# Patient Record
Sex: FEMALE | Race: WHITE | Employment: OTHER | RURAL
[De-identification: names, ages, dates, MRNs, and addresses within clinical notes are randomized per-mention and may not be internally consistent; named-entity substitution may affect disease eponyms.]

---

## 2021-03-11 RX ORDER — CARVEDILOL 3.12 MG/1
3.12 TABLET ORAL 2 TIMES DAILY WITH MEALS
COMMUNITY
End: 2021-12-06

## 2021-03-11 RX ORDER — DICLOFENAC POTASSIUM 50 MG/1
50 TABLET, FILM COATED ORAL DAILY
COMMUNITY
End: 2021-12-06

## 2021-03-11 RX ORDER — ASPIRIN 81 MG/1
81 TABLET ORAL DAILY
COMMUNITY

## 2021-03-11 RX ORDER — LOSARTAN POTASSIUM 50 MG/1
50 TABLET ORAL DAILY
COMMUNITY
End: 2021-12-06

## 2021-03-11 RX ORDER — OMEPRAZOLE 20 MG/1
20 CAPSULE, DELAYED RELEASE ORAL DAILY
COMMUNITY

## 2021-03-11 NOTE — PERIOP NOTES
[address] [place] [address]  SURGICAL PRE-ADMISSION INSTRUCTIONS    ARRIVAL  · You will be called the day before your surgery with your expected arrival time. · Sign in at the  of the hospital.  You will be directed to the Surgical Waiting Room. · Please arrive at your scheduled appointment time. You have been scheduled to arrive for your procedure one or two hours prior to the expected start time of your procedure. · Every effort will be made to minimize your wait but please be aware that unforeseen circumstances may affect our schedule. EATING  · DO NOT EAT OR DRINK ANYTHING AFTER MIDNIGHT ON THE EVENING BEFORE YOUR SURGERY OR ON THE DAY OF YOUR SURGERY except for your medications (as instructed) with a sip of water. · Do not use gum, mints or lozenges on the morning of your surgery. · Please do not smoke or chew tobacco before your surgery. MEDICATIONS   · Take the following medications on the morning of your surgery with the smallest amount of water possible : Omeprazole and Carvedilol    STOP THESE MEDICATIONS AT THE TIMES LISTED BELOW  Aspirin ;  7 days before                                                   DRIVING/TRANSPORATION  · Have a responsible adult to drive you home from the hospital and to stay with you over night. Please have them plan to remain in the hospital during your surgery. Your surgery will not be done if you do not have a responsible adult to take you home and to stay with you. · If you have arranged for public transport, you must have a responsible adult to ride with you (who is not the ). · You may not drive for 24 hours after anesthesia. PREPARATION  · If you have a Living WiIl/Advance Directive, please bring a copy with you to scan into your chart. · Please DO NOT wear makeup or nail polish  · Please leave valuables at home,  DO NOT wear jewelry.   · Wear loose, comfortable clothing that is large enough to cover a bulky dressing. SPECIAL INSTRUCTIONS:  · Shower with the Preoperative Skin Preparation CHLORHEXIDINE as instructed.     Reviewed above preoperative instructions and answered questions by phone interview    Patient:  Stefanie Chamberlainmonds   Date:     March 11, 2021  Time:   2:17 PM    RN:  Gaetano Diaz RN    Date:     March 11, 2021  Time:   2:17 PM

## 2021-03-15 ENCOUNTER — ANESTHESIA EVENT (OUTPATIENT)
Dept: SURGERY | Age: 86
End: 2021-03-15
Payer: MEDICARE

## 2021-03-15 PROBLEM — G56.02 LEFT CARPAL TUNNEL SYNDROME: Status: ACTIVE | Noted: 2021-03-15

## 2021-03-15 NOTE — H&P
History and Physical    Patient: Sushila Marcelo MRN: [id number]  SSN: [id number]    YOB: 1935  Age: 81 y.o. Sex: female      Subjective:      Sushila Marcelo is a 81 y.o. female who  Presents with left hand pain and paresthesias with permanent sensory change into the thumb. She has been diagnosed with carpal tunnel syndrome on the left. She has tried to use splints and manage nonsurgically without relief. She is here for surgical release of her carpal tunnel syndrome. .     Past Medical History:   Diagnosis Date    Anxiety     Hypertension      Past Surgical History:   Procedure Laterality Date    HX KNEE REPLACEMENT Right 2011    HX KNEE REPLACEMENT Left 2013      History reviewed. No pertinent family history. Social History     Tobacco Use    Smoking status: Never Smoker    Smokeless tobacco: Never Used   Substance Use Topics    Alcohol use: Not Currently      Prior to Admission medications    Medication Sig Start Date End Date Taking? Authorizing Provider   diclofenac potassium (CATAFLAM) 50 mg tablet Take 50 mg by mouth daily. Yes Provider, Historical   losartan (COZAAR) 50 mg tablet Take 50 mg by mouth daily. Yes Provider, Historical   carvediloL (COREG) 3.125 mg tablet Take 3.125 mg by mouth two (2) times daily (with meals). Yes Provider, Historical   omeprazole (PRILOSEC) 20 mg capsule Take 20 mg by mouth daily. Yes Provider, Historical   aspirin delayed-release 81 mg tablet Take 81 mg by mouth daily. Yes Provider, Historical   triamterene-hydrochlorothiazide (MAXZIDE) 37.5-25 mg per tablet take 1 tablet by mouth once daily for fluid retention and blood pressure 8/4/15  Yes Roberto Carlos Noble MD   simvastatin (ZOCOR) 40 mg tablet Take 1 Tab by mouth nightly.  For cholesterol 5/19/14  Yes Brenda Oropeza NP   verapamil SR (CALAN-SR) 240 mg CR tablet take 1 tablet by mouth NIGHTLY 5/5/14  Yes Roberto Carlos Noble MD   ALPRAZolam [place]) 0.25 mg tablet take 1 tablet by mouth three times a day if needed for anxiety 2/17/14  Yes Gerardo Oropeza NP   calcium-vitamin, d3, (OS-MARINA 250) 250-125 mg-unit tablet Take 1 Tab by mouth daily. Yes Provider, Historical   MULTIVITS W-FE,OTHER MIN (CENTRUM PO) Take  by mouth. Yes Provider, Historical        Allergies   Allergen Reactions    Novocain [Procaine] Anaphylaxis     Throat swelling    Ace Inhibitors Cough    Penicillins Rash       Review of Systems:  A comprehensive review of systems was negative. Objective:     Vitals:    03/11/21 1350   Weight: 83.9 kg (184 lb 15.5 oz)   Height: 5' 7\" (1.702 m)        Physical Exam:  GENERAL: alert, cooperative, no distress, appears stated age  LUNG: clear to auscultation bilaterally  HEART: regular rate and rhythm, S1, S2 normal, no murmur, click, rub or gallop  ABDOMEN: soft, non-tender. Bowel sounds normal. No masses,  no organomegaly  EXTREMITIES:  extremities normal, atraumatic, no cyanosis or edema  SKIN: Normal  NEUROLOGIC: permanent paresthesia in left thumb.     Assessment:     Hospital Problems  Date Reviewed: 3/15/2021          Codes Class Noted POA    * (Principal) Left carpal tunnel syndrome ICD-10-CM: G56.02  ICD-9-CM: 354.0  3/15/2021 Yes              Plan:       Left carpal tunnel release    Signed By: Loyda Garcia MD     March 15, 2021

## 2021-03-16 ENCOUNTER — ANESTHESIA (OUTPATIENT)
Dept: SURGERY | Age: 86
End: 2021-03-16
Payer: MEDICARE

## 2021-03-16 ENCOUNTER — HOSPITAL ENCOUNTER (OUTPATIENT)
Age: 86
Setting detail: OUTPATIENT SURGERY
Discharge: HOME | End: 2021-03-16
Attending: ORTHOPAEDIC SURGERY | Admitting: ORTHOPAEDIC SURGERY
Payer: MEDICARE

## 2021-03-16 VITALS
BODY MASS INDEX: 29.03 KG/M2 | TEMPERATURE: 97 F | HEART RATE: 67 BPM | WEIGHT: 184.97 LBS | DIASTOLIC BLOOD PRESSURE: 61 MMHG | SYSTOLIC BLOOD PRESSURE: 154 MMHG | HEIGHT: 67 IN | OXYGEN SATURATION: 95 % | RESPIRATION RATE: 13 BRPM

## 2021-03-16 DIAGNOSIS — G56.02 LEFT CARPAL TUNNEL SYNDROME: Primary | ICD-10-CM

## 2021-03-16 LAB
ANION GAP SERPL CALC-SCNC: 8 MMOL/L (ref 5–15)
BASOPHILS # BLD: 0 K/UL (ref 0–0.1)
BASOPHILS NFR BLD: 0 % (ref 0–1)
BUN SERPL-MCNC: 38 MG/DL (ref 6–20)
BUN/CREAT SERPL: 25 (ref 12–20)
CALCIUM SERPL-MCNC: 8.9 MG/DL (ref 8.5–10.1)
CHLORIDE SERPL-SCNC: 105 MMOL/L (ref 97–108)
CO2 SERPL-SCNC: 27 MMOL/L (ref 21–32)
CREAT SERPL-MCNC: 1.5 MG/DL (ref 0.55–1.02)
DIFFERENTIAL METHOD BLD: NORMAL
EOSINOPHIL # BLD: 0.4 K/UL (ref 0–0.4)
EOSINOPHIL NFR BLD: 5 % (ref 0–7)
ERYTHROCYTE [DISTWIDTH] IN BLOOD BY AUTOMATED COUNT: 12.7 % (ref 11.5–14.5)
GLUCOSE SERPL-MCNC: 155 MG/DL (ref 65–100)
HCT VFR BLD AUTO: 38.1 % (ref 35–47)
HGB BLD-MCNC: 12.3 G/DL (ref 11.5–16)
IMM GRANULOCYTES # BLD AUTO: 0 K/UL (ref 0–0.04)
IMM GRANULOCYTES NFR BLD AUTO: 0 % (ref 0–0.5)
LYMPHOCYTES # BLD: 2.2 K/UL (ref 0.8–3.5)
LYMPHOCYTES NFR BLD: 25 % (ref 12–49)
MCH RBC QN AUTO: 30.4 PG (ref 26–34)
MCHC RBC AUTO-ENTMCNC: 32.3 G/DL (ref 30–36.5)
MCV RBC AUTO: 94.1 FL (ref 80–99)
MONOCYTES # BLD: 0.9 K/UL (ref 0–1)
MONOCYTES NFR BLD: 10 % (ref 5–13)
NEUTS SEG # BLD: 5.5 K/UL (ref 1.8–8)
NEUTS SEG NFR BLD: 60 % (ref 32–75)
NRBC # BLD: 0 K/UL (ref 0–0.01)
NRBC BLD-RTO: 0 PER 100 WBC
PLATELET # BLD AUTO: 335 K/UL (ref 150–400)
PMV BLD AUTO: 9.2 FL (ref 8.9–12.9)
POTASSIUM SERPL-SCNC: 5.3 MMOL/L (ref 3.5–5.1)
RBC # BLD AUTO: 4.05 M/UL (ref 3.8–5.2)
SODIUM SERPL-SCNC: 140 MMOL/L (ref 136–145)
WBC # BLD AUTO: 9 K/UL (ref 3.6–11)

## 2021-03-16 PROCEDURE — 76010000154 HC OR TIME FIRST 0.5 HR: Performed by: ORTHOPAEDIC SURGERY

## 2021-03-16 PROCEDURE — 74011250636 HC RX REV CODE- 250/636: Performed by: ORTHOPAEDIC SURGERY

## 2021-03-16 PROCEDURE — 76060000031 HC ANESTHESIA FIRST 0.5 HR: Performed by: ORTHOPAEDIC SURGERY

## 2021-03-16 PROCEDURE — 85025 COMPLETE CBC W/AUTO DIFF WBC: CPT

## 2021-03-16 PROCEDURE — 76210000063 HC OR PH I REC FIRST 0.5 HR: Performed by: ORTHOPAEDIC SURGERY

## 2021-03-16 PROCEDURE — 77030000032 HC CUF TRNQT ZIMM -B: Performed by: ORTHOPAEDIC SURGERY

## 2021-03-16 PROCEDURE — 36415 COLL VENOUS BLD VENIPUNCTURE: CPT

## 2021-03-16 PROCEDURE — 74011250637 HC RX REV CODE- 250/637: Performed by: ANESTHESIOLOGY

## 2021-03-16 PROCEDURE — 77030002916 HC SUT ETHLN J&J -A: Performed by: ORTHOPAEDIC SURGERY

## 2021-03-16 PROCEDURE — 93005 ELECTROCARDIOGRAM TRACING: CPT

## 2021-03-16 PROCEDURE — 80048 BASIC METABOLIC PNL TOTAL CA: CPT

## 2021-03-16 PROCEDURE — 74011250636 HC RX REV CODE- 250/636: Performed by: ANESTHESIOLOGY

## 2021-03-16 PROCEDURE — 2709999900 HC NON-CHARGEABLE SUPPLY: Performed by: ORTHOPAEDIC SURGERY

## 2021-03-16 PROCEDURE — 74011000250 HC RX REV CODE- 250: Performed by: ORTHOPAEDIC SURGERY

## 2021-03-16 RX ORDER — BUPIVACAINE HYDROCHLORIDE 5 MG/ML
INJECTION, SOLUTION EPIDURAL; INTRACAUDAL AS NEEDED
Status: DISCONTINUED | OUTPATIENT
Start: 2021-03-16 | End: 2021-03-16 | Stop reason: HOSPADM

## 2021-03-16 RX ORDER — HYDROCODONE BITARTRATE AND ACETAMINOPHEN 5; 325 MG/1; MG/1
1 TABLET ORAL
Qty: 12 TAB | Refills: 0 | Status: SHIPPED | OUTPATIENT
Start: 2021-03-16 | End: 2021-03-19

## 2021-03-16 RX ORDER — SODIUM CHLORIDE, SODIUM LACTATE, POTASSIUM CHLORIDE, CALCIUM CHLORIDE 600; 310; 30; 20 MG/100ML; MG/100ML; MG/100ML; MG/100ML
25 INJECTION, SOLUTION INTRAVENOUS CONTINUOUS
Status: DISCONTINUED | OUTPATIENT
Start: 2021-03-16 | End: 2021-03-16 | Stop reason: HOSPADM

## 2021-03-16 RX ORDER — ACETAMINOPHEN 500 MG
1000 TABLET ORAL ONCE
Status: COMPLETED | OUTPATIENT
Start: 2021-03-16 | End: 2021-03-16

## 2021-03-16 RX ORDER — LABETALOL HCL 20 MG/4 ML
SYRINGE (ML) INTRAVENOUS AS NEEDED
Status: DISCONTINUED | OUTPATIENT
Start: 2021-03-16 | End: 2021-03-16 | Stop reason: HOSPADM

## 2021-03-16 RX ORDER — PROPOFOL 10 MG/ML
INJECTION, EMULSION INTRAVENOUS AS NEEDED
Status: DISCONTINUED | OUTPATIENT
Start: 2021-03-16 | End: 2021-03-16 | Stop reason: HOSPADM

## 2021-03-16 RX ORDER — BUPIVACAINE HYDROCHLORIDE 5 MG/ML
20 INJECTION, SOLUTION EPIDURAL; INTRACAUDAL ONCE
Status: DISCONTINUED | OUTPATIENT
Start: 2021-03-16 | End: 2021-03-16 | Stop reason: HOSPADM

## 2021-03-16 RX ADMIN — PROPOFOL 30 MG: 10 INJECTION, EMULSION INTRAVENOUS at 11:28

## 2021-03-16 RX ADMIN — LABETALOL 20 MG/4 ML (5 MG/ML) INTRAVENOUS SYRINGE 10 MG: at 11:20

## 2021-03-16 RX ADMIN — SODIUM CHLORIDE, POTASSIUM CHLORIDE, SODIUM LACTATE AND CALCIUM CHLORIDE 25 ML/HR: 600; 310; 30; 20 INJECTION, SOLUTION INTRAVENOUS at 10:41

## 2021-03-16 RX ADMIN — ACETAMINOPHEN 1000 MG: 500 TABLET ORAL at 11:12

## 2021-03-16 RX ADMIN — PROPOFOL 50 MG: 10 INJECTION, EMULSION INTRAVENOUS at 11:21

## 2021-03-16 NOTE — ANESTHESIA POSTPROCEDURE EVALUATION
Post-Anesthesia Evaluation and Assessment    Cardiovascular Function/Vital Signs  Visit Vitals  /63   Pulse 62   Temp 36.1 °C (97 °F)   Resp 18   Ht 5' 7\" (1.702 m)   Wt 83.9 kg (184 lb 15.5 oz)   SpO2 97%   BMI 28.97 kg/m²       Patient is status post Procedure(s) with comments:  LEFT HAND CARPAL TUNNEL RELEASE - [facility] 3/11/21. Nausea/Vomiting: Controlled. Postoperative hydration reviewed and adequate. Pain:  Pain Scale 1: Numeric (0 - 10) (03/16/21 1145)  Pain Intensity 1: 0 (03/16/21 1145)   Managed. Neurological Status:   Neuro (WDL): Within Defined Limits (03/16/21 1145)   At baseline. Mental Status and Level of Consciousness: Arousable. Pulmonary Status:   O2 Device: Nasal cannula (03/16/21 1148)   Adequate oxygenation and airway patent. Complications related to anesthesia: None    Post-anesthesia assessment completed. No concerns. Patient has met all discharge requirements. Signed By: Ivette Mina MD    March 16, 2021               Procedure(s):  LEFT HAND CARPAL TUNNEL RELEASE. MAC    <BSHSIANPOST>    INITIAL Post-op Vital signs:   Vitals Value Taken Time   /63 03/16/21 1148   Temp 36.1 °C (97 °F) 03/16/21 1148   Pulse 58 03/16/21 1149   Resp 21 03/16/21 1149   SpO2 96 % 03/16/21 1149   Vitals shown include unvalidated device data.

## 2021-03-16 NOTE — DISCHARGE INSTRUCTIONS
Miki Richardson MD      POST-OPERATIVE INSTRUCTIONS  Carpal Tunnel Release    Patient Name  Reinaldo Medina  Date of procedure  3/16/2021    Procedure  Procedure(s) with comments:  LEFT HAND CARPAL TUNNEL RELEASE - [facility] 3/11/21  Surgeon  Surgeon(s) and Role:     * Lesvia Gan MD - Primary  Date of discharge: No discharge date for patient encounter. PCP: Christiana Gray MD      1. First meal at home should be clear liquids. Progress to regular diet as tolerated. 2. Apply an ice bag or use cold therapy device for 20-30 minutes 4 times a day for the first 48-72 hours to reduce swelling and pain and then use as desired. 3. A prescription for pain medication will be provided to you on the day of surgery. Do not take any aspirin for one week after surgery. Please inform us of any allergies. 4. You may remove the bulky dressing 48 hours after surgery and leave the clear dressing in place. You may shower with clear dressing in place. You may remove the clear dressing 5 days after your surgery, if necessary, and cover with a bandaid. Avoid any lifting, gripping, or use with that hand until follow-up appointment. 5. A post-op appointment has been scheduled for you. Please call the office if you need to re-schedule your appointment for another day or time ([phone]). 6. If you develop a fever greater than 101, unexpected redness or swelling, please call the office immediately. Some bleeding and or drainage can be expected the first few days after surgery. Thank you for following the above instructions. If you have any questions, please call my office and the  will put you in touch with one of my assistants ([phone]).     ______________________________________________________________________    Anesthesia Discharge Instructions    After general anesthesia or intervenous sedation, for 24 hours or while taking prescription Narcotics:  · Limit your activities  · Do not drive or operate hazardous machinery  · If you have not urinated within 8 hours after discharge, please contact your surgeon on call. · Do not make important personal or business decisions  · Do not drink alcoholic beverages    Report the following to your surgeon:  · Excessive pain, swelling, redness or odor of or around the surgical area  · Temperature over 100.5 degrees  · Nausea and vomiting lasting longer than 4 hours or if unable to take medication  · Any signs of decreased circulation or nerve impairment to extremity:  Change in color, persistent numbness, tingling, coldness or increased pain.   · Any questions

## 2021-03-16 NOTE — ANESTHESIA PREPROCEDURE EVALUATION
Relevant Problems   No relevant active problems       Anesthetic History   No history of anesthetic complications            Review of Systems / Medical History  Patient summary reviewed, nursing notes reviewed and pertinent labs reviewed    Pulmonary  Within defined limits                 Neuro/Psych   Within defined limits           Cardiovascular    Hypertension              Exercise tolerance: >4 METS     GI/Hepatic/Renal     GERD: well controlled           Endo/Other    Diabetes: well controlled    Arthritis     Other Findings              Physical Exam    Airway  Mallampati: II  TM Distance: 4 - 6 cm  Neck ROM: normal range of motion   Mouth opening: Normal     Cardiovascular               Dental  No notable dental hx       Pulmonary                 Abdominal  GI exam deferred       Other Findings            Anesthetic Plan    ASA: 2  Anesthesia type: MAC          Induction: Intravenous  Anesthetic plan and risks discussed with: Patient

## 2021-03-16 NOTE — OP NOTES
NAME: Sushila Marcelo  Surgeon: Papi Jj MD         Preoperative Diagnosis: left Carpal Tunnel Syndrome    Postoperative Diagnosis: left Carpal Tunnel Syndrome    Surgeon: Papi Jj MD    Specimen removed: none    Estimated blood loss: minimal    Anesthesia: LOcal/ MAC    Indications: A 81 y.o. female with pain and paresthesias in the median nerve distribution, not responsive to conservative management with positive   EMG. Description of Procedure: The patient was taken to the operating room and given conscious sedation. The right hand and arm were prepped and draped in   the usual fashion. Lidocaine 1% was used to infiltrate the area for the   proposed incision. The hand was elevated and manually exsanguinated and the   tourniquet inflated to 250 mmHg. A longitudinal palmar incision was made in line with the ring finger down   through skin and subcutaneous tissue. A self-retaining retractor was   placed. The transverse carpal ligament was identified and transected down   to the underlying carpal canal. The median nerve was identified. A dilator   was advanced just underneath the ligament, thereby protecting the nerve. A   15 blade was advanced down the groove with the dilator releasing the   ligament distally well into the distal palmar fascia. The dilator was then   advanced proximally again just underneath the ligament, thereby protecting   the nerve. A 15 blade was advanced down the groove with the dilator   releasing the ligament proximally well up in the distal forearm fascia. I then evaluated the nerve, noted it to be intact and free of further   compression. The skin edges were approximated using 4-0 nylon in horizontal   mattress fashion. A bulky sterile dressing was applied and the tourniquet   was let down after a total tourniquet time of 9 minutes. The patient was   then transferred to the recovery room in stable condition. There were no   Complications.      Estimated Blood Loss: Minimal    Specimen: None    Reviewed on 3/16/2021 at 11:43 AM    Destiny Matute MD

## 2021-03-19 LAB
ATRIAL RATE: 61 BPM
CALCULATED P AXIS, ECG09: 71 DEGREES
CALCULATED R AXIS, ECG10: 52 DEGREES
CALCULATED T AXIS, ECG11: 64 DEGREES
DIAGNOSIS, 93000: NORMAL
P-R INTERVAL, ECG05: 174 MS
Q-T INTERVAL, ECG07: 428 MS
QRS DURATION, ECG06: 96 MS
QTC CALCULATION (BEZET), ECG08: 430 MS
VENTRICULAR RATE, ECG03: 61 BPM

## 2021-08-04 ENCOUNTER — HOSPITAL ENCOUNTER (OUTPATIENT)
Dept: GENERAL RADIOLOGY | Age: 86
Discharge: HOME | End: 2021-08-04
Payer: MEDICARE

## 2021-08-04 ENCOUNTER — TRANSCRIBE ORDER (OUTPATIENT)
Dept: REGISTRATION | Age: 86
End: 2021-08-04

## 2021-08-04 DIAGNOSIS — M25.551 RIGHT HIP PAIN: Primary | ICD-10-CM

## 2021-08-04 DIAGNOSIS — M25.551 RIGHT HIP PAIN: ICD-10-CM

## 2021-08-04 PROCEDURE — 73502 X-RAY EXAM HIP UNI 2-3 VIEWS: CPT

## 2021-11-14 ENCOUNTER — HOSPITAL ENCOUNTER (INPATIENT)
Age: 86
LOS: 22 days | Discharge: NURSING, REHAB OR LONG-TERM CARE | DRG: 233 | End: 2021-12-06
Attending: INTERNAL MEDICINE | Admitting: THORACIC SURGERY (CARDIOTHORACIC VASCULAR SURGERY)
Payer: MEDICARE

## 2021-11-14 ENCOUNTER — APPOINTMENT (OUTPATIENT)
Dept: NON INVASIVE DIAGNOSTICS | Age: 86
DRG: 233 | End: 2021-11-14
Attending: INTERNAL MEDICINE
Payer: MEDICARE

## 2021-11-14 DIAGNOSIS — Z95.1 S/P CABG X 4: ICD-10-CM

## 2021-11-14 DIAGNOSIS — E11.9 TYPE II OR UNSPECIFIED TYPE DIABETES MELLITUS WITHOUT MENTION OF COMPLICATION, NOT STATED AS UNCONTROLLED: ICD-10-CM

## 2021-11-14 DIAGNOSIS — I21.4 NSTEMI (NON-ST ELEVATED MYOCARDIAL INFARCTION) (HCC): ICD-10-CM

## 2021-11-14 LAB
ANION GAP SERPL CALC-SCNC: 5 MMOL/L (ref 5–15)
BUN SERPL-MCNC: 17 MG/DL (ref 6–20)
BUN/CREAT SERPL: 13 (ref 12–20)
CALCIUM SERPL-MCNC: 9.6 MG/DL (ref 8.5–10.1)
CHLORIDE SERPL-SCNC: 103 MMOL/L (ref 97–108)
CO2 SERPL-SCNC: 25 MMOL/L (ref 21–32)
CREAT SERPL-MCNC: 1.34 MG/DL (ref 0.55–1.02)
ERYTHROCYTE [DISTWIDTH] IN BLOOD BY AUTOMATED COUNT: 12.9 % (ref 11.5–14.5)
GLUCOSE BLD STRIP.AUTO-MCNC: 114 MG/DL (ref 65–117)
GLUCOSE BLD STRIP.AUTO-MCNC: 139 MG/DL (ref 65–117)
GLUCOSE BLD STRIP.AUTO-MCNC: 147 MG/DL (ref 65–117)
GLUCOSE BLD STRIP.AUTO-MCNC: 180 MG/DL (ref 65–117)
GLUCOSE SERPL-MCNC: 147 MG/DL (ref 65–100)
HCT VFR BLD AUTO: 39.1 % (ref 35–47)
HGB BLD-MCNC: 12.6 G/DL (ref 11.5–16)
MCH RBC QN AUTO: 30.6 PG (ref 26–34)
MCHC RBC AUTO-ENTMCNC: 32.2 G/DL (ref 30–36.5)
MCV RBC AUTO: 94.9 FL (ref 80–99)
NRBC # BLD: 0 K/UL (ref 0–0.01)
NRBC BLD-RTO: 0 PER 100 WBC
PLATELET # BLD AUTO: 351 K/UL (ref 150–400)
PMV BLD AUTO: 9 FL (ref 8.9–12.9)
POTASSIUM SERPL-SCNC: 4.4 MMOL/L (ref 3.5–5.1)
RBC # BLD AUTO: 4.12 M/UL (ref 3.8–5.2)
SERVICE CMNT-IMP: ABNORMAL
SERVICE CMNT-IMP: NORMAL
SODIUM SERPL-SCNC: 133 MMOL/L (ref 136–145)
TROPONIN-HIGH SENSITIVITY: 406 NG/L (ref 0–51)
WBC # BLD AUTO: 9.4 K/UL (ref 3.6–11)

## 2021-11-14 PROCEDURE — 51798 US URINE CAPACITY MEASURE: CPT

## 2021-11-14 PROCEDURE — 36415 COLL VENOUS BLD VENIPUNCTURE: CPT

## 2021-11-14 PROCEDURE — 93306 TTE W/DOPPLER COMPLETE: CPT

## 2021-11-14 PROCEDURE — 80048 BASIC METABOLIC PNL TOTAL CA: CPT

## 2021-11-14 PROCEDURE — 84484 ASSAY OF TROPONIN QUANT: CPT

## 2021-11-14 PROCEDURE — 65660000000 HC RM CCU STEPDOWN

## 2021-11-14 PROCEDURE — 85027 COMPLETE CBC AUTOMATED: CPT

## 2021-11-14 PROCEDURE — 93005 ELECTROCARDIOGRAM TRACING: CPT

## 2021-11-14 PROCEDURE — 74011250637 HC RX REV CODE- 250/637: Performed by: INTERNAL MEDICINE

## 2021-11-14 PROCEDURE — 82962 GLUCOSE BLOOD TEST: CPT

## 2021-11-14 RX ORDER — CARVEDILOL 6.25 MG/1
6.25 TABLET ORAL 2 TIMES DAILY WITH MEALS
Status: DISCONTINUED | OUTPATIENT
Start: 2021-11-14 | End: 2021-11-15

## 2021-11-14 RX ORDER — DEXTROSE 50 % IN WATER (D50W) INTRAVENOUS SYRINGE
12.5-25 AS NEEDED
Status: DISCONTINUED | OUTPATIENT
Start: 2021-11-14 | End: 2021-11-19

## 2021-11-14 RX ORDER — CLONIDINE HYDROCHLORIDE 0.1 MG/1
0.2 TABLET ORAL 2 TIMES DAILY
Status: DISCONTINUED | OUTPATIENT
Start: 2021-11-14 | End: 2021-11-15

## 2021-11-14 RX ORDER — INSULIN LISPRO 100 [IU]/ML
INJECTION, SOLUTION INTRAVENOUS; SUBCUTANEOUS
Status: DISCONTINUED | OUTPATIENT
Start: 2021-11-14 | End: 2021-11-19

## 2021-11-14 RX ORDER — CARVEDILOL 3.12 MG/1
3.12 TABLET ORAL 2 TIMES DAILY WITH MEALS
Status: DISCONTINUED | OUTPATIENT
Start: 2021-11-14 | End: 2021-11-14

## 2021-11-14 RX ORDER — LOSARTAN POTASSIUM 50 MG/1
50 TABLET ORAL DAILY
Status: DISCONTINUED | OUTPATIENT
Start: 2021-11-14 | End: 2021-11-19

## 2021-11-14 RX ORDER — ACETAMINOPHEN 325 MG/1
650 TABLET ORAL
Status: DISCONTINUED | OUTPATIENT
Start: 2021-11-14 | End: 2021-11-19

## 2021-11-14 RX ORDER — ALPRAZOLAM 0.5 MG/1
0.5 TABLET ORAL AS NEEDED
Status: DISCONTINUED | OUTPATIENT
Start: 2021-11-14 | End: 2021-12-06 | Stop reason: HOSPADM

## 2021-11-14 RX ORDER — LABETALOL HYDROCHLORIDE 5 MG/ML
10 INJECTION, SOLUTION INTRAVENOUS
Status: DISCONTINUED | OUTPATIENT
Start: 2021-11-14 | End: 2021-11-19

## 2021-11-14 RX ORDER — SODIUM CHLORIDE 0.9 % (FLUSH) 0.9 %
5-40 SYRINGE (ML) INJECTION AS NEEDED
Status: DISCONTINUED | OUTPATIENT
Start: 2021-11-14 | End: 2021-11-19

## 2021-11-14 RX ORDER — VERAPAMIL HYDROCHLORIDE 120 MG/1
240 TABLET, FILM COATED, EXTENDED RELEASE ORAL
Status: DISCONTINUED | OUTPATIENT
Start: 2021-11-14 | End: 2021-11-19

## 2021-11-14 RX ORDER — MAGNESIUM SULFATE 100 %
4 CRYSTALS MISCELLANEOUS AS NEEDED
Status: DISCONTINUED | OUTPATIENT
Start: 2021-11-14 | End: 2021-11-19

## 2021-11-14 RX ORDER — SODIUM CHLORIDE 0.9 % (FLUSH) 0.9 %
5-40 SYRINGE (ML) INJECTION EVERY 8 HOURS
Status: DISCONTINUED | OUTPATIENT
Start: 2021-11-14 | End: 2021-11-18

## 2021-11-14 RX ORDER — SODIUM CHLORIDE 9 MG/ML
50 INJECTION, SOLUTION INTRAVENOUS CONTINUOUS
Status: DISCONTINUED | OUTPATIENT
Start: 2021-11-14 | End: 2021-11-14

## 2021-11-14 RX ORDER — MORPHINE SULFATE 2 MG/ML
1 INJECTION, SOLUTION INTRAMUSCULAR; INTRAVENOUS
Status: DISCONTINUED | OUTPATIENT
Start: 2021-11-14 | End: 2021-11-19

## 2021-11-14 RX ORDER — ATORVASTATIN CALCIUM 20 MG/1
20 TABLET, FILM COATED ORAL
Status: DISCONTINUED | OUTPATIENT
Start: 2021-11-14 | End: 2021-11-19

## 2021-11-14 RX ORDER — FERROUS SULFATE, DRIED 160(50) MG
1 TABLET, EXTENDED RELEASE ORAL DAILY
Status: DISCONTINUED | OUTPATIENT
Start: 2021-11-14 | End: 2021-12-06 | Stop reason: HOSPADM

## 2021-11-14 RX ORDER — ASPIRIN 81 MG/1
81 TABLET ORAL DAILY
Status: DISCONTINUED | OUTPATIENT
Start: 2021-11-14 | End: 2021-11-19

## 2021-11-14 RX ORDER — PANTOPRAZOLE SODIUM 40 MG/1
40 TABLET, DELAYED RELEASE ORAL
Status: DISCONTINUED | OUTPATIENT
Start: 2021-11-14 | End: 2021-11-19

## 2021-11-14 RX ORDER — ONDANSETRON 2 MG/ML
4 INJECTION INTRAMUSCULAR; INTRAVENOUS
Status: DISCONTINUED | OUTPATIENT
Start: 2021-11-14 | End: 2021-11-17 | Stop reason: SDUPTHER

## 2021-11-14 RX ADMIN — Medication 10 ML: at 22:04

## 2021-11-14 RX ADMIN — ATORVASTATIN CALCIUM 20 MG: 20 TABLET, FILM COATED ORAL at 22:04

## 2021-11-14 RX ADMIN — CARVEDILOL 6.25 MG: 6.25 TABLET, FILM COATED ORAL at 06:59

## 2021-11-14 RX ADMIN — Medication 1 TABLET: at 08:59

## 2021-11-14 RX ADMIN — Medication 10 ML: at 09:00

## 2021-11-14 RX ADMIN — CLONIDINE HYDROCHLORIDE 0.2 MG: 0.1 TABLET ORAL at 17:00

## 2021-11-14 RX ADMIN — ASPIRIN 81 MG: 81 TABLET, COATED ORAL at 09:00

## 2021-11-14 RX ADMIN — CARVEDILOL 6.25 MG: 6.25 TABLET, FILM COATED ORAL at 17:00

## 2021-11-14 RX ADMIN — VERAPAMIL HYDROCHLORIDE 240 MG: 120 TABLET, FILM COATED, EXTENDED RELEASE ORAL at 22:03

## 2021-11-14 RX ADMIN — Medication 10 ML: at 14:54

## 2021-11-14 RX ADMIN — PANTOPRAZOLE SODIUM 40 MG: 40 TABLET, DELAYED RELEASE ORAL at 08:59

## 2021-11-14 RX ADMIN — ACETAMINOPHEN 650 MG: 325 TABLET ORAL at 06:59

## 2021-11-14 RX ADMIN — CLONIDINE HYDROCHLORIDE 0.2 MG: 0.1 TABLET ORAL at 06:59

## 2021-11-14 NOTE — PROGRESS NOTES
Problem: Falls - Risk of  Goal: *Absence of Falls  Description: Document Julio Effingham Fall Risk and appropriate interventions in the flowsheet.   Outcome: Progressing Towards Goal  Note: Fall Risk Interventions:  Mobility Interventions: Bed/chair exit alarm, Patient to call before getting OOB, Strengthening exercises (ROM-active/passive)         Medication Interventions: Bed/chair exit alarm, Patient to call before getting OOB, Teach patient to arise slowly    Elimination Interventions: Bed/chair exit alarm, Call light in reach, Patient to call for help with toileting needs, Stay With Me (per policy), Toilet paper/wipes in reach              Problem: Patient Education: Go to Patient Education Activity  Goal: Patient/Family Education  Outcome: Progressing Towards Goal     Problem: Patient Education: Go to Patient Education Activity  Goal: Patient/Family Education  Outcome: Progressing Towards Goal     Problem: Unstable angina/NSTEMI: Day of Admission/Day 1  Goal: Off Pathway (Use only if patient is Off Pathway)  Outcome: Progressing Towards Goal  Goal: Activity/Safety  Outcome: Progressing Towards Goal  Goal: Consults, if ordered  Outcome: Progressing Towards Goal  Goal: Diagnostic Test/Procedures  Outcome: Progressing Towards Goal  Goal: Nutrition/Diet  Outcome: Progressing Towards Goal  Goal: Discharge Planning  Outcome: Progressing Towards Goal  Goal: Medications  Outcome: Progressing Towards Goal  Goal: Respiratory  Outcome: Progressing Towards Goal  Goal: Treatments/Interventions/Procedures  Outcome: Progressing Towards Goal  Goal: Psychosocial  Outcome: Progressing Towards Goal  Goal: *Hemodynamically stable  Outcome: Progressing Towards Goal  Goal: *Optimal pain control at patient's stated goal  Outcome: Progressing Towards Goal  Goal: *Lungs clear or at baseline  Outcome: Progressing Towards Goal     Problem: Unstable angina/NSTEMI: Day 2  Goal: Off Pathway (Use only if patient is Off Pathway)  Outcome: Progressing Towards Goal  Goal: Activity/Safety  Outcome: Progressing Towards Goal  Goal: Consults, if ordered  Outcome: Progressing Towards Goal  Goal: Diagnostic Test/Procedures  Outcome: Progressing Towards Goal  Goal: Nutrition/Diet  Outcome: Progressing Towards Goal  Goal: Discharge Planning  Outcome: Progressing Towards Goal  Goal: Medications  Outcome: Progressing Towards Goal  Goal: Respiratory  Outcome: Progressing Towards Goal  Goal: Treatments/Interventions/Procedures  Outcome: Progressing Towards Goal  Goal: Psychosocial  Outcome: Progressing Towards Goal  Goal: *Hemodynamically stable  Outcome: Progressing Towards Goal  Goal: *Optimal pain control at patient's stated goal  Outcome: Progressing Towards Goal  Goal: *Lungs clear or at baseline  Outcome: Progressing Towards Goal     Problem: Unstable Angina/NSTEMI: Discharge Outcomes  Goal: *Hemodynamically stable  Outcome: Progressing Towards Goal  Goal: *Stable cardiac rhythm  Outcome: Progressing Towards Goal  Goal: *Lungs clear or at baseline  Outcome: Progressing Towards Goal  Goal: *Optimal pain control at patient's stated goal  Outcome: Progressing Towards Goal  Goal: *Identifies cardiac risk factors  Outcome: Progressing Towards Goal  Goal: *Verbalizes home exercise program, activity guidelines, cardiac precautions  Outcome: Progressing Towards Goal  Goal: *Verbalizes understanding and describes prescribed diet  Outcome: Progressing Towards Goal  Goal: *Verbalizes name, dosage, time, side effects, and number of days to continue medications  Outcome: Progressing Towards Goal  Goal: *Anxiety reduced or absent  Outcome: Progressing Towards Goal  Goal: *Understands and describes signs and symptoms to report to providers(Stroke Metric)  Outcome: Progressing Towards Goal  Goal: *Describes follow-up/return visits to physicians  Outcome: Progressing Towards Goal  Goal: *Describes available resources and support systems  Outcome: Progressing Towards Goal  Goal: *Influenza immunization  Outcome: Progressing Towards Goal  Goal: *Pneumococcal immunization  Outcome: Progressing Towards Goal  Goal: *Describes smoking cessation resources  Outcome: Progressing Towards Goal     Problem:  Activity Intolerance  Goal: *Oxygen saturation during activity within specified parameters  Outcome: Progressing Towards Goal  Goal: *Able to remain out of bed as prescribed  Outcome: Progressing Towards Goal     Problem: Patient Education: Go to Patient Education Activity  Goal: Patient/Family Education  Outcome: Progressing Towards Goal

## 2021-11-14 NOTE — PROGRESS NOTES
0700: Bedside and Verbal shift change report given to Hermila Gonzales RN (oncoming nurse) by Essie Velásquez (offgoing nurse). Report included the following information SBAR, Kardex, Procedure Summary, Intake/Output, MAR, Recent Results and Cardiac Rhythm NSR.     0742: Attempted to call consult. Was on hold for 10 minutes will call back after 8AM.     0800: Consult called, MD Suly assigned. 1032: Troponin resulted 406, MD Chelsea notified. 1645: Bedside Dysphagia screen complete. Speech consult placed for tomorrow. 1900:   End of Shift Note    Bedside shift change report given to Mehreen Amezcua (oncoming nurse) by Hermila Gonzales RN (offgoing nurse). Report included the following information SBAR, Kardex, Intake/Output, MAR, Recent Results and Cardiac Rhythm NSR    Shift worked:  [id number]     Shift summary and any significant changes:     Pt complaining of difficulty swallowing, Speech consult ordered. Cardiology saw pt today, ordered new Echo. Concerns for physician to address:  none     Zone phone for oncoming shift:          Activity:  Activity Level: Up with Assistance  Number times ambulated in hallways past shift: 0  Number of times OOB to chair past shift: 0    Cardiac:   Cardiac Monitoring: Yes      Cardiac Rhythm: Sinus Rhythm    Access:   Current line(s): PIV     Genitourinary:   Urinary status: voiding    Respiratory:   O2 Device: None (Room air)  Chronic home O2 use?: NO  Incentive spirometer at bedside: NO     GI:  Last Bowel Movement Date: 11/11/21 (PTA)  Current diet:  ADULT DIET Regular  Passing flatus: YES  Tolerating current diet: YES       Pain Management:   Patient states pain is manageable on current regimen: YES    Skin:  Ilia Score: 20  Interventions: increase time out of bed    Patient Safety:  Fall Score:  Total Score: 3  Interventions: gripper socks, pt to call before getting OOB and stay with me (per policy)  High Fall Risk: Yes    Length of Stay:  Expected LOS: - - -  Actual LOS: 0      Hermila Gonzales RN

## 2021-11-14 NOTE — Clinical Note
Patient pulled off procedure table due to emergency case.  Will take to recovery room for monitoring

## 2021-11-14 NOTE — ACP (ADVANCE CARE PLANNING)
Advance Care Planning Note      NAME: Jen Novak   :  1935   MRN:  [id number]     Date/Time:  2021 6:47 AM    Active Diagnoses:  Hospital Problems  Date Reviewed: 3/15/2021          Codes Class Noted POA    NSTEMI (non-ST elevated myocardial infarction) [facility]) ICD-10-CM: I21.4  ICD-9-CM: 410.70  2021 Unknown              These active diagnoses are of sufficient risk that focused discussion on advance care planning is indicated in order to allow the patient to thoughtfully consider personal goals of care, and if situations arise that prevent the ability to personally give input, to ensure appropriate representation of their personal desires for different levels and aggressiveness of care. Discussion:   Code status addressed and wants to be a DNR / DNI. Patient wants central line and vasopressors if needed. Patient would also want a feeding tube, if needed, for nutritional support. Patient  would like to assign her son Gregor Calvert [phone]       as the surrogate decision maker. Persons present and participating in discussion: Beatris Mckeon MD      Time Spent:   Total time spent face-to-face in education and discussion: 16 minutes.          Gibran Gutierrez MD   Hospitalist

## 2021-11-14 NOTE — PROGRESS NOTES
Problem: Falls - Risk of  Goal: *Absence of Falls  Description: Document Jaylene Martinez Fall Risk and appropriate interventions in the flowsheet.   Outcome: Progressing Towards Goal  Note: Fall Risk Interventions:  Mobility Interventions: Bed/chair exit alarm, Communicate number of staff needed for ambulation/transfer         Medication Interventions: Bed/chair exit alarm, Evaluate medications/consider consulting pharmacy, Patient to call before getting OOB, Teach patient to arise slowly    Elimination Interventions: Bed/chair exit alarm, Call light in reach, Elevated toilet seat, Patient to call for help with toileting needs, Stay With Me (per policy), Toilet paper/wipes in reach, Toileting schedule/hourly rounds

## 2021-11-14 NOTE — Clinical Note
TRANSFER - OUT REPORT:     Verbal report given to: [facility]. Report consisted of patient's Situation, Background, Assessment and   Recommendations(SBAR). Opportunity for questions and clarification was provided. Patient transported with a Registered Nurse.

## 2021-11-14 NOTE — CONSULTS
Consult    NAME: Jose Porter   :  1935   MRN:  [id number]     Date/Time:  2021 9:03 AM    Patient PCP: Lupe Askew MD  ________________________________________________________________________     Assessment:     Uncontrolled HTN    1) TIDWELL 2019:  3 months w/o change; unremarkable echo at  [facility]. (except PAp 45). Stress 2020 Ex 2 min, no ischemia, normal EF  2) palpitations 2019:  (Q.day at night early a.m.) unremarkable 48 hour Holter at  [facility]. (was symptomatic). 3) HTN  4) diabetes  5) dyslipidemia  6) large hiatal hernia per patient  7) chronic lumbar back pain  8) CKD 3:  Cr 1.64/GFR approx 40 10/2019.  9) GERD  10) osteoarthritis  She is a retired ; no nicotine, ethanol, nor added salt. 1 caffeinated beverage per day. , ADLs, drives        Plan: Tx from [facility]    Increased BP  No chest pain, no ST changes, mildly increased troponin, likely demand, manage medically initially. Update echo  Sinus    - Cont ASA  - Cont increased clonidine 0.2 bid  - Cont increased coreg 6.25mg bid  - Cont verapamil 240mg  - Holding cozaar, monitor renal function  - Holding maxide, low sodium  - Cont statin    Labs pending. [x]        High complexity decision making was performed        Subjective:   CHIEF COMPLAINT: Increased bp, nausea/vomiting. HISTORY OF PRESENT ILLNESS:       Henrry Burdick is a 81 y.o.  female with past medical history of hypertension who was transferred from [facility] for nausea vomiting and elevated blood pressure. Patient has had 3 trips to the [facility] ED for the same complaints, she presented this Friday and was found to have a systolic blood pressure around 225/105, she was complaining of nausea vomiting and chills. Labs at the [facility] showed elevated troponin at 0.45. Patient denies any chest pain, shortness of breath or palpitation.   EKG showed no acute ST changes    Currently in bed, son at bedside, no chest pain, no dyspnea, no edema, no syncope    We were asked to consult for work up and evaluation of the above problems. Past Medical History:   Diagnosis Date    Anxiety     Hypertension       Past Surgical History:   Procedure Laterality Date    HX KNEE REPLACEMENT Right 2011    HX KNEE REPLACEMENT Left 2013     Allergies   Allergen Reactions    Novocain [Procaine] Anaphylaxis     Throat swelling    Ace Inhibitors Cough    Penicillins Rash      Meds:  See below  Social History     Tobacco Use    Smoking status: Never Smoker    Smokeless tobacco: Never Used   Substance Use Topics    Alcohol use: Not Currently      No family history on file. REVIEW OF SYSTEMS:     []         Unable to obtain  ROS due to ---   [x]         Total of 12 systems reviewed as follows: Total of 12 systems reviewed as follows:       POSITIVE= Bold text  Negative = normal text  General:  fever, chills, sweats, generalized weakness, weight loss/gain,      loss of appetite   Eyes:    blurred vision, eye pain, loss of vision, double vision  ENT:    rhinorrhea, pharyngitis   Respiratory:   cough, sputum production, SOB, TIDWELL, wheezing, pleuritic pain   Cardiology:   chest pain, palpitations, orthopnea, PND, edema, syncope   Gastrointestinal:  abdominal pain , N/V, diarrhea, dysphagia, constipation, bleeding   Genitourinary:  frequency, urgency, dysuria, hematuria, incontinence   Muskuloskeletal :  arthralgia, myalgia, back pain  Hematology:  easy bruising, nose or gum bleeding, lymphadenopathy   Dermatological: rash, ulceration, pruritis, color change / jaundice  Endocrine:   hot flashes or polydipsia   Neurological:  headache, dizziness, confusion, focal weakness, paresthesia,     Speech difficulties, memory loss, gait difficulty  Psychological: Feelings of anxiety, depression, agitation    Objective:      Physical Exam:    Last 24hrs VS reviewed since prior progress note.  Most recent are:    Visit Vitals  BP (!) 161/111 Pulse 98   Temp 98 °F (36.7 °C)   Resp 30   SpO2 92%       Intake/Output Summary (Last 24 hours) at 11/14/2021 7829  Last data filed at 11/14/2021 0708  Gross per 24 hour   Intake 0 ml   Output --   Net 0 ml        General Appearance: Well developed, well nourished, alert & oriented x 3,    no acute distress. Ears/Nose/Mouth/Throat: Pupils equal and round, Hearing grossly normal.  Neck: Supple. JVP within normal limits. Carotids good upstrokes, with no bruit. Chest: Lungs clear to auscultation bilaterally. Cardiovascular: Regular rate and rhythm, S1S2 normal, no murmur, rubs, gallops. Abdomen: Soft, non-tender, bowel sounds are active. No organomegaly. Extremities: No edema bilaterally. Femoral pulses +2, Distal Pulses +1. Skin: Warm and dry. Neuro: CN II-XII grossly intact, Strength and sensation grossly intact. Data:      Prior to Admission medications    Medication Sig Start Date End Date Taking? Authorizing Provider   carvediloL (COREG) 3.125 mg tablet Take 3.125 mg by mouth two (2) times daily (with meals). Yes Provider, Historical   omeprazole (PRILOSEC) 20 mg capsule Take 20 mg by mouth daily. Yes Provider, Historical   diclofenac potassium (CATAFLAM) 50 mg tablet Take 50 mg by mouth daily. Provider, Historical   losartan (COZAAR) 50 mg tablet Take 50 mg by mouth daily. Provider, Historical   aspirin delayed-release 81 mg tablet Take 81 mg by mouth daily. Provider, Historical   triamterene-hydrochlorothiazide (MAXZIDE) 37.5-25 mg per tablet take 1 tablet by mouth once daily for fluid retention and blood pressure 8/4/15   Padmini Berman MD   simvastatin (ZOCOR) 40 mg tablet Take 1 Tab by mouth nightly.  For cholesterol 5/19/14   Ford Osgood, NP   verapamil SR (CALAN-SR) 240 mg CR tablet take 1 tablet by mouth NIGHTLY 5/5/14   Padmini Berman MD   ALPRAZolam Anju Balboa) 0.25 mg tablet take 1 tablet by mouth three times a day if needed for anxiety 2/17/14   Ford Osgood, NP calcium-vitamin, d3, (OS-MARINA 250) 250-125 mg-unit tablet Take 1 Tab by mouth daily. Provider, Historical   MULTIVITS W-FE,OTHER MIN (CENTRUM PO) Take  by mouth. Provider, Historical       Recent Results (from the past 24 hour(s))   GLUCOSE, POC    Collection Time: 11/14/21  7:52 AM   Result Value Ref Range    Glucose (POC) 139 (H) 65 - 117 mg/dL    Performed by Herbert Asencio RN    EKG, 12 LEAD, INITIAL    Collection Time: 11/14/21  8:43 AM   Result Value Ref Range    Ventricular Rate 72 BPM    Atrial Rate 72 BPM    P-R Interval 184 ms    QRS Duration 92 ms    Q-T Interval 422 ms    QTC Calculation (Bezet) 462 ms    Calculated P Axis 57 degrees    Calculated R Axis 23 degrees    Calculated T Axis 26 degrees    Diagnosis       Normal sinus rhythm  Normal ECG  When compared with ECG of 16-MAR-2021 09:01,  No significant change was found         DEMETRIO [de-identified]  81year old F (1935)  Account #: [id number]  PRIMARY CARE PHYSICIAN:  Cristal Castillo MD    REASON FOR VISIT:  This 81year old female presents for Palpitations, Dyspnea and hyperlipidemia. HISTORY OF PRESENT ILLNESS:   Ms Vaishali Lopez has a h/o:  1) TIDWELL 12/2019:  3 months w/o change; unremarkable echo at [address] [place] [address]. (except PAp 45). 2) palpitations 12/2019:  (Q.day at night early a.m.) unremarkable 48 hour Holter at [address] [place] [address]. (was symptomatic). 3) HTN  4) diabetes  5) dyslipidemia  6) large hiatal hernia per patient  7) chronic lumbar back pain  8) CKD 3:  Cr 1.64/GFR approx 40 10/2019.  9) GERD  10) osteoarthritis  She is a retired ; no nicotine, ethanol, nor added salt. 1 caffeinated beverage per day. 12/2019:  TIDWELL for the last 3 months; her BPs been running high. No chest pain. Palpitations as noted above with unremarkable Holter with symptoms; no symptoms in the last 4 days. 1/2020: States \"episodes are better\" since Dr. Jordin Alfaro increased Losartan. Hasn't put on MCOT yet because she couldn't figure it out.  Still feels short of breath and has occasional chest pressure durig episodes. Occurs when exerting herself. MPI pending. No dizziness, LH, or edema. CARDIAC HISTORY  RISK FACTORS:  1 Diabetes   2 Hypertension   3 Dyslipidemia   4 Family History of CAD [Less than 61years of age]   5 Tobacco Use: No/never       CARDIOVASCULAR PROCEDURES  Procedure Date Results   Tim MPI 2011 No Ischemia, Per patient; at  [facility].  (for atypical chest pain). Echo 2019 EF 0.65 (65%), Mild LVH, AV Mean 7 mmHg, Est RVSP 44 mmHg, Normal RV. No valvular heart disease. At  [facility]. EKG 2019 Sinus Rhythm, Borderline LVH. Holter 2019 48 hr holter; at [facility]: No arrhythmia; HR 50 to 116:  Average 90 ; patient reported symptoms. ACTIVE ALLERGIES:  Ingredient Reaction Comment   ACE INHIBITORS     PENICILLINS     PROCAINAMIDE  Procan SR       PROBLEM LIST:  Problem Description Chronic   Angiotensin-converting-enzyme inhibitor allergy N       PAST MEDICAL/SURGICAL HISTORY  (Detailed)    Disease/disorder Onset Date Surgical History Date Comments     Appendectomy       Knee Replacement       Colonoscopy       EGD     Anemia       Anxiety       Arthritis       Blood Clots       Chronic Kidney Disease       Diverticulosis       Elevated lipids       GERD       Hypertension       Mood Disorder       Overactive Bladder         Family History  (Detailed)  Relationship Family Member Name  Age at Death Condition Onset Age Cause of Death   Father    Heart Attack     Sister    High Blood Pressure       SOCIAL HISTORY  (Detailed)  Tobacco use reviewed. Preferred language is [place]. EDUCATION/EMPLOYMENT/OCCUPATION  Employment History Status Retired Restrictions     retired       Smoking status: Never smoker. ALCOHOL  There is no history of alcohol use. CAFFEINE  The patient uses caffeine. LIFESTYLE  Exercises daily. REVIEW OF SYMPTOMS:    CONST - Negative for weight gain, weight loss, fever.   EYES - Negative for visual changes. ENT - Negative for hearing loss. RESP - Negative for snoring, hemoptysis, dyspnea. CARD - Negative for chest pain, diaphoresis, orthopnea, palpitation, syncope, PND.  VASC - Negative for claudication, edema. GI - Negative for nausea, reflux, bleeding.  - Negative for hematuria, nocturia. REPROD - Negative for Hx of OCP.  ENDO - Negative for goiter, tremors. NEURO - Negative for dizziness, memory loss, seizures. PSYCH - Negative for depression, hallucinations. DERM - Negative for rash, skin sores. M/S - Negative for joint pain, myalgia. HEMAT - Negative for acute anemia, thrombocytopenia. VITAL SIGNS  Time BP mm/Hg Pulse /min Resp /min Temp F Ht ft Ht in Ht cm Wt lb Wt kg BMI kg/m2 BSA m2 O2 Sat%   1:34 /78 61   5.0 5.00 165.10 190.80 86.545 31.75         PHYSICAL EXAM:  Exam Findings Details   Const Neg Level of Distress - Awake / Alert. Nourishment - Well Nourished. Appearance - Well Developed. Eyes Neg Lids/External - Bilateral Normal.  Conjunctiva - Bilateral Normal.   NMT Neg Oral Mucosa - Moist, No Cyanosis, No Pallor. Neck Neg JVP - Less Than 8. Resp Neg Respirations - Nonlabored. Breath Sounds - Clear Throughout. Rales - Absent. Wheezes - Absent. Rhonchi - Absent. Cardiac Neg Rhythm - Regular. Palpation - PMI Normal.  Heart Sounds - S1 Normal, S2 Normal, No S3, No S4. Extra Sounds - None. Murmurs - None. M/S Neg Gait - Normal.  Able to Exercise - Yes. EXT Neg Clubbing - Absent. Lower Extremity Edema - Absent. Skin Neg Venous Stasis Ulcer - Absent. Psych Neg Orientation - Oriented to Time, Person, Place. IMPRESSION AND PLAN  01. Palpitations (R00.2):  Etiology unknown. Event monitor is recommended. Applied to patient in office today. 02. Shortness of breath (R06.02):  ?  Cardiac. Stress Cardiolite is warranted. We discussed the signs and symptoms of unstable angina, myocardial infarction and malignant arrhythmia.   The patient knows to seek immediate medical attention should they occur. 03. Hyp hrt & chr kdny dis w/o hrt fail, w stg 1-4/unsp chr kdny (I13.10): Inadequately controlled; losartan increased to 50 mg q.day and sCr with slight bump to 1.77. Will continu at 50mg QD and change metoprolol to Coreg 3.125mg BID. [facility]. 04. Mixed hyperlipidemia (E78.2):  Lipid labs drawn by PCP. The patient is tolerating lipid lowering therapy well. 05. Chronic kidney disease, stage 3 (moderate) (N18.3):  Managed by: Other Physician   06. Body mass index (BMI) 31.0-31.9, adult (Z68.31): The patient was instructed on AHA diet and regular exercise. ORDERS:   Dietary management education, guidance, and counseling    2 The patient was instructed on AHA diet and regular exercise.         FINAL MEDICATION LIST  Medication Sig Desc   alprazolam 0.25 mg tablet take 1 tablet by oral route 3 times every day   Aspirin Low Dose 81 mg tablet,delayed release take 1 tablet by oral route  every day   calcium carbonate 500 mg (1,250 mg)-vitamin D3 125 unit tablet    carvedilol 3.125 mg tablet take 1 tablet by oral route 2 times every day with food   diclofenac sodium 50 mg tablet,delayed release take 1 tablet by oral route 2 times every day   losartan 50 mg tablet take 1 tablet by oral route  every day   multivitamin tablet take 1 tablet by oral route  every day with food   omeprazole 20 mg capsule,delayed release take 1 capsule by oral route  every day before a meal   ranitidine 150 mg tablet take 1 tablet by oral route 2 times every day   simvastatin 40 mg tablet take 1 tablet by oral route  every day in the evening   triamterene 37.5 mg-hydrochlorothiazide 25 mg tablet take 1 tablet by oral route  every day   verapamil  mg 24 hr capsule,extended release take 1 capsule by oral route  every day

## 2021-11-14 NOTE — H&P
Hospitalist Admission Note    NAME: Severo Leavell   :  1935   MRN:  [id number]     Date/Time:  2021 6:30 AM    Patient PCP: Jim Kapoor MD  ______________________________________________________________________  Given the patient's current clinical presentation, I have a high level of concern for decompensation if discharged from the emergency department. Complex decision making was performed, which includes reviewing the patient's available past medical records, laboratory results, and x-ray films. My assessment of this patient's clinical condition and my plan of care is as follows. Assessment / Plan:  ? NSTEMI  Nausea and vomiting  Hypertensive urgency  Admit to stepdown unit, as needed Zofran, per transfer papers review did not seem like patient was given any Lovenox shot or no cardiology consult was noted. Initial troponin was 0.45-second troponin went up to 0.55. We will repeat another troponin. Last 2D echo was on  which showed EF of 65%. Will get cardiology consult. Keep n.p.o. except med's  prnZofran for nausea and vomiting  Systolic blood pressure upon presentation to [facility] ED was 225, at [facility] systolic blood pressure in the 180s. We will continue home BP meds: Patient takes clonidine, Coreg, Cozaar and verapamil hydrochlorothiazide/triamterene. Will hold HCTZ and triamterene because of elevated creatinine   As needed labetalol  Continue statin and aspirin  Defer to cardiology  For  need of anticoagulation  TEMO  Hyponatremia, most likely due to dehydration  Unknown baseline  Sodium 129  Creatinine is a 1.33, start IV fluids once blood pressure under control  Repeat BMP.   Hold diuretics, hold Cozaar  Elevated blood sugars, most likely prediabetes  We will check HbA1c, sliding scale insulin    Code Status: DNR  Surrogate Decision Maker: Her son Josey Aggarwal    DVT Prophylaxis: SCDs  GI Prophylaxis: not indicated    Baseline: Ambulatory      Subjective:   CHIEF COMPLAINT: Nausea vomiting and elevated blood pressure    HISTORY OF PRESENT ILLNESS:     Shira Miller is a 81 y.o.  female with past medical history of hypertension who was transferred from [address] [place] [address] for nausea vomiting and elevated blood pressure. Patient has had 3 trips to the [address] [place] [address] ED for the same complaints, she presented this Friday and was found to have a systolic blood pressure around 225/105, she was complaining of nausea vomiting and chills. Labs at the [address] [place] [address] showed elevated troponin at 0.45. Patient denies any chest pain, shortness of breath or palpitation. EKG showed no acute ST changes    We were asked to admit for work up and evaluation of the above problems. Past Medical History:   Diagnosis Date    Anxiety     Hypertension         Past Surgical History:   Procedure Laterality Date    HX KNEE REPLACEMENT Right 2011    HX KNEE REPLACEMENT Left 2013       Social History     Tobacco Use    Smoking status: Never Smoker    Smokeless tobacco: Never Used   Substance Use Topics    Alcohol use: Not Currently        No family history on file. Allergies   Allergen Reactions    Novocain [Procaine] Anaphylaxis     Throat swelling    Ace Inhibitors Cough    Penicillins Rash        Prior to Admission medications    Medication Sig Start Date End Date Taking? Authorizing Provider   diclofenac potassium (CATAFLAM) 50 mg tablet Take 50 mg by mouth daily. Provider, Historical   losartan (COZAAR) 50 mg tablet Take 50 mg by mouth daily. Provider, Historical   carvediloL (COREG) 3.125 mg tablet Take 3.125 mg by mouth two (2) times daily (with meals). Provider, Historical   omeprazole (PRILOSEC) 20 mg capsule Take 20 mg by mouth daily. Provider, Historical   aspirin delayed-release 81 mg tablet Take 81 mg by mouth daily.     Provider, Historical   triamterene-hydrochlorothiazide (MAXZIDE) 37.5-25 mg per tablet take 1 tablet by mouth once daily for fluid retention and blood pressure 8/4/15   Amibka Retana MD   simvastatin (ZOCOR) 40 mg tablet Take 1 Tab by mouth nightly. For cholesterol 5/19/14   Juan Carlos Brown NP   verapamil SR (CALAN-SR) 240 mg CR tablet take 1 tablet by mouth NIGHTLY 5/5/14   Ambika Retana MD   ALPRAZolam Glendia Reeve) 0.25 mg tablet take 1 tablet by mouth three times a day if needed for anxiety 2/17/14   Juan Carlos Brown NP   calcium-vitamin, d3, (OS-MARINA 250) 250-125 mg-unit tablet Take 1 Tab by mouth daily. Provider, Historical   MULTIVITS W-FE,OTHER MIN (CENTRUM PO) Take  by mouth. Provider, Historical       REVIEW OF SYSTEMS:     I am not able to complete the review of systems because:    The patient is intubated and sedated    The patient has altered mental status due to his acute medical problems    The patient has baseline aphasia from prior stroke(s)    The patient has baseline dementia and is not reliable historian    The patient is in acute medical distress and unable to provide information           Total of 12 systems reviewed as follows:       POSITIVE= underlined text  Negative = text not underlined  General:  fever, chills, sweats, generalized weakness, weight loss/gain,      loss of appetite   Eyes:    blurred vision, eye pain, loss of vision, double vision  ENT:    rhinorrhea, pharyngitis   Respiratory:   cough, sputum production, SOB, TIDWELL, wheezing, pleuritic pain   Cardiology:   chest pain, palpitations, orthopnea, PND, edema, syncope   Gastrointestinal:  abdominal pain , N/V, diarrhea, dysphagia, constipation, bleeding   Genitourinary:  frequency, urgency, dysuria, hematuria, incontinence   Muskuloskeletal :  arthralgia, myalgia, back pain  Hematology:  easy bruising, nose or gum bleeding, lymphadenopathy   Dermatological: rash, ulceration, pruritis, color change / jaundice  Endocrine:   hot flashes or polydipsia   Neurological:  headache, dizziness, confusion, focal weakness, paresthesia,     Speech difficulties, memory loss, gait difficulty  Psychological: Feelings of anxiety, depression, agitation    Objective:   VITALS:    Visit Vitals  BP (!) 190/81   Pulse 89   Temp 98.8 °F (37.1 °C)   Resp 20   SpO2 92%       PHYSICAL EXAM:    General:    Alert, cooperative, no distress, appears stated age. HEENT: Atraumatic, anicteric sclerae, pink conjunctivae     No oral ulcers, mucosa moist, throat clear, dentition fair  Neck:  Supple, symmetrical,  thyroid: non tender  Lungs:   Clear to auscultation bilaterally. No Wheezing or Rhonchi. No rales. Chest wall:  No tenderness  No Accessory muscle use. Heart:   Regular  rhythm,  No  murmur   No edema  Abdomen:   Soft, non-tender. Not distended. Bowel sounds normal  Extremities: No cyanosis. No clubbing,      Skin turgor normal, Capillary refill normal, Radial dial pulse 2+  Skin:     Not pale. Not Jaundiced  No rashes   Psych:  Good insight. Not depressed. Not anxious or agitated. Neurologic: EOMs intact. No facial asymmetry. No aphasia or slurred speech. Symmetrical strength, Sensation grossly intact.  Alert and oriented X 4.     _______________________________________________________________________  Care Plan discussed with:    Comments   Patient x    Family      RN x    Care Manager                    Consultant:      _______________________________________________________________________  Expected  Disposition:   Home with Family    HH/PT/OT/RN    SNF/LTC    SAM    ________________________________________________________________________  TOTAL TIME: 70Minutes    Critical Care Provided     Minutes non procedure based      Comments    x Reviewed previous records   >50% of visit spent in counseling and coordination of care x Discussion with patient and/or family and questions answered       ________________________________________________________________________  Signed: Dee Drew MD    Procedures: see electronic medical records for all procedures/Xrays and details which were not copied into this note but were reviewed prior to creation of Plan. LAB DATA REVIEWED:    No results found for this or any previous visit (from the past 24 hour(s)).

## 2021-11-15 LAB
ANION GAP SERPL CALC-SCNC: 5 MMOL/L (ref 5–15)
ATRIAL RATE: 72 BPM
BNP SERPL-MCNC: 2108 PG/ML
BUN SERPL-MCNC: 30 MG/DL (ref 6–20)
BUN/CREAT SERPL: 16 (ref 12–20)
CALCIUM SERPL-MCNC: 9.1 MG/DL (ref 8.5–10.1)
CALCULATED P AXIS, ECG09: 57 DEGREES
CALCULATED R AXIS, ECG10: 23 DEGREES
CALCULATED T AXIS, ECG11: 26 DEGREES
CHLORIDE SERPL-SCNC: 104 MMOL/L (ref 97–108)
CO2 SERPL-SCNC: 25 MMOL/L (ref 21–32)
CREAT SERPL-MCNC: 1.83 MG/DL (ref 0.55–1.02)
DIAGNOSIS, 93000: NORMAL
ERYTHROCYTE [DISTWIDTH] IN BLOOD BY AUTOMATED COUNT: 12.8 % (ref 11.5–14.5)
EST. AVERAGE GLUCOSE BLD GHB EST-MCNC: 154 MG/DL
GLUCOSE BLD STRIP.AUTO-MCNC: 124 MG/DL (ref 65–117)
GLUCOSE BLD STRIP.AUTO-MCNC: 147 MG/DL (ref 65–117)
GLUCOSE BLD STRIP.AUTO-MCNC: 166 MG/DL (ref 65–117)
GLUCOSE BLD STRIP.AUTO-MCNC: 221 MG/DL (ref 65–117)
GLUCOSE SERPL-MCNC: 142 MG/DL (ref 65–100)
HBA1C MFR BLD: 7 % (ref 4–5.6)
HCT VFR BLD AUTO: 37.1 % (ref 35–47)
HGB BLD-MCNC: 11.6 G/DL (ref 11.5–16)
MCH RBC QN AUTO: 30.9 PG (ref 26–34)
MCHC RBC AUTO-ENTMCNC: 31.3 G/DL (ref 30–36.5)
MCV RBC AUTO: 98.7 FL (ref 80–99)
NRBC # BLD: 0 K/UL (ref 0–0.01)
NRBC BLD-RTO: 0 PER 100 WBC
P-R INTERVAL, ECG05: 184 MS
PLATELET # BLD AUTO: 308 K/UL (ref 150–400)
PMV BLD AUTO: 9.2 FL (ref 8.9–12.9)
POTASSIUM SERPL-SCNC: 4.4 MMOL/L (ref 3.5–5.1)
Q-T INTERVAL, ECG07: 422 MS
QRS DURATION, ECG06: 92 MS
QTC CALCULATION (BEZET), ECG08: 462 MS
RBC # BLD AUTO: 3.76 M/UL (ref 3.8–5.2)
SERVICE CMNT-IMP: ABNORMAL
SODIUM SERPL-SCNC: 134 MMOL/L (ref 136–145)
TROPONIN-HIGH SENSITIVITY: 298 NG/L (ref 0–51)
VENTRICULAR RATE, ECG03: 72 BPM
WBC # BLD AUTO: 10.5 K/UL (ref 3.6–11)

## 2021-11-15 PROCEDURE — 85027 COMPLETE CBC AUTOMATED: CPT

## 2021-11-15 PROCEDURE — 36415 COLL VENOUS BLD VENIPUNCTURE: CPT

## 2021-11-15 PROCEDURE — 97161 PT EVAL LOW COMPLEX 20 MIN: CPT | Performed by: PHYSICAL THERAPIST

## 2021-11-15 PROCEDURE — 74011250637 HC RX REV CODE- 250/637: Performed by: INTERNAL MEDICINE

## 2021-11-15 PROCEDURE — 97116 GAIT TRAINING THERAPY: CPT | Performed by: PHYSICAL THERAPIST

## 2021-11-15 PROCEDURE — 83880 ASSAY OF NATRIURETIC PEPTIDE: CPT

## 2021-11-15 PROCEDURE — 97535 SELF CARE MNGMENT TRAINING: CPT | Performed by: OCCUPATIONAL THERAPIST

## 2021-11-15 PROCEDURE — 74011250636 HC RX REV CODE- 250/636: Performed by: INTERNAL MEDICINE

## 2021-11-15 PROCEDURE — 97165 OT EVAL LOW COMPLEX 30 MIN: CPT | Performed by: OCCUPATIONAL THERAPIST

## 2021-11-15 PROCEDURE — 77010033678 HC OXYGEN DAILY

## 2021-11-15 PROCEDURE — 92610 EVALUATE SWALLOWING FUNCTION: CPT

## 2021-11-15 PROCEDURE — 82962 GLUCOSE BLOOD TEST: CPT

## 2021-11-15 PROCEDURE — 83036 HEMOGLOBIN GLYCOSYLATED A1C: CPT

## 2021-11-15 PROCEDURE — 84484 ASSAY OF TROPONIN QUANT: CPT

## 2021-11-15 PROCEDURE — 74011636637 HC RX REV CODE- 636/637: Performed by: INTERNAL MEDICINE

## 2021-11-15 PROCEDURE — 80048 BASIC METABOLIC PNL TOTAL CA: CPT

## 2021-11-15 PROCEDURE — 65660000000 HC RM CCU STEPDOWN

## 2021-11-15 RX ORDER — CLONIDINE HYDROCHLORIDE 0.1 MG/1
0.1 TABLET ORAL 2 TIMES DAILY
Status: COMPLETED | OUTPATIENT
Start: 2021-11-15 | End: 2021-11-15

## 2021-11-15 RX ORDER — ENOXAPARIN SODIUM 100 MG/ML
30 INJECTION SUBCUTANEOUS EVERY 24 HOURS
Status: DISCONTINUED | OUTPATIENT
Start: 2021-11-15 | End: 2021-11-19

## 2021-11-15 RX ORDER — CARVEDILOL 12.5 MG/1
12.5 TABLET ORAL 2 TIMES DAILY WITH MEALS
Status: DISCONTINUED | OUTPATIENT
Start: 2021-11-15 | End: 2021-11-15

## 2021-11-15 RX ORDER — CARVEDILOL 12.5 MG/1
12.5 TABLET ORAL 2 TIMES DAILY WITH MEALS
Status: DISCONTINUED | OUTPATIENT
Start: 2021-11-15 | End: 2021-11-19

## 2021-11-15 RX ADMIN — ASPIRIN 81 MG: 81 TABLET, COATED ORAL at 08:55

## 2021-11-15 RX ADMIN — ENOXAPARIN SODIUM 30 MG: 100 INJECTION SUBCUTANEOUS at 16:27

## 2021-11-15 RX ADMIN — Medication 1 TABLET: at 08:55

## 2021-11-15 RX ADMIN — Medication 10 ML: at 06:18

## 2021-11-15 RX ADMIN — CLONIDINE HYDROCHLORIDE 0.1 MG: 0.1 TABLET ORAL at 18:14

## 2021-11-15 RX ADMIN — Medication 10 ML: at 13:50

## 2021-11-15 RX ADMIN — CARVEDILOL 12.5 MG: 12.5 TABLET, FILM COATED ORAL at 16:36

## 2021-11-15 RX ADMIN — CLONIDINE HYDROCHLORIDE 0.1 MG: 0.1 TABLET ORAL at 08:55

## 2021-11-15 RX ADMIN — Medication 10 ML: at 21:23

## 2021-11-15 RX ADMIN — ATORVASTATIN CALCIUM 20 MG: 20 TABLET, FILM COATED ORAL at 21:23

## 2021-11-15 RX ADMIN — INSULIN LISPRO 1 UNITS: 100 INJECTION, SOLUTION INTRAVENOUS; SUBCUTANEOUS at 21:22

## 2021-11-15 RX ADMIN — VERAPAMIL HYDROCHLORIDE 240 MG: 120 TABLET, FILM COATED, EXTENDED RELEASE ORAL at 21:23

## 2021-11-15 RX ADMIN — PANTOPRAZOLE SODIUM 40 MG: 40 TABLET, DELAYED RELEASE ORAL at 08:56

## 2021-11-15 RX ADMIN — CARVEDILOL 12.5 MG: 12.5 TABLET, FILM COATED ORAL at 08:56

## 2021-11-15 NOTE — PROGRESS NOTES
Problem: Self Care Deficits Care Plan (Adult)  Goal: *Acute Goals and Plan of Care (Insert Text)  Description:   FUNCTIONAL STATUS PRIOR TO ADMISSION:  Pt lives alone, but reports having a supportive family and Orthodox family. Pt states that she has been independent in adls and IADLs, including driving, shopping, preparing meals, gardening etc.      HOME SUPPORT: The patient lived alone with family and Orthodox family  to provide assistance. Occupational Therapy Goals  Initiated 11/15/2021  1. Patient will perform grooming in stanidng with supervision within 7 day(s). 2.  Patient will perform bathing with supervision within 7 day(s). 3.  Patient will perform upper body dressing and lower body dressing with supervision within 7 day(s). 4.  Patient will perform toilet transfers with supervision within 7 day(s). 5.  Patient will perform all aspects of toileting with independence within 7 day(s). 6.  Patient will participate in upper extremity therapeutic exercise/activities with supervision/set-up for 8 minutes within 7 day(s). 7.  Patient will utilize energy conservation techniques during functional activities with verbal cues within 7 day(s). 8.  Patient will perform standing adls for at least 8 minutes within 7 days. Outcome: Not Met   OCCUPATIONAL THERAPY EVALUATION  Patient: Favio Mejia (78 y.o. female)  Date: 11/15/2021  Primary Diagnosis: NSTEMI (non-ST elevated myocardial infarction) [facility]) [I21.4]        Precautions:   Fall    ASSESSMENT  Based on the objective data described below, the patient presents with generalized weakness and poor tolerance for adls and functional mobility impairing independence and safety during adls and functional mobility. Pt is functioning below her independent in adls and IADLs baseline and will benefit from acute OT services. At discharge, pt will likely need increased/24 hour assistance at home vs. Rehab at discharge.   .    Current Level of Function Impacting Discharge (ADLs/self-care): up to CGA/Susana for adls/mobility--poor functional tolerance     Functional Outcome Measure: The patient scored Total: 60/100 on the Barthel Index outcome measure which is indicative of being \"minimally independent\"  in basic self-care. Other factors to consider for discharge: lives alone     Patient will benefit from skilled therapy intervention to address the above noted impairments. PLAN :  Recommendations and Planned Interventions: self care training, functional mobility training, therapeutic exercise, balance training, therapeutic activities, endurance activities, patient education, home safety training, and family training/education    Frequency/Duration: Patient will be followed by occupational therapy 4 times a week to address goals. Recommendation for discharge: (in order for the patient to meet his/her long term goals)  Occupational therapy at least 2 days/week in the home AND ensure assist and/or supervision for safety with adls/mobillity  vs. Rehab admission    This discharge recommendation:  Has not yet been discussed the attending provider and/or case management    IF patient discharges home will need the following DME: tbd       SUBJECTIVE:   Patient stated I've gotten so I can't swallow, especially if I'm laying down.     OBJECTIVE DATA SUMMARY:   HISTORY:   Past Medical History:   Diagnosis Date    Anxiety     Hypertension      Past Surgical History:   Procedure Laterality Date    HX KNEE REPLACEMENT Right 2011    HX KNEE REPLACEMENT Left 2013       Expanded or extensive additional review of patient history:     Home Situation  Home Environment: Private residence  # Steps to Enter: 3  Rails to Enter: Yes  Hand Rails : Bilateral  One/Two Story Residence: One story  Living Alone: Yes  Support Systems: Friend/Neighbor  Patient Expects to be Discharged to[de-identified] [employer]  Current DME Used/Available at Home: Cane, straight  Tub or Shower Type: Shower    Hand dominance: Right    EXAMINATION OF PERFORMANCE DEFICITS:  Cognitive/Behavioral Status:  Neurologic State: Alert  Orientation Level: Oriented X4  Cognition: Appropriate decision making; Appropriate for age attention/concentration; Appropriate safety awareness; Follows commands  Perception: Appears intact  Perseveration: No perseveration noted  Safety/Judgement: Awareness of environment; Fall prevention; Insight into deficits    Skin: generally intact    Edema: none observed    Hearing: Auditory  Auditory Impairment: None    Vision/Perceptual:    Tracking:  (not formally tested, scans environment functionally)                      Acuity:  (not formally assessed)         Range of Motion:  BUEs;    AROM:  (inpaired L shoulder/bursitis-decr ROM-ortho following)                         Strength:  BUEs:    Strength: Generally decreased, functional                Coordination:     Fine Motor Skills-Upper: Left Intact; Right Intact    Gross Motor Skills-Upper: Left Intact; Right Impaired (L shoulder flex/abd/ rot)    Tone & Sensation: Tone is normal  Sensation not tested                            Balance:  Sitting: Intact  Standing: Impaired  Standing - Static: Constant support; Good  Standing - Dynamic : Constant support; Fair (CGA)    Functional Mobility and Transfers for ADLs:  Bed Mobility:  Rolling:  (pt was seated upon arrival)    Transfers:  Sit to Stand: Contact guard assistance  Stand to Sit: Contact guard assistance  Bathroom Mobility: Supervision/set up; Contact guard assistance  Toilet Transfer : Contact guard assistance (using grab bar and managing clothing)    ADL Assessment:  Feeding: Independent    Oral Facial Hygiene/Grooming: Setup (using RUE only above shoulders/head)    Bathing: Contact guard assistance    Upper Body Dressing: Stand-by assistance; Setup    Lower Body Dressing: Stand-by assistance; Setup; Contact guard assistance;  Additional time (needs a rest break inbetween R/L socks)    Toileting: Independent                ADL Intervention and task modifications:     Pt was able to stand and ambulate to the bathroom with CGA, did not tolerate hand washing at the sink and reported that that was enough activity for her at this time. Pt  will benefit from energy conservation education. Cognitive Retraining  Safety/Judgement: Awareness of environment; Fall prevention; Insight into deficits    Therapeutic Exercise:  Encouraged balancing rest and activity throughout the day   Functional Measure:    Barthel Index:  Bathin  Bladder: 10  Bowels: 10  Groomin  Dressin  Feeding: 10  Mobility: 0  Stairs: 0  Toilet Use: 10  Transfer (Bed to Chair and Back): 10  Total: 60/100      The Barthel ADL Index: Guidelines  1. The index should be used as a record of what a patient does, not as a record of what a patient could do. 2. The main aim is to establish degree of independence from any help, physical or verbal, however minor and for whatever reason. 3. The need for supervision renders the patient not independent. 4. A patient's performance should be established using the best available evidence. Asking the patient, friends/relatives and nurses are the usual sources, but direct observation and common sense are also important. However direct testing is not needed. 5. Usually the patient's performance over the preceding 24-48 hours is important, but occasionally longer periods will be relevant. 6. Middle categories imply that the patient supplies over 50 per cent of the effort. 7. Use of aids to be independent is allowed. Score Interpretation (from [address] [address])    Independent   60-79 Minimally independent   40-59 Partially dependent   20-39 Very dependent   <20 Totally dependent     -Juany Perera., Barthel, D.W. (1965). Functional evaluation: the Barthel Index. [facility] ([address] (1997).  The Barthel activities of daily living index: self-reporting versus actual performance in the old (> or = 75 years). Journal of [address] [place] [address] 45(2), [facility], JRAULF, Neftali Mccoy, Tyler Pablo. (1999). Measuring the change in disability after inpatient rehabilitation; comparison of the responsiveness of the Barthel Index and Functional [place] Measure. Journal of Neurology, Neurosurgery, and Psychiatry, 66(4), 876-080. QUIANA Bhat, LUCINDA Timmons, & Holli Loco M.A. (2004) Assessment of post-stroke quality of life in cost-effectiveness studies: The usefulness of the Barthel Index and the EuroQoL-5D. Quality of Life Research, 15, 133-35     Occupational Therapy Evaluation Charge Determination   History Examination Decision-Making   MEDIUM Complexity : Expanded review of history including physical, cognitive and psychosocial  history  MEDIUM Complexity : 3-5 performance deficits relating to physical, cognitive , or psychosocial skils that result in activity limitations and / or participation restrictions MEDIUM Complexity : Patient may present with comorbidities that affect occupational performnce. Miniml to moderate modification of tasks or assistance (eg, physical or verbal ) with assesment(s) is necessary to enable patient to complete evaluation       Based on the above components, the patient evaluation is determined to be of the following complexity level: MEDIUM  Pain Rating:  No complaints of pain--has chronic issues with her shoulder bursitis (ortho following and has had injections)    Activity Tolerance:   Poor and requires frequent rest breaks  VSS, but poor tolerance for adls and mobility at this time    After treatment patient left in no apparent distress:    Sitting in chair, Call bell within reach, and nurse notified    COMMUNICATION/EDUCATION:   The patients plan of care was discussed with: Physical therapist and Registered nurse.      Patient/family agree to work toward stated goals and plan of care. This patients plan of care is appropriate for delegation to FRANCHESKA.     Thank you for this referral.  Luz Sanchez, OTR/L  Time Calculation: 26 mins

## 2021-11-15 NOTE — PROGRESS NOTES
0700: Bedside and Verbal shift change report given to Yamil Roblero RN (oncoming nurse) by Sohail Naranjo (offgoing nurse). Report included the following information SBAR, Kardex, Intake/Output, MAR, Recent Results and Cardiac Rhythm NSR.     0800: Pt up to chair. 1630: Perfect Serve sent to MD Chelsea regarding pts HR of 55 and Coreg administration. MD would like medication given. 1900:   End of Shift Note    Bedside shift change report given to Sohail Naranjo (oncoming nurse) by Yamil Roblero RN (offgoing nurse). Report included the following information SBAR, Kardex, Intake/Output, MAR, Recent Results and Cardiac Rhythm NSR to Sinus Kendra Deep    Shift worked:  [id number]     Shift summary and any significant changes:     PT and OT worked with pt today. She did well cleared to be a stand by assist. Speech therapy saw pt and cleared swallowing, likely due to hernia. No complaints of pain. Pt up in chair all shift. Concerns for physician to address:  none     Zone phone for oncoming shift:          Activity:  Activity Level: Up with Assistance  Number times ambulated in hallways past shift: 0  Number of times OOB to chair past shift: 5    Cardiac:   Cardiac Monitoring: Yes      Cardiac Rhythm: Sinus Wili    Access:   Current line(s): PIV     Genitourinary:   Urinary status: voiding    Respiratory:   O2 Device: Nasal cannula  Chronic home O2 use?: NO  Incentive spirometer at bedside: YES     GI:  Last Bowel Movement Date: 11/15/21  Current diet:  ADULT DIET Regular  Passing flatus: YES  Tolerating current diet: YES       Pain Management:   Patient states pain is manageable on current regimen: YES    Skin:  Ilia Score: 21  Interventions: increase time out of bed    Patient Safety:  Fall Score:  Total Score: 3  Interventions: gripper socks, pt to call before getting OOB and stay with me (per policy)  High Fall Risk: Yes    Length of Stay:  Expected LOS: - - -  Actual LOS: [facility], RN

## 2021-11-15 NOTE — PROGRESS NOTES
Problem: Mobility Impaired (Adult and Pediatric)  Goal: *Acute Goals and Plan of Care (Insert Text)  Description: FUNCTIONAL STATUS PRIOR TO ADMISSION: Patient was independent and active without use of DME. Reports that she has been having difficulty with endurance and can only do a little of activity before needing to take a break. Reports \"nighttime weakness\" but no falls at home. HOME SUPPORT PRIOR TO ADMISSION: The patient lived alone with son in the area to provide assistance. Physical Therapy Goals  Initiated 11/15/2021  1. Patient will move from supine to sit and sit to supine  in bed with modified independence within 7 day(s). 2.  Patient will transfer from bed to chair and chair to bed with modified independence using the least restrictive device within 7 day(s). 3.  Patient will perform sit to stand with modified independence within 7 day(s). 4.  Patient will ambulate with modified independence for 250 feet with the least restrictive device within 7 day(s). 5.  Patient will ascend/descend 3 stairs with 1 handrail(s) with modified independence within 7 day(s). Outcome: Progressing Towards Goal   PHYSICAL THERAPY EVALUATION  Patient: Reddy Ruff (88 y.o. female)  Date: 11/15/2021  Primary Diagnosis: NSTEMI (non-ST elevated myocardial infarction) [facility]) [I21.4]        Precautions:   Fall    ASSESSMENT  Based on the objective data described below, the patient presents with decreased functional mobility from baseline level of function. Patient currently limited by impaired balance, gait instability and decreased activity tolerance. Up in chair upon arrival and needing CGA for transfers. BP stable with positional changes but reports some dizziness. Amb with and without RW  for 20 feet x 2 with increased stability with use of RW but still unstable. She is generally weak and well below her independent functional baseline. Recommend SNF vs  PT pending progress.   She does live alone and at this moment is not strong enough to be at home alone. Hopeful her mobility and strength will improve enough that she can DC home with [facility] PT. Other factors to consider for discharge: lives alone, well below functional baseline, at risk for falls     Patient will benefit from skilled therapy intervention to address the above noted impairments. PLAN :  Recommendations and Planned Interventions: bed mobility training, transfer training, gait training, therapeutic exercises, and neuromuscular re-education      Frequency/Duration: Patient will be followed by physical therapy:  5 times a week to address goals. Recommendation for discharge: (in order for the patient to meet his/her long term goals)  Therapy up to 5 days/week in SNF setting at current. Hopeful strength and mobility improve enough that she can DC home with  and  PT      IF patient discharges home will need the following DME: rolling walker         SUBJECTIVE:   Patient stated I am still weak.     OBJECTIVE DATA SUMMARY:   HISTORY:    Past Medical History:   Diagnosis Date    Anxiety     Hypertension      Past Surgical History:   Procedure Laterality Date    HX KNEE REPLACEMENT Right 2011    HX KNEE REPLACEMENT Left 2013       Personal factors and/or comorbidities impacting plan of care:     Home Situation  Home Environment: Private residence  # Steps to Enter: 3  Rails to Enter: Yes  Hand Rails : Bilateral  One/Two Story Residence: One story  Living Alone: Yes  Support Systems: Friend/Neighbor  Patient Expects to be Discharged to[de-identified] [employer]  Current DME Used/Available at Home: Cane, straight  Tub or Shower Type: Shower    EXAMINATION/PRESENTATION/DECISION MAKING:   Critical Behavior:  Neurologic State: Alert  Orientation Level: Oriented X4        Hearing:   Auditory  Auditory Impairment: None    Range Of Motion:  AROM: Generally decreased, functional                       Strength:    Strength: Generally decreased, functional Functional Mobility:  Bed Mobility:   Not tested           Transfers:  Sit to Stand: Contact guard assistance  Stand to Sit: Contact guard assistance                       Balance:   Sitting: Intact  Standing: Impaired  Standing - Static: Constant support; Good  Standing - Dynamic : Constant support; Fair  Ambulation/Gait Training:  Distance (ft): 20 Feet (ft) (x 2, once with RW, once without)  Assistive Device: Gait belt (2nd trial with RW)  Ambulation - Level of Assistance: Contact guard assistance     Gait Description (WDL): Exceptions to WDL  Gait Abnormalities: Decreased step clearance; Shuffling gait        Base of Support: Widened     Speed/Jes: Pace decreased (<100 feet/min); Shuffled; Slow  Step Length: Left shortened; Right shortened             Pain Rating:  No c/o pain    Activity Tolerance:   Fair    After treatment patient left in no apparent distress:   Sitting in chair and Call bell within reach    COMMUNICATION/EDUCATION:   The patients plan of care was discussed with: Physical therapist, Occupational therapist, and Registered nurse. Fall prevention education was provided and the patient/caregiver indicated understanding., Patient/family have participated as able in goal setting and plan of care. , and Patient/family agree to work toward stated goals and plan of care.     Thank you for this referral.  Ino Francois, PT, DPT   Time Calculation: 20 mins

## 2021-11-15 NOTE — PROGRESS NOTES
SPEECH PATHOLOGY BEDSIDE SWALLOW EVALUATION/DISCHARGE  Patient: Favio Mejia (88 y.o. female)  Date: 11/15/2021  Primary Diagnosis: NSTEMI (non-ST elevated myocardial infarction) [facility]) [I21.4]       Precautions:  Fall    ASSESSMENT :  Based on the objective data described below, the patient presents with functional oropharyngeal swallow. She occasionally has trouble swallowing and has to think about it. This has been happening frequently over the past few weeks. There is no explanation for this difficulty swallowing her saliva. Skilled acute therapy provided by a speech-language pathologist is not indicated at this time. PLAN :  Recommendations:  Barium swallow to assess for hiatal hernia. She reported she had a large hiatal hernia which is inoperable but it could be causing reflux which makes her feel like she is having swallowing difficulty. Discharge Recommendations: None     SUBJECTIVE:   Patient stated she was eating and drinking fine but she couldn't swallow periodically.  .    OBJECTIVE:     Past Medical History:   Diagnosis Date    Anxiety     Hypertension      Past Surgical History:   Procedure Laterality Date    HX KNEE REPLACEMENT Right 2011    HX KNEE REPLACEMENT Left 2013     Prior Level of Function/Home Situation:   Home Situation  Home Environment: Private residence  # Steps to Enter: 3  Rails to Enter: Yes  Hand Rails : Bilateral  One/Two Story Residence: One story  Living Alone: Yes  Support Systems: Friend/Neighbor  Patient Expects to be Discharged to[de-identified] [employer]  Current DME Used/Available at Home: Cane, straight  Tub or Shower Type: Shower  Diet prior to admission: reg/thins  Current Diet:  Reg/thins   Cognitive and Communication Status:  Neurologic State: Alert  Orientation Level: Oriented X4  Cognition: Appropriate decision making, Appropriate for age attention/concentration, Appropriate safety awareness, Follows commands  Perception: Appears intact  Perseveration: No perseveration noted  Safety/Judgement: Awareness of environment, Fall prevention, Insight into deficits  Oral Assessment:  Oral Assessment  Labial: No impairment  Dentition: Natural  Lingual: No impairment  Mandible: No impairment  P.O. Trials:  Patient Position: sitting upright in a chair  Vocal quality prior to P.O.: No impairment  Consistency Presented: Thin liquid; Puree; Solid  How Presented: Self-fed/presented; Cup/sip     Bolus Acceptance: No impairment  Bolus Formation/Control: No impairment     Propulsion: No impairment  Oral Residue: None  Initiation of Swallow: No impairment  Laryngeal Elevation: Functional  Aspiration Signs/Symptoms: None  Pharyngeal Phase Characteristics: No impairment, issues, or problems      Cues for Modifications: None       Oral Phase Severity: No impairment  Pharyngeal Phase Severity : No impairment  NOMS:   The NOMS functional outcome measure was used to quantify this patient's level of swallowing impairment. Based on the NOMS, the patient was determined to be at level 7 for swallow function     NOMS Swallowing Levels:  Level 1 (CN): NPO  Level 2 (CM): NPO but takes consistency in therapy  Level 3 (CL): Takes less than 50% of nutrition p.o. and continues with nonoral feedings; and/or safe with mod cues; and/or max diet restriction  Level 4 (CK): Safe swallow but needs mod cues; and/or mod diet restriction; and/or still requires some nonoral feeding/supplements  Level 5 (CJ): Safe swallow with min diet restriction; and/or needs min cues  Level 6 (CI): Independent with p.o.; rare cues; usually self cues; may need to avoid some foods or needs extra time  Level 7 ([address] [place] [address]): Independent for all p.o.  NAZIA. (2003). National Outcomes Measurement System (NOMS): Adult Speech-Language Pathology User's Guide.        Pain:  Pain Scale 1: Numeric (0 - 10)  Pain Intensity 1: 0     After treatment:       COMMUNICATION/EDUCATION:       The patient's plan of care including recommendations, planned interventions, and recommended diet changes were discussed with: Registered nurse.      Thank you for this referral.  RAMY Berg  Time Calculation: 15 mins

## 2021-11-15 NOTE — PROGRESS NOTES
Hospitalist Progress Note    NAME: Loc Chino   :  1935   MRN:  [id number]       Assessment / Plan:  Uncontrolled HTN  Elevated troponin  Nausea  -no chest pain, no ischemic changes on EKG  -mildly elevated trop  -recent ED visits w similar presentations  -appreciate cardiology consult  -cont asa  -weaning off clonidine as patient feeling dizzy/lightheaded  -cont increased coreg dose  -cont verapail, holding cozar d/t increased Cr  -felt trop elevation may be 2/2 demand ischemia  -TTE ordered  -elevated BNP 2k noted    Nausea/GERD  -large hiatal hernia  -cont protonix  -SLP consulted, barium swallow planned  -cont protonix  -zofran for refractory nausea    TEMO on CKD 3  -monitor Cr  -baseline Cr 1.4, elevated to 1.83    Anxiety  -has been living alone  - ~a year ago?  -per son/daughter, heaving benzo use recently, ?dependence  -advised to avoid benzos      Body mass index is 28.82 kg/m². Code status: DNR  Prophylaxis: lovenox     Subjective:     Chief Complaint / Reason for Physician Visit  Mildly anxious, no chest pain, no n/v    Discussed with RN events overnight. Review of Systems:  Symptom Y/N Comments  Symptom Y/N Comments   Fever/Chills n   Chest Pain n    Poor Appetite    Edema     Cough n   Abdominal Pain n    Sputum n   Joint Pain     SOB/TIDWELL n   Pruritis/Rash     Nausea/vomit n   Tolerating PT/OT     Diarrhea n   Tolerating Diet y    Constipation n   Other       Could NOT obtain due to:      Objective:     VITALS:   Last 24hrs VS reviewed since prior progress note.  Most recent are:  Patient Vitals for the past 24 hrs:   Temp Pulse Resp BP SpO2   11/15/21 0731 98.7 °F (37.1 °C) (!) 56 16 (!) 131/54 91 %   11/15/21 0309 98 °F (36.7 °C) 81 20 (!) 111/39 91 %   11/15/21 0000 98.1 °F (36.7 °C) 68 20 (!) 122/49 96 %   21 2203 -- 79 -- (!) 127/56 --   21 1925 98.9 °F (37.2 °C) 79 20 (!) 147/59 91 %   21 1654 -- -- -- (!) 132/58 --   21 1450 98.3 °F (36.8 °C) 77 24 (!) 132/58 93 %   11/14/21 1112 97.9 °F (36.6 °C) 73 21 (!) 128/58 92 %       Intake/Output Summary (Last 24 hours) at 11/15/2021 0846  Last data filed at 11/14/2021 2203  Gross per 24 hour   Intake 760 ml   Output 300 ml   Net 460 ml        I had a face to face encounter and independently examined this patient on 11/15/2021, as outlined below:  PHYSICAL EXAM:  General: WD, WN. Alert, cooperative, no acute distress    EENT:  EOMI. Anicteric sclerae. MMM  Resp:  CTA bilaterally, no wheezing or rales. No accessory muscle use  CV:  Regular  rhythm,  No edema  GI:  Soft, Non distended, Non tender. +Bowel sounds  Neurologic:  Alert and oriented X 3, normal speech,   Psych:   Good insight. Not anxious nor agitated  Skin:  No rashes. No jaundice    Reviewed most current lab test results and cultures  YES  Reviewed most current radiology test results   YES  Review and summation of old records today    NO  Reviewed patient's current orders and MAR    YES  PMH/ reviewed - no change compared to H&P  ________________________________________________________________________  Care Plan discussed with:    Comments   Patient x    Family  x son   RN x    Care Manager     Consultant                       x Multidiciplinary team rounds were held today with , nursing, pharmacist and clinical coordinator. Patient's plan of care was discussed; medications were reviewed and discharge planning was addressed.      ________________________________________________________________________  Total NON critical care TIME:  35   Minutes    Total CRITICAL CARE TIME Spent:   Minutes non procedure based      Comments   >50% of visit spent in counseling and coordination of care x    ________________________________________________________________________  Alverto Francis DO     Procedures: see electronic medical records for all procedures/Xrays and details which were not copied into this note but were reviewed prior to creation of Plan. LABS:  I reviewed today's most current labs and imaging studies.   Pertinent labs include:  Recent Labs     11/15/21  0430 11/14/21  0952   WBC 10.5 9.4   HGB 11.6 12.6   HCT 37.1 39.1    351     Recent Labs     11/15/21  0430 11/14/21  0952   * 133*   K 4.4 4.4    103   CO2 25 25   * 147*   BUN 30* 17   CREA 1.83* 1.34*   CA 9.1 9.6       Signed: Omaira Solo DO

## 2021-11-15 NOTE — PROGRESS NOTES
Problem: Falls - Risk of  Goal: *Absence of Falls  Description: Document Nanette Nguyen Fall Risk and appropriate interventions in the flowsheet.   Outcome: Progressing Towards Goal  Note: Fall Risk Interventions:  Mobility Interventions: Bed/chair exit alarm, Patient to call before getting OOB         Medication Interventions: Bed/chair exit alarm, Patient to call before getting OOB, Teach patient to arise slowly    Elimination Interventions: Call light in reach, Bed/chair exit alarm, Toilet paper/wipes in reach, Toileting schedule/hourly rounds              Problem: Patient Education: Go to Patient Education Activity  Goal: Patient/Family Education  Outcome: Progressing Towards Goal     Problem: Patient Education: Go to Patient Education Activity  Goal: Patient/Family Education  Outcome: Progressing Towards Goal     Problem: Unstable angina/NSTEMI: Day of Admission/Day 1  Goal: Off Pathway (Use only if patient is Off Pathway)  Outcome: Progressing Towards Goal  Goal: Activity/Safety  Outcome: Progressing Towards Goal  Goal: Consults, if ordered  Outcome: Progressing Towards Goal  Goal: Diagnostic Test/Procedures  Outcome: Progressing Towards Goal  Goal: Nutrition/Diet  Outcome: Progressing Towards Goal  Goal: Discharge Planning  Outcome: Progressing Towards Goal  Goal: Medications  Outcome: Progressing Towards Goal  Goal: Respiratory  Outcome: Progressing Towards Goal  Goal: Treatments/Interventions/Procedures  Outcome: Progressing Towards Goal  Goal: Psychosocial  Outcome: Progressing Towards Goal  Goal: *Hemodynamically stable  Outcome: Progressing Towards Goal  Goal: *Optimal pain control at patient's stated goal  Outcome: Progressing Towards Goal  Goal: *Lungs clear or at baseline  Outcome: Progressing Towards Goal     Problem: Unstable angina/NSTEMI: Day 2  Goal: Off Pathway (Use only if patient is Off Pathway)  Outcome: Progressing Towards Goal  Goal: Activity/Safety  Outcome: Progressing Towards Goal  Goal: Consults, if ordered  Outcome: Progressing Towards Goal  Goal: Diagnostic Test/Procedures  Outcome: Progressing Towards Goal  Goal: Nutrition/Diet  Outcome: Progressing Towards Goal  Goal: Discharge Planning  Outcome: Progressing Towards Goal  Goal: Medications  Outcome: Progressing Towards Goal  Goal: Respiratory  Outcome: Progressing Towards Goal  Goal: Treatments/Interventions/Procedures  Outcome: Progressing Towards Goal  Goal: Psychosocial  Outcome: Progressing Towards Goal  Goal: *Hemodynamically stable  Outcome: Progressing Towards Goal  Goal: *Optimal pain control at patient's stated goal  Outcome: Progressing Towards Goal  Goal: *Lungs clear or at baseline  Outcome: Progressing Towards Goal     Problem: Unstable Angina/NSTEMI: Discharge Outcomes  Goal: *Hemodynamically stable  Outcome: Progressing Towards Goal  Goal: *Stable cardiac rhythm  Outcome: Progressing Towards Goal  Goal: *Lungs clear or at baseline  Outcome: Progressing Towards Goal  Goal: *Optimal pain control at patient's stated goal  Outcome: Progressing Towards Goal  Goal: *Identifies cardiac risk factors  Outcome: Progressing Towards Goal  Goal: *Verbalizes home exercise program, activity guidelines, cardiac precautions  Outcome: Progressing Towards Goal  Goal: *Verbalizes understanding and describes prescribed diet  Outcome: Progressing Towards Goal  Goal: *Verbalizes name, dosage, time, side effects, and number of days to continue medications  Outcome: Progressing Towards Goal  Goal: *Anxiety reduced or absent  Outcome: Progressing Towards Goal  Goal: *Understands and describes signs and symptoms to report to providers(Stroke Metric)  Outcome: Progressing Towards Goal  Goal: *Describes follow-up/return visits to physicians  Outcome: Progressing Towards Goal  Goal: *Describes available resources and support systems  Outcome: Progressing Towards Goal  Goal: *Influenza immunization  Outcome: Progressing Towards Goal  Goal: *Pneumococcal immunization  Outcome: Progressing Towards Goal  Goal: *Describes smoking cessation resources  Outcome: Progressing Towards Goal     Problem: Activity Intolerance  Goal: *Oxygen saturation during activity within specified parameters  Outcome: Progressing Towards Goal  Goal: *Able to remain out of bed as prescribed  Outcome: Progressing Towards Goal     Problem: Patient Education: Go to Patient Education Activity  Goal: Patient/Family Education  Outcome: Progressing Towards Goal     Problem: Diabetes Self-Management  Goal: *Disease process and treatment process  Description: Define diabetes and identify own type of diabetes; list 3 options for treating diabetes. Outcome: Progressing Towards Goal  Goal: *Incorporating nutritional management into lifestyle  Description: Describe effect of type, amount and timing of food on blood glucose; list 3 methods for planning meals. Outcome: Progressing Towards Goal  Goal: *Incorporating physical activity into lifestyle  Description: State effect of exercise on blood glucose levels. Outcome: Progressing Towards Goal  Goal: *Developing strategies to promote health/change behavior  Description: Define the ABC's of diabetes; identify appropriate screenings, schedule and personal plan for screenings. Outcome: Progressing Towards Goal  Goal: *Using medications safely  Description: State effect of diabetes medications on diabetes; name diabetes medication taking, action and side effects. Outcome: Progressing Towards Goal  Goal: *Monitoring blood glucose, interpreting and using results  Description: Identify recommended blood glucose targets  and personal targets. Outcome: Progressing Towards Goal  Goal: *Prevention, detection, treatment of acute complications  Description: List symptoms of hyper- and hypoglycemia; describe how to treat low blood sugar and actions for lowering  high blood glucose level.   Outcome: Progressing Towards Goal  Goal: *Prevention, detection and treatment of chronic complications  Description: Define the natural course of diabetes and describe the relationship of blood glucose levels to long term complications of diabetes.   Outcome: Progressing Towards Goal  Goal: *Developing strategies to address psychosocial issues  Description: Describe feelings about living with diabetes; identify support needed and support network  Outcome: Progressing Towards Goal  Goal: *Insulin pump training  Outcome: Progressing Towards Goal  Goal: *Sick day guidelines  Outcome: Progressing Towards Goal  Goal: *Patient Specific Goal (EDIT GOAL, INSERT TEXT)  Outcome: Progressing Towards Goal     Problem: Patient Education: Go to Patient Education Activity  Goal: Patient/Family Education  Outcome: Progressing Towards Goal

## 2021-11-15 NOTE — PROGRESS NOTES
Problem: Falls - Risk of  Goal: *Absence of Falls  Description: Document Veronica Samaniego Fall Risk and appropriate interventions in the flowsheet.   11/14/2021 1955 by Alexa Reece RN  Outcome: Progressing Towards Goal  Note: Fall Risk Interventions:  Mobility Interventions: Bed/chair exit alarm, OT consult for ADLs, Patient to call before getting OOB, PT Consult for mobility concerns         Medication Interventions: Bed/chair exit alarm, Evaluate medications/consider consulting pharmacy, Patient to call before getting OOB, Teach patient to arise slowly    Elimination Interventions: Bed/chair exit alarm, Call light in reach, Elevated toilet seat, Patient to call for help with toileting needs, Stay With Me (per policy), Toilet paper/wipes in reach, Toileting schedule/hourly rounds           11/14/2021 0728 by Alexa Reece, RN  Outcome: Progressing Towards Goal  Note: Fall Risk Interventions:  Mobility Interventions: Bed/chair exit alarm, Communicate number of staff needed for ambulation/transfer         Medication Interventions: Bed/chair exit alarm, Evaluate medications/consider consulting pharmacy, Patient to call before getting OOB, Teach patient to arise slowly    Elimination Interventions: Bed/chair exit alarm, Call light in reach, Elevated toilet seat, Patient to call for help with toileting needs, Stay With Me (per policy), Toilet paper/wipes in reach, Toileting schedule/hourly rounds

## 2021-11-15 NOTE — PROGRESS NOTES
Cardiology Progress Note  11/15/2021     Admit Date: 11/14/2021  Admit Diagnosis: NSTEMI (non-ST elevated myocardial infarction) ([facility] [address] [I21.4]  CC: none currently  Cardiac Assessment/Plan:     1) TIDWELL 12/2019:  3 months w/o change; unremarkable echo at [facility]. (except PAp 45). *Neg MPI 1/2020 (Ex 2 min, no ischemia, normal EF). 2) palpitations 12/2019:  (Q.day at night early a.m.) unremarkable 48 hr Holter at [facility]. (w/ Sx);      *Neg EVR 12/2019.    3) HTN  4) diabetes  5) dyslipidemia  6) large hiatal hernia per patient  7) chronic lumbar back pain  8) CKD 3:  Cr 1.64/GFR approx 40 10/2019.  9) GERD  10) osteoarthritis  She is a retired ; no nicotine, ethanol, nor added salt. 1 caffeinated beverage per day. Admitted w/ uncontrolled HTN; HypoNa, indeterminate troponin; No ecg changes; no CP.     11/15: BPs 110-140; HR 50-80s; NSR; 91% 2L; ? Low UOP  Hg 11.6; Na 134; Cr 1.8 from 1.3 from 1.5; trop 0.4 to 0.3 ng/ml. proBNP 2k.    Echo pending. Cardiac meds: asa; lipitor 20; coreg 6.25 bid; clonidine 0.2 bid; verapamil 240 qhs. (holding losartan 50). Rec; wean off clonidine (dry mouth/\"fuzzy head\"); increase coreg (to max if needed); Add back losartan when Cr stabilizes (may need hydralazine/imdur, will see).        __________________________________________________________  CHIEF COMPLAINT: Increased bp, nausea/vomiting.   HISTORY OF PRESENT ILLNESS:      Violeta Lobato is a 81 y.o.   female with past medical history of hypertension who was transferred from [place] for nausea vomiting and elevated blood pressure.  Patient has had 3 trips to the [address] [place] [address] ED for the same complaints, she presented this Friday and was found to have a systolic blood pressure around 225/105, she was complaining of nausea vomiting and chills.  Labs at the [address] [place] [address] showed elevated troponin at 0.45.  Patient denies any chest pain, shortness of breath or palpitation.  EKG showed no acute ST changes     Currently in bed, son at bedside, no chest pain, no dyspnea, no edema, no syncope   _______________________________________________________  @ NP OV 1/2/20  Ms Brooks Marie has a h/o:  1) TIDWELL 12/2019:  3 months w/o change; unremarkable echo at [facility]. (except PAp 45). 2) palpitations 12/2019:  (Q.day at night early a.m.) unremarkable 48 hour Holter at [facility]. (was symptomatic). 3) HTN  4) diabetes  5) dyslipidemia  6) large hiatal hernia per patient  7) chronic lumbar back pain  8) CKD 3:  Cr 1.64/GFR approx 40 10/2019.  9) GERD  10) osteoarthritis  She is a retired ; no nicotine, ethanol, nor added salt. 1 caffeinated beverage per day. 12/2019:  TIDWELL for the last 3 months; her BPs been running high. No chest pain. Palpitations as noted above with unremarkable Holter with symptoms; no symptoms in the last 4 days. 1/2020: States \"episodes are better\" since Dr. Abdulaziz Syed increased Losartan. Hasn't put on MCOT yet because she couldn't figure it out. Still feels short of breath and has occasional chest pressure durig episodes. Occurs when exerting herself. MPI pending. No dizziness, LH, or edema. CARDIAC HISTORY  RISK FACTORS:  1 Diabetes   2 Hypertension   3 Dyslipidemia   4 Family History of CAD [Less than 61years of age]   5 Tobacco Use: No/never       CARDIOVASCULAR PROCEDURES  Procedure Date Results   Tim MPI 09/13/2011 No Ischemia, Per patient; at [facility].  (for atypical chest pain). Echo 11/27/2019 EF 0.65 (65%), Mild LVH, AV Mean 7 mmHg, Est RVSP 44 mmHg, Normal RV. No valvular heart disease. At [address] [place] [address]. EKG 12/17/2019 Sinus Rhythm, Borderline LVH. Holter 12/09/2019 48 hr holter; at [facility]: No arrhythmia; HR 50 to 116:  Average 90 ; patient reported symptoms.        ACTIVE ALLERGIES:  Ingredient Reaction Comment   ACE INHIBITORS     PENICILLINS     PROCAINAMIDE  Procan SR       PROBLEM LIST:  Problem Description Chronic   Angiotensin-converting-enzyme inhibitor allergy N PAST MEDICAL/SURGICAL HISTORY  (Detailed)    Disease/disorder Onset Date Surgical History Date Comments     Appendectomy       Knee Replacement       Colonoscopy       EGD     Anemia       Anxiety       Arthritis       Blood Clots       Chronic Kidney Disease       Diverticulosis       Elevated lipids       GERD       Hypertension       Mood Disorder       Overactive Bladder         Family History  (Detailed)  Relationship Family Member Name  Age at Death Condition Onset Age Cause of Death   Father    Heart Attack     Sister    High Blood Pressure       SOCIAL HISTORY  (Detailed)  Tobacco use reviewed. Preferred language is [place]. EDUCATION/EMPLOYMENT/OCCUPATION  Employment History Status Retired Restrictions     retired       Smoking status: Never smoker. ALCOHOL  There is no history of alcohol use. CAFFEINE  The patient uses caffeine. LIFESTYLE  Exercises daily. IMPRESSION AND PLAN  01. Palpitations (R00.2):  Etiology unknown. Event monitor is recommended. Applied to patient in office today. 02. Shortness of breath (R06.02):  ?  Cardiac. Stress Cardiolite is warranted. We discussed the signs and symptoms of unstable angina, myocardial infarction and malignant arrhythmia. The patient knows to seek immediate medical attention should they occur. 03. Hyp hrt & chr kdny dis w/o hrt fail, w stg 1-4/unsp chr kdny (I13.10): Inadequately controlled; losartan increased to 50 mg q.day and sCr with slight bump to 1.77. Will continu at 50mg QD and change metoprolol to Coreg 3.125mg BID. Will recheck at [facility] next week. 04. Mixed hyperlipidemia (E78.2):  Lipid labs drawn by PCP. The patient is tolerating lipid lowering therapy well. 05. Chronic kidney disease, stage 3 (moderate) (N18.3):  Managed by: Other Physician   06. Body mass index (BMI) 31.0-31.9, adult (Z68.31): The patient was instructed on AHA diet and regular exercise.      ORDERS:   Dietary management education, guidance, and counseling    2 The patient was instructed on AHA diet and regular exercise. 1/2/20 MEDICATION LIST  Medication Sig Desc   alprazolam 0.25 mg tablet take 1 tablet by oral route 3 times every day   Aspirin Low Dose 81 mg tablet,delayed release take 1 tablet by oral route  every day   calcium carbonate 500 mg (1,250 mg)-vitamin D3 125 unit tablet    carvedilol 3.125 mg tablet take 1 tablet by oral route 2 times every day with food   diclofenac sodium 50 mg tablet,delayed release take 1 tablet by oral route 2 times every day   losartan 50 mg tablet take 1 tablet by oral route  every day   multivitamin tablet take 1 tablet by oral route  every day with food   omeprazole 20 mg capsule,delayed release take 1 capsule by oral route  every day before a meal   ranitidine 150 mg tablet take 1 tablet by oral route 2 times every day   simvastatin 40 mg tablet take 1 tablet by oral route  every day in the evening   triamterene 37.5 mg-hydrochlorothiazide 25 mg tablet take 1 tablet by oral route  every day   verapamil  mg 24 hr capsule,extended release take 1 capsule by oral route  every day       For other plans, see orders.   Hospital problem list     Active Hospital Problems    Diagnosis Date Noted    NSTEMI (non-ST elevated myocardial infarction) ([facility] [address] 11/14/2021        Subjective: Rajeev Li reports       Chest pain X none  consistent with:  Non-cardiac CP         Atypical CP     None now  On going  Anginal CP     Dyspnea X none  at rest  with exertion         improved  unchanged  worse              PND X none  overnight       Orthopnea X none  improved  unchanged  worse   Presyncope X none  improved  unchanged  worse     Ambulated in hallway without symptoms   Yes   Ambulated in room without symptoms  Yes   Objective:     Physical Exam:  Overall VSSAF;    Visit Vitals  BP (!) 131/54   Pulse (!) 56   Temp 98.7 °F (37.1 °C)   Resp 16   Ht 5' 7\" (1.702 m)   Wt 83.5 kg (184 lb)   SpO2 91%   BMI 28.82 kg/m²     Temp (24hrs), Av.3 °F (36.8 °C), Min:97.9 °F (36.6 °C), Max:98.9 °F (37.2 °C)    Patient Vitals for the past 8 hrs:   Pulse   11/15/21 0731 (!) 56   11/15/21 0309 81     Patient Vitals for the past 8 hrs:   Resp   11/15/21 0731 16   11/15/21 0309 20     Patient Vitals for the past 8 hrs:   BP   11/15/21 0731 (!) 131/54   11/15/21 0309 (!) 111/39       Intake/Output Summary (Last 24 hours) at 11/15/2021 0803  Last data filed at 2021 2203  Gross per 24 hour   Intake 760 ml   Output 300 ml   Net 460 ml     General Appearance: Well developed, well nourished, no acute distress. Ears/Nose/Mouth/Throat:   Normal MM; anicteric. JVP: WNL   Resp:   Lungs clear to auscultation bilaterally. Nl resp effort. Cardiovascular:  RRR, S1, S2 normal, no new murmur. No gallop or rub. Abdomen:   Soft, non-tender, bowel sounds are present. Extremities: trivial edema bilaterally. Skin:  Neuro: Warm and dry. A/O x3, grossly nonfocal       cath site intact w/o hematoma or new bruit; distal pulse unchanged  Yes   Data Review:     Telemetry independently reviewed x sinus  chronic afib  parox afib  NSVT     ECG independently reviewed  NSR  afib  no significant changes  NSST-Tw chgs     x no new ECG provided for review   Lab results reviewed as noted below. Current medications reviewed as noted below. No results for input(s): PH, PCO2, PO2 in the last 72 hours. No results for input(s): CPK, CKMB, CKNDX, TROIQ in the last 72 hours.   Recent Labs     11/15/21  0430 21  0952   * 133*   K 4.4 4.4    103   CO2 25 25   BUN 30* 17   CREA 1.83* 1.34*   GFRAA 32* 46*   * 147*   CA 9.1 9.6   WBC 10.5 9.4   HGB 11.6 12.6   HCT 37.1 39.1    351     Lab Results   Component Value Date/Time    Cholesterol, total 158 2014 09:29 AM    HDL Cholesterol 63 2014 09:29 AM    LDL, calculated 70 2014 09:29 AM    Triglyceride 123 2014 09:29 AM     No results for input(s): AP, TBIL, TP, ALB, GLOB, GGT, AML, LPSE in the last 72 hours. No lab exists for component: SGOT, GPT, AMYP, HLPSE  No results for input(s): INR, PTP, APTT, INREXT in the last 72 hours.    No components found for: GLPOC    Current Facility-Administered Medications   Medication Dose Route Frequency    ALPRAZolam (XANAX) tablet 0.5 mg  0.5 mg Oral PRN    aspirin delayed-release tablet 81 mg  81 mg Oral DAILY    calcium-vitamin D (OS-MARINA +D3) 500 mg-200 unit per tablet 1 Tablet  1 Tablet Oral DAILY    [Held by provider] losartan (COZAAR) tablet 50 mg  50 mg Oral DAILY    pantoprazole (PROTONIX) tablet 40 mg  40 mg Oral ACB    atorvastatin (LIPITOR) tablet 20 mg  20 mg Oral QHS    verapamil ER (CALAN-SR) tablet 240 mg  240 mg Oral QHS    sodium chloride (NS) flush 5-40 mL  5-40 mL IntraVENous Q8H    sodium chloride (NS) flush 5-40 mL  5-40 mL IntraVENous PRN    morphine injection 1 mg  1 mg IntraVENous Q4H PRN    ondansetron (ZOFRAN) injection 4 mg  4 mg IntraVENous Q6H PRN    labetaloL (NORMODYNE;TRANDATE) injection 10 mg  10 mg IntraVENous Q4H PRN    carvediloL (COREG) tablet 6.25 mg  6.25 mg Oral BID WITH MEALS    cloNIDine HCL (CATAPRES) tablet 0.2 mg  0.2 mg Oral BID    insulin lispro (HUMALOG) injection   SubCUTAneous AC&HS    glucose chewable tablet 16 g  4 Tablet Oral PRN    dextrose (D50W) injection syrg 12.5-25 g  12.5-25 g IntraVENous PRN    glucagon (GLUCAGEN) injection 1 mg  1 mg IntraMUSCular PRN    acetaminophen (TYLENOL) tablet 650 mg  650 mg Oral Q6H PRN        Jf White MD

## 2021-11-15 NOTE — PROGRESS NOTES
Spiritual Care Partner Volunteer visited patient at [facility] in [facility] CARDI[facility]ULMONARY CARE on 11/15/2021     Documented by:  [facility] [place] [facility] Provider   Paging Service [phone])

## 2021-11-15 NOTE — PROGRESS NOTES
Day 0  Pt admitted overnight with uncontrolled HTN, TIDWELL  BP improved  Mildly increased troponin no chest pain, likely demand ischemic, EKG NS, no ischemic changes  Agree with H&P plan as dicated

## 2021-11-16 ENCOUNTER — APPOINTMENT (OUTPATIENT)
Dept: NON INVASIVE DIAGNOSTICS | Age: 86
DRG: 233 | End: 2021-11-16
Attending: INTERNAL MEDICINE
Payer: MEDICARE

## 2021-11-16 ENCOUNTER — APPOINTMENT (OUTPATIENT)
Dept: NUCLEAR MEDICINE | Age: 86
DRG: 233 | End: 2021-11-16
Attending: INTERNAL MEDICINE
Payer: MEDICARE

## 2021-11-16 LAB
ANION GAP SERPL CALC-SCNC: 8 MMOL/L (ref 5–15)
BUN SERPL-MCNC: 37 MG/DL (ref 6–20)
BUN/CREAT SERPL: 21 (ref 12–20)
CALCIUM SERPL-MCNC: 9.1 MG/DL (ref 8.5–10.1)
CHLORIDE SERPL-SCNC: 103 MMOL/L (ref 97–108)
CO2 SERPL-SCNC: 26 MMOL/L (ref 21–32)
CREAT SERPL-MCNC: 1.76 MG/DL (ref 0.55–1.02)
ECHO AO ASC DIAM: 2.95 CM
ECHO AV AREA PEAK VELOCITY: 2.05 CM2
ECHO AV AREA PEAK VELOCITY: 2.07 CM2
ECHO AV AREA VTI: 2.19 CM2
ECHO AV AREA/BSA VTI: 1.1 CM2/M2
ECHO AV MEAN GRADIENT: 4.7 MMHG
ECHO AV PEAK GRADIENT: 7.33 MMHG
ECHO AV PEAK VELOCITY: 135.41 CM/S
ECHO AV VTI: 34.58 CM
ECHO LA MAJOR AXIS: 1.93 CM
ECHO LA MINOR AXIS: 0.99 CM
ECHO LV E' LATERAL VELOCITY: 7.71 CM/S
ECHO LV E' SEPTAL VELOCITY: 4.28 CM/S
ECHO LV INTERNAL DIMENSION DIASTOLIC: 4.07 CM (ref 3.9–5.3)
ECHO LV INTERNAL DIMENSION SYSTOLIC: 2.84 CM
ECHO LV IVSD: 1.41 CM (ref 0.6–0.9)
ECHO LV MASS 2D: 203.8 G (ref 67–162)
ECHO LV MASS INDEX 2D: 104.5 G/M2 (ref 43–95)
ECHO LV POSTERIOR WALL DIASTOLIC: 1.3 CM (ref 0.6–0.9)
ECHO LVOT DIAM: 1.89 CM
ECHO LVOT PEAK GRADIENT: 3.97 MMHG
ECHO LVOT PEAK GRADIENT: 4.02 MMHG
ECHO LVOT PEAK VELOCITY: 100.3 CM/S
ECHO LVOT PEAK VELOCITY: 99.58 CM/S
ECHO LVOT SV: 75.8 ML
ECHO LVOT VTI: 27.16 CM
ECHO MV A VELOCITY: 90.6 CM/S
ECHO MV AREA PHT: 3.34 CM2
ECHO MV AREA VTI: 2.71 CM2
ECHO MV E DECELERATION TIME (DT): 325.02 MS
ECHO MV E VELOCITY: 71.4 CM/S
ECHO MV E/A RATIO: 0.79
ECHO MV E/E' LATERAL: 9.26
ECHO MV E/E' RATIO (AVERAGED): 12.97
ECHO MV E/E' SEPTAL: 16.68
ECHO MV MAX VELOCITY: 96.24 CM/S
ECHO MV MEAN GRADIENT: 1.55 MMHG
ECHO MV PEAK GRADIENT: 3.71 MMHG
ECHO MV PRESSURE HALF TIME (PHT): 65.87 MS
ECHO MV VTI: 27.99 CM
ECHO PV MAX VELOCITY: 96.84 CM/S
ECHO PV MAX VELOCITY: 98.27 CM/S
ECHO PV MEAN GRADIENT: 1.61 MMHG
ECHO PV PEAK INSTANTANEOUS GRADIENT SYSTOLIC: 3.75 MMHG
ECHO PV PEAK INSTANTANEOUS GRADIENT SYSTOLIC: 3.86 MMHG
ECHO TV REGURGITANT MAX VELOCITY: 308.07 CM/S
ERYTHROCYTE [DISTWIDTH] IN BLOOD BY AUTOMATED COUNT: 12.6 % (ref 11.5–14.5)
GLUCOSE BLD STRIP.AUTO-MCNC: 110 MG/DL (ref 65–117)
GLUCOSE BLD STRIP.AUTO-MCNC: 139 MG/DL (ref 65–117)
GLUCOSE BLD STRIP.AUTO-MCNC: 143 MG/DL (ref 65–117)
GLUCOSE BLD STRIP.AUTO-MCNC: 186 MG/DL (ref 65–117)
GLUCOSE SERPL-MCNC: 134 MG/DL (ref 65–100)
HCT VFR BLD AUTO: 37.2 % (ref 35–47)
HGB BLD-MCNC: 11.6 G/DL (ref 11.5–16)
LVOT MG: 2.61 MMHG
MCH RBC QN AUTO: 30.9 PG (ref 26–34)
MCHC RBC AUTO-ENTMCNC: 31.2 G/DL (ref 30–36.5)
MCV RBC AUTO: 99.2 FL (ref 80–99)
NRBC # BLD: 0 K/UL (ref 0–0.01)
NRBC BLD-RTO: 0 PER 100 WBC
PLATELET # BLD AUTO: 292 K/UL (ref 150–400)
PMV BLD AUTO: 9.3 FL (ref 8.9–12.9)
POTASSIUM SERPL-SCNC: 4.3 MMOL/L (ref 3.5–5.1)
RBC # BLD AUTO: 3.75 M/UL (ref 3.8–5.2)
SERVICE CMNT-IMP: ABNORMAL
SERVICE CMNT-IMP: NORMAL
SODIUM SERPL-SCNC: 137 MMOL/L (ref 136–145)
WBC # BLD AUTO: 9 K/UL (ref 3.6–11)

## 2021-11-16 PROCEDURE — 74011250636 HC RX REV CODE- 250/636: Performed by: INTERNAL MEDICINE

## 2021-11-16 PROCEDURE — 74011250637 HC RX REV CODE- 250/637: Performed by: INTERNAL MEDICINE

## 2021-11-16 PROCEDURE — 80048 BASIC METABOLIC PNL TOTAL CA: CPT

## 2021-11-16 PROCEDURE — 82962 GLUCOSE BLOOD TEST: CPT

## 2021-11-16 PROCEDURE — 85027 COMPLETE CBC AUTOMATED: CPT

## 2021-11-16 PROCEDURE — 77010033678 HC OXYGEN DAILY

## 2021-11-16 PROCEDURE — 65660000000 HC RM CCU STEPDOWN

## 2021-11-16 PROCEDURE — 93017 CV STRESS TEST TRACING ONLY: CPT

## 2021-11-16 PROCEDURE — A9500 TC99M SESTAMIBI: HCPCS

## 2021-11-16 RX ORDER — TETRAKIS(2-METHOXYISOBUTYLISOCYANIDE)COPPER(I) TETRAFLUOROBORATE 1 MG/ML
30 INJECTION, POWDER, LYOPHILIZED, FOR SOLUTION INTRAVENOUS
Status: COMPLETED | OUTPATIENT
Start: 2021-11-16 | End: 2021-11-16

## 2021-11-16 RX ORDER — ONDANSETRON 2 MG/ML
4 INJECTION INTRAMUSCULAR; INTRAVENOUS
Status: DISCONTINUED | OUTPATIENT
Start: 2021-11-16 | End: 2021-11-19

## 2021-11-16 RX ORDER — SODIUM CHLORIDE 0.9 % (FLUSH) 0.9 %
10 SYRINGE (ML) INJECTION AS NEEDED
Status: DISCONTINUED | OUTPATIENT
Start: 2021-11-16 | End: 2021-11-19

## 2021-11-16 RX ORDER — TETRAKIS(2-METHOXYISOBUTYLISOCYANIDE)COPPER(I) TETRAFLUOROBORATE 1 MG/ML
10 INJECTION, POWDER, LYOPHILIZED, FOR SOLUTION INTRAVENOUS
Status: COMPLETED | OUTPATIENT
Start: 2021-11-16 | End: 2021-11-16

## 2021-11-16 RX ADMIN — TETRAKIS(2-METHOXYISOBUTYLISOCYANIDE)COPPER(I) TETRAFLUOROBORATE 30 MILLICURIE: 1 INJECTION, POWDER, LYOPHILIZED, FOR SOLUTION INTRAVENOUS at 11:50

## 2021-11-16 RX ADMIN — CARVEDILOL 12.5 MG: 12.5 TABLET, FILM COATED ORAL at 08:58

## 2021-11-16 RX ADMIN — Medication 10 ML: at 17:19

## 2021-11-16 RX ADMIN — ENOXAPARIN SODIUM 30 MG: 100 INJECTION SUBCUTANEOUS at 17:19

## 2021-11-16 RX ADMIN — CARVEDILOL 12.5 MG: 12.5 TABLET, FILM COATED ORAL at 17:19

## 2021-11-16 RX ADMIN — ASPIRIN 81 MG: 81 TABLET, COATED ORAL at 08:58

## 2021-11-16 RX ADMIN — REGADENOSON 0.4 MG: 0.08 INJECTION, SOLUTION INTRAVENOUS at 11:49

## 2021-11-16 RX ADMIN — ACETAMINOPHEN 650 MG: 325 TABLET ORAL at 08:58

## 2021-11-16 RX ADMIN — VERAPAMIL HYDROCHLORIDE 240 MG: 120 TABLET, FILM COATED, EXTENDED RELEASE ORAL at 21:14

## 2021-11-16 RX ADMIN — Medication 10 ML: at 11:49

## 2021-11-16 RX ADMIN — ALPRAZOLAM 0.5 MG: 0.5 TABLET ORAL at 21:14

## 2021-11-16 RX ADMIN — ONDANSETRON 4 MG: 2 INJECTION INTRAMUSCULAR; INTRAVENOUS at 07:43

## 2021-11-16 RX ADMIN — Medication 1 TABLET: at 08:58

## 2021-11-16 RX ADMIN — ATORVASTATIN CALCIUM 20 MG: 20 TABLET, FILM COATED ORAL at 21:14

## 2021-11-16 RX ADMIN — Medication 10 ML: at 21:15

## 2021-11-16 RX ADMIN — Medication 10 ML: at 06:59

## 2021-11-16 RX ADMIN — TETRAKIS(2-METHOXYISOBUTYLISOCYANIDE)COPPER(I) TETRAFLUOROBORATE 10 MILLICURIE: 1 INJECTION, POWDER, LYOPHILIZED, FOR SOLUTION INTRAVENOUS at 10:05

## 2021-11-16 RX ADMIN — PANTOPRAZOLE SODIUM 40 MG: 40 TABLET, DELAYED RELEASE ORAL at 08:58

## 2021-11-16 NOTE — PROGRESS NOTES
Problem: Falls - Risk of  Goal: *Absence of Falls  Description: Document Tess Duverney Fall Risk and appropriate interventions in the flowsheet.   Outcome: Progressing Towards Goal  Note: Fall Risk Interventions:  Mobility Interventions: Bed/chair exit alarm, Patient to call before getting OOB         Medication Interventions: Bed/chair exit alarm, Patient to call before getting OOB    Elimination Interventions: Call light in reach, Bed/chair exit alarm, Patient to call for help with toileting needs

## 2021-11-16 NOTE — PROGRESS NOTES
Transition of Care Plan:    RUR:8% low risk  Disposition:home with home health  Follow up appointments:PCP and specialists as indicated  DME needed: RW  Transportation at [place] [id number] will transport  [address] or means to access home: pt has access       IM Medicare Letter:2nd letter to be given at d/c  Is patient a BCPI-A Bundle: n/a        If yes, was Bundle Letter given?:     Caregiver Contact:son Abel Santiago [phone]  Discharge Caregiver contacted prior to discharge? Reason for Admission:  NSTEMI                  RUR Score: 8% low risk                    Plan for utilizing home health:  Home health at d/c. She has no HH/IPR hx but has been to SNF         PCP: First and Last name:  Dr. Donny Real     Name of Practice: Replaced by [facility]   Are you a current patient: Yes/No: yes   Approximate date of last visit: 2 wks   Can you participate in a virtual visit with your PCP: no                    Current Advanced Directive/Advance Care Plan:   Advance Care Planning     General Advance Care Planning (ACP) Conversation      Date of Conversation: 11/13/2021  Conducted with: Patient with [address] 153:   No healthcare decision makers have been documented. Click here to complete [address] including selection of the Healthcare Decision Maker Relationship (ie \"Primary\")    Content/Action Overview: Has ACP document(s) NOT on file - requested patient to provide  Reviewed DNR/DNI and patient confirms current DNR status - completed forms on file (place new order if needed)     Length of Voluntary ACP Conversation in minutes:  <16 minutes (Non-Billable)                        Transition of Care Plan:  Home with home health    Update 1425: Home health referrals sent to [facility], [facility] [address], and [facility]. Pt accepted by [facility]. AVS updated. FOC placed on chart. Initial note:  Chart reviewed.   CM met with pt to introduce role, verify demographics, discuss SNF options and complete assessment. Pt lives alone in a 1 level home with 4 HERMAN. She is independent with ADL's and IADL's and drives. Her preferred pharmacy is [facility] in [place]. She confirmed that her physician is Dr. Donny Real but she does not know her first name. Pt declined SNF but is willing to have [facility]. She confirms that she has someone that can assist her at home. In a later conversation she also confirm that she has family that can assist.  She denies  Questions/concerns at this time. CM will continue to monitor for d/c needs. Care Management Interventions  PCP Verified by CM: Yes  Palliative Care Criteria Met (RRAT>21 & CHF Dx)?: No  Mode of Transport at Discharge:  Other (see comment) (son will transport)  Transition of Care Consult (CM Consult): Discharge Planning  Physical Therapy Consult: Yes  Occupational Therapy Consult: Yes  Speech Therapy Consult: Yes  Support Systems: Child(peggy)  Confirm Follow Up Transport: Family  The Patient and/or Patient Representative was Provided with a Choice of Provider and Agrees with the Discharge Plan?: Yes  Name of the Patient Representative Who was Provided with a Choice of Provider and Agrees with the Discharge Plan: [facility] of Choice List was Provided with Basic Dialogue that Supports the Patient's Individualized Plan of Care/Goals, Treatment Preferences and Shares the Quality Data Associated with the Providers?: Yes   Resource Information Provided?: No  Discharge Location  Discharge Placement: Home with home health     Sarai Rodriguez, BRADLY  Care Manager  [phone]

## 2021-11-16 NOTE — PROGRESS NOTES
[facility] MED: LEXISCAN Cardiac Stress completed @ 1300. Results will follow. Please check with ordering Physician regarding pt diet.

## 2021-11-16 NOTE — PROGRESS NOTES
Problem: Falls - Risk of  Goal: *Absence of Falls  Description: Document Zaki Hawk Fall Risk and appropriate interventions in the flowsheet.   Outcome: Progressing Towards Goal  Note: Fall Risk Interventions:  Mobility Interventions: Bed/chair exit alarm, OT consult for ADLs, Patient to call before getting OOB, PT Consult for mobility concerns, Strengthening exercises (ROM-active/passive)         Medication Interventions: Assess postural VS orthostatic hypotension, Bed/chair exit alarm, Evaluate medications/consider consulting pharmacy, Teach patient to arise slowly, Patient to call before getting OOB    Elimination Interventions: Call light in reach, Patient to call for help with toileting needs, Toileting schedule/hourly rounds              Problem: Patient Education: Go to Patient Education Activity  Goal: Patient/Family Education  Outcome: Progressing Towards Goal     Problem: Patient Education: Go to Patient Education Activity  Goal: Patient/Family Education  Outcome: Progressing Towards Goal     Problem: Unstable angina/NSTEMI: Day of Admission/Day 1  Goal: Off Pathway (Use only if patient is Off Pathway)  Outcome: Progressing Towards Goal  Goal: Activity/Safety  Outcome: Progressing Towards Goal  Goal: Consults, if ordered  Outcome: Progressing Towards Goal  Goal: Diagnostic Test/Procedures  Outcome: Progressing Towards Goal  Goal: Nutrition/Diet  Outcome: Progressing Towards Goal  Goal: Discharge Planning  Outcome: Progressing Towards Goal  Goal: Medications  Outcome: Progressing Towards Goal  Goal: Respiratory  Outcome: Progressing Towards Goal  Goal: Treatments/Interventions/Procedures  Outcome: Progressing Towards Goal  Goal: Psychosocial  Outcome: Progressing Towards Goal  Goal: *Hemodynamically stable  Outcome: Progressing Towards Goal  Goal: *Optimal pain control at patient's stated goal  Outcome: Progressing Towards Goal  Goal: *Lungs clear or at baseline  Outcome: Progressing Towards Goal     Problem: Unstable angina/NSTEMI: Day 2  Goal: Off Pathway (Use only if patient is Off Pathway)  Outcome: Progressing Towards Goal  Goal: Activity/Safety  Outcome: Progressing Towards Goal  Goal: Consults, if ordered  Outcome: Progressing Towards Goal  Goal: Diagnostic Test/Procedures  Outcome: Progressing Towards Goal  Goal: Nutrition/Diet  Outcome: Progressing Towards Goal  Goal: Discharge Planning  Outcome: Progressing Towards Goal  Goal: Medications  Outcome: Progressing Towards Goal  Goal: Respiratory  Outcome: Progressing Towards Goal  Goal: Treatments/Interventions/Procedures  Outcome: Progressing Towards Goal  Goal: Psychosocial  Outcome: Progressing Towards Goal  Goal: *Hemodynamically stable  Outcome: Progressing Towards Goal  Goal: *Optimal pain control at patient's stated goal  Outcome: Progressing Towards Goal  Goal: *Lungs clear or at baseline  Outcome: Progressing Towards Goal     Problem: Unstable Angina/NSTEMI: Discharge Outcomes  Goal: *Hemodynamically stable  Outcome: Progressing Towards Goal  Goal: *Stable cardiac rhythm  Outcome: Progressing Towards Goal  Goal: *Lungs clear or at baseline  Outcome: Progressing Towards Goal  Goal: *Optimal pain control at patient's stated goal  Outcome: Progressing Towards Goal  Goal: *Identifies cardiac risk factors  Outcome: Progressing Towards Goal  Goal: *Verbalizes home exercise program, activity guidelines, cardiac precautions  Outcome: Progressing Towards Goal  Goal: *Verbalizes understanding and describes prescribed diet  Outcome: Progressing Towards Goal  Goal: *Verbalizes name, dosage, time, side effects, and number of days to continue medications  Outcome: Progressing Towards Goal  Goal: *Anxiety reduced or absent  Outcome: Progressing Towards Goal  Goal: *Understands and describes signs and symptoms to report to providers(Stroke Metric)  Outcome: Progressing Towards Goal  Goal: *Describes follow-up/return visits to physicians  Outcome: Progressing Towards Goal  Goal: *Describes available resources and support systems  Outcome: Progressing Towards Goal  Goal: *Influenza immunization  Outcome: Progressing Towards Goal  Goal: *Pneumococcal immunization  Outcome: Progressing Towards Goal  Goal: *Describes smoking cessation resources  Outcome: Progressing Towards Goal     Problem: Activity Intolerance  Goal: *Oxygen saturation during activity within specified parameters  Outcome: Progressing Towards Goal  Goal: *Able to remain out of bed as prescribed  Outcome: Progressing Towards Goal     Problem: Patient Education: Go to Patient Education Activity  Goal: Patient/Family Education  Outcome: Progressing Towards Goal     Problem: Diabetes Self-Management  Goal: *Disease process and treatment process  Description: Define diabetes and identify own type of diabetes; list 3 options for treating diabetes. Outcome: Progressing Towards Goal  Goal: *Incorporating nutritional management into lifestyle  Description: Describe effect of type, amount and timing of food on blood glucose; list 3 methods for planning meals. Outcome: Progressing Towards Goal  Goal: *Incorporating physical activity into lifestyle  Description: State effect of exercise on blood glucose levels. Outcome: Progressing Towards Goal  Goal: *Developing strategies to promote health/change behavior  Description: Define the ABC's of diabetes; identify appropriate screenings, schedule and personal plan for screenings. Outcome: Progressing Towards Goal  Goal: *Using medications safely  Description: State effect of diabetes medications on diabetes; name diabetes medication taking, action and side effects. Outcome: Progressing Towards Goal  Goal: *Monitoring blood glucose, interpreting and using results  Description: Identify recommended blood glucose targets  and personal targets.   Outcome: Progressing Towards Goal  Goal: *Prevention, detection, treatment of acute complications  Description: List symptoms of hyper- and hypoglycemia; describe how to treat low blood sugar and actions for lowering  high blood glucose level. Outcome: Progressing Towards Goal  Goal: *Prevention, detection and treatment of chronic complications  Description: Define the natural course of diabetes and describe the relationship of blood glucose levels to long term complications of diabetes.   Outcome: Progressing Towards Goal  Goal: *Developing strategies to address psychosocial issues  Description: Describe feelings about living with diabetes; identify support needed and support network  Outcome: Progressing Towards Goal  Goal: *Insulin pump training  Outcome: Progressing Towards Goal  Goal: *Sick day guidelines  Outcome: Progressing Towards Goal  Goal: *Patient Specific Goal (EDIT GOAL, INSERT TEXT)  Outcome: Progressing Towards Goal     Problem: Patient Education: Go to Patient Education Activity  Goal: Patient/Family Education  Outcome: Progressing Towards Goal     Problem: Patient Education: Go to Patient Education Activity  Goal: Patient/Family Education  Outcome: Progressing Towards Goal     Problem: Patient Education: Go to Patient Education Activity  Goal: Patient/Family Education  Outcome: Progressing Towards Goal

## 2021-11-16 NOTE — PROGRESS NOTES
0700 Bedside shift change report given to Rashmi Madrigal RN (oncoming nurse) by Claudio Guerrero RN (offgoing nurse). Report included the following information SBAR, Kardex, Intake/Output, MAR, Recent Results and Cardiac Rhythm NSR.    0745 Pt resting quietly in bed at this time. VSS. Pt complaining of nausea. PRN zofran given. Plan to administer remainder of AM meds shortly. 3288 Dr. Rosana Esparza at bedside. Plan for stress test this AM. Will keep pt NPO. 1015 Pt off the floor for stress test.     1030 Bedside shift change report given to Titi Alcazar RN (oncoming nurse) by Rashmi Madrigal RN (offgoing nurse). Report included the following information SBAR, Kardex, Intake/Output, MAR, Recent Results and Cardiac Rhythm NSR.

## 2021-11-16 NOTE — PROGRESS NOTES
Chart reviewed. Attempted to see pt for PT intervention however pt off the floor for stress test. Will defer however continue to follow.  Thank you    Moriah Cruz, PT, DPT

## 2021-11-16 NOTE — PROGRESS NOTES
1:28 PM dr Yogesh Pantoja paged re: diet order post stress test    1:35 PM ok to eat, order entered.     Teagan Leo RN

## 2021-11-16 NOTE — PROGRESS NOTES
Occupational Therapy  Will defer OT treatment as pt is off the floor for stress test.  Will continue to follow

## 2021-11-16 NOTE — PROGRESS NOTES
Cardiology Progress Note  11/16/2021     Admit Date: 11/14/2021  Admit Diagnosis: NSTEMI (non-ST elevated myocardial infarction) ([facility] [address] [I21.4]  CC: none currently  Cardiac Assessment/Plan:     1) TIDWELL 12/2019:  3 months w/o change; unremarkable echo at [facility]. (except PAp 45). *Neg MPI 1/2020 (Ex 2 min, no ischemia, normal EF). 2) palpitations 12/2019:  (Q.day at night early a.m.) unremarkable 48 hr Holter at [address] [place] [address]. (w/ Sx);      *Neg EVR 12/2019.    3) HTN  4) diabetes  5) dyslipidemia  6) large hiatal hernia per patient  7) chronic lumbar back pain  8) CKD 3:  Cr 1.64/GFR approx 40 10/2019.  9) GERD  10) osteoarthritis  She is a retired ; no nicotine, ethanol, nor added salt. 1 caffeinated beverage per day. Admitted w/ uncontrolled HTN; HypoNa, indeterminate troponin; No ecg changes; no CP. Echo 11/14/21: Normal LV & RV Fxn; no valve dz; mild LVH.    11/15: BPs 110-140; HR 50-80s; NSR; 91% 2L; ? Low UOP  Hg 11.6; Na 134; Cr 1.8 from 1.3 from 1.5; trop 0.4 to 0.3 ng/ml. proBNP 2k.    Echo pending. Cardiac meds: asa; lipitor 20; coreg 6.25 bid; clonidine 0.2 bid; verapamil 240 qhs. (holding losartan 50). Rec; wean off clonidine (dry mouth/\"fuzzy head\"); increase coreg (to max if needed); Add back losartan when Cr stabilizes (may need hydralazine/imdur, will see).     11/16: BPs 110-140; HR 60-70s; NSR; 92% 2L. Hg 11.6; Cr 1.76/gfr 27.     Cardiac meds: asa; lipitor 20; coreg 12.5 bid; verapamil 240 qhs. (holding losartan 50). Rec: Cont current Rx; Add back losartan at low dose in future if needed (vs hydralazine/imdur if Cr too high). Pharm MPI rec; pt request here; ordered and d/w nuclear lab. If neg MPI, stable cardiac status and ok for d/c from cardiac standpoint.   Multiple other pt Sx (eg: \"intermittently cant swallow; chills\") per primary team.      __________________________________________________________  CHIEF COMPLAINT: Increased bp, nausea/vomiting. HISTORY OF PRESENT ILLNESS:      Violeta Lobato is a 81 y.o.   female with past medical history of hypertension who was transferred from [place] for nausea vomiting and elevated blood pressure.  Patient has had 3 trips to the [address] [place] [address] ED for the same complaints, she presented this Friday and was found to have a systolic blood pressure around 225/105, she was complaining of nausea vomiting and chills.  Labs at the [address] [place] [address] showed elevated troponin at 0.45.  Patient denies any chest pain, shortness of breath or palpitation.  EKG showed no acute ST changes     Currently in bed, son at bedside, no chest pain, no dyspnea, no edema, no syncope   _______________________________________________________  @ NP OV 1/2/20  Ms Veria Night has a h/o:  1) TIDWELL 12/2019:  3 months w/o change; unremarkable echo at [facility]. (except PAp 45). 2) palpitations 12/2019:  (Q.day at night early a.m.) unremarkable 48 hour Holter at [facility]. (was symptomatic). 3) HTN  4) diabetes  5) dyslipidemia  6) large hiatal hernia per patient  7) chronic lumbar back pain  8) CKD 3:  Cr 1.64/GFR approx 40 10/2019.  9) GERD  10) osteoarthritis  She is a retired ; no nicotine, ethanol, nor added salt. 1 caffeinated beverage per day. 12/2019:  TIDWELL for the last 3 months; her BPs been running high. No chest pain. Palpitations as noted above with unremarkable Holter with symptoms; no symptoms in the last 4 days. 1/2020: States \"episodes are better\" since Dr. Mateus Molina increased Losartan. Hasn't put on MCOT yet because she couldn't figure it out. Still feels short of breath and has occasional chest pressure durig episodes. Occurs when exerting herself. MPI pending. No dizziness, LH, or edema.     CARDIAC HISTORY  RISK FACTORS:  1 Diabetes   2 Hypertension   3 Dyslipidemia   4 Family History of CAD [Less than 61years of age]   5 Tobacco Use: No/never       CARDIOVASCULAR PROCEDURES  Procedure Date Results   Tim MPI 2011 No Ischemia, Per patient; at  [facility].  (for atypical chest pain). Echo 2019 EF 0.65 (65%), Mild LVH, AV Mean 7 mmHg, Est RVSP 44 mmHg, Normal RV. No valvular heart disease. At  [facility]. EKG 2019 Sinus Rhythm, Borderline LVH. Holter 2019 48 hr holter; at [facility]: No arrhythmia; HR 50 to 116:  Average 90 ; patient reported symptoms. ACTIVE ALLERGIES:  Ingredient Reaction Comment   ACE INHIBITORS     PENICILLINS     PROCAINAMIDE  Procan SR       PROBLEM LIST:  Problem Description Chronic   Angiotensin-converting-enzyme inhibitor allergy N       PAST MEDICAL/SURGICAL HISTORY  (Detailed)    Disease/disorder Onset Date Surgical History Date Comments     Appendectomy       Knee Replacement       Colonoscopy       EGD     Anemia       Anxiety       Arthritis       Blood Clots       Chronic Kidney Disease       Diverticulosis       Elevated lipids       GERD       Hypertension       Mood Disorder       Overactive Bladder         Family History  (Detailed)  Relationship Family Member Name  Age at Death Condition Onset Age Cause of Death   Father    Heart Attack     Sister    High Blood Pressure       SOCIAL HISTORY  (Detailed)  Tobacco use reviewed. Preferred language is [place]. EDUCATION/EMPLOYMENT/OCCUPATION  Employment History Status Retired Restrictions     retired       Smoking status: Never smoker. ALCOHOL  There is no history of alcohol use. CAFFEINE  The patient uses caffeine. LIFESTYLE  Exercises daily. IMPRESSION AND PLAN  01. Palpitations (R00.2):  Etiology unknown. Event monitor is recommended. Applied to patient in office today. 02. Shortness of breath (R06.02):  ?  Cardiac. Stress Cardiolite is warranted. We discussed the signs and symptoms of unstable angina, myocardial infarction and malignant arrhythmia. The patient knows to seek immediate medical attention should they occur. 03.  Hyp hrt & chr kdny dis w/o hrt fail, w stg 1-4/unsp chr kdny (I13.10): Inadequately controlled; losartan increased to 50 mg q.day and sCr with slight bump to 1.77. Will continu at 50mg QD and change metoprolol to Coreg 3.125mg BID. Will recheck at [facility] next week. 04. Mixed hyperlipidemia (E78.2):  Lipid labs drawn by PCP. The patient is tolerating lipid lowering therapy well. 05. Chronic kidney disease, stage 3 (moderate) (N18.3):  Managed by: Other Physician   06. Body mass index (BMI) 31.0-31.9, adult (Z68.31): The patient was instructed on AHA diet and regular exercise. ORDERS:   Dietary management education, guidance, and counseling    2 The patient was instructed on AHA diet and regular exercise. 1/2/20 MEDICATION LIST  Medication Sig Desc   alprazolam 0.25 mg tablet take 1 tablet by oral route 3 times every day   Aspirin Low Dose 81 mg tablet,delayed release take 1 tablet by oral route  every day   calcium carbonate 500 mg (1,250 mg)-vitamin D3 125 unit tablet    carvedilol 3.125 mg tablet take 1 tablet by oral route 2 times every day with food   diclofenac sodium 50 mg tablet,delayed release take 1 tablet by oral route 2 times every day   losartan 50 mg tablet take 1 tablet by oral route  every day   multivitamin tablet take 1 tablet by oral route  every day with food   omeprazole 20 mg capsule,delayed release take 1 capsule by oral route  every day before a meal   ranitidine 150 mg tablet take 1 tablet by oral route 2 times every day   simvastatin 40 mg tablet take 1 tablet by oral route  every day in the evening   triamterene 37.5 mg-hydrochlorothiazide 25 mg tablet take 1 tablet by oral route  every day   verapamil  mg 24 hr capsule,extended release take 1 capsule by oral route  every day       For other plans, see orders.   Hospital problem list     Active Hospital Problems    Diagnosis Date Noted    [facility]STEMI (non-ST elevated [facility]) [facility] 11/14/2021        Subjective: Jose Porter reports       Chest pain X none  consistent with:  Non-cardiac CP         Atypical CP     None now  On going  Anginal CP     Dyspnea  none  at rest  with exertion        x improved  unchanged  worse              PND X none  overnight       Orthopnea X none  improved  unchanged  worse   Presyncope X none  improved  unchanged  worse     Ambulated in hallway without symptoms   Yes   Ambulated in room without symptoms  Yes   Objective:     Physical Exam:  Overall VSSAF;    Visit Vitals  BP (!) 148/52 (BP 1 Location: Left upper arm, BP Patient Position: At rest)   Pulse 71   Temp 98.4 °F (36.9 °C)   Resp 18   Ht 5' 7\" (1.702 m)   Wt 83.5 kg (184 lb)   SpO2 92%   BMI 28.82 kg/m²     Temp (24hrs), Av.2 °F (36.8 °C), Min:97.7 °F (36.5 °C), Max:98.4 °F (36.9 °C)    Patient Vitals for the past 8 hrs:   Pulse   21 0800 71   21 0743 64   21 62     Patient Vitals for the past 8 hrs:   Resp   21 0743 18   219 18     Patient Vitals for the past 8 hrs:   BP   21 0743 (!) 148/52   21 0329 (!) 142/62       Intake/Output Summary (Last 24 hours) at 2021 0902  Last data filed at 11/15/2021 1503  Gross per 24 hour   Intake 360 ml   Output 300 ml   Net 60 ml     General Appearance: Well developed, well nourished, no acute distress. Ears/Nose/Mouth/Throat:   Normal MM; anicteric. JVP: WNL   Resp:   Lungs clear to auscultation bilaterally. Nl resp effort. Cardiovascular:  RRR, S1, S2 normal, no new murmur. No gallop or rub. Abdomen:   Soft, non-tender, bowel sounds are present. Extremities: trivial edema bilaterally. Skin:  Neuro: Warm and dry. A/O x3, grossly nonfocal       cath site intact w/o hematoma or new bruit; distal pulse unchanged  Yes   Data Review:     Telemetry independently reviewed x sinus  chronic afib  parox afib  NSVT     ECG independently reviewed  NSR  afib  no significant changes  NSST-Tw chgs     x no new ECG provided for review   Lab results reviewed as noted below.   Current medications reviewed as noted below. No results for input(s): PH, PCO2, PO2 in the last 72 hours. No results for input(s): CPK, CKMB, CKNDX, TROIQ in the last 72 hours. Recent Labs     11/16/21  0249 11/15/21  0430 11/14/21  0952    134* 133*   K 4.3 4.4 4.4    104 103   CO2 26 25 25   BUN 37* 30* 17   CREA 1.76* 1.83* 1.34*   GFRAA 33* 32* 46*   * 142* 147*   CA 9.1 9.1 9.6   WBC 9.0 10.5 9.4   HGB 11.6 11.6 12.6   HCT 37.2 37.1 39.1    308 351     Lab Results   Component Value Date/Time    Cholesterol, total 158 04/02/2014 09:29 AM    HDL Cholesterol 63 04/02/2014 09:29 AM    LDL, calculated 70 04/02/2014 09:29 AM    Triglyceride 123 04/02/2014 09:29 AM     No results for input(s): AP, TBIL, TP, ALB, GLOB, GGT, AML, LPSE in the last 72 hours. No lab exists for component: SGOT, GPT, AMYP, HLPSE  No results for input(s): INR, PTP, APTT, INREXT, INREXT in the last 72 hours.    No components found for: GLPOC    Current Facility-Administered Medications   Medication Dose Route Frequency    ondansetron (ZOFRAN) injection 4 mg  4 mg IntraVENous Q4H PRN    carvediloL (COREG) tablet 12.5 mg  12.5 mg Oral BID WITH MEALS    enoxaparin (LOVENOX) injection 30 mg  30 mg SubCUTAneous Q24H    ALPRAZolam (XANAX) tablet 0.5 mg  0.5 mg Oral PRN    aspirin delayed-release tablet 81 mg  81 mg Oral DAILY    calcium-vitamin D (OS-MARINA +D3) 500 mg-200 unit per tablet 1 Tablet  1 Tablet Oral DAILY    [Held by provider] losartan (COZAAR) tablet 50 mg  50 mg Oral DAILY    pantoprazole (PROTONIX) tablet 40 mg  40 mg Oral ACB    atorvastatin (LIPITOR) tablet 20 mg  20 mg Oral QHS    verapamil ER (CALAN-SR) tablet 240 mg  240 mg Oral QHS    sodium chloride (NS) flush 5-40 mL  5-40 mL IntraVENous Q8H    sodium chloride (NS) flush 5-40 mL  5-40 mL IntraVENous PRN    morphine injection 1 mg  1 mg IntraVENous Q4H PRN    ondansetron (ZOFRAN) injection 4 mg  4 mg IntraVENous Q6H PRN    labetaloL (NORMODYNE;TRANDATE) injection 10 mg  10 mg IntraVENous Q4H PRN    insulin lispro (HUMALOG) injection   SubCUTAneous AC&HS    glucose chewable tablet 16 g  4 Tablet Oral PRN    dextrose (D50W) injection syrg 12.5-25 g  12.5-25 g IntraVENous PRN    glucagon (GLUCAGEN) injection 1 mg  1 mg IntraMUSCular PRN    acetaminophen (TYLENOL) tablet 650 mg  650 mg Oral Q6H PRN        Sergey Sosa MD

## 2021-11-16 NOTE — PROGRESS NOTES
Problem: Falls - Risk of  Goal: *Absence of Falls  Description: Document Julio Curry Fall Risk and appropriate interventions in the flowsheet.   Outcome: Progressing Towards Goal  Note: Fall Risk Interventions:  Mobility Interventions: Bed/chair exit alarm, Communicate number of staff needed for ambulation/transfer         Medication Interventions: Bed/chair exit alarm, Evaluate medications/consider consulting pharmacy, Patient to call before getting OOB, Teach patient to arise slowly    Elimination Interventions: Bed/chair exit alarm, Call light in reach, Elevated toilet seat, Patient to call for help with toileting needs, Stay With Me (per policy)              Problem: Patient Education: Go to Patient Education Activity  Goal: Patient/Family Education  Outcome: Progressing Towards Goal

## 2021-11-17 LAB
ANION GAP SERPL CALC-SCNC: 6 MMOL/L (ref 5–15)
BUN SERPL-MCNC: 30 MG/DL (ref 6–20)
BUN/CREAT SERPL: 21 (ref 12–20)
CALCIUM SERPL-MCNC: 9.3 MG/DL (ref 8.5–10.1)
CHLORIDE SERPL-SCNC: 105 MMOL/L (ref 97–108)
CHOLEST SERPL-MCNC: 140 MG/DL
CO2 SERPL-SCNC: 27 MMOL/L (ref 21–32)
CREAT SERPL-MCNC: 1.42 MG/DL (ref 0.55–1.02)
GLUCOSE BLD STRIP.AUTO-MCNC: 117 MG/DL (ref 65–117)
GLUCOSE BLD STRIP.AUTO-MCNC: 129 MG/DL (ref 65–117)
GLUCOSE BLD STRIP.AUTO-MCNC: 131 MG/DL (ref 65–117)
GLUCOSE SERPL-MCNC: 119 MG/DL (ref 65–100)
HDLC SERPL-MCNC: 52 MG/DL
HDLC SERPL: 2.7 {RATIO} (ref 0–5)
LDLC SERPL CALC-MCNC: 62 MG/DL (ref 0–100)
POTASSIUM SERPL-SCNC: 4.3 MMOL/L (ref 3.5–5.1)
SERVICE CMNT-IMP: ABNORMAL
SERVICE CMNT-IMP: ABNORMAL
SERVICE CMNT-IMP: NORMAL
SODIUM SERPL-SCNC: 138 MMOL/L (ref 136–145)
STRESS BASELINE DIAS BP: 67 MMHG
STRESS BASELINE HR: 63 BPM
STRESS BASELINE SYS BP: 158 MMHG
STRESS ESTIMATED WORKLOAD: 1 METS
STRESS EXERCISE DUR MIN: NORMAL
STRESS O2 SAT PEAK: 99 %
STRESS O2 SAT REST: 99 %
STRESS PEAK DIAS BP: 73 MMHG
STRESS PEAK SYS BP: 184 MMHG
STRESS PERCENT HR ACHIEVED: 58 %
STRESS POST PEAK HR: 78 BPM
STRESS RATE PRESSURE PRODUCT: NORMAL BPM*MMHG
STRESS ST DEPRESSION: 0 MM
STRESS ST ELEVATION: 0 MM
STRESS TARGET HR: 135 BPM
TRIGL SERPL-MCNC: 130 MG/DL (ref ?–150)
VLDLC SERPL CALC-MCNC: 26 MG/DL

## 2021-11-17 PROCEDURE — 77010033678 HC OXYGEN DAILY

## 2021-11-17 PROCEDURE — 77030008543 HC TBNG MON PRSS MRTM -A: Performed by: INTERNAL MEDICINE

## 2021-11-17 PROCEDURE — C1769 GUIDE WIRE: HCPCS | Performed by: INTERNAL MEDICINE

## 2021-11-17 PROCEDURE — 74011000250 HC RX REV CODE- 250: Performed by: INTERNAL MEDICINE

## 2021-11-17 PROCEDURE — 77030004549 HC CATH ANGI DX PRF MRTM -A: Performed by: INTERNAL MEDICINE

## 2021-11-17 PROCEDURE — 77030019698 HC SYR ANGI MDLON MRTM -A: Performed by: INTERNAL MEDICINE

## 2021-11-17 PROCEDURE — 97116 GAIT TRAINING THERAPY: CPT

## 2021-11-17 PROCEDURE — 80048 BASIC METABOLIC PNL TOTAL CA: CPT

## 2021-11-17 PROCEDURE — 99153 MOD SED SAME PHYS/QHP EA: CPT | Performed by: INTERNAL MEDICINE

## 2021-11-17 PROCEDURE — 74011250636 HC RX REV CODE- 250/636: Performed by: INTERNAL MEDICINE

## 2021-11-17 PROCEDURE — 74011250637 HC RX REV CODE- 250/637: Performed by: INTERNAL MEDICINE

## 2021-11-17 PROCEDURE — 74011000636 HC RX REV CODE- 636: Performed by: INTERNAL MEDICINE

## 2021-11-17 PROCEDURE — 82962 GLUCOSE BLOOD TEST: CPT

## 2021-11-17 PROCEDURE — 77030030195 HC CATH ANGI DX PRF4 MRTM -A: Performed by: INTERNAL MEDICINE

## 2021-11-17 PROCEDURE — 93458 L HRT ARTERY/VENTRICLE ANGIO: CPT | Performed by: INTERNAL MEDICINE

## 2021-11-17 PROCEDURE — 80061 LIPID PANEL: CPT

## 2021-11-17 PROCEDURE — 65660000000 HC RM CCU STEPDOWN

## 2021-11-17 PROCEDURE — 36415 COLL VENOUS BLD VENIPUNCTURE: CPT

## 2021-11-17 PROCEDURE — C1894 INTRO/SHEATH, NON-LASER: HCPCS | Performed by: INTERNAL MEDICINE

## 2021-11-17 PROCEDURE — 77030042317 HC BND COMPR HEMSTAT -B: Performed by: INTERNAL MEDICINE

## 2021-11-17 PROCEDURE — 77030015766: Performed by: INTERNAL MEDICINE

## 2021-11-17 PROCEDURE — B2101ZZ FLUOROSCOPY OF SINGLE CORONARY ARTERY USING LOW OSMOLAR CONTRAST: ICD-10-PCS | Performed by: INTERNAL MEDICINE

## 2021-11-17 PROCEDURE — 99152 MOD SED SAME PHYS/QHP 5/>YRS: CPT | Performed by: INTERNAL MEDICINE

## 2021-11-17 PROCEDURE — 4A023N7 MEASUREMENT OF CARDIAC SAMPLING AND PRESSURE, LEFT HEART, PERCUTANEOUS APPROACH: ICD-10-PCS | Performed by: INTERNAL MEDICINE

## 2021-11-17 PROCEDURE — C1887 CATHETER, GUIDING: HCPCS | Performed by: INTERNAL MEDICINE

## 2021-11-17 RX ORDER — HYDRALAZINE HYDROCHLORIDE 20 MG/ML
20 INJECTION INTRAMUSCULAR; INTRAVENOUS
Status: DISCONTINUED | OUTPATIENT
Start: 2021-11-17 | End: 2021-11-19

## 2021-11-17 RX ORDER — HEPARIN SODIUM 200 [USP'U]/100ML
INJECTION, SOLUTION INTRAVENOUS
Status: COMPLETED | OUTPATIENT
Start: 2021-11-17 | End: 2021-11-17

## 2021-11-17 RX ORDER — FENTANYL CITRATE 50 UG/ML
INJECTION, SOLUTION INTRAMUSCULAR; INTRAVENOUS AS NEEDED
Status: DISCONTINUED | OUTPATIENT
Start: 2021-11-17 | End: 2021-11-17 | Stop reason: HOSPADM

## 2021-11-17 RX ORDER — MIDAZOLAM HYDROCHLORIDE 1 MG/ML
INJECTION, SOLUTION INTRAMUSCULAR; INTRAVENOUS AS NEEDED
Status: DISCONTINUED | OUTPATIENT
Start: 2021-11-17 | End: 2021-11-17 | Stop reason: HOSPADM

## 2021-11-17 RX ORDER — HEPARIN SODIUM 1000 [USP'U]/ML
INJECTION, SOLUTION INTRAVENOUS; SUBCUTANEOUS AS NEEDED
Status: DISCONTINUED | OUTPATIENT
Start: 2021-11-17 | End: 2021-11-17 | Stop reason: HOSPADM

## 2021-11-17 RX ORDER — SODIUM CHLORIDE 9 MG/ML
75 INJECTION, SOLUTION INTRAVENOUS CONTINUOUS
Status: DISCONTINUED | OUTPATIENT
Start: 2021-11-17 | End: 2021-11-19

## 2021-11-17 RX ORDER — VERAPAMIL HYDROCHLORIDE 2.5 MG/ML
INJECTION, SOLUTION INTRAVENOUS AS NEEDED
Status: DISCONTINUED | OUTPATIENT
Start: 2021-11-17 | End: 2021-11-17 | Stop reason: HOSPADM

## 2021-11-17 RX ORDER — LIDOCAINE HYDROCHLORIDE 10 MG/ML
INJECTION, SOLUTION EPIDURAL; INFILTRATION; INTRACAUDAL; PERINEURAL AS NEEDED
Status: DISCONTINUED | OUTPATIENT
Start: 2021-11-17 | End: 2021-11-17 | Stop reason: HOSPADM

## 2021-11-17 RX ADMIN — ALPRAZOLAM 0.5 MG: 0.5 TABLET ORAL at 09:13

## 2021-11-17 RX ADMIN — PANTOPRAZOLE SODIUM 40 MG: 40 TABLET, DELAYED RELEASE ORAL at 08:34

## 2021-11-17 RX ADMIN — CARVEDILOL 12.5 MG: 12.5 TABLET, FILM COATED ORAL at 22:58

## 2021-11-17 RX ADMIN — ALPRAZOLAM 0.5 MG: 0.5 TABLET ORAL at 22:58

## 2021-11-17 RX ADMIN — ASPIRIN 81 MG: 81 TABLET, COATED ORAL at 08:31

## 2021-11-17 RX ADMIN — Medication 10 ML: at 05:26

## 2021-11-17 RX ADMIN — ATORVASTATIN CALCIUM 20 MG: 20 TABLET, FILM COATED ORAL at 22:58

## 2021-11-17 RX ADMIN — Medication 1 TABLET: at 08:32

## 2021-11-17 RX ADMIN — SODIUM CHLORIDE 50 ML/HR: 9 INJECTION, SOLUTION INTRAVENOUS at 09:13

## 2021-11-17 RX ADMIN — CARVEDILOL 12.5 MG: 12.5 TABLET, FILM COATED ORAL at 08:31

## 2021-11-17 NOTE — PROGRESS NOTES
Transition of Care Plan:     RUR:7% low risk  Disposition:home with [address]  Follow up appointments:PCP and specialists as indicated  DME needed: RW  [facility] at [facility] will transport  [employer] or means to access home: pt has access       IM Medicare Letter:2nd letter to be given at d/c  Is patient a BCPI-A Bundle: n/a                   If yes, was Bundle Letter given?:     Caregiver Contact:zay Sorenson [phone]  Discharge Caregiver contacted prior to discharge? Update 1200:  Pt accepted by [facility]. RW delivered and paperwork signed. Initial note:  CM met with pt to to discuss RW. She is agreeable to this. Referrals sent to [facility] and [facility]. Pt declined by [facility]. Awaiting response from [facility]. CM will continue to monitor for d/c needs.     Janusz Tian, MSN  Care Manager  [phone]

## 2021-11-17 NOTE — PROGRESS NOTES
Cardiology Progress Note  11/17/2021     Admit Date: 11/14/2021  Admit Diagnosis: NSTEMI (non-ST elevated myocardial infarction) ([facility] [address] [I21.4]  CC: none currently  Cardiac Assessment/Plan:     1) TIDWELL 12/2019:  3 months w/o change; unremarkable echo at [facility]. (except PAp 45). *Neg MPI 1/2020 (Ex 2 min, no ischemia, normal EF). 2) palpitations 12/2019:  (Q.day at night early a.m.) unremarkable 48 hr Holter at [facility]. (w/ Sx);      *Neg EVR 12/2019.    3) HTN  4) diabetes  5) dyslipidemia  6) large hiatal hernia per patient  7) chronic lumbar back pain  8) CKD 3:  Cr 1.64/GFR approx 40 10/2019.  9) GERD  10) osteoarthritis  She is a retired ; no nicotine, ethanol, nor added salt. 1 caffeinated beverage per day. Admitted w/ uncontrolled HTN; HypoNa, indeterminate troponin; No ecg changes; no CP. Echo 11/14/21: Normal LV & RV Fxn; no valve dz; mild LVH. Pharm MPI 11/16/21: \"Suspect mild inferolateral ischemia. Left ventricular ejection fraction measures 78%. \"    11/16: BPs 110-140; HR 60-70s; NSR; 92% 2L. Hg 11.6; Cr 1.76/gfr 27.     Cardiac meds: asa; lipitor 20; coreg 12.5 bid; verapamil 240 qhs. (holding losartan 50). Rec: Cont current Rx; Add back losartan at low dose in future if needed (vs hydralazine/imdur if Cr too high). Pharm MPI rec; pt request here; ordered and d/w nuclear lab. If neg MPI, stable cardiac status and ok for d/c from cardiac standpoint. Multiple other pt Sx (eg: \"intermittently cant swallow; chills\") per primary team.    11/17: BPs 115-170; HR 50-70s; NSR; 95% 2L. Cr 1.42; LDL 62. Cardiac meds: asa; lipitor 20; coreg 12.5 bid; verapamil 240 qhs. (holding losartan 50). Rec: Cont current Rx; Add back losartan at low dose in future if needed (vs hydralazine/imdur if Cr too high). I have recommended diagnostic cath for definitive evaluation of the patient's coronary anatomy and PCI if appropriate;    All risks, benefits, and alternatives were discussed and patient wishes to proceed. NPO after light breakfast; IVF @ 50.      __________________________________________________________  CHIEF COMPLAINT: Increased bp, nausea/vomiting. HISTORY OF PRESENT ILLNESS:      Violeta Lobato is a 81 y.o.   female with past medical history of hypertension who was transferred from [place] for nausea vomiting and elevated blood pressure.  Patient has had 3 trips to the [address] [place] [address] ED for the same complaints, she presented this Friday and was found to have a systolic blood pressure around 225/105, she was complaining of nausea vomiting and chills.  Labs at the [address] [place] [address] showed elevated troponin at 0.45.  Patient denies any chest pain, shortness of breath or palpitation.  EKG showed no acute ST changes     Currently in bed, son at bedside, no chest pain, no dyspnea, no edema, no syncope   _______________________________________________________  @ NP OV 1/2/20  Ms Alexandre Wilson has a h/o:  1) TIDWELL 12/2019:  3 months w/o change; unremarkable echo at [facility]. (except PAp 45). 2) palpitations 12/2019:  (Q.day at night early a.m.) unremarkable 48 hour Holter at [facility]. (was symptomatic). 3) HTN  4) diabetes  5) dyslipidemia  6) large hiatal hernia per patient  7) chronic lumbar back pain  8) CKD 3:  Cr 1.64/GFR approx 40 10/2019.  9) GERD  10) osteoarthritis  She is a retired ; no nicotine, ethanol, nor added salt. 1 caffeinated beverage per day. 12/2019:  TIDWELL for the last 3 months; her BPs been running high. No chest pain. Palpitations as noted above with unremarkable Holter with symptoms; no symptoms in the last 4 days. 1/2020: States \"episodes are better\" since Dr. Josue Johnston increased Losartan. Hasn't put on MCOT yet because she couldn't figure it out. Still feels short of breath and has occasional chest pressure durig episodes. Occurs when exerting herself. MPI pending. No dizziness, LH, or edema.     CARDIAC HISTORY  RISK FACTORS:  1 Diabetes   2 Hypertension   3 Dyslipidemia   4 Family History of CAD [Less than 61years of age]   5 Tobacco Use: No/never       CARDIOVASCULAR PROCEDURES  Procedure Date Results   Tim MPI 2011 No Ischemia, Per patient; at  [facility].  (for atypical chest pain). Echo 2019 EF 0.65 (65%), Mild LVH, AV Mean 7 mmHg, Est RVSP 44 mmHg, Normal RV. No valvular heart disease. At  [facility]. EKG 2019 Sinus Rhythm, Borderline LVH. Holter 2019 48 hr holter; at [facility]: No arrhythmia; HR 50 to 116:  Average 90 ; patient reported symptoms. ACTIVE ALLERGIES:  Ingredient Reaction Comment   ACE INHIBITORS     PENICILLINS     PROCAINAMIDE  Procan SR       PROBLEM LIST:  Problem Description Chronic   Angiotensin-converting-enzyme inhibitor allergy N       PAST MEDICAL/SURGICAL HISTORY  (Detailed)    Disease/disorder Onset Date Surgical History Date Comments     Appendectomy       Knee Replacement       Colonoscopy       EGD     Anemia       Anxiety       Arthritis       Blood Clots       Chronic Kidney Disease       Diverticulosis       Elevated lipids       GERD       Hypertension       Mood Disorder       Overactive Bladder         Family History  (Detailed)  Relationship Family Member Name  Age at Death Condition Onset Age Cause of Death   Father    Heart Attack     Sister    High Blood Pressure       SOCIAL HISTORY  (Detailed)  Tobacco use reviewed. Preferred language is [place]. EDUCATION/EMPLOYMENT/OCCUPATION  Employment History Status Retired Restrictions     retired       Smoking status: Never smoker. ALCOHOL  There is no history of alcohol use. CAFFEINE  The patient uses caffeine. LIFESTYLE  Exercises daily. IMPRESSION AND PLAN  01. Palpitations (R00.2):  Etiology unknown. Event monitor is recommended. Applied to patient in office today. 02. Shortness of breath (R06.02):  ?  Cardiac. Stress Cardiolite is warranted.   We discussed the signs and symptoms of unstable angina, myocardial infarction and malignant arrhythmia. The patient knows to seek immediate medical attention should they occur. 03. Hyp hrt & chr kdny dis w/o hrt fail, w stg 1-4/unsp chr kdny (I13.10): Inadequately controlled; losartan increased to 50 mg q.day and sCr with slight bump to 1.77. Will continu at 50mg QD and change metoprolol to Coreg 3.125mg BID. Will recheck at [facility] next week. 04. Mixed hyperlipidemia (E78.2):  Lipid labs drawn by PCP. The patient is tolerating lipid lowering therapy well. 05. Chronic kidney disease, stage 3 (moderate) (N18.3):  Managed by: Other Physician   06. Body mass index (BMI) 31.0-31.9, adult (Z68.31): The patient was instructed on AHA diet and regular exercise. ORDERS:   Dietary management education, guidance, and counseling    2 The patient was instructed on AHA diet and regular exercise. 1/2/20 MEDICATION LIST  Medication Sig Desc   alprazolam 0.25 mg tablet take 1 tablet by oral route 3 times every day   Aspirin Low Dose 81 mg tablet,delayed release take 1 tablet by oral route  every day   calcium carbonate 500 mg (1,250 mg)-vitamin D3 125 unit tablet    carvedilol 3.125 mg tablet take 1 tablet by oral route 2 times every day with food   diclofenac sodium 50 mg tablet,delayed release take 1 tablet by oral route 2 times every day   losartan 50 mg tablet take 1 tablet by oral route  every day   multivitamin tablet take 1 tablet by oral route  every day with food   omeprazole 20 mg capsule,delayed release take 1 capsule by oral route  every day before a meal   ranitidine 150 mg tablet take 1 tablet by oral route 2 times every day   simvastatin 40 mg tablet take 1 tablet by oral route  every day in the evening   triamterene 37.5 mg-hydrochlorothiazide 25 mg tablet take 1 tablet by oral route  every day   verapamil  mg 24 hr capsule,extended release take 1 capsule by oral route  every day       For other plans, see orders.   Hospital problem list     Active Hospital Problems    Diagnosis Date Noted    NSTEMI (non-ST elevated myocardial infarction) ([facility] [address] 2021        Subjective: Trisha Carnes reports       Chest pain X none  consistent with:  Non-cardiac CP         Atypical CP     None now  On going  Anginal CP     Dyspnea  none  at rest  with exertion        x improved  unchanged  worse              PND X none  overnight       Orthopnea X none  improved  unchanged  worse   Presyncope X none  improved  unchanged  worse     Ambulated in hallway without symptoms   Yes   Ambulated in room without symptoms  Yes   Objective:     Physical Exam:  Overall VSSAF;    Visit Vitals  BP (!) 163/58 (BP 1 Location: Right upper arm, BP Patient Position: Sitting) Comment: RN notified   Pulse (!) 57   Temp 98.7 °F (37.1 °C)   Resp 18   Ht 5' 7\" (1.702 m)   Wt 83.5 kg (184 lb)   SpO2 95%   BMI 28.82 kg/m²     Temp (24hrs), Av.5 °F (36.9 °C), Min:98.2 °F (36.8 °C), Max:98.8 °F (37.1 °C)    Patient Vitals for the past 8 hrs:   Pulse   21 0728 (!) 57   21 0306 (!) 56     Patient Vitals for the past 8 hrs:   Resp   21 0728 18   21 0306 18     Patient Vitals for the past 8 hrs:   BP   21 0728 (!) 163/58   21 0306 136/63     No intake or output data in the 24 hours ending 21 0817  General Appearance: Well developed, well nourished, no acute distress. Ears/Nose/Mouth/Throat:   Normal MM; anicteric. JVP: WNL   Resp:   Lungs clear to auscultation bilaterally. Nl resp effort. Cardiovascular:  RRR, S1, S2 normal, no new murmur. No gallop or rub. Abdomen:   Soft, non-tender, bowel sounds are present. Extremities: trivial edema bilaterally. Skin:  Neuro: Warm and dry.   A/O x3, grossly nonfocal       cath site intact w/o hematoma or new bruit; distal pulse unchanged  Yes   Data Review:     Telemetry independently reviewed x sinus  chronic afib  parox afib  NSVT     ECG independently reviewed  NSR  afib  no significant changes  NSST-Tw chgs     x no new ECG provided for review   Lab results reviewed as noted below. Current medications reviewed as noted below. No results for input(s): PH, PCO2, PO2 in the last 72 hours. No results for input(s): CPK, CKMB, CKNDX, TROIQ in the last 72 hours. Recent Labs     11/17/21  0311 11/16/21  0249 11/15/21  0430 11/14/21  0952 11/14/21  0952    137 134*   < > 133*   K 4.3 4.3 4.4   < > 4.4    103 104   < > 103   CO2 27 26 25   < > 25   BUN 30* 37* 30*   < > 17   CREA 1.42* 1.76* 1.83*   < > 1.34*   GFRAA 43* 33* 32*   < > 46*   * 134* 142*   < > 147*   CA 9.3 9.1 9.1   < > 9.6   WBC  --  9.0 10.5  --  9.4   HGB  --  11.6 11.6  --  12.6   HCT  --  37.2 37.1  --  39.1   PLT  --  292 308  --  351    < > = values in this interval not displayed. Lab Results   Component Value Date/Time    Cholesterol, total 140 11/17/2021 03:11 AM    HDL Cholesterol 52 11/17/2021 03:11 AM    LDL, calculated 62 11/17/2021 03:11 AM    Triglyceride 130 11/17/2021 03:11 AM    CHOL/HDL Ratio 2.7 11/17/2021 03:11 AM     No results for input(s): AP, TBIL, TP, ALB, GLOB, GGT, AML, LPSE in the last 72 hours. No lab exists for component: SGOT, GPT, AMYP, HLPSE  No results for input(s): INR, PTP, APTT, INREXT, INREXT in the last 72 hours.    No components found for: GLPOC    Current Facility-Administered Medications   Medication Dose Route Frequency    0.9% sodium chloride infusion  50 mL/hr IntraVENous CONTINUOUS    ondansetron (ZOFRAN) injection 4 mg  4 mg IntraVENous Q4H PRN    sodium chloride (NS) flush 10 mL  10 mL IntraVENous PRN    carvediloL (COREG) tablet 12.5 mg  12.5 mg Oral BID WITH MEALS    enoxaparin (LOVENOX) injection 30 mg  30 mg SubCUTAneous Q24H    ALPRAZolam (XANAX) tablet 0.5 mg  0.5 mg Oral PRN    aspirin delayed-release tablet 81 mg  81 mg Oral DAILY    calcium-vitamin D (OS-MARINA +D3) 500 mg-200 unit per tablet 1 Tablet  1 Tablet Oral DAILY    [Held by provider] losartan (COZAAR) tablet 50 mg  50 mg Oral DAILY    pantoprazole (PROTONIX) tablet 40 mg  40 mg Oral ACB    atorvastatin (LIPITOR) tablet 20 mg  20 mg Oral QHS    verapamil ER (CALAN-SR) tablet 240 mg  240 mg Oral QHS    sodium chloride (NS) flush 5-40 mL  5-40 mL IntraVENous Q8H    sodium chloride (NS) flush 5-40 mL  5-40 mL IntraVENous PRN    morphine injection 1 mg  1 mg IntraVENous Q4H PRN    ondansetron (ZOFRAN) injection 4 mg  4 mg IntraVENous Q6H PRN    labetaloL (NORMODYNE;TRANDATE) injection 10 mg  10 mg IntraVENous Q4H PRN    insulin lispro (HUMALOG) injection   SubCUTAneous AC&HS    glucose chewable tablet 16 g  4 Tablet Oral PRN    dextrose (D50W) injection syrg 12.5-25 g  12.5-25 g IntraVENous PRN    glucagon (GLUCAGEN) injection 1 mg  1 mg IntraMUSCular PRN    acetaminophen (TYLENOL) tablet 650 mg  650 mg Oral Q6H PRN        Tabatha Dalton MD

## 2021-11-17 NOTE — PROGRESS NOTES
Problem: Falls - Risk of  Goal: *Absence of Falls  Description: Document Teresa Gamma Fall Risk and appropriate interventions in the flowsheet.   Outcome: Progressing Towards Goal  Note: Fall Risk Interventions:  Mobility Interventions: Assess mobility with egress test, Bed/chair exit alarm, Communicate number of staff needed for ambulation/transfer, OT consult for ADLs, Patient to call before getting OOB, PT Consult for mobility concerns, PT Consult for assist device competence, Strengthening exercises (ROM-active/passive), Utilize walker, cane, or other assistive device, Utilize gait belt for transfers/ambulation         Medication Interventions: Assess postural VS orthostatic hypotension, Bed/chair exit alarm, Evaluate medications/consider consulting pharmacy, Patient to call before getting OOB, Teach patient to arise slowly, Utilize gait belt for transfers/ambulation    Elimination Interventions: Bed/chair exit alarm, Call light in reach, Patient to call for help with toileting needs, Elevated toilet seat, Stay With Me (per policy), Toilet paper/wipes in reach, Toileting schedule/hourly rounds

## 2021-11-17 NOTE — PROGRESS NOTES
Problem: Mobility Impaired (Adult and Pediatric)  Goal: *Acute Goals and Plan of Care (Insert Text)  Description: FUNCTIONAL STATUS PRIOR TO ADMISSION: Patient was independent and active without use of DME. Reports that she has been having difficulty with endurance and can only do a little of activity before needing to take a break. Reports \"nighttime weakness\" but no falls at home. HOME SUPPORT PRIOR TO ADMISSION: The patient lived alone with son in the area to provide assistance. Physical Therapy Goals  Initiated 11/15/2021  1. Patient will move from supine to sit and sit to supine  in bed with modified independence within 7 day(s). 2.  Patient will transfer from bed to chair and chair to bed with modified independence using the least restrictive device within 7 day(s). 3.  Patient will perform sit to stand with modified independence within 7 day(s). 4.  Patient will ambulate with modified independence for 250 feet with the least restrictive device within 7 day(s). 5.  Patient will ascend/descend 3 stairs with 1 handrail(s) with modified independence within 7 day(s). Outcome: Progressing Towards Goal   PHYSICAL THERAPY TREATMENT  Patient: Geovani Gan (82 y.o. female)  Date: 11/17/2021  Diagnosis: [facility]STEMI (non-ST elevated [facility] [I21.4]   <principal problem not specified>  Procedure(s) (LRB):  LEFT HEART CATH / CORONARY ANGIOGRAPHY (N/A) Day of Surgery  Precautions: Fall  Chart, physical therapy assessment, plan of care and goals were reviewed. ASSESSMENT  Patient continues with skilled PT services and is progressing towards goals, demonstrating continued improvements in activity tolerance, strength, balance, and overall functional mobility. Pt performed x2 ambulation trials of 75ft each. Initial ambulation trial occurred without device with pt requiring CGAx1 secondary to shuffled gait pattern with increased trunk sway and multiple LOB.  Following seated rest break, pt performed additional ambulation trial with RW support with pt exhibiting significant improvement in gait stability and overall safety during mobility. As a result, recommend continued use of rolling walker during all ambulation. Ambulation occurred on RA with O2 sats 89% with pt recovering to 96% with seated rest. Pt remains appropriate to return home w/ HHPT, assist of family, and continued use of RW. Current Level of Function Impacting Discharge (mobility/balance): CGAx1 during ambulation without device, SBAx1 during ambulation w/ RW support    Other factors to consider for discharge: caregiver         PLAN :  Patient continues to benefit from skilled intervention to address the above impairments. Continue treatment per established plan of care. to address goals. Recommendation for discharge: (in order for the patient to meet his/her long term goals)  Physical therapy at least 2 days/week in the home     This discharge recommendation:  Has been made in collaboration with the attending provider and/or case management    IF patient discharges home will need the following DME: rolling walker       SUBJECTIVE:   Patient stated I like to  pinecones in my yard.     OBJECTIVE DATA SUMMARY:   Critical Behavior:  Neurologic State: Alert  Orientation Level: Oriented X4  Cognition: Appropriate decision making, Appropriate for age attention/concentration, Appropriate safety awareness, Follows commands  Safety/Judgement: Awareness of environment, Fall prevention, Insight into deficits  Functional Mobility Training:  Bed Mobility:        Seated in recliner chair upon arrival.            Transfers:  Sit to Stand: Stand-by assistance  Stand to Sit: Stand-by assistance                             Balance:  Sitting: Intact  Standing: Impaired;  Without support  Standing - Static: Good  Standing - Dynamic : Fair  Ambulation/Gait Training:  Distance (ft): 150 Feet (ft) (75ft x2; 75ft w/ RW and 75ft w/o device)  Assistive Device: Walker, rolling; Gait belt (75ft x2; 75ft w/ RW and 75ft w/o device)  Ambulation - Level of Assistance: Stand-by assistance; Contact guard assistance (CGA w/o device, SBA w/ RW)        Gait Abnormalities: Decreased step clearance; Trunk sway increased; Shuffling gait              Speed/Jes: Pace decreased (<100 feet/min)  Step Length: Left shortened; Right shortened                    Pain Rating:  Denied c/o pain    Activity Tolerance:   O2 sats 89% on RA post ambulation, recovering to 95% with seated rest    After treatment patient left in no apparent distress:   Sitting in chair and Call bell within reach    COMMUNICATION/COLLABORATION:   The patients plan of care was discussed with: Registered nurse.      Garcia Vance, PT, DPT   Time Calculation: 19 mins

## 2021-11-17 NOTE — PROGRESS NOTES
Hospitalist Progress Note    NAME: Josiane Ruth   :  1935   MRN:  [id number]       Assessment / Plan:    Uncontrolled HTN  ? STEMI  Elevated troponin  Nausea  -no chest pain, no ischemic changes on EKG  -mildly elevated trop  -positive stress test today   -Cardiac cath today, n.p.o.  -recent ED visits w similar presentations  -appreciate cardiology consult  -cont asa  -weaning off clonidine as patient feeling dizzy/lightheaded  -cont increased coreg dose  -cont verapail, holding cozar d/t increased Cr  -felt trop elevation may be 2/2 demand ischemia  -Echo-EF 65 to 30%, normal diastolic function  -elevated BNP 2k noted    Nausea/GERD  -large hiatal hernia  -cont protonix  -SLP consulted, barium swallow planned  -cont protonix  -zofran for refractory nausea    TEMO on CKD 3  -monitor Cr  -Creatinine improved to 1.42, will check again tomorrow. Anxiety  -has been living alone  - ~a year ago?  -per son/daughter, heaving benzo use recently, ?dependence  -advised to avoid benzos      Body mass index is 28.82 kg/m². Code status: DNR  Prophylaxis: lovenox     Subjective:     Chief Complaint / Reason for Physician Visit  Patient seen today, doing fine sitting comfortably on the chair, nervous about the cardiac catheterization, no new complaints. No acute events overnight. Discussed with RN events overnight. Review of Systems:  Symptom Y/N Comments  Symptom Y/N Comments   Fever/Chills n   Chest Pain n    Poor Appetite    Edema     Cough n   Abdominal Pain n    Sputum n   Joint Pain     SOB/TIDWELL n   Pruritis/Rash     Nausea/vomit n   Tolerating PT/OT     Diarrhea n   Tolerating Diet y    Constipation n   Other       Could NOT obtain due to:      Objective:     VITALS:   Last 24hrs VS reviewed since prior progress note.  Most recent are:  Patient Vitals for the past 24 hrs:   Temp Pulse Resp BP SpO2   21 1056 98.7 °F (37.1 °C) 64 18 (!) 150/56 95 %   21 0831 -- 62 -- (!) 149/50 --   11/17/21 0728 98.7 °F (37.1 °C) (!) 57 18 (!) 163/58 95 %   11/17/21 0306 98.4 °F (36.9 °C) (!) 56 18 136/63 92 %   11/16/21 2311 98.5 °F (36.9 °C) 68 16 (!) 149/57 --   11/16/21 2000 98.8 °F (37.1 °C) 66 19 (!) 165/60 94 %   11/16/21 1445 98.2 °F (36.8 °C) 69 18 (!) 138/55 92 %       Intake/Output Summary (Last 24 hours) at 11/17/2021 1321  Last data filed at 11/17/2021 1024  Gross per 24 hour   Intake 480 ml   Output --   Net 480 ml        I had a face to face encounter and independently examined this patient on 11/17/2021, as outlined below:  PHYSICAL EXAM:  General: WD, WN. Alert, cooperative, no acute distress    EENT:  EOMI. Anicteric sclerae. MMM  Resp:  CTA bilaterally, no wheezing or rales. No accessory muscle use  CV:  Regular  rhythm,  No edema  GI:  Soft, Non distended, Non tender. +Bowel sounds  Neurologic:  Alert and oriented X 3, normal speech,   Psych:   Good insight. Not anxious nor agitated  Skin:  No rashes. No jaundice    Reviewed most current lab test results and cultures  YES  Reviewed most current radiology test results   YES  Review and summation of old records today    NO  Reviewed patient's current orders and MAR    YES  PMH/SH reviewed - no change compared to H&P  ________________________________________________________________________  Care Plan discussed with:    Comments   Patient x    Family      RN x    Care Manager     Consultant                       x Multidiciplinary team rounds were held today with , nursing, pharmacist and clinical coordinator. Patient's plan of care was discussed; medications were reviewed and discharge planning was addressed.      ________________________________________________________________________  Total NON critical care TIME:  35   Minutes    Total CRITICAL CARE TIME Spent:   Minutes non procedure based      Comments   >50% of visit spent in counseling and coordination of care x ________________________________________________________________________  Ruth Barney MD     Procedures: see electronic medical records for all procedures/Xrays and details which were not copied into this note but were reviewed prior to creation of Plan. LABS:  I reviewed today's most current labs and imaging studies.   Pertinent labs include:  Recent Labs     11/16/21  0249 11/15/21  0430   WBC 9.0 10.5   HGB 11.6 11.6   HCT 37.2 37.1    308     Recent Labs     11/17/21  0311 11/16/21  0249 11/15/21  0430    137 134*   K 4.3 4.3 4.4    103 104   CO2 27 26 25   * 134* 142*   BUN 30* 37* 30*   CREA 1.42* 1.76* 1.83*   CA 9.3 9.1 9.1       Signed: Ruth Barney MD

## 2021-11-17 NOTE — PROGRESS NOTES
Occupational Therapy  Pt is off the floor at this time in [facility]. Will defer and continue to follow as appropriate.

## 2021-11-18 ENCOUNTER — HOSPITAL ENCOUNTER (OUTPATIENT)
Dept: NON INVASIVE DIAGNOSTICS | Age: 86
Discharge: HOME | End: 2021-11-18
Attending: THORACIC SURGERY (CARDIOTHORACIC VASCULAR SURGERY)

## 2021-11-18 ENCOUNTER — APPOINTMENT (OUTPATIENT)
Dept: VASCULAR SURGERY | Age: 86
DRG: 233 | End: 2021-11-18
Attending: PHYSICIAN ASSISTANT
Payer: MEDICARE

## 2021-11-18 ENCOUNTER — APPOINTMENT (OUTPATIENT)
Dept: CT IMAGING | Age: 86
DRG: 233 | End: 2021-11-18
Attending: PHYSICIAN ASSISTANT
Payer: MEDICARE

## 2021-11-18 PROBLEM — I25.10 CAD (CORONARY ARTERY DISEASE): Status: ACTIVE | Noted: 2021-11-18

## 2021-11-18 LAB
ALBUMIN SERPL-MCNC: 3.2 G/DL (ref 3.5–5)
ALBUMIN/GLOB SERPL: 0.9 {RATIO} (ref 1.1–2.2)
ALP SERPL-CCNC: 54 U/L (ref 45–117)
ALT SERPL-CCNC: 19 U/L (ref 12–78)
ANION GAP SERPL CALC-SCNC: 5 MMOL/L (ref 5–15)
ANION GAP SERPL CALC-SCNC: 6 MMOL/L (ref 5–15)
APPEARANCE UR: CLEAR
APTT PPP: 20.5 SEC (ref 22.1–31)
ARTERIAL PATENCY WRIST A: YES
AST SERPL-CCNC: 16 U/L (ref 15–37)
BACTERIA URNS QL MICRO: NEGATIVE /HPF
BASE EXCESS BLDA CALC-SCNC: 4 MMOL/L
BDY SITE: ABNORMAL
BILIRUB SERPL-MCNC: 0.7 MG/DL (ref 0.2–1)
BILIRUB UR QL: NEGATIVE
BUN SERPL-MCNC: 24 MG/DL (ref 6–20)
BUN SERPL-MCNC: 25 MG/DL (ref 6–20)
BUN/CREAT SERPL: 19 (ref 12–20)
BUN/CREAT SERPL: 20 (ref 12–20)
CALCIUM SERPL-MCNC: 9 MG/DL (ref 8.5–10.1)
CALCIUM SERPL-MCNC: 9.4 MG/DL (ref 8.5–10.1)
CHLORIDE SERPL-SCNC: 104 MMOL/L (ref 97–108)
CHLORIDE SERPL-SCNC: 106 MMOL/L (ref 97–108)
CO2 SERPL-SCNC: 27 MMOL/L (ref 21–32)
CO2 SERPL-SCNC: 28 MMOL/L (ref 21–32)
COLOR UR: ABNORMAL
CREAT SERPL-MCNC: 1.23 MG/DL (ref 0.55–1.02)
CREAT SERPL-MCNC: 1.28 MG/DL (ref 0.55–1.02)
EPITH CASTS URNS QL MICRO: ABNORMAL /LPF
GLOBULIN SER CALC-MCNC: 3.7 G/DL (ref 2–4)
GLUCOSE BLD STRIP.AUTO-MCNC: 116 MG/DL (ref 65–117)
GLUCOSE BLD STRIP.AUTO-MCNC: 118 MG/DL (ref 65–117)
GLUCOSE BLD STRIP.AUTO-MCNC: 121 MG/DL (ref 65–117)
GLUCOSE BLD STRIP.AUTO-MCNC: 127 MG/DL (ref 65–117)
GLUCOSE SERPL-MCNC: 118 MG/DL (ref 65–100)
GLUCOSE SERPL-MCNC: 119 MG/DL (ref 65–100)
GLUCOSE UR STRIP.AUTO-MCNC: NEGATIVE MG/DL
HCO3 BLDA-SCNC: 28 MMOL/L (ref 22–26)
HGB UR QL STRIP: NEGATIVE
HISTORY CHECKED?,CKHIST: NORMAL
HYALINE CASTS URNS QL MICRO: ABNORMAL /LPF (ref 0–5)
INR PPP: 1 (ref 0.9–1.1)
KETONES UR QL STRIP.AUTO: NEGATIVE MG/DL
LEFT CCA DIST DIAS: 8.6 CM/S
LEFT CCA DIST SYS: 89.8 CM/S
LEFT CCA PROX DIAS: 10.8 CM/S
LEFT CCA PROX SYS: 105.1 CM/S
LEFT ECA DIAS: 0 CM/S
LEFT ECA SYS: 133.6 CM/S
LEFT GSV ANKLE DIAM: 0.23 CM
LEFT GSV AT KNEE DIAM: 0.33 CM
LEFT GSV BK MID DIAM: 0.19 CM
LEFT GSV BK PROX DIAM: 0.27 CM
LEFT GSV THIGH DIST DIAM: 0.3 CM
LEFT GSV THIGH MID DIAM: 0.45 CM
LEFT GSV THIGH PROX DIAM: 0.46 CM
LEFT ICA DIST DIAS: 16.8 CM/S
LEFT ICA DIST SYS: 99.9 CM/S
LEFT ICA MID DIAS: 21.7 CM/S
LEFT ICA MID SYS: 113.9 CM/S
LEFT ICA PROX DIAS: 19.5 CM/S
LEFT ICA PROX SYS: 162.2 CM/S
LEFT ICA/CCA SYS: 1.8
LEFT VERTEBRAL DIAS: 10.8 CM/S
LEFT VERTEBRAL SYS: 63.4 CM/S
LEUKOCYTE ESTERASE UR QL STRIP.AUTO: ABNORMAL
MAGNESIUM SERPL-MCNC: 1.6 MG/DL (ref 1.6–2.4)
NITRITE UR QL STRIP.AUTO: NEGATIVE
PCO2 BLDA: 39 MMHG (ref 35–45)
PH BLDA: 7.47 [PH] (ref 7.35–7.45)
PH UR STRIP: 6 [PH] (ref 5–8)
PO2 BLDA: 62 MMHG (ref 80–100)
POTASSIUM SERPL-SCNC: 4 MMOL/L (ref 3.5–5.1)
POTASSIUM SERPL-SCNC: 4.4 MMOL/L (ref 3.5–5.1)
PROT SERPL-MCNC: 6.9 G/DL (ref 6.4–8.2)
PROT UR STRIP-MCNC: NEGATIVE MG/DL
PROTHROMBIN TIME: 10.8 SEC (ref 9–11.1)
RBC #/AREA URNS HPF: ABNORMAL /HPF (ref 0–5)
RIGHT CCA DIST DIAS: 12.9 CM/S
RIGHT CCA DIST SYS: 85.4 CM/S
RIGHT CCA PROX DIAS: 0 CM/S
RIGHT CCA PROX SYS: 100.7 CM/S
RIGHT ECA DIAS: 0 CM/S
RIGHT ECA SYS: 127.1 CM/S
RIGHT GSV ANKLE DIAM: 0.32 CM
RIGHT GSV AT KNEE DIAM: 0.25 CM
RIGHT GSV BK MID DIAM: 0.1 CM
RIGHT GSV BK PROX DIAM: 0.19 CM
RIGHT GSV THIGH DIST DIAM: 0.3 CM
RIGHT GSV THIGH MID DIAM: 0.44 CM
RIGHT GSV THIGH PROX DIAM: 0.45 CM
RIGHT ICA DIST DIAS: 17.3 CM/S
RIGHT ICA DIST SYS: 89.8 CM/S
RIGHT ICA MID DIAS: 30.5 CM/S
RIGHT ICA MID SYS: 142.4 CM/S
RIGHT ICA PROX DIAS: 12.9 CM/S
RIGHT ICA PROX SYS: 94.1 CM/S
RIGHT ICA/CCA SYS: 1.7
RIGHT VERTEBRAL DIAS: 7.6 CM/S
RIGHT VERTEBRAL SYS: 48.3 CM/S
SAO2 % BLD: 93 % (ref 92–97)
SAO2% DEVICE SAO2% SENSOR NAME: ABNORMAL
SERVICE CMNT-IMP: ABNORMAL
SERVICE CMNT-IMP: NORMAL
SODIUM SERPL-SCNC: 137 MMOL/L (ref 136–145)
SODIUM SERPL-SCNC: 139 MMOL/L (ref 136–145)
SP GR UR REFRACTOMETRY: 1.02 (ref 1–1.03)
SPECIMEN SITE: ABNORMAL
THERAPEUTIC RANGE,PTTT: ABNORMAL SECS (ref 58–77)
TSH SERPL DL<=0.05 MIU/L-ACNC: 0.9 UIU/ML (ref 0.36–3.74)
UA: UC IF INDICATED,UAUC: ABNORMAL
UROBILINOGEN UR QL STRIP.AUTO: 0.2 EU/DL (ref 0.2–1)
VAS LEFT SUBCLAVIAN PROX EDV: 0 CM/S
VAS LEFT SUBCLAVIAN PROX PSV: 213 CM/S
VAS RIGHT SUBCLAVIAN PROX EDV: 0 CM/S
VAS RIGHT SUBCLAVIAN PROX PSV: 102.9 CM/S
WBC URNS QL MICRO: ABNORMAL /HPF (ref 0–4)

## 2021-11-18 PROCEDURE — 82803 BLOOD GASES ANY COMBINATION: CPT

## 2021-11-18 PROCEDURE — 85610 PROTHROMBIN TIME: CPT

## 2021-11-18 PROCEDURE — 83735 ASSAY OF MAGNESIUM: CPT

## 2021-11-18 PROCEDURE — 87086 URINE CULTURE/COLONY COUNT: CPT

## 2021-11-18 PROCEDURE — 84443 ASSAY THYROID STIM HORMONE: CPT

## 2021-11-18 PROCEDURE — 99223 1ST HOSP IP/OBS HIGH 75: CPT | Performed by: THORACIC SURGERY (CARDIOTHORACIC VASCULAR SURGERY)

## 2021-11-18 PROCEDURE — 74011250636 HC RX REV CODE- 250/636: Performed by: INTERNAL MEDICINE

## 2021-11-18 PROCEDURE — 85730 THROMBOPLASTIN TIME PARTIAL: CPT

## 2021-11-18 PROCEDURE — 81001 URINALYSIS AUTO W/SCOPE: CPT

## 2021-11-18 PROCEDURE — 74011250637 HC RX REV CODE- 250/637: Performed by: INTERNAL MEDICINE

## 2021-11-18 PROCEDURE — 86923 COMPATIBILITY TEST ELECTRIC: CPT

## 2021-11-18 PROCEDURE — 86900 BLOOD TYPING SEROLOGIC ABO: CPT

## 2021-11-18 PROCEDURE — 74011000250 HC RX REV CODE- 250: Performed by: PHYSICIAN ASSISTANT

## 2021-11-18 PROCEDURE — 82962 GLUCOSE BLOOD TEST: CPT

## 2021-11-18 PROCEDURE — 74011000250 HC RX REV CODE- 250: Performed by: INTERNAL MEDICINE

## 2021-11-18 PROCEDURE — 80053 COMPREHEN METABOLIC PANEL: CPT

## 2021-11-18 PROCEDURE — 71250 CT THORAX DX C-: CPT

## 2021-11-18 PROCEDURE — 36600 WITHDRAWAL OF ARTERIAL BLOOD: CPT

## 2021-11-18 PROCEDURE — 36415 COLL VENOUS BLD VENIPUNCTURE: CPT

## 2021-11-18 PROCEDURE — 74011250637 HC RX REV CODE- 250/637: Performed by: PHYSICIAN ASSISTANT

## 2021-11-18 PROCEDURE — 77010033678 HC OXYGEN DAILY

## 2021-11-18 PROCEDURE — 93880 EXTRACRANIAL BILAT STUDY: CPT

## 2021-11-18 PROCEDURE — 65660000000 HC RM CCU STEPDOWN

## 2021-11-18 PROCEDURE — 93970 EXTREMITY STUDY: CPT

## 2021-11-18 PROCEDURE — 77030027138 HC INCENT SPIROMETER -A

## 2021-11-18 RX ORDER — NITROGLYCERIN 20 MG/100ML
0-20 INJECTION INTRAVENOUS
Status: DISCONTINUED | OUTPATIENT
Start: 2021-11-19 | End: 2021-11-19

## 2021-11-18 RX ORDER — PAPAVERINE HYDROCHLORIDE 30 MG/ML
30 INJECTION INTRAMUSCULAR; INTRAVENOUS ONCE
Status: DISCONTINUED | OUTPATIENT
Start: 2021-11-19 | End: 2021-11-19

## 2021-11-18 RX ORDER — SODIUM CHLORIDE 0.9 % (FLUSH) 0.9 %
5-40 SYRINGE (ML) INJECTION AS NEEDED
Status: DISCONTINUED | OUTPATIENT
Start: 2021-11-18 | End: 2021-11-19

## 2021-11-18 RX ORDER — NALOXONE HYDROCHLORIDE 0.4 MG/ML
0.4 INJECTION, SOLUTION INTRAMUSCULAR; INTRAVENOUS; SUBCUTANEOUS AS NEEDED
Status: DISCONTINUED | OUTPATIENT
Start: 2021-11-18 | End: 2021-11-19

## 2021-11-18 RX ORDER — DESMOPRESSIN ACETATE 4 UG/ML
2 INJECTION, SOLUTION INTRAVENOUS; SUBCUTANEOUS ONCE
Status: COMPLETED | OUTPATIENT
Start: 2021-11-19 | End: 2021-11-19

## 2021-11-18 RX ORDER — MUPIROCIN 20 MG/G
OINTMENT TOPICAL 2 TIMES DAILY
Status: DISCONTINUED | OUTPATIENT
Start: 2021-11-18 | End: 2021-11-19

## 2021-11-18 RX ORDER — SODIUM CHLORIDE 0.9 % (FLUSH) 0.9 %
5-40 SYRINGE (ML) INJECTION EVERY 8 HOURS
Status: DISCONTINUED | OUTPATIENT
Start: 2021-11-18 | End: 2021-11-19

## 2021-11-18 RX ORDER — NOREPINEPHRINE BITARTRATE/D5W 8 MG/250ML
2-16 PLASTIC BAG, INJECTION (ML) INTRAVENOUS
Status: DISCONTINUED | OUTPATIENT
Start: 2021-11-19 | End: 2021-11-19

## 2021-11-18 RX ORDER — HEPARIN SODIUM 1000 [USP'U]/ML
2000 INJECTION, SOLUTION INTRAVENOUS; SUBCUTANEOUS ONCE
Status: DISCONTINUED | OUTPATIENT
Start: 2021-11-19 | End: 2021-11-19

## 2021-11-18 RX ORDER — PAPAVERINE HYDROCHLORIDE 30 MG/ML
30 INJECTION INTRAMUSCULAR; INTRAVENOUS ONCE
Status: DISCONTINUED | OUTPATIENT
Start: 2021-11-18 | End: 2021-11-18

## 2021-11-18 RX ORDER — DOBUTAMINE HYDROCHLORIDE 200 MG/100ML
0-10 INJECTION INTRAVENOUS
Status: DISCONTINUED | OUTPATIENT
Start: 2021-11-19 | End: 2021-11-21

## 2021-11-18 RX ORDER — MAGNESIUM SULFATE HEPTAHYDRATE 40 MG/ML
2 INJECTION, SOLUTION INTRAVENOUS ONCE
Status: DISCONTINUED | OUTPATIENT
Start: 2021-11-19 | End: 2021-11-19

## 2021-11-18 RX ORDER — DESMOPRESSIN ACETATE 4 UG/ML
2 INJECTION, SOLUTION INTRAVENOUS; SUBCUTANEOUS ONCE
Status: DISCONTINUED | OUTPATIENT
Start: 2021-11-19 | End: 2021-11-18

## 2021-11-18 RX ORDER — DIPHENHYDRAMINE HYDROCHLORIDE 50 MG/ML
25 INJECTION, SOLUTION INTRAMUSCULAR; INTRAVENOUS
Status: DISCONTINUED | OUTPATIENT
Start: 2021-11-18 | End: 2021-11-19

## 2021-11-18 RX ORDER — PROTAMINE SULFATE 10 MG/ML
250 INJECTION, SOLUTION INTRAVENOUS
Status: DISCONTINUED | OUTPATIENT
Start: 2021-11-19 | End: 2021-11-19

## 2021-11-18 RX ORDER — HEPARIN SODIUM 1000 [USP'U]/ML
2000 INJECTION, SOLUTION INTRAVENOUS; SUBCUTANEOUS ONCE
Status: DISCONTINUED | OUTPATIENT
Start: 2021-11-18 | End: 2021-11-18

## 2021-11-18 RX ORDER — AMIODARONE HYDROCHLORIDE 200 MG/1
400 TABLET ORAL EVERY 12 HOURS
Status: DISCONTINUED | OUTPATIENT
Start: 2021-11-18 | End: 2021-11-19

## 2021-11-18 RX ORDER — CHLORHEXIDINE GLUCONATE 1.2 MG/ML
15 RINSE ORAL EVERY 12 HOURS
Status: DISCONTINUED | OUTPATIENT
Start: 2021-11-18 | End: 2021-11-19

## 2021-11-18 RX ORDER — POTASSIUM CHLORIDE 29.8 MG/ML
20 INJECTION INTRAVENOUS ONCE
Status: DISCONTINUED | OUTPATIENT
Start: 2021-11-19 | End: 2021-11-19

## 2021-11-18 RX ORDER — DEXMEDETOMIDINE HYDROCHLORIDE 4 UG/ML
.1-1.5 INJECTION, SOLUTION INTRAVENOUS
Status: DISCONTINUED | OUTPATIENT
Start: 2021-11-19 | End: 2021-11-21

## 2021-11-18 RX ORDER — DOPAMINE HYDROCHLORIDE 320 MG/100ML
0-20 INJECTION, SOLUTION INTRAVENOUS
Status: DISCONTINUED | OUTPATIENT
Start: 2021-11-19 | End: 2021-11-19

## 2021-11-18 RX ADMIN — CARVEDILOL 12.5 MG: 12.5 TABLET, FILM COATED ORAL at 18:17

## 2021-11-18 RX ADMIN — Medication 10 ML: at 23:08

## 2021-11-18 RX ADMIN — MUPIROCIN: 20 OINTMENT TOPICAL at 23:09

## 2021-11-18 RX ADMIN — CHLORHEXIDINE GLUCONATE 15 ML: 1.2 RINSE ORAL at 13:49

## 2021-11-18 RX ADMIN — ALPRAZOLAM 0.5 MG: 0.5 TABLET ORAL at 08:40

## 2021-11-18 RX ADMIN — VERAPAMIL HYDROCHLORIDE 240 MG: 120 TABLET, FILM COATED, EXTENDED RELEASE ORAL at 23:06

## 2021-11-18 RX ADMIN — HYDRALAZINE HYDROCHLORIDE 20 MG: 20 INJECTION INTRAMUSCULAR; INTRAVENOUS at 23:07

## 2021-11-18 RX ADMIN — NITROGLYCERIN 1 INCH: 20 OINTMENT TOPICAL at 23:08

## 2021-11-18 RX ADMIN — Medication 1 TABLET: at 08:40

## 2021-11-18 RX ADMIN — ALPRAZOLAM 0.5 MG: 0.5 TABLET ORAL at 23:07

## 2021-11-18 RX ADMIN — CARVEDILOL 12.5 MG: 12.5 TABLET, FILM COATED ORAL at 08:40

## 2021-11-18 RX ADMIN — PANTOPRAZOLE SODIUM 40 MG: 40 TABLET, DELAYED RELEASE ORAL at 08:40

## 2021-11-18 RX ADMIN — ACETAMINOPHEN 650 MG: 325 TABLET ORAL at 11:51

## 2021-11-18 RX ADMIN — LABETALOL HYDROCHLORIDE 10 MG: 5 INJECTION, SOLUTION INTRAVENOUS at 20:34

## 2021-11-18 RX ADMIN — AMIODARONE HYDROCHLORIDE 400 MG: 200 TABLET ORAL at 13:49

## 2021-11-18 RX ADMIN — MUPIROCIN: 20 OINTMENT TOPICAL at 13:49

## 2021-11-18 RX ADMIN — AMIODARONE HYDROCHLORIDE 400 MG: 200 TABLET ORAL at 23:06

## 2021-11-18 RX ADMIN — ENOXAPARIN SODIUM 30 MG: 100 INJECTION SUBCUTANEOUS at 18:17

## 2021-11-18 RX ADMIN — CHLORHEXIDINE GLUCONATE 15 ML: 1.2 RINSE ORAL at 23:09

## 2021-11-18 RX ADMIN — ASPIRIN 81 MG: 81 TABLET, COATED ORAL at 08:40

## 2021-11-18 RX ADMIN — ATORVASTATIN CALCIUM 20 MG: 20 TABLET, FILM COATED ORAL at 23:06

## 2021-11-18 RX ADMIN — ACETAMINOPHEN 650 MG: 325 TABLET ORAL at 21:17

## 2021-11-18 NOTE — PROGRESS NOTES
11/17/2021 7:00 PM  Patient without complaints. Last VS:   Visit Vitals  BP (!) 183/61 (BP 1 Location: Left upper arm, BP Patient Position: At rest)   Pulse 71   Temp 97.8 °F (36.6 °C)   Resp 16   Ht 5' 7\" (1.702 m)   Wt 83.5 kg (184 lb)   SpO2 96%   BMI 28.82 kg/m²     Cath site without hematoma, bleeding or new bruit. Distal pulses at baseline. Continue current plan of care. Results of cath discussed with patient and daughter.

## 2021-11-18 NOTE — PROGRESS NOTES
Physical Therapy    Chart reviewed, patient is off the floor for chest CT and scheduled for CABG tomorrow. Will defer and re-eval after CABG as per protocol.     Robyn Esteban

## 2021-11-18 NOTE — PROGRESS NOTES
Spiritual Care Assessment/Progress Note  [facility]      NAME: Joaquín Sandhu      MRN: [id number]  AGE: 81 y.o.  SEX: female  Pentecostal Affiliation: Unknown   Language: English     11/18/2021     Total Time (in minutes): 8     Spiritual Assessment begun in [facility] 2 [facility] CARDIO through conversation with:         [x]Patient        [] Family    [] Friend(s)        Reason for Consult: Initial/Spiritual assessment, patient floor     Spiritual beliefs: (Please include comment if needed)     [x] Identifies with a liz tradition: Jewish         [x] Supported by a liz community: [facility] in [place]           [] Claims no spiritual orientation:           [] Seeking spiritual identity:                [] Adheres to an individual form of spirituality:           [] Not able to assess:                           Identified resources for coping:      [x] Prayer                               [] Music                  [] Guided Imagery     [x] Family/friends                 [] Pet visits     [] Devotional reading                         [] Unknown     [] Other:                                               Interventions offered during this visit: (See comments for more details)    Patient Interventions: Affirmation of emotions/emotional suffering, Affirmation of liz, Catharsis/review of pertinent events in supportive environment, Coping skills reviewed/reinforced, Prayer (actual), Prayer (assurance of)           Plan of Care:     [] Support spiritual and/or cultural needs    [] Support AMD and/or advance care planning process      [] Support grieving process   [] Coordinate Rites and/or Rituals    [] Coordination with community clergy   [] No spiritual needs identified at this time   [] Detailed Plan of Care below (See Comments)  [] Make referral to Music Therapy  [] Make referral to Pet Therapy     [] Make referral to Addiction services  [] Make referral to [facility]  [] Make referral to Spiritual Care Partner  [] No future visits requested        [x] Contact Spiritual Care for further referrals     Comments:     Initial Spiritual Care Assessment visit for Ms. Lobato in 2156. Reviewed the chart before visit. Patient was alert, no family was present during the visit. Patient shared about her surgery schedule, asked prayer for it.  prayed for her sucesssful surgery. She was thankful. Advised of  availability.       [id number] [facility] MAT LanceDiv,Th.M, Mariam 603 Provider   Paging Service [phone])

## 2021-11-18 NOTE — PROGRESS NOTES
Cardiology Progress Note  11/18/2021     Admit Date: 11/14/2021  Admit Diagnosis: NSTEMI (non-ST elevated myocardial infarction) ([facility] [place] [address] [I21.4]  CC: none currently  Cardiac Assessment/Plan:     1) TIDWELL 12/2019:  3 months w/o change; unremarkable echo at [facility]. (except PAp 45). *Neg MPI 1/2020 (Ex 2 min, no ischemia, normal EF). 2) palpitations 12/2019:  (Q.day at night early a.m.) unremarkable 48 hr Holter at [facility]. (w/ Sx);      *Neg EVR 12/2019.    3) HTN  4) diabetes  5) dyslipidemia  6) large hiatal hernia per patient  7) chronic lumbar back pain  8) CKD 3:  Cr 1.64/GFR approx 40 10/2019.  9) GERD  10) osteoarthritis  She is a retired ; no nicotine, ethanol, nor added salt. 1 caffeinated beverage per day. Admitted w/ uncontrolled HTN; HypoNa, indeterminate troponin; No ecg changes; no CP. Echo 11/14/21: Normal LV & RV Fxn; no valve dz; mild LVH. Pharm MPI 11/16/21: \"Suspect mild inferolateral ischemia. Left ventricular ejection fraction measures 78%. \"    Cath 11/17/21:  1. Severe CAD of distal LM, mid-circ, & distal RCA; mod prox LAD dz.  2. No AS  3. EDP 25  PLAN: Consult for CABG; D/W Dr Stephanie Khan.      11/17: BPs 115-170; HR 50-70s; NSR; 95% 2L. Cr 1.42; LDL 62. Cardiac meds: asa; lipitor 20; coreg 12.5 bid; verapamil 240 qhs. (holding losartan 50). Rec: Cont current Rx; Add back losartan at low dose in future if needed (vs hydralazine/imdur if Cr too high). I have recommended diagnostic cath for definitive evaluation of the patient's coronary anatomy and PCI if appropriate; All risks, benefits, and alternatives were discussed and patient wishes to proceed. NPO after light breakfast; IVF @ 50.    11/18: BPs 130-160; HR 70-80s; 97% RA. K 4; Cr 1.23;     Cardiac meds: asa; lipitor 20; coreg 12.5 bid; verapamil ; NTG paste prn.     Rec: CABG; timing per Dr Stephanie Khan (tentative for tomorrow).     __________________________________________________________  CHIEF COMPLAINT: Increased bp, nausea/vomiting. HISTORY OF PRESENT ILLNESS:      Violeta Lobato is a 81 y.o.   female with past medical history of hypertension who was transferred from [place] for nausea vomiting and elevated blood pressure.  Patient has had 3 trips to the [address] [place] [address] ED for the same complaints, she presented this Friday and was found to have a systolic blood pressure around 225/105, she was complaining of nausea vomiting and chills.  Labs at the [address] [place] [address] showed elevated troponin at 0.45.  Patient denies any chest pain, shortness of breath or palpitation.  EKG showed no acute ST changes     Currently in bed, son at bedside, no chest pain, no dyspnea, no edema, no syncope   _______________________________________________________  @ NP OV 1/2/20  Ms Ana Fan has a h/o:  1) TIDWELL 12/2019:  3 months w/o change; unremarkable echo at [address] [place] [address]. (except PAp 45). 2) palpitations 12/2019:  (Q.day at night early a.m.) unremarkable 48 hour Holter at [address] [place] [address]. (was symptomatic). 3) HTN  4) diabetes  5) dyslipidemia  6) large hiatal hernia per patient  7) chronic lumbar back pain  8) CKD 3:  Cr 1.64/GFR approx 40 10/2019.  9) GERD  10) osteoarthritis  She is a retired ; no nicotine, ethanol, nor added salt. 1 caffeinated beverage per day. 12/2019:  TIDWELL for the last 3 months; her BPs been running high. No chest pain. Palpitations as noted above with unremarkable Holter with symptoms; no symptoms in the last 4 days. 1/2020: States \"episodes are better\" since Dr. Cecile Justice increased Losartan. Hasn't put on MCOT yet because she couldn't figure it out. Still feels short of breath and has occasional chest pressure durig episodes. Occurs when exerting herself. MPI pending. No dizziness, LH, or edema.     CARDIAC HISTORY  RISK FACTORS:  1 Diabetes   2 Hypertension   3 Dyslipidemia   4 Family History of CAD [Less than 60 years of age]   5 Tobacco Use: No/never       CARDIOVASCULAR PROCEDURES  Procedure Date Results   Tim MPI 2011 No Ischemia, Per patient; [facility]  (for atypical chest pain). Echo 2019 EF 0.65 (65%), Mild LVH, AV Mean 7 mmHg, Est RVSP 44 mmHg, Normal RV. No valvular heart disease. At  [facility]. EKG 2019 Sinus Rhythm, Borderline LVH. Holter 2019 48 hr holter; at [facility]: No arrhythmia; HR 50 to 116:  Average 90 ; patient reported symptoms. ACTIVE ALLERGIES:  Ingredient Reaction Comment   ACE INHIBITORS     PENICILLINS     PROCAINAMIDE  Procan SR       PROBLEM LIST:  Problem Description Chronic   Angiotensin-converting-enzyme inhibitor allergy N       PAST MEDICAL/SURGICAL HISTORY  (Detailed)    Disease/disorder Onset Date Surgical History Date Comments     Appendectomy       Knee Replacement       Colonoscopy       EGD     Anemia       Anxiety       Arthritis       Blood Clots       Chronic Kidney Disease       Diverticulosis       Elevated lipids       GERD       Hypertension       Mood Disorder       Overactive Bladder         Family History  (Detailed)  Relationship Family Member Name  Age at Death Condition Onset Age Cause of Death   Father    Heart Attack     Sister    High Blood Pressure       SOCIAL HISTORY  (Detailed)  Tobacco use reviewed. Preferred language is [place]. EDUCATION/EMPLOYMENT/OCCUPATION  Employment History Status Retired Restrictions     retired       Smoking status: Never smoker. ALCOHOL  There is no history of alcohol use. CAFFEINE  The patient uses caffeine. LIFESTYLE  Exercises daily. IMPRESSION AND PLAN  01. Palpitations (R00.2):  Etiology unknown. Event monitor is recommended. Applied to patient in office today. 02. Shortness of breath (R06.02):  ?  Cardiac. Stress Cardiolite is warranted. We discussed the signs and symptoms of unstable angina, myocardial infarction and malignant arrhythmia.   The patient knows to seek immediate medical attention should they occur. 03. Hyp hrt & chr kdny dis w/o hrt fail, w stg 1-4/unsp chr kdny (I13.10): Inadequately controlled; losartan increased to 50 mg q.day and sCr with slight bump to 1.77. Will continu at 50mg QD and change metoprolol to Coreg 3.125mg BID. Will recheck at [facility] next week. 04. Mixed hyperlipidemia (E78.2):  Lipid labs drawn by PCP. The patient is tolerating lipid lowering therapy well. 05. Chronic kidney disease, stage 3 (moderate) (N18.3):  Managed by: Other Physician   06. Body mass index (BMI) 31.0-31.9, adult (Z68.31): The patient was instructed on AHA diet and regular exercise. ORDERS:   Dietary management education, guidance, and counseling    2 The patient was instructed on AHA diet and regular exercise. 1/2/20 MEDICATION LIST  Medication Sig Desc   alprazolam 0.25 mg tablet take 1 tablet by oral route 3 times every day   Aspirin Low Dose 81 mg tablet,delayed release take 1 tablet by oral route  every day   calcium carbonate 500 mg (1,250 mg)-vitamin D3 125 unit tablet    carvedilol 3.125 mg tablet take 1 tablet by oral route 2 times every day with food   diclofenac sodium 50 mg tablet,delayed release take 1 tablet by oral route 2 times every day   losartan 50 mg tablet take 1 tablet by oral route  every day   multivitamin tablet take 1 tablet by oral route  every day with food   omeprazole 20 mg capsule,delayed release take 1 capsule by oral route  every day before a meal   ranitidine 150 mg tablet take 1 tablet by oral route 2 times every day   simvastatin 40 mg tablet take 1 tablet by oral route  every day in the evening   triamterene 37.5 mg-hydrochlorothiazide 25 mg tablet take 1 tablet by oral route  every day   verapamil  mg 24 hr capsule,extended release take 1 capsule by oral route  every day       For other plans, see orders.   Hospital problem list     Active Hospital Problems    Diagnosis Date Noted    [facility]        Subjective: Ana Corrales reports       Chest pain X none  consistent with:  Non-cardiac CP         Atypical CP     None now  On going  Anginal CP     Dyspnea  none  at rest  with exertion        x improved  unchanged  worse              PND X none  overnight       Orthopnea X none  improved  unchanged  worse   Presyncope X none  improved  unchanged  worse     Ambulated in hallway without symptoms   Yes   Ambulated in room without symptoms  Yes   Objective:     Physical Exam:  Overall VSSAF;    Visit Vitals  BP (!) 165/52 (BP 1 Location: Left upper arm, BP Patient Position: At rest)   Pulse 85   Temp 98.6 °F (37 °C)   Resp 17   Ht 5' 7\" (1.702 m)   Wt 83.5 kg (184 lb)   SpO2 97%   BMI 28.82 kg/m²     Temp (24hrs), Av.5 °F (36.9 °C), Min:97.8 °F (36.6 °C), Max:98.7 °F (37.1 °C)    Patient Vitals for the past 8 hrs:   Pulse   21 0831 85   21 0400 80   21 0300 77     Patient Vitals for the past 8 hrs:   Resp   21 0831 17     Patient Vitals for the past 8 hrs:   BP   21 0831 (!) 165/52   21 0400 (!) 156/55   21 0300 (!) 154/56       Intake/Output Summary (Last 24 hours) at 2021 1012  Last data filed at 2021 1024  Gross per 24 hour   Intake 480 ml   Output --   Net 480 ml     General Appearance: Well developed, well nourished, no acute distress. Ears/Nose/Mouth/Throat:   Normal MM; anicteric. JVP: WNL   Resp:   Lungs clear to auscultation bilaterally. Nl resp effort. Cardiovascular:  RRR, S1, S2 normal, no new murmur. No gallop or rub. Abdomen:   Soft, non-tender, bowel sounds are present. Extremities: trivial edema bilaterally. Skin:  Neuro: Warm and dry.   A/O x3, grossly nonfocal       cath site intact w/o hematoma or new bruit; distal pulse unchanged x Yes   Data Review:     Telemetry independently reviewed x sinus  chronic afib  parox afib  NSVT     ECG independently reviewed  NSR  afib  no significant changes  NSST-Tw chgs     x no new ECG provided for review   Lab results reviewed as noted below. Current medications reviewed as noted below. No results for input(s): PH, PCO2, PO2 in the last 72 hours. No results for input(s): CPK, CKMB, CKNDX, TROIQ in the last 72 hours. Recent Labs     11/18/21  0506 11/17/21  0311 11/16/21  0249    138 137   K 4.0 4.3 4.3    105 103   CO2 28 27 26   BUN 25* 30* 37*   CREA 1.23* 1.42* 1.76*   GFRAA 50* 43* 33*   * 119* 134*   CA 9.0 9.3 9.1   WBC  --   --  9.0   HGB  --   --  11.6   HCT  --   --  37.2   PLT  --   --  292     Lab Results   Component Value Date/Time    Cholesterol, total 140 11/17/2021 03:11 AM    HDL Cholesterol 52 11/17/2021 03:11 AM    LDL, calculated 62 11/17/2021 03:11 AM    Triglyceride 130 11/17/2021 03:11 AM    CHOL/HDL Ratio 2.7 11/17/2021 03:11 AM     No results for input(s): AP, TBIL, TP, ALB, GLOB, GGT, AML, LPSE in the last 72 hours. No lab exists for component: SGOT, GPT, AMYP, HLPSE  No results for input(s): INR, PTP, APTT, INREXT, INREXT in the last 72 hours.    No components found for: [place]    Current Facility-Administered Medications   Medication Dose Route Frequency    sodium chloride (NS) flush 5-40 mL  5-40 mL IntraVENous Q8H    sodium chloride (NS) flush 5-40 mL  5-40 mL IntraVENous PRN    naloxone (NARCAN) injection 0.4 mg  0.4 mg IntraVENous PRN    diphenhydrAMINE (BENADRYL) injection 25 mg  25 mg IntraVENous Q4H PRN    0.9% sodium chloride infusion  75 mL/hr IntraVENous CONTINUOUS    nitroglycerin (NITROBID) 2 % ointment 1 Inch  1 Inch Topical Q6H PRN    hydrALAZINE (APRESOLINE) 20 mg/mL injection 20 mg  20 mg IntraVENous Q4H PRN    ondansetron (ZOFRAN) injection 4 mg  4 mg IntraVENous Q4H PRN    sodium chloride (NS) flush 10 mL  10 mL IntraVENous PRN    carvediloL (COREG) tablet 12.5 mg  12.5 mg Oral BID WITH MEALS    enoxaparin (LOVENOX) injection 30 mg  30 mg SubCUTAneous Q24H    ALPRAZolam (XANAX) tablet 0.5 mg  0.5 mg Oral PRN    aspirin delayed-release tablet 81 mg  81 mg Oral DAILY    calcium-vitamin D (OS-MARINA +D3) 500 mg-200 unit per tablet 1 Tablet  1 Tablet Oral DAILY    [Held by provider] losartan (COZAAR) tablet 50 mg  50 mg Oral DAILY    pantoprazole (PROTONIX) tablet 40 mg  40 mg Oral ACB    atorvastatin (LIPITOR) tablet 20 mg  20 mg Oral QHS    verapamil ER (CALAN-SR) tablet 240 mg  240 mg Oral QHS    sodium chloride (NS) flush 5-40 mL  5-40 mL IntraVENous Q8H    sodium chloride (NS) flush 5-40 mL  5-40 mL IntraVENous PRN    morphine injection 1 mg  1 mg IntraVENous Q4H PRN    labetaloL (NORMODYNE;TRANDATE) injection 10 mg  10 mg IntraVENous Q4H PRN    insulin lispro (HUMALOG) injection   SubCUTAneous AC&HS    glucose chewable tablet 16 g  4 Tablet Oral PRN    dextrose (D50W) injection syrg 12.5-25 g  12.5-25 g IntraVENous PRN    glucagon (GLUCAGEN) injection 1 mg  1 mg IntraMUSCular PRN    acetaminophen (TYLENOL) tablet 650 mg  650 mg Oral Q6H PRN        Leslie Lombardo MD

## 2021-11-18 NOTE — PROGRESS NOTES
Problem: Falls - Risk of  Goal: *Absence of Falls  Description: Document Christopher Pierre Fall Risk and appropriate interventions in the flowsheet.   11/18/2021 0333 by Abdulaziz Goodman RN  Outcome: Progressing Towards Goal  Note: Fall Risk Interventions:  Mobility Interventions: Assess mobility with egress test, Bed/chair exit alarm, Communicate number of staff needed for ambulation/transfer, OT consult for ADLs, Patient to call before getting OOB, PT Consult for mobility concerns, PT Consult for assist device competence, Strengthening exercises (ROM-active/passive), Utilize walker, cane, or other assistive device, Utilize gait belt for transfers/ambulation         Medication Interventions: Assess postural VS orthostatic hypotension, Bed/chair exit alarm, Evaluate medications/consider consulting pharmacy, Patient to call before getting OOB, Teach patient to arise slowly, Utilize gait belt for transfers/ambulation    Elimination Interventions: Bed/chair exit alarm, Call light in reach, Patient to call for help with toileting needs, Elevated toilet seat, Stay With Me (per policy), Toilet paper/wipes in reach, Toileting schedule/hourly rounds           11/17/2021 1959 by Abdulaziz Goodman RN  Outcome: Progressing Towards Goal  Note: Fall Risk Interventions:  Mobility Interventions: Assess mobility with egress test, Bed/chair exit alarm, Communicate number of staff needed for ambulation/transfer, OT consult for ADLs, Patient to call before getting OOB, PT Consult for mobility concerns, PT Consult for assist device competence, Strengthening exercises (ROM-active/passive), Utilize walker, cane, or other assistive device, Utilize gait belt for transfers/ambulation         Medication Interventions: Assess postural VS orthostatic hypotension, Bed/chair exit alarm, Evaluate medications/consider consulting pharmacy, Patient to call before getting OOB, Teach patient to arise slowly, Utilize gait belt for transfers/ambulation    Elimination Interventions: Bed/chair exit alarm, Call light in reach, Patient to call for help with toileting needs, Elevated toilet seat, Stay With Me (per policy), Toilet paper/wipes in reach, Toileting schedule/hourly rounds

## 2021-11-18 NOTE — PROGRESS NOTES
1135 GI consult called to Dr. Hilton Thibodeaux. 1240 CT chest and preop blood work complete. 1320 All preop testing complete.

## 2021-11-18 NOTE — PROGRESS NOTES
Dr. Jessica Chaudhary discussed DNR status with patient and daughter and rationale for rescending it for surgery and post op management. Patient agreeable to changing code status to full code for surgery and post op care. Status changed in [facility].

## 2021-11-18 NOTE — PROGRESS NOTES
Cardiac Surgery Care Coordinator-  Met with Jose Porter and daughter. Introduced role of the Cardiac Surgery Care Coordinator. Reviewed plan of care and began pre-op education. Discussed day of surgery expectations for the pt and family. Instructed pt on the proper use of the incentive spirometer, and she is able to pull 1000ml. Reviewed material in the CABG educational folder including Cardiac Surgery Pathway. Reinforced sternal precautions and keeping your move in the tube. Encouraged Jose Porter and her family to verbalize and offered emotional support.  Sarah Hatch RN

## 2021-11-18 NOTE — PROGRESS NOTES
TRANSFER - OUT REPORT:    Verbal report given to Trinidad(name) on Angelita Samano  being transferred to IVCU(unit) for routine post - op       Report consisted of patients Situation, Background, Assessment and   Recommendations(SBAR). Information from the following report(s) SBAR was reviewed with the receiving nurse. Lines:   Peripheral IV 11/13/21 Posterior; Right Hand (Active)   Site Assessment Clean, dry, & intact 11/17/21 1024   Phlebitis Assessment 0 11/17/21 1024   Infiltration Assessment 0 11/17/21 1024   Dressing Status Clean, dry, & intact 11/17/21 1024   Dressing Type Transparent 11/17/21 1024   Hub Color/Line Status Blue 11/17/21 1024   Alcohol Cap Used Yes 11/15/21 1503        Opportunity for questions and clarification was provided. Pt family transported personal belongings to [facility]. Carmela Liu was also taken to [facility].

## 2021-11-18 NOTE — PROGRESS NOTES
Problem: Falls - Risk of  Goal: *Absence of Falls  Description: Document Hudson Fall Risk and appropriate interventions in the flowsheet.   Outcome: Progressing Towards Goal  Note: Fall Risk Interventions:  Mobility Interventions: Assess mobility with egress test, Bed/chair exit alarm, Communicate number of staff needed for ambulation/transfer, OT consult for ADLs, Patient to call before getting OOB, PT Consult for mobility concerns, PT Consult for assist device competence, Strengthening exercises (ROM-active/passive), Utilize walker, cane, or other assistive device, Utilize gait belt for transfers/ambulation         Medication Interventions: Assess postural VS orthostatic hypotension, Bed/chair exit alarm, Evaluate medications/consider consulting pharmacy, Patient to call before getting OOB, Teach patient to arise slowly, Utilize gait belt for transfers/ambulation    Elimination Interventions: Bed/chair exit alarm, Call light in reach, Patient to call for help with toileting needs, Elevated toilet seat, Stay With Me (per policy), Toilet paper/wipes in reach, Toileting schedule/hourly rounds

## 2021-11-18 NOTE — CONSULTS
CSS Consult    Subjective:      Carrington Vázquez is a 81 y.o. female who was referred for cardiac evaluation for CAD from Dr. Bimal Galvez. Patient was transferred from [facility] with chief complaint of nausea and vomiting and hypertensive urgency (/105). Troponin 0.45 in ED. Patient had 2 trips to ED in [facility] prior to this with hypertensive urgency without troponin bump. Patient without chest pain, shortness of breath, fever, swelling in expremities and palpitations. EKG without any ST changes in ED. 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 Past medical history significant for HTN, OA with B/L TKR, large hiatal hernia, and anxiety. Cardiac Testing    Cardiac catheterization:    Diagnostic  Dominance: Right    Left Main   The vessel is large. The vessel is calcified. 75% distal LM stenosis. Left Anterior Descending   The proximal segment of the vessel is large. The middle segment of the vessel is moderate in size. The distal segment of the vessel is small. The vessel is calcified. 60% mid stenosis; 3 small diagonals. Left Circumflex   The proximal segment of the vessel is moderate in size. The middle segment of the vessel is small. The distal segment of the vessel is small. The vessel is calcified. 75% mid-circ plaque; Medium OM; trivial terminal OMs; Left to right collaterals. Right Coronary Artery   The vessel was visualized by non-selective angiography and is large. The vessel is calcified. 80% distal plaque; small PDA/trivial posterolateral vessels (fill from left primarily). ECHO:  Left Ventricle Normal cavity size and systolic function (ejection fraction normal). Mild concentric hypertrophy. The estimated EF is 65 - 70%. Left Atrium Normal cavity size. Right Ventricle Normal cavity size and global systolic function. Right Atrium Normal cavity size. Aortic Valve Normal valve structure, no stenosis and no regurgitation.    Mitral Valve Normal valve structure and no stenosis. Trace regurgitation. Tricuspid Valve Normal valve structure, no stenosis and no regurgitation. Pulmonic Valve Pulmonic valve not well visualized, but normal doppler findings. Aorta Normal aortic root. Pulmonary Artery Pulmonary arteries not well visualized. Pulmonary hypertension not suggested by Doppler findings. IVC/Hepatic Veins Normal structure. Normal central venous pressure (3 mmHg); IVC diameter is less than 21 mm and collapses more than 50% with respiration. Pericardium No evidence of pericardial effusion. Past Medical History:   Diagnosis Date    Anxiety     Hypertension      Past Surgical History:   Procedure Laterality Date    HX KNEE REPLACEMENT Right 2011    HX KNEE REPLACEMENT Left 2013      Social History     Tobacco Use    Smoking status: Never Smoker    Smokeless tobacco: Never Used   Substance Use Topics    Alcohol use: Not Currently      No family history on file. Prior to Admission medications    Medication Sig Start Date End Date Taking? Authorizing Provider   carvediloL (COREG) 3.125 mg tablet Take 3.125 mg by mouth two (2) times daily (with meals). Yes Provider, Historical   omeprazole (PRILOSEC) 20 mg capsule Take 20 mg by mouth daily. Yes Provider, Historical   diclofenac potassium (CATAFLAM) 50 mg tablet Take 50 mg by mouth daily. Provider, Historical   losartan (COZAAR) 50 mg tablet Take 50 mg by mouth daily. Provider, Historical   aspirin delayed-release 81 mg tablet Take 81 mg by mouth daily. Provider, Historical   triamterene-hydrochlorothiazide (MAXZIDE) 37.5-25 mg per tablet take 1 tablet by mouth once daily for fluid retention and blood pressure 8/4/15   Raquel Patino MD   simvastatin (ZOCOR) 40 mg tablet Take 1 Tab by mouth nightly.  For cholesterol 5/19/14   Sae Wagner NP   verapamil SR (CALAN-SR) 240 mg CR tablet take 1 tablet by mouth NIGHTLY 5/5/14   Raquel Patino MD   ALPRAZolam Dahlia Mech) 0.25 mg tablet take 1 tablet by mouth three times a day if needed for anxiety 2/17/14   Terell Gibbons NP   calcium-vitamin, d3, (OS-MARINA 250) 250-125 mg-unit tablet Take 1 Tab by mouth daily. Provider, Historical   MULTIVITS W-FE,OTHER MIN (CENTRUM PO) Take  by mouth. Provider, Historical       Allergies   Allergen Reactions    Novocain [Procaine] Anaphylaxis     Throat swelling    Ace Inhibitors Cough    Penicillins Rash     Review of Systems:   Consititutional: Denies fever.  + Chills  Eyes:  Denies use of glasses or vision problems(cataracts). ENT:  Denies hearing or swallowing difficulty. CV: Denies CP, claudication, HTN. Resp: Denies dyspnea, productive cough. : Denies dialysis or kidney problems. GI: + N/V. Denies ulcers, esophageal strictures, liver problems. M/S: Denies joint or bone problems, or implanted artificial hardware. Skin: Denies varicose veins, edema. Neuro: Denies strokes, or TIAs. Psych: Denies anxiety or depression. Endocrine: Denies thyroid problems or diabetes. Heme/Lymphatic: Denies easy bruising or lymphedema. Objective:     VS:   Visit Vitals  BP (!) 165/52 (BP 1 Location: Left upper arm, BP Patient Position: At rest)   Pulse 85   Temp 98.6 °F (37 °C)   Resp 17   Ht 5' 7\" (1.702 m)   Wt 184 lb (83.5 kg)   SpO2 97%   BMI 28.82 kg/m²     Physical Exam:    General appearance: alert, cooperative, no distress  Head: normocephalic, without obvious abnormality; atraumatic  Eyes: conjunctivae/corneas clear; EOM's intact. Nose: nares normal; no drainage. Neck: no carotid bruit and no JVD  Lungs: clear to auscultation bilaterally  Heart: regular rate and rhythm; no murmur  Abdomen: soft, non-tender; bowel sounds normal  Extremities: moves all extremities; no weakness. Skin: Skin color normal; No varicose veins or edema.   Neurologic: Grossly normal      Labs:   Recent Labs     11/18/21  0648 11/18/21  0506 11/17/21  2030 11/16/21  0756 11/16/21  0249   WBC  --   --   --   --  9.0 HGB  --   --   --   --  11.6   HCT  --   --   --   --  37.2   PLT  --   --   --   --  292   NA  --  139  --    < > 137   K  --  4.0  --    < > 4.3   BUN  --  25*  --    < > 37*   CREA  --  1.23*  --    < > 1.76*   GLU  --  119*  --    < > 134*   GLUCPOC 121*  --    < >   < >  --     < > = values in this interval not displayed. Diagnostics:   PA and lateral:    Carotid doppler:     PFTS-FEV1:     EKG:   Assessment:     Active Problems:    NSTEMI (non-ST elevated myocardial infarction) ([facility] [address] (11/14/2021)        Plan:     STS Risk Calculator V2.81 - Discussed by surgeon with patient. Risk of Mortality:  3.114%  Renal Failure:  4.056%  Permanent Stroke:  1.354%  Prolonged Ventilation:  8.670%  DSW Infection:  0.162%  Reoperation:  2.071%  Morbidity or Mortality:  13.566%  Short Length of Stay:  21.675%  Long Length of Stay:  9.581%    Treatment Plan:    1. Multivessel CAD including left main with preserved EF: Plan for CABG 11-19-21 first case for Dr. Matt Spencer. Preop workup ordered, be sure to complete today. NPO at midnight. 2. Uncontrolled Hypertension: Care per primary   3. Large hiatal hernia: Chest CT ordered to eval  4. TEMO on CKD stage 3: Monitor. Cr improved  5.  Anxiety: PRN xanax      Signed By: TRACY Alexander     November 18, 2021

## 2021-11-18 NOTE — PROGRESS NOTES
Occupational Therapy Treatment Attempt    Chart reviewed. Attempted Occupational Therapy Treatment, however, patient unable to be seen due to:  []  Nausea/vomiting  []  Eating  []  Pain  []  Patient too lethargic  [x]  Off Unit for testing/procedure/getting CT of chest; per RN/Joe, pt has been unsteady in room with nursing; also plan for CABG tomorrow am  []  Dialysis treatment in progress  []  Telemetry Results  []  Other:     Will f/u later as patient's schedule allows.    Thank you for this referral.  Kelin Mckinley, OTR/L, CSRS, CDCS, CFPS

## 2021-11-18 NOTE — PROGRESS NOTES
Hospitalist    Picked up care of patient today, pt transferring to cardiac surgery for OR in AM  No complaints, spoke with pt and son at bedside    Will sign off, please call back if we can be of further assistance    Armando Angelucci, MD

## 2021-11-19 ENCOUNTER — ANESTHESIA (OUTPATIENT)
Dept: CARDIOTHORACIC SURGERY | Age: 86
DRG: 233 | End: 2021-11-19
Payer: MEDICARE

## 2021-11-19 ENCOUNTER — HOSPITAL ENCOUNTER (OUTPATIENT)
Dept: NON INVASIVE DIAGNOSTICS | Age: 86
Discharge: HOME | End: 2021-11-19
Attending: THORACIC SURGERY (CARDIOTHORACIC VASCULAR SURGERY)

## 2021-11-19 ENCOUNTER — APPOINTMENT (OUTPATIENT)
Dept: GENERAL RADIOLOGY | Age: 86
DRG: 233 | End: 2021-11-19
Attending: PHYSICIAN ASSISTANT
Payer: MEDICARE

## 2021-11-19 ENCOUNTER — ANESTHESIA EVENT (OUTPATIENT)
Dept: CARDIOTHORACIC SURGERY | Age: 86
DRG: 233 | End: 2021-11-19
Payer: MEDICARE

## 2021-11-19 PROBLEM — Z95.1 S/P CABG X 4: Status: ACTIVE | Noted: 2021-11-19

## 2021-11-19 LAB
ADMINISTERED INITIALS, ADMINIT: NORMAL
ALBUMIN SERPL-MCNC: 3.2 G/DL (ref 3.5–5)
ALBUMIN SERPL-MCNC: 3.5 G/DL (ref 3.5–5)
ALBUMIN/GLOB SERPL: 1.8 {RATIO} (ref 1.1–2.2)
ALBUMIN/GLOB SERPL: 1.9 {RATIO} (ref 1.1–2.2)
ALP SERPL-CCNC: 24 U/L (ref 45–117)
ALP SERPL-CCNC: 30 U/L (ref 45–117)
ALT SERPL-CCNC: 14 U/L (ref 12–78)
ALT SERPL-CCNC: 16 U/L (ref 12–78)
ANION GAP SERPL CALC-SCNC: 6 MMOL/L (ref 5–15)
ANION GAP SERPL CALC-SCNC: 8 MMOL/L (ref 5–15)
ANION GAP SERPL CALC-SCNC: 8 MMOL/L (ref 5–15)
APTT PPP: 31.7 SEC (ref 22.1–31)
ARTERIAL PATENCY WRIST A: ABNORMAL
AST SERPL-CCNC: 20 U/L (ref 15–37)
AST SERPL-CCNC: 27 U/L (ref 15–37)
ATRIAL RATE: 68 BPM
BACTERIA SPEC CULT: NORMAL
BASE DEFICIT BLDA-SCNC: 1.6 MMOL/L
BASE DEFICIT BLDA-SCNC: 4 MMOL/L
BASE DEFICIT BLDA-SCNC: 6.1 MMOL/L
BASE EXCESS BLDA CALC-SCNC: 0 MMOL/L
BASOPHILS # BLD: 0 K/UL (ref 0–0.1)
BASOPHILS NFR BLD: 0 % (ref 0–1)
BDY SITE: ABNORMAL
BILIRUB SERPL-MCNC: 0.6 MG/DL (ref 0.2–1)
BILIRUB SERPL-MCNC: 0.7 MG/DL (ref 0.2–1)
BREATHS.SPONTANEOUS ON VENT: 12
BREATHS.SPONTANEOUS ON VENT: 17
BUN SERPL-MCNC: 24 MG/DL (ref 6–20)
BUN SERPL-MCNC: 26 MG/DL (ref 6–20)
BUN SERPL-MCNC: 27 MG/DL (ref 6–20)
BUN/CREAT SERPL: 18 (ref 12–20)
BUN/CREAT SERPL: 19 (ref 12–20)
BUN/CREAT SERPL: 20 (ref 12–20)
CA-I BLD-SCNC: 1.26 MMOL/L (ref 1.13–1.32)
CALCIUM SERPL-MCNC: 8.9 MG/DL (ref 8.5–10.1)
CALCIUM SERPL-MCNC: 9.5 MG/DL (ref 8.5–10.1)
CALCIUM SERPL-MCNC: 9.6 MG/DL (ref 8.5–10.1)
CALCULATED P AXIS, ECG09: 64 DEGREES
CALCULATED R AXIS, ECG10: 58 DEGREES
CALCULATED T AXIS, ECG11: 52 DEGREES
CHLORIDE SERPL-SCNC: 104 MMOL/L (ref 97–108)
CHLORIDE SERPL-SCNC: 111 MMOL/L (ref 97–108)
CHLORIDE SERPL-SCNC: 111 MMOL/L (ref 97–108)
CO2 SERPL-SCNC: 22 MMOL/L (ref 21–32)
CO2 SERPL-SCNC: 24 MMOL/L (ref 21–32)
CO2 SERPL-SCNC: 24 MMOL/L (ref 21–32)
CREAT SERPL-MCNC: 1.31 MG/DL (ref 0.55–1.02)
CREAT SERPL-MCNC: 1.37 MG/DL (ref 0.55–1.02)
CREAT SERPL-MCNC: 1.39 MG/DL (ref 0.55–1.02)
D50 ADMINISTERED, D50ADM: 0 ML
D50 ORDER, D50ORD: 0 ML
DIAGNOSIS, 93000: NORMAL
DIFFERENTIAL METHOD BLD: ABNORMAL
EOSINOPHIL # BLD: 0.1 K/UL (ref 0–0.4)
EOSINOPHIL NFR BLD: 0 % (ref 0–7)
ERYTHROCYTE [DISTWIDTH] IN BLOOD BY AUTOMATED COUNT: 14.1 % (ref 11.5–14.5)
FIO2 ON VENT: 40 %
FIO2 ON VENT: 40 %
FIO2 ON VENT: 50 %
FIO2 ON VENT: 80 %
GAS FLOW.O2 SETTING OXYMISER: 10 L/MIN
GAS FLOW.O2 SETTING OXYMISER: 18 L/MIN
GLOBULIN SER CALC-MCNC: 1.7 G/DL (ref 2–4)
GLOBULIN SER CALC-MCNC: 2 G/DL (ref 2–4)
GLSCOM COMMENTS: NORMAL
GLUCOSE BLD STRIP.AUTO-MCNC: 104 MG/DL (ref 65–117)
GLUCOSE BLD STRIP.AUTO-MCNC: 105 MG/DL (ref 65–117)
GLUCOSE BLD STRIP.AUTO-MCNC: 106 MG/DL (ref 65–117)
GLUCOSE BLD STRIP.AUTO-MCNC: 125 MG/DL (ref 65–117)
GLUCOSE BLD STRIP.AUTO-MCNC: 128 MG/DL (ref 65–117)
GLUCOSE BLD STRIP.AUTO-MCNC: 128 MG/DL (ref 65–117)
GLUCOSE BLD STRIP.AUTO-MCNC: 136 MG/DL (ref 65–117)
GLUCOSE BLD STRIP.AUTO-MCNC: 142 MG/DL (ref 65–117)
GLUCOSE BLD STRIP.AUTO-MCNC: 145 MG/DL (ref 65–117)
GLUCOSE BLD STRIP.AUTO-MCNC: 149 MG/DL (ref 65–117)
GLUCOSE BLD STRIP.AUTO-MCNC: 91 MG/DL (ref 65–117)
GLUCOSE SERPL-MCNC: 126 MG/DL (ref 65–100)
GLUCOSE SERPL-MCNC: 126 MG/DL (ref 65–100)
GLUCOSE SERPL-MCNC: 128 MG/DL (ref 65–100)
GLUCOSE, GLC: 104 MG/DL
GLUCOSE, GLC: 105 MG/DL
GLUCOSE, GLC: 106 MG/DL
GLUCOSE, GLC: 117 MG/DL
GLUCOSE, GLC: 125 MG/DL
GLUCOSE, GLC: 126 MG/DL
GLUCOSE, GLC: 128 MG/DL
GLUCOSE, GLC: 128 MG/DL
GLUCOSE, GLC: 134 MG/DL
GLUCOSE, GLC: 142 MG/DL
GLUCOSE, GLC: 145 MG/DL
GLUCOSE, GLC: 149 MG/DL
GLUCOSE, GLC: 159 MG/DL
GLUCOSE, GLC: 91 MG/DL
HCO3 BLDA-SCNC: 20 MMOL/L (ref 22–26)
HCO3 BLDA-SCNC: 22 MMOL/L (ref 22–26)
HCO3 BLDA-SCNC: 23 MMOL/L (ref 22–26)
HCO3 BLDA-SCNC: 25 MMOL/L (ref 22–26)
HCT VFR BLD AUTO: 28.5 % (ref 35–47)
HCT VFR BLD AUTO: 30.2 % (ref 35–47)
HGB BLD-MCNC: 10 G/DL (ref 11.5–16)
HGB BLD-MCNC: 9.4 G/DL (ref 11.5–16)
HIGH TARGET, HITG: 140 MG/DL
IMM GRANULOCYTES # BLD AUTO: 0.1 K/UL (ref 0–0.04)
IMM GRANULOCYTES NFR BLD AUTO: 1 % (ref 0–0.5)
INR PPP: 1.4 (ref 0.9–1.1)
INSULIN ADMINSTERED, INSADM: 1.2 UNITS/HOUR
INSULIN ADMINSTERED, INSADM: 1.7 UNITS/HOUR
INSULIN ADMINSTERED, INSADM: 1.8 UNITS/HOUR
INSULIN ADMINSTERED, INSADM: 2 UNITS/HOUR
INSULIN ADMINSTERED, INSADM: 2 UNITS/HOUR
INSULIN ADMINSTERED, INSADM: 2.2 UNITS/HOUR
INSULIN ADMINSTERED, INSADM: 2.2 UNITS/HOUR
INSULIN ADMINSTERED, INSADM: 2.3 UNITS/HOUR
INSULIN ADMINSTERED, INSADM: 2.6 UNITS/HOUR
INSULIN ADMINSTERED, INSADM: 3 UNITS/HOUR
INSULIN ADMINSTERED, INSADM: 3.3 UNITS/HOUR
INSULIN ADMINSTERED, INSADM: 3.3 UNITS/HOUR
INSULIN ADMINSTERED, INSADM: 3.4 UNITS/HOUR
INSULIN ADMINSTERED, INSADM: 4.5 UNITS/HOUR
INSULIN ORDER, INSORD: 1.2 UNITS/HOUR
INSULIN ORDER, INSORD: 1.7 UNITS/HOUR
INSULIN ORDER, INSORD: 1.8 UNITS/HOUR
INSULIN ORDER, INSORD: 2 UNITS/HOUR
INSULIN ORDER, INSORD: 2 UNITS/HOUR
INSULIN ORDER, INSORD: 2.2 UNITS/HOUR
INSULIN ORDER, INSORD: 2.2 UNITS/HOUR
INSULIN ORDER, INSORD: 2.3 UNITS/HOUR
INSULIN ORDER, INSORD: 2.6 UNITS/HOUR
INSULIN ORDER, INSORD: 3 UNITS/HOUR
INSULIN ORDER, INSORD: 3.3 UNITS/HOUR
INSULIN ORDER, INSORD: 3.3 UNITS/HOUR
INSULIN ORDER, INSORD: 3.4 UNITS/HOUR
INSULIN ORDER, INSORD: 4.5 UNITS/HOUR
LOW TARGET, LOT: 100 MG/DL
LYMPHOCYTES # BLD: 1.4 K/UL (ref 0.8–3.5)
LYMPHOCYTES NFR BLD: 10 % (ref 12–49)
MAGNESIUM SERPL-MCNC: 1.9 MG/DL (ref 1.6–2.4)
MAGNESIUM SERPL-MCNC: 1.9 MG/DL (ref 1.6–2.4)
MCH RBC QN AUTO: 30.8 PG (ref 26–34)
MCHC RBC AUTO-ENTMCNC: 33 G/DL (ref 30–36.5)
MCV RBC AUTO: 93.4 FL (ref 80–99)
MINUTES UNTIL NEXT BG, NBG: 60 MIN
MONOCYTES # BLD: 1 K/UL (ref 0–1)
MONOCYTES NFR BLD: 7 % (ref 5–13)
MULTIPLIER, MUL: 0.03
MULTIPLIER, MUL: 0.04
MULTIPLIER, MUL: 0.05
NEUTS SEG # BLD: 11.3 K/UL (ref 1.8–8)
NEUTS SEG NFR BLD: 82 % (ref 32–75)
NRBC # BLD: 0 K/UL (ref 0–0.01)
NRBC BLD-RTO: 0 PER 100 WBC
ORDER INITIALS, ORDINIT: NORMAL
P-R INTERVAL, ECG05: 188 MS
PCO2 BLDA: 33 MMHG (ref 35–45)
PCO2 BLDA: 40 MMHG (ref 35–45)
PCO2 BLDA: 44 MMHG (ref 35–45)
PCO2 BLDA: 47 MMHG (ref 35–45)
PEEP RESPIRATORY: 5 CM[H2O]
PEEP RESPIRATORY: 5 CM[H2O]
PEEP RESPIRATORY: 6 CM[H2O]
PH BLDA: 7.29 [PH] (ref 7.35–7.45)
PH BLDA: 7.33 [PH] (ref 7.35–7.45)
PH BLDA: 7.34 [PH] (ref 7.35–7.45)
PH BLDA: 7.46 [PH] (ref 7.35–7.45)
PLATELET # BLD AUTO: 131 K/UL (ref 150–400)
PMV BLD AUTO: 9.5 FL (ref 8.9–12.9)
PO2 BLDA: 100 MMHG (ref 80–100)
PO2 BLDA: 51 MMHG (ref 80–100)
PO2 BLDA: 61 MMHG (ref 80–100)
PO2 BLDA: 65 MMHG (ref 80–100)
POTASSIUM SERPL-SCNC: 3.9 MMOL/L (ref 3.5–5.1)
POTASSIUM SERPL-SCNC: 4 MMOL/L (ref 3.5–5.1)
POTASSIUM SERPL-SCNC: 4.1 MMOL/L (ref 3.5–5.1)
PRESSURE SUPPORT SETTING VENT: 10 CM[H2O]
PRESSURE SUPPORT SETTING VENT: 5 CM[H2O]
PRESSURE SUPPORT SETTING VENT: 5 CM[H2O]
PROT SERPL-MCNC: 4.9 G/DL (ref 6.4–8.2)
PROT SERPL-MCNC: 5.5 G/DL (ref 6.4–8.2)
PROTHROMBIN TIME: 14.3 SEC (ref 9–11.1)
Q-T INTERVAL, ECG07: 442 MS
QRS DURATION, ECG06: 90 MS
QTC CALCULATION (BEZET), ECG08: 469 MS
RBC # BLD AUTO: 3.05 M/UL (ref 3.8–5.2)
SAO2 % BLD: 84 % (ref 92–97)
SAO2 % BLD: 89 % (ref 92–97)
SAO2 % BLD: 92 % (ref 92–97)
SAO2 % BLD: 98 % (ref 92–97)
SAO2% DEVICE SAO2% SENSOR NAME: ABNORMAL
SERVICE CMNT-IMP: ABNORMAL
SERVICE CMNT-IMP: NORMAL
SODIUM SERPL-SCNC: 136 MMOL/L (ref 136–145)
SODIUM SERPL-SCNC: 141 MMOL/L (ref 136–145)
SODIUM SERPL-SCNC: 141 MMOL/L (ref 136–145)
SPECIMEN SITE: ABNORMAL
THERAPEUTIC RANGE,PTTT: ABNORMAL SECS (ref 58–77)
VENTILATION MODE VENT: ABNORMAL
VENTRICULAR RATE, ECG03: 68 BPM
VT SETTING VENT: 450 ML
WBC # BLD AUTO: 13.9 K/UL (ref 3.6–11)

## 2021-11-19 PROCEDURE — 74011000258 HC RX REV CODE- 258: Performed by: PHYSICIAN ASSISTANT

## 2021-11-19 PROCEDURE — 77030010605 HC BLWR MR MAL MEDT -B: Performed by: THORACIC SURGERY (CARDIOTHORACIC VASCULAR SURGERY)

## 2021-11-19 PROCEDURE — 83735 ASSAY OF MAGNESIUM: CPT

## 2021-11-19 PROCEDURE — 77030040361 HC SLV COMPR DVT MDII -B

## 2021-11-19 PROCEDURE — 94002 VENT MGMT INPAT INIT DAY: CPT

## 2021-11-19 PROCEDURE — 77030033150: Performed by: THORACIC SURGERY (CARDIOTHORACIC VASCULAR SURGERY)

## 2021-11-19 PROCEDURE — 74011636637 HC RX REV CODE- 636/637: Performed by: THORACIC SURGERY (CARDIOTHORACIC VASCULAR SURGERY)

## 2021-11-19 PROCEDURE — P9016 RBC LEUKOCYTES REDUCED: HCPCS

## 2021-11-19 PROCEDURE — 77030013798 HC KT TRNSDUC PRSSR EDWD -B: Performed by: THORACIC SURGERY (CARDIOTHORACIC VASCULAR SURGERY)

## 2021-11-19 PROCEDURE — 77030006689 HC BLD OPHTH BVR BD -A: Performed by: THORACIC SURGERY (CARDIOTHORACIC VASCULAR SURGERY)

## 2021-11-19 PROCEDURE — 74011000258 HC RX REV CODE- 258: Performed by: THORACIC SURGERY (CARDIOTHORACIC VASCULAR SURGERY)

## 2021-11-19 PROCEDURE — 93005 ELECTROCARDIOGRAM TRACING: CPT

## 2021-11-19 PROCEDURE — 82962 GLUCOSE BLOOD TEST: CPT

## 2021-11-19 PROCEDURE — 77030014491 HC PLEDG PTFE BARD -A: Performed by: THORACIC SURGERY (CARDIOTHORACIC VASCULAR SURGERY)

## 2021-11-19 PROCEDURE — 77030008771 HC TU NG SALEM SUMP -A: Performed by: STUDENT IN AN ORGANIZED HEALTH CARE EDUCATION/TRAINING PROGRAM

## 2021-11-19 PROCEDURE — 74011250636 HC RX REV CODE- 250/636: Performed by: NURSE ANESTHETIST, CERTIFIED REGISTERED

## 2021-11-19 PROCEDURE — 77030020167 HC PERF SET MULT MEDT -B: Performed by: THORACIC SURGERY (CARDIOTHORACIC VASCULAR SURGERY)

## 2021-11-19 PROCEDURE — 77030037878 HC DRSG MEPILEX >48IN BORD MOLN -B: Performed by: THORACIC SURGERY (CARDIOTHORACIC VASCULAR SURGERY)

## 2021-11-19 PROCEDURE — 74011000250 HC RX REV CODE- 250: Performed by: NURSE ANESTHETIST, CERTIFIED REGISTERED

## 2021-11-19 PROCEDURE — 77030013798 HC KT TRNSDUC PRSSR EDWD -B: Performed by: STUDENT IN AN ORGANIZED HEALTH CARE EDUCATION/TRAINING PROGRAM

## 2021-11-19 PROCEDURE — 33533 CABG ARTERIAL SINGLE: CPT | Performed by: THORACIC SURGERY (CARDIOTHORACIC VASCULAR SURGERY)

## 2021-11-19 PROCEDURE — P9045 ALBUMIN (HUMAN), 5%, 250 ML: HCPCS | Performed by: PHYSICIAN ASSISTANT

## 2021-11-19 PROCEDURE — 77030041244 HC CBL PACE EXT TEMP REMG -B: Performed by: THORACIC SURGERY (CARDIOTHORACIC VASCULAR SURGERY)

## 2021-11-19 PROCEDURE — 85730 THROMBOPLASTIN TIME PARTIAL: CPT

## 2021-11-19 PROCEDURE — 33508 ENDOSCOPIC VEIN HARVEST: CPT | Performed by: THORACIC SURGERY (CARDIOTHORACIC VASCULAR SURGERY)

## 2021-11-19 PROCEDURE — 77030012390 HC DRN CHST BTL GTNG -B: Performed by: THORACIC SURGERY (CARDIOTHORACIC VASCULAR SURGERY)

## 2021-11-19 PROCEDURE — 02HV33Z INSERTION OF INFUSION DEVICE INTO SUPERIOR VENA CAVA, PERCUTANEOUS APPROACH: ICD-10-PCS | Performed by: STUDENT IN AN ORGANIZED HEALTH CARE EDUCATION/TRAINING PROGRAM

## 2021-11-19 PROCEDURE — 77030010813: Performed by: THORACIC SURGERY (CARDIOTHORACIC VASCULAR SURGERY)

## 2021-11-19 PROCEDURE — 77030018729 HC ELECTRD DEFIB PAD CARD -B: Performed by: THORACIC SURGERY (CARDIOTHORACIC VASCULAR SURGERY)

## 2021-11-19 PROCEDURE — 74011000250 HC RX REV CODE- 250: Performed by: THORACIC SURGERY (CARDIOTHORACIC VASCULAR SURGERY)

## 2021-11-19 PROCEDURE — 74011250637 HC RX REV CODE- 250/637: Performed by: PHYSICIAN ASSISTANT

## 2021-11-19 PROCEDURE — 74011250636 HC RX REV CODE- 250/636: Performed by: INTERNAL MEDICINE

## 2021-11-19 PROCEDURE — 74011250636 HC RX REV CODE- 250/636: Performed by: THORACIC SURGERY (CARDIOTHORACIC VASCULAR SURGERY)

## 2021-11-19 PROCEDURE — 77030003029 HC SUT VCRL J&J -B: Performed by: THORACIC SURGERY (CARDIOTHORACIC VASCULAR SURGERY)

## 2021-11-19 PROCEDURE — C9113 INJ PANTOPRAZOLE SODIUM, VIA: HCPCS | Performed by: PHYSICIAN ASSISTANT

## 2021-11-19 PROCEDURE — 76010000117 HC CV SURG 5.5 TO 6 HR: Performed by: THORACIC SURGERY (CARDIOTHORACIC VASCULAR SURGERY)

## 2021-11-19 PROCEDURE — 77030006921 HC BLD STRNL SAW TERU -B: Performed by: THORACIC SURGERY (CARDIOTHORACIC VASCULAR SURGERY)

## 2021-11-19 PROCEDURE — 77030013861 HC PNCH AORT CLNCUT QUES -B: Performed by: THORACIC SURGERY (CARDIOTHORACIC VASCULAR SURGERY)

## 2021-11-19 PROCEDURE — 65610000006 HC RM INTENSIVE CARE

## 2021-11-19 PROCEDURE — 06BP3ZZ EXCISION OF RIGHT SAPHENOUS VEIN, PERCUTANEOUS APPROACH: ICD-10-PCS | Performed by: THORACIC SURGERY (CARDIOTHORACIC VASCULAR SURGERY)

## 2021-11-19 PROCEDURE — C1751 CATH, INF, PER/CENT/MIDLINE: HCPCS | Performed by: STUDENT IN AN ORGANIZED HEALTH CARE EDUCATION/TRAINING PROGRAM

## 2021-11-19 PROCEDURE — 74011250636 HC RX REV CODE- 250/636: Performed by: PHYSICIAN ASSISTANT

## 2021-11-19 PROCEDURE — 77030002933 HC SUT MCRYL J&J -A: Performed by: THORACIC SURGERY (CARDIOTHORACIC VASCULAR SURGERY)

## 2021-11-19 PROCEDURE — P9045 ALBUMIN (HUMAN), 5%, 250 ML: HCPCS | Performed by: NURSE ANESTHETIST, CERTIFIED REGISTERED

## 2021-11-19 PROCEDURE — 80053 COMPREHEN METABOLIC PANEL: CPT

## 2021-11-19 PROCEDURE — 77030027138 HC INCENT SPIROMETER -A

## 2021-11-19 PROCEDURE — 82803 BLOOD GASES ANY COMBINATION: CPT

## 2021-11-19 PROCEDURE — 74011250637 HC RX REV CODE- 250/637: Performed by: INTERNAL MEDICINE

## 2021-11-19 PROCEDURE — 2709999900 HC NON-CHARGEABLE SUPPLY

## 2021-11-19 PROCEDURE — 71045 X-RAY EXAM CHEST 1 VIEW: CPT

## 2021-11-19 PROCEDURE — 76060000042 HC ANESTHESIA 5.5 TO 6 HR: Performed by: THORACIC SURGERY (CARDIOTHORACIC VASCULAR SURGERY)

## 2021-11-19 PROCEDURE — 36415 COLL VENOUS BLD VENIPUNCTURE: CPT

## 2021-11-19 PROCEDURE — 33533 CABG ARTERIAL SINGLE: CPT | Performed by: PHYSICIAN ASSISTANT

## 2021-11-19 PROCEDURE — 021209W BYPASS CORONARY ARTERY, THREE ARTERIES FROM AORTA WITH AUTOLOGOUS VENOUS TISSUE, OPEN APPROACH: ICD-10-PCS | Performed by: THORACIC SURGERY (CARDIOTHORACIC VASCULAR SURGERY)

## 2021-11-19 PROCEDURE — 5A1221Z PERFORMANCE OF CARDIAC OUTPUT, CONTINUOUS: ICD-10-PCS | Performed by: THORACIC SURGERY (CARDIOTHORACIC VASCULAR SURGERY)

## 2021-11-19 PROCEDURE — 77030026438 HC STYL ET INTUB CARD -A: Performed by: STUDENT IN AN ORGANIZED HEALTH CARE EDUCATION/TRAINING PROGRAM

## 2021-11-19 PROCEDURE — 77030002987 HC SUT PROL J&J -B: Performed by: THORACIC SURGERY (CARDIOTHORACIC VASCULAR SURGERY)

## 2021-11-19 PROCEDURE — 77030022199 HC SYS HARV VESL GTNG -G1: Performed by: THORACIC SURGERY (CARDIOTHORACIC VASCULAR SURGERY)

## 2021-11-19 PROCEDURE — 77030041076 HC DRSG AG OPTICELL MDII -A: Performed by: THORACIC SURGERY (CARDIOTHORACIC VASCULAR SURGERY)

## 2021-11-19 PROCEDURE — 77030005401 HC CATH RAD ARRO -A: Performed by: STUDENT IN AN ORGANIZED HEALTH CARE EDUCATION/TRAINING PROGRAM

## 2021-11-19 PROCEDURE — 74011000250 HC RX REV CODE- 250: Performed by: PHYSICIAN ASSISTANT

## 2021-11-19 PROCEDURE — 77030008684 HC TU ET CUF COVD -B: Performed by: STUDENT IN AN ORGANIZED HEALTH CARE EDUCATION/TRAINING PROGRAM

## 2021-11-19 PROCEDURE — 74011250637 HC RX REV CODE- 250/637: Performed by: THORACIC SURGERY (CARDIOTHORACIC VASCULAR SURGERY)

## 2021-11-19 PROCEDURE — 77030010818: Performed by: THORACIC SURGERY (CARDIOTHORACIC VASCULAR SURGERY)

## 2021-11-19 PROCEDURE — 77030010938 HC CLP LIG TELE -A: Performed by: THORACIC SURGERY (CARDIOTHORACIC VASCULAR SURGERY)

## 2021-11-19 PROCEDURE — 85018 HEMOGLOBIN: CPT

## 2021-11-19 PROCEDURE — 76998 US GUIDE INTRAOP: CPT | Performed by: THORACIC SURGERY (CARDIOTHORACIC VASCULAR SURGERY)

## 2021-11-19 PROCEDURE — 77030032058 HC PK ACC HARV VESL VSVIEW GTNG -F: Performed by: THORACIC SURGERY (CARDIOTHORACIC VASCULAR SURGERY)

## 2021-11-19 PROCEDURE — 02100A9 BYPASS CORONARY ARTERY, ONE ARTERY FROM LEFT INTERNAL MAMMARY WITH AUTOLOGOUS ARTERIAL TISSUE, OPEN APPROACH: ICD-10-PCS | Performed by: THORACIC SURGERY (CARDIOTHORACIC VASCULAR SURGERY)

## 2021-11-19 PROCEDURE — 77030014008 HC SPNG HEMSTAT J&J -C: Performed by: THORACIC SURGERY (CARDIOTHORACIC VASCULAR SURGERY)

## 2021-11-19 PROCEDURE — 85610 PROTHROMBIN TIME: CPT

## 2021-11-19 PROCEDURE — 85025 COMPLETE CBC W/AUTO DIFF WBC: CPT

## 2021-11-19 PROCEDURE — 77030040504 HC DRN WND MDII -B: Performed by: THORACIC SURGERY (CARDIOTHORACIC VASCULAR SURGERY)

## 2021-11-19 PROCEDURE — 77030003020 HC SUT TICRN COVD -A: Performed by: THORACIC SURGERY (CARDIOTHORACIC VASCULAR SURGERY)

## 2021-11-19 PROCEDURE — 77030003010 HC SUT SURG STL J&J -B: Performed by: THORACIC SURGERY (CARDIOTHORACIC VASCULAR SURGERY)

## 2021-11-19 PROCEDURE — 77030010797: Performed by: THORACIC SURGERY (CARDIOTHORACIC VASCULAR SURGERY)

## 2021-11-19 PROCEDURE — 33518 CABG ARTERY-VEIN TWO: CPT | Performed by: THORACIC SURGERY (CARDIOTHORACIC VASCULAR SURGERY)

## 2021-11-19 PROCEDURE — 77030013079 HC BLNKT BAIR HGGR 3M -A: Performed by: STUDENT IN AN ORGANIZED HEALTH CARE EDUCATION/TRAINING PROGRAM

## 2021-11-19 PROCEDURE — 77030020061 HC IV BLD WRMR ADMIN SET 3M -B: Performed by: STUDENT IN AN ORGANIZED HEALTH CARE EDUCATION/TRAINING PROGRAM

## 2021-11-19 PROCEDURE — 77030002986 HC SUT PROL J&J -A: Performed by: THORACIC SURGERY (CARDIOTHORACIC VASCULAR SURGERY)

## 2021-11-19 PROCEDURE — 77030002912 HC SUT ETHBND J&J -A: Performed by: THORACIC SURGERY (CARDIOTHORACIC VASCULAR SURGERY)

## 2021-11-19 PROCEDURE — 2709999900 HC NON-CHARGEABLE SUPPLY: Performed by: THORACIC SURGERY (CARDIOTHORACIC VASCULAR SURGERY)

## 2021-11-19 PROCEDURE — 33518 CABG ARTERY-VEIN TWO: CPT | Performed by: PHYSICIAN ASSISTANT

## 2021-11-19 PROCEDURE — 74011250636 HC RX REV CODE- 250/636: Performed by: NURSE PRACTITIONER

## 2021-11-19 PROCEDURE — 77030008771 HC TU NG SALEM SUMP -A: Performed by: THORACIC SURGERY (CARDIOTHORACIC VASCULAR SURGERY)

## 2021-11-19 PROCEDURE — 77030007667 HC INSRT SUT HLD MEDT -B: Performed by: THORACIC SURGERY (CARDIOTHORACIC VASCULAR SURGERY)

## 2021-11-19 PROCEDURE — 33508 ENDOSCOPIC VEIN HARVEST: CPT | Performed by: PHYSICIAN ASSISTANT

## 2021-11-19 RX ORDER — SUFENTANIL CITRATE 50 UG/ML
INJECTION EPIDURAL; INTRAVENOUS AS NEEDED
Status: DISCONTINUED | OUTPATIENT
Start: 2021-11-19 | End: 2021-11-19 | Stop reason: HOSPADM

## 2021-11-19 RX ORDER — FENTANYL CITRATE 50 UG/ML
25 INJECTION, SOLUTION INTRAMUSCULAR; INTRAVENOUS ONCE
Status: COMPLETED | OUTPATIENT
Start: 2021-11-19 | End: 2021-11-19

## 2021-11-19 RX ORDER — POLYETHYLENE GLYCOL 3350 17 G/17G
17 POWDER, FOR SOLUTION ORAL DAILY
Status: DISCONTINUED | OUTPATIENT
Start: 2021-11-20 | End: 2021-12-06 | Stop reason: HOSPADM

## 2021-11-19 RX ORDER — ALBUMIN HUMAN 50 G/1000ML
SOLUTION INTRAVENOUS AS NEEDED
Status: DISCONTINUED | OUTPATIENT
Start: 2021-11-19 | End: 2021-11-19 | Stop reason: HOSPADM

## 2021-11-19 RX ORDER — PROTAMINE SULFATE 10 MG/ML
INJECTION, SOLUTION INTRAVENOUS AS NEEDED
Status: DISCONTINUED | OUTPATIENT
Start: 2021-11-19 | End: 2021-11-19 | Stop reason: HOSPADM

## 2021-11-19 RX ORDER — POTASSIUM CHLORIDE 29.8 MG/ML
20 INJECTION INTRAVENOUS
Status: ACTIVE | OUTPATIENT
Start: 2021-11-19 | End: 2021-11-20

## 2021-11-19 RX ORDER — PAPAVERINE HYDROCHLORIDE 30 MG/ML
30 INJECTION INTRAMUSCULAR; INTRAVENOUS ONCE
Status: DISCONTINUED | OUTPATIENT
Start: 2021-11-19 | End: 2021-11-19

## 2021-11-19 RX ORDER — ALBUTEROL SULFATE 0.83 MG/ML
2.5 SOLUTION RESPIRATORY (INHALATION)
Status: DISCONTINUED | OUTPATIENT
Start: 2021-11-19 | End: 2021-12-06 | Stop reason: HOSPADM

## 2021-11-19 RX ORDER — SODIUM CHLORIDE 450 MG/100ML
10 INJECTION, SOLUTION INTRAVENOUS CONTINUOUS
Status: DISCONTINUED | OUTPATIENT
Start: 2021-11-19 | End: 2021-11-23

## 2021-11-19 RX ORDER — PAPAVERINE HYDROCHLORIDE 30 MG/ML
INJECTION INTRAMUSCULAR; INTRAVENOUS AS NEEDED
Status: DISCONTINUED | OUTPATIENT
Start: 2021-11-19 | End: 2021-11-21 | Stop reason: ALTCHOICE

## 2021-11-19 RX ORDER — FACIAL-BODY WIPES
10 EACH TOPICAL DAILY PRN
Status: DISCONTINUED | OUTPATIENT
Start: 2021-11-19 | End: 2021-12-02

## 2021-11-19 RX ORDER — SODIUM CHLORIDE 0.9 % (FLUSH) 0.9 %
5-40 SYRINGE (ML) INJECTION AS NEEDED
Status: DISCONTINUED | OUTPATIENT
Start: 2021-11-19 | End: 2021-11-29

## 2021-11-19 RX ORDER — ATORVASTATIN CALCIUM 40 MG/1
80 TABLET, FILM COATED ORAL
Status: DISCONTINUED | OUTPATIENT
Start: 2021-11-19 | End: 2021-12-06 | Stop reason: HOSPADM

## 2021-11-19 RX ORDER — ONDANSETRON 2 MG/ML
4 INJECTION INTRAMUSCULAR; INTRAVENOUS ONCE
Status: COMPLETED | OUTPATIENT
Start: 2021-11-19 | End: 2021-11-19

## 2021-11-19 RX ORDER — PROPOFOL 10 MG/ML
INJECTION, EMULSION INTRAVENOUS AS NEEDED
Status: DISCONTINUED | OUTPATIENT
Start: 2021-11-19 | End: 2021-11-19 | Stop reason: HOSPADM

## 2021-11-19 RX ORDER — SODIUM CHLORIDE 0.9 % (FLUSH) 0.9 %
5-40 SYRINGE (ML) INJECTION EVERY 8 HOURS
Status: DISCONTINUED | OUTPATIENT
Start: 2021-11-19 | End: 2021-11-29

## 2021-11-19 RX ORDER — GUAIFENESIN 100 MG/5ML
81 LIQUID (ML) ORAL DAILY
Status: DISCONTINUED | OUTPATIENT
Start: 2021-11-20 | End: 2021-12-06 | Stop reason: HOSPADM

## 2021-11-19 RX ORDER — SODIUM CHLORIDE 9 MG/ML
9 INJECTION, SOLUTION INTRAVENOUS CONTINUOUS
Status: DISCONTINUED | OUTPATIENT
Start: 2021-11-19 | End: 2021-11-23

## 2021-11-19 RX ORDER — NITROGLYCERIN 20 MG/100ML
INJECTION INTRAVENOUS
Status: DISCONTINUED | OUTPATIENT
Start: 2021-11-19 | End: 2021-11-19 | Stop reason: HOSPADM

## 2021-11-19 RX ORDER — LIDOCAINE HYDROCHLORIDE 20 MG/ML
INJECTION, SOLUTION EPIDURAL; INFILTRATION; INTRACAUDAL; PERINEURAL AS NEEDED
Status: DISCONTINUED | OUTPATIENT
Start: 2021-11-19 | End: 2021-11-19 | Stop reason: HOSPADM

## 2021-11-19 RX ORDER — ALBUMIN HUMAN 50 G/1000ML
12.5 SOLUTION INTRAVENOUS
Status: COMPLETED | OUTPATIENT
Start: 2021-11-19 | End: 2021-11-20

## 2021-11-19 RX ORDER — DIPHENHYDRAMINE HYDROCHLORIDE 50 MG/ML
25 INJECTION, SOLUTION INTRAMUSCULAR; INTRAVENOUS
Status: DISCONTINUED | OUTPATIENT
Start: 2021-11-19 | End: 2021-12-06 | Stop reason: HOSPADM

## 2021-11-19 RX ORDER — INSULIN GLARGINE 100 [IU]/ML
1-50 INJECTION, SOLUTION SUBCUTANEOUS
Status: ACTIVE | OUTPATIENT
Start: 2021-11-19 | End: 2021-11-20

## 2021-11-19 RX ORDER — INSULIN LISPRO 100 [IU]/ML
INJECTION, SOLUTION INTRAVENOUS; SUBCUTANEOUS
Status: DISCONTINUED | OUTPATIENT
Start: 2021-11-21 | End: 2021-12-02

## 2021-11-19 RX ORDER — SUFENTANIL CITRATE 50 UG/ML
INJECTION EPIDURAL; INTRAVENOUS
Status: DISCONTINUED | OUTPATIENT
Start: 2021-11-19 | End: 2021-11-19 | Stop reason: HOSPADM

## 2021-11-19 RX ORDER — NALOXONE HYDROCHLORIDE 0.4 MG/ML
0.4 INJECTION, SOLUTION INTRAMUSCULAR; INTRAVENOUS; SUBCUTANEOUS AS NEEDED
Status: DISCONTINUED | OUTPATIENT
Start: 2021-11-19 | End: 2021-12-06 | Stop reason: HOSPADM

## 2021-11-19 RX ORDER — CHLORHEXIDINE GLUCONATE 1.2 MG/ML
10 RINSE ORAL EVERY 12 HOURS
Status: COMPLETED | OUTPATIENT
Start: 2021-11-19 | End: 2021-11-25

## 2021-11-19 RX ORDER — MAGNESIUM SULFATE 1 G/100ML
1 INJECTION INTRAVENOUS AS NEEDED
Status: DISCONTINUED | OUTPATIENT
Start: 2021-11-19 | End: 2021-11-23

## 2021-11-19 RX ORDER — FENTANYL CITRATE 50 UG/ML
INJECTION, SOLUTION INTRAMUSCULAR; INTRAVENOUS AS NEEDED
Status: DISCONTINUED | OUTPATIENT
Start: 2021-11-19 | End: 2021-11-19 | Stop reason: HOSPADM

## 2021-11-19 RX ORDER — MIDAZOLAM HYDROCHLORIDE 1 MG/ML
1 INJECTION, SOLUTION INTRAMUSCULAR; INTRAVENOUS
Status: DISCONTINUED | OUTPATIENT
Start: 2021-11-19 | End: 2021-11-21

## 2021-11-19 RX ORDER — SODIUM BICARBONATE 1 MEQ/ML
50 SYRINGE (ML) INTRAVENOUS ONCE
Status: COMPLETED | OUTPATIENT
Start: 2021-11-19 | End: 2021-11-19

## 2021-11-19 RX ORDER — SODIUM CHLORIDE 9 MG/ML
INJECTION, SOLUTION INTRAVENOUS
Status: DISCONTINUED | OUTPATIENT
Start: 2021-11-19 | End: 2021-11-19 | Stop reason: HOSPADM

## 2021-11-19 RX ORDER — EPHEDRINE SULFATE/0.9% NACL/PF 50 MG/5 ML
SYRINGE (ML) INTRAVENOUS AS NEEDED
Status: DISCONTINUED | OUTPATIENT
Start: 2021-11-19 | End: 2021-11-19 | Stop reason: HOSPADM

## 2021-11-19 RX ORDER — OXYCODONE HYDROCHLORIDE 5 MG/1
10 TABLET ORAL
Status: DISCONTINUED | OUTPATIENT
Start: 2021-11-19 | End: 2021-12-03

## 2021-11-19 RX ORDER — HEPARIN SODIUM 1000 [USP'U]/ML
2000 INJECTION, SOLUTION INTRAVENOUS; SUBCUTANEOUS ONCE
Status: DISCONTINUED | OUTPATIENT
Start: 2021-11-19 | End: 2021-11-19

## 2021-11-19 RX ORDER — AMOXICILLIN 250 MG
1 CAPSULE ORAL 2 TIMES DAILY
Status: DISCONTINUED | OUTPATIENT
Start: 2021-11-20 | End: 2021-12-02

## 2021-11-19 RX ORDER — HEPARIN SODIUM 1000 [USP'U]/ML
INJECTION, SOLUTION INTRAVENOUS; SUBCUTANEOUS AS NEEDED
Status: DISCONTINUED | OUTPATIENT
Start: 2021-11-19 | End: 2021-12-06 | Stop reason: HOSPADM

## 2021-11-19 RX ORDER — ACETAMINOPHEN 325 MG/1
650 TABLET ORAL EVERY 4 HOURS
Status: DISPENSED | OUTPATIENT
Start: 2021-11-19 | End: 2021-11-21

## 2021-11-19 RX ORDER — MUPIROCIN 20 MG/G
OINTMENT TOPICAL 2 TIMES DAILY
Status: COMPLETED | OUTPATIENT
Start: 2021-11-19 | End: 2021-11-24

## 2021-11-19 RX ORDER — NEOSTIGMINE METHYLSULFATE 1 MG/ML
INJECTION, SOLUTION INTRAVENOUS AS NEEDED
Status: DISCONTINUED | OUTPATIENT
Start: 2021-11-19 | End: 2021-11-19 | Stop reason: HOSPADM

## 2021-11-19 RX ORDER — DIPHENHYDRAMINE HCL 25 MG
25 CAPSULE ORAL
Status: DISCONTINUED | OUTPATIENT
Start: 2021-11-19 | End: 2021-12-03

## 2021-11-19 RX ORDER — LANOLIN ALCOHOL/MO/W.PET/CERES
3 CREAM (GRAM) TOPICAL
Status: DISCONTINUED | OUTPATIENT
Start: 2021-11-19 | End: 2021-12-03

## 2021-11-19 RX ORDER — SODIUM CHLORIDE, SODIUM LACTATE, POTASSIUM CHLORIDE, CALCIUM CHLORIDE 600; 310; 30; 20 MG/100ML; MG/100ML; MG/100ML; MG/100ML
25 INJECTION, SOLUTION INTRAVENOUS CONTINUOUS
Status: DISCONTINUED | OUTPATIENT
Start: 2021-11-19 | End: 2021-11-19

## 2021-11-19 RX ORDER — GLYCOPYRROLATE 0.2 MG/ML
INJECTION INTRAMUSCULAR; INTRAVENOUS AS NEEDED
Status: DISCONTINUED | OUTPATIENT
Start: 2021-11-19 | End: 2021-11-19 | Stop reason: HOSPADM

## 2021-11-19 RX ORDER — SUCCINYLCHOLINE CHLORIDE 20 MG/ML
INJECTION INTRAMUSCULAR; INTRAVENOUS AS NEEDED
Status: DISCONTINUED | OUTPATIENT
Start: 2021-11-19 | End: 2021-11-19 | Stop reason: HOSPADM

## 2021-11-19 RX ORDER — OXYCODONE HYDROCHLORIDE 5 MG/1
5 TABLET ORAL
Status: DISCONTINUED | OUTPATIENT
Start: 2021-11-19 | End: 2021-11-29 | Stop reason: SDUPTHER

## 2021-11-19 RX ORDER — BACITRACIN 500 UNIT/G
1 PACKET (EA) TOPICAL AS NEEDED
Status: DISCONTINUED | OUTPATIENT
Start: 2021-11-19 | End: 2021-12-06 | Stop reason: HOSPADM

## 2021-11-19 RX ORDER — AMIODARONE HYDROCHLORIDE 200 MG/1
400 TABLET ORAL EVERY 12 HOURS
Status: DISCONTINUED | OUTPATIENT
Start: 2021-11-20 | End: 2021-12-01

## 2021-11-19 RX ORDER — PANTOPRAZOLE SODIUM 40 MG/1
40 TABLET, DELAYED RELEASE ORAL
Status: DISCONTINUED | OUTPATIENT
Start: 2021-11-20 | End: 2021-12-06 | Stop reason: HOSPADM

## 2021-11-19 RX ORDER — LANOLIN ALCOHOL/MO/W.PET/CERES
400 CREAM (GRAM) TOPICAL 2 TIMES DAILY
Status: DISCONTINUED | OUTPATIENT
Start: 2021-11-20 | End: 2021-12-06 | Stop reason: HOSPADM

## 2021-11-19 RX ORDER — HEPARIN SODIUM 1000 [USP'U]/ML
INJECTION, SOLUTION INTRAVENOUS; SUBCUTANEOUS AS NEEDED
Status: DISCONTINUED | OUTPATIENT
Start: 2021-11-19 | End: 2021-11-19 | Stop reason: HOSPADM

## 2021-11-19 RX ORDER — INSULIN LISPRO 100 [IU]/ML
INJECTION, SOLUTION INTRAVENOUS; SUBCUTANEOUS
Status: ACTIVE | OUTPATIENT
Start: 2021-11-20 | End: 2021-11-21

## 2021-11-19 RX ORDER — ROCURONIUM BROMIDE 10 MG/ML
INJECTION, SOLUTION INTRAVENOUS AS NEEDED
Status: DISCONTINUED | OUTPATIENT
Start: 2021-11-19 | End: 2021-11-19 | Stop reason: HOSPADM

## 2021-11-19 RX ORDER — DEXTROSE 50 % IN WATER (D50W) INTRAVENOUS SYRINGE
12.5-25 AS NEEDED
Status: DISCONTINUED | OUTPATIENT
Start: 2021-11-19 | End: 2021-12-06 | Stop reason: HOSPADM

## 2021-11-19 RX ORDER — SODIUM CHLORIDE, SODIUM LACTATE, POTASSIUM CHLORIDE, CALCIUM CHLORIDE 600; 310; 30; 20 MG/100ML; MG/100ML; MG/100ML; MG/100ML
INJECTION, SOLUTION INTRAVENOUS
Status: DISCONTINUED | OUTPATIENT
Start: 2021-11-19 | End: 2021-11-19 | Stop reason: HOSPADM

## 2021-11-19 RX ORDER — ONDANSETRON 2 MG/ML
4 INJECTION INTRAMUSCULAR; INTRAVENOUS
Status: DISCONTINUED | OUTPATIENT
Start: 2021-11-19 | End: 2021-12-06 | Stop reason: HOSPADM

## 2021-11-19 RX ORDER — MAGNESIUM SULFATE 100 %
4 CRYSTALS MISCELLANEOUS AS NEEDED
Status: DISCONTINUED | OUTPATIENT
Start: 2021-11-19 | End: 2021-12-06 | Stop reason: HOSPADM

## 2021-11-19 RX ADMIN — FAMOTIDINE 20 MG: 10 INJECTION, SOLUTION INTRAVENOUS at 06:57

## 2021-11-19 RX ADMIN — Medication 10 MG: at 08:17

## 2021-11-19 RX ADMIN — DOBUTAMINE HYDROCHLORIDE 2.5 MCG/KG/MIN: 200 INJECTION INTRAVENOUS at 13:07

## 2021-11-19 RX ADMIN — SODIUM CHLORIDE, POTASSIUM CHLORIDE, SODIUM LACTATE AND CALCIUM CHLORIDE 25 ML/HR: 600; 310; 30; 20 INJECTION, SOLUTION INTRAVENOUS at 07:09

## 2021-11-19 RX ADMIN — CEFAZOLIN SODIUM 2 G: 1 INJECTION, POWDER, FOR SOLUTION INTRAMUSCULAR; INTRAVENOUS at 20:11

## 2021-11-19 RX ADMIN — ONDANSETRON 4 MG: 2 INJECTION INTRAMUSCULAR; INTRAVENOUS at 02:15

## 2021-11-19 RX ADMIN — GLYCOPYRROLATE 0.2 MG: 0.2 INJECTION, SOLUTION INTRAMUSCULAR; INTRAVENOUS at 08:13

## 2021-11-19 RX ADMIN — CHLORHEXIDINE GLUCONATE 0.12% ORAL RINSE 10 ML: 1.2 LIQUID ORAL at 16:39

## 2021-11-19 RX ADMIN — FENTANYL CITRATE 25 MCG: 50 INJECTION, SOLUTION INTRAMUSCULAR; INTRAVENOUS at 09:19

## 2021-11-19 RX ADMIN — Medication 10 ML: at 18:43

## 2021-11-19 RX ADMIN — ONDANSETRON 4 MG: 2 INJECTION INTRAMUSCULAR; INTRAVENOUS at 06:56

## 2021-11-19 RX ADMIN — SODIUM CHLORIDE 5 MG/HR: 9 INJECTION, SOLUTION INTRAVENOUS at 14:00

## 2021-11-19 RX ADMIN — SODIUM CHLORIDE 40 MG: 9 INJECTION, SOLUTION INTRAMUSCULAR; INTRAVENOUS; SUBCUTANEOUS at 16:38

## 2021-11-19 RX ADMIN — SODIUM CHLORIDE 9 ML/HR: 9 INJECTION, SOLUTION INTRAVENOUS at 14:50

## 2021-11-19 RX ADMIN — SUFENTANIL CITRATE 0.3 MCG/KG/HR: 50 INJECTION EPIDURAL; INTRAVENOUS at 08:08

## 2021-11-19 RX ADMIN — DESMOPRESSIN ACETATE 26 MCG: 4 INJECTION INTRAVENOUS at 11:57

## 2021-11-19 RX ADMIN — ALBUMIN (HUMAN) 12.5 G: 12.5 INJECTION, SOLUTION INTRAVENOUS at 23:38

## 2021-11-19 RX ADMIN — SODIUM CHLORIDE 0.6 MCG/KG/HR: 9 INJECTION, SOLUTION INTRAVENOUS at 14:16

## 2021-11-19 RX ADMIN — Medication 10 ML: at 21:06

## 2021-11-19 RX ADMIN — SODIUM CHLORIDE 3 UNITS/HR: 9 INJECTION, SOLUTION INTRAVENOUS at 09:30

## 2021-11-19 RX ADMIN — PROTAMINE SULFATE 250 MG: 10 INJECTION, SOLUTION INTRAVENOUS at 11:51

## 2021-11-19 RX ADMIN — SODIUM CHLORIDE 0.3 MCG/KG/HR: 9 INJECTION, SOLUTION INTRAVENOUS at 11:11

## 2021-11-19 RX ADMIN — SUFENTANIL CITRATE 20 MCG: 50 INJECTION EPIDURAL; INTRAVENOUS at 09:19

## 2021-11-19 RX ADMIN — ALBUMIN (HUMAN) 250 ML: 2.5 SOLUTION INTRAVENOUS at 11:59

## 2021-11-19 RX ADMIN — DOBUTAMINE HYDROCHLORIDE 2 MCG/KG/MIN: 200 INJECTION INTRAVENOUS at 14:00

## 2021-11-19 RX ADMIN — SODIUM BICARBONATE 50 MEQ: 84 INJECTION, SOLUTION INTRAVENOUS at 18:21

## 2021-11-19 RX ADMIN — HEPARIN SODIUM 35000 UNITS: 1000 INJECTION, SOLUTION INTRAVENOUS; SUBCUTANEOUS at 09:46

## 2021-11-19 RX ADMIN — PHENYLEPHRINE HYDROCHLORIDE 80 MCG/MIN: 10 INJECTION INTRAVENOUS at 08:23

## 2021-11-19 RX ADMIN — NEOSTIGMINE METHYLSULFATE 2 MG: 1 INJECTION, SOLUTION INTRAVENOUS at 13:30

## 2021-11-19 RX ADMIN — ALBUMIN (HUMAN) 12.5 G: 12.5 INJECTION, SOLUTION INTRAVENOUS at 21:25

## 2021-11-19 RX ADMIN — HEPARIN SODIUM 2000 UNITS: 1000 INJECTION, SOLUTION INTRAVENOUS; SUBCUTANEOUS at 08:26

## 2021-11-19 RX ADMIN — CARVEDILOL 12.5 MG: 12.5 TABLET, FILM COATED ORAL at 05:56

## 2021-11-19 RX ADMIN — SODIUM CHLORIDE: 9 INJECTION, SOLUTION INTRAVENOUS at 08:08

## 2021-11-19 RX ADMIN — SODIUM CHLORIDE 40 MCG/MIN: 900 INJECTION, SOLUTION INTRAVENOUS at 08:08

## 2021-11-19 RX ADMIN — FENTANYL CITRATE 75 MCG: 50 INJECTION, SOLUTION INTRAMUSCULAR; INTRAVENOUS at 07:05

## 2021-11-19 RX ADMIN — SODIUM CHLORIDE, POTASSIUM CHLORIDE, SODIUM LACTATE AND CALCIUM CHLORIDE: 600; 310; 30; 20 INJECTION, SOLUTION INTRAVENOUS at 08:08

## 2021-11-19 RX ADMIN — MUPIROCIN: 20 OINTMENT TOPICAL at 20:11

## 2021-11-19 RX ADMIN — ALBUMIN (HUMAN) 250 ML: 2.5 SOLUTION INTRAVENOUS at 13:22

## 2021-11-19 RX ADMIN — SUFENTANIL CITRATE 10 MCG: 50 INJECTION EPIDURAL; INTRAVENOUS at 08:08

## 2021-11-19 RX ADMIN — ALBUMIN (HUMAN) 250 ML: 2.5 SOLUTION INTRAVENOUS at 13:04

## 2021-11-19 RX ADMIN — SODIUM CHLORIDE 10 ML/HR: 4.5 INJECTION, SOLUTION INTRAVENOUS at 14:49

## 2021-11-19 RX ADMIN — FENTANYL CITRATE 25 MCG: 0.05 INJECTION, SOLUTION INTRAMUSCULAR; INTRAVENOUS at 21:05

## 2021-11-19 RX ADMIN — GLYCOPYRROLATE 0.2 MG: 0.2 INJECTION, SOLUTION INTRAMUSCULAR; INTRAVENOUS at 13:30

## 2021-11-19 RX ADMIN — CHLORHEXIDINE GLUCONATE 0.12% ORAL RINSE 10 ML: 1.2 LIQUID ORAL at 20:07

## 2021-11-19 RX ADMIN — ROCURONIUM BROMIDE 50 MG: 10 INJECTION INTRAVENOUS at 09:39

## 2021-11-19 RX ADMIN — SUCCINYLCHOLINE CHLORIDE 160 MG: 20 INJECTION, SOLUTION INTRAMUSCULAR; INTRAVENOUS at 08:09

## 2021-11-19 RX ADMIN — SUFENTANIL CITRATE 20 MCG: 50 INJECTION EPIDURAL; INTRAVENOUS at 12:41

## 2021-11-19 RX ADMIN — Medication 3 AMPULE: at 07:07

## 2021-11-19 RX ADMIN — ROCURONIUM BROMIDE 50 MG: 10 INJECTION INTRAVENOUS at 08:18

## 2021-11-19 RX ADMIN — GLYCOPYRROLATE 0.2 MG: 0.2 INJECTION, SOLUTION INTRAMUSCULAR; INTRAVENOUS at 08:25

## 2021-11-19 RX ADMIN — WATER 2 G: 1 INJECTION INTRAMUSCULAR; INTRAVENOUS; SUBCUTANEOUS at 11:20

## 2021-11-19 RX ADMIN — Medication 10 MG: at 08:15

## 2021-11-19 RX ADMIN — AMINOCAPROIC ACID 10 G/HR: 250 INJECTION, SOLUTION INTRAVENOUS at 08:08

## 2021-11-19 RX ADMIN — PROPOFOL 150 MG: 10 INJECTION, EMULSION INTRAVENOUS at 08:08

## 2021-11-19 RX ADMIN — NITROGLYCERIN 10 MCG/MIN: 200 INJECTION, SOLUTION INTRAVENOUS at 13:42

## 2021-11-19 RX ADMIN — SODIUM CHLORIDE 3 MG/HR: 9 INJECTION, SOLUTION INTRAVENOUS at 14:30

## 2021-11-19 RX ADMIN — WATER 2 G: 1 INJECTION INTRAMUSCULAR; INTRAVENOUS; SUBCUTANEOUS at 08:11

## 2021-11-19 RX ADMIN — LIDOCAINE HYDROCHLORIDE 80 MG: 20 INJECTION, SOLUTION EPIDURAL; INFILTRATION; INTRACAUDAL; PERINEURAL at 08:08

## 2021-11-19 RX ADMIN — Medication 12.5 MCG/HR: at 23:08

## 2021-11-19 NOTE — PERIOP NOTES
[address] [facility]   REF [id number]  EXP 10/31/2023  LOT [id number]

## 2021-11-19 NOTE — ANESTHESIA PROCEDURE NOTES
Central Line Placement    Start time: 11/19/2021 7:05 AM  End time: 11/19/2021 7:05 AM  Performed by: Carmelo Michael DO  Authorized by: Carmelo Michael DO     Indications: vascular access, central pressure monitoring and need for vasopressors  Preanesthetic Checklist: patient identified, risks and benefits discussed, anesthesia consent, site marked, patient being monitored and timeout performed      Pre-procedure: All elements of maximal sterile barrier technique followed?  Yes    2% Chlorhexidine for cutaneous antisepsis, Hand hygiene performed prior to catheter insertion and Ultrasound guidance    Sterile Ultrasound Technique followed?: Yes            Procedure:   Prep:  Chlorhexidine  Location: internal jugular  Orientation:  Right  Patient position:  Trendelenburg  Catheter type:  Double lumen  Catheter size:  9 Fr  Catheter length:  12 cm  Number of attempts:  1  Successful placement: Yes      Assessment:   Post-procedure:  Catheter secured and sterile dressing applied  Assessment:  Blood return through all ports, free fluid flow and guidewire removal verified  Insertion:  Uncomplicated  Patient tolerance:  Patient tolerated the procedure well with no immediate complications  8 Fr CCO PAC

## 2021-11-19 NOTE — ANESTHESIA POSTPROCEDURE EVALUATION
Post-Anesthesia Evaluation and Assessment    Patient: Roman Christina MRN: [id number]  SSN: [id number]    YOB: 1935  Age: 81 y.o. Sex: female      I have evaluated the patient and they are stable in the ICU. Cardiovascular Function/Vital Signs  HR:  86 atrial paced  BP: 128/53 (78)  RR: 18  SpO2: 100% on mechanical ventilation  Temp: 36.9 C    Patient is status post General anesthesia for Procedure(s):  CORONARY ARTERY BYPASS GRAFT X4 LIMA, LSVH VIA EVH. ECC. EPI AORTIC US BY DR Pedro Luis Mustafa. .    Nausea/Vomiting: None    Postoperative hydration reviewed and adequate. Pain:  Pain Scale 1: Behavioral Pain Scale (BPS) (11/19/21 1430)  Pain Intensity 1: 3 (11/19/21 1430)   Managed    Neurological Status:   Neuro (WDL): Exceptions to WDL (11/19/21 0735)  Neuro  Neurologic State: Alert (11/19/21 0198)  Orientation Level: Oriented X4 (11/19/21 0735)  Cognition: Appropriate decision making; Appropriate for age attention/concentration; Appropriate safety awareness (11/19/21 0735)  LUE Motor Response: Purposeful; Weak (11/19/21 0735)  LLE Motor Response: Weak; Purposeful (11/19/21 0735)  RUE Motor Response: Weak; Purposeful (11/19/21 0735)  RLE Motor Response: Purposeful; Weak (11/19/21 0735)   Intubated and sedated     Pulmonary Status:   Mechanical ventilation   Intubated with adequate ventilation and oxygenation     Complications related to anesthesia: None    Post-anesthesia assessment completed. No concerns. Report given to ICU provider with cardiac surgical PA during transition of care. No concerns.        Signed By: Javier Garces DO     November 19, 2021

## 2021-11-19 NOTE — ANESTHESIA PREPROCEDURE EVALUATION
Relevant Problems   CARDIOVASCULAR   (+) CAD (coronary artery disease)   (+) NSTEMI (non-ST elevated myocardial infarction) (HCC)   (+) Unspecified essential hypertension      GASTROINTESTINAL   (+) GERD (gastroesophageal reflux disease)      ENDOCRINE   (+) Arthritis   (+) Type II or unspecified type diabetes mellitus without mention of complication, not stated as uncontrolled     Relevant Problems   No relevant active problems       Anesthetic History   No history of anesthetic complications            Review of Systems / Medical History  Patient summary reviewed, nursing notes reviewed and pertinent labs reviewed    Pulmonary  Within defined limits                 Neuro/Psych   Within defined limits           Cardiovascular    Hypertension              Exercise tolerance: >4 METS     GI/Hepatic/Renal     GERD: well controlled           Endo/Other    Diabetes: well controlled    Arthritis     Other Findings              Physical Exam    Airway  Mallampati: II  TM Distance: 4 - 6 cm  Neck ROM: normal range of motion   Mouth opening: Normal     Cardiovascular               Dental  No notable dental hx       Pulmonary                 Abdominal  GI exam deferred       Other Findings            Anesthetic Plan    ASA: 2  Anesthesia type: MAC          Induction: Intravenous  Anesthetic plan and risks discussed with: Patient              Anesthetic History   No history of anesthetic complications            Review of Systems / Medical History  Patient summary reviewed, nursing notes reviewed and pertinent labs reviewed    Pulmonary  Within defined limits                 Neuro/Psych   Within defined limits           Cardiovascular    Hypertension          CAD    Exercise tolerance: >4 METS  Comments: Cath 11/17/21:  1. Severe CAD of distal LM, mid-circ, & distal RCA; mod prox LAD dz.  2. No AS  3. EDP 25    Echo: 11/2021  LV: Estimated LVEF is 65%. Normal cavity size and systolic function (ejection fraction normal).  Mild concentric hypertrophy.    GI/Hepatic/Renal     GERD           Endo/Other    Diabetes    Arthritis     Other Findings   Comments: Large hiatal hernia                 Anesthetic Plan    ASA: 4  Anesthesia type: general    Monitoring Plan: Arterial line, BIS, Continuous noninvasive hemodynamic monitoring, CVP and RAHUL      Induction: Intravenous  Anesthetic plan and risks discussed with: Patient

## 2021-11-19 NOTE — PROGRESS NOTES
Physical Therapy    Chart reviewed, patient is off the floor for CABG. Will defer today  and re-eval as per protocol.     Mar Runner

## 2021-11-19 NOTE — PERIOP NOTES
Patient interviewed in Main Operating Room/Cardiac Surgery, Pre-op Holding. Patient identifiers verified with name and date of birth. Procedure verified with patient. Consent forms verified. Allergies verified. Patient informed of procedure and plan of care. Questions answered with review. Patient voiced understanding of procedure and plan of care. Patient arrived to the operating room via stretcher. All wheels locked and patient transferred to the OR table with the assistance of four people. MTRE warming wrap present on OR table. Temp set at 37 C and monitored by perfusion. Unit # [id number]. After the induction of anesthesia and intubation, a 16 fr temp sensing servin catheter was placed without difficulty. 10 ml of sterile water was used to inflate the balloon. Clear, yellow urine return noted. Urine output monitored by anesthesia. Mepilex sacral pad placed in pre-op, heel pads placed prior to anesthesia induction. Fluids:   0.9 % Sodium Chloride:   PRN irrigation    Sterile water:   1000 ml in splash basin for instrument care.

## 2021-11-19 NOTE — BRIEF OP NOTE
BRIEF POSTOPERATIVE NOTE    Patient: Silvia Mclean  YOB: 1935  MRN: [id number]    Pre-Op Diagnosis: CAD    Post-Op Diagnosis: CAD      Procedure:   CABG x 4, LIMA to LAD, RSVG to Diag, RSVG to OM, RSVG to PDA  Left Greater Saphenous Vein EVH  Placement of Prevena Incision Management System    Surgeon: Astrid Sultana MD    Assistant(s): TRACY Torres    Anesthesia: General     Infusions/Support: Amiodarone, precedex, insulin, penny, dobut    Estimated Blood Loss (mL): 350    Cell Saver (mL): 700    Specimens: * No specimens in log *    Drains and pacing wires: 2 atrial wires, 1 bipolar ventricular wire, 3 sofya drains    Complications: none    Findings: CAD    Implants: * No implants in log *    Electronically Signed by TRACY Nieto on 11/19/21 at 1:28 PM

## 2021-11-19 NOTE — PROGRESS NOTES
1900: Received report from day shift nurse Pooja Morgan. Reviewed SBAR, Kardex, I/O, MAR, Procedural information and Recent results. VSS, NSR (rhythm), RA (oxygenation). A/O X 4  Pt resting in bed/sitting up in the bed. Cath lab site: RR CDI, no bleeding/hematoma. Pt reporting no CP/SOB. Pt reports being very anxious about the upcoming CABG procedure. 2030: SBP>190; PRN Labetalol given. 2100: Pt reporting HA, PRN Tylenol given. 2200: Pt normally ambulates with the walker, not stable enough for pre-op CHG shower. Full CHG wipe bath, new leads, new gown. All new linen and equipment in the room. All consents for the procedure are signed and on the chart. RN answered any questions the patient had. RN prayed with the patient at bedside. 2300: SBP>180. PRN Hydralazine and 1\" Nitrobid given. All evening meds given. PRN Xanax given for anxiety. 0000: SBP: 150/45. PT NPO. Pt is reporting hearing pulsing in her ear. Pt reports no CP/SOB at this time. Nitrobid taken off her arm. Will continue to assess. 0200: Pt reports she can hear the pulse in her ear. BP stable, NSR, Afebrile. Pt reports no CP/SOB. Pt reports same level of anxiety saying \"the xanax didn't work this time. \" Pt reports some nausea after ambulating to the bathroom. PRN Zofran given.

## 2021-11-19 NOTE — PROGRESS NOTES
1500  Awake, follows commands. 1530 attempted to wean to simv 6, but patient not breathing over vent. Placed back up to simv 10.    1600  Dr. Raisa Oliveira at bedside. Aware of drop in SvO2 to 48-50%, and O sat93%. Patient arousable but very sleepy. precedex drip now off.     1700  Arouses a bit more easily,but continues to be sleepy. Attempted agaiin to wean vent to rate of 6, but she still does not breathe over vent. [address] on cpap. 1815  Labs,abg sent. abg slightly acidotic. Dr. Quick Man at bedside. Will continue on cpap as ordered. One amp  sodium bicarb given.

## 2021-11-19 NOTE — PERIOP NOTES
06:20= has had both Moderna vaccinations; has not been tested for COVID; surgeon wishes to proceed; has not been exposed or had any s/s virus. 06:50= 3 warming blankets applied after cleansing; primary RN had placed mepilex dsg to sacrum. 07:03= timeout performed with Dr. Juan Leo; 2LO2NC placed on pt and frequent VS started. 07:13= O2 sats decreased to 92% on 2L after sedation: increased to 4L.    07:18= O2 sats 91% on 4L; increased to 6L; drowsy but arousable; stimulated and sats increased to 97%.

## 2021-11-19 NOTE — ANESTHESIA PROCEDURE NOTES
Epiaortic US        Procedure Details: probe placement, image aquisition & interpretation    Site marked, Timeout performed  Risks and benefits discussed with the patient and plans are to proceed    Procedure Note    Performed by: Isadora Arredondo DO  Authorized by: Isadora Arredondo DO       Indications: assessment of ascending aorta  Probe Type: epiaortic    Echocardiographic and Doppler Measurements   Aorta  Size  Diam(cm)  Dissection PlaqueThick(mm)  Plaque Mobile    Ascending normal  No 0-3 No    Arch         Descending              Post Intervention Follow-up Study         Valve  Function  Regurgitation  Area    Aortic       Mitral       Tricuspid       Prosthetic          Comments: Epiaortic US for cannulation placed, small non mobile plaque identified, but otherwise unremarkable

## 2021-11-19 NOTE — ANESTHESIA PROCEDURE NOTES
Arterial Line Placement    Start time: 11/19/2021 6:50 AM  End time: 11/19/2021 6:52 AM  Performed by: Vale Olvera DO  Authorized by: Vale Olvera DO     Pre-Procedure  Indications:  Arterial pressure monitoring and blood sampling  Preanesthetic Checklist: patient identified, risks and benefits discussed, anesthesia consent, site marked, patient being monitored, timeout performed and patient being monitored      Procedure:   Prep:  ChloraPrep  Seldinger Technique?: Yes    Orientation:  Right  Location:  Radial artery  Catheter size:  20 G  Number of attempts:  1    Assessment:   Post-procedure:  Line secured and sterile dressing applied  Patient Tolerance:  Patient tolerated the procedure well with no immediate complications

## 2021-11-19 NOTE — CARDIO/PULMONARY
Cardiac Rehab Note: chart review/referral     Consult has been acknowledged     NSTEMI 11/14/21  Cardiac cath >CTS consult 11/17/21  CABG 11/19/21    EF 65-70%  on 11/14/21 per echo    Smoking history assessed. Patient is a non smoker. Smoking Cessation Program link has not been added to the AVS.     Patient not seen at this time due to current condition as indicated in chart. Patient in CCU on vent post CABG. CP Rehab will follow.

## 2021-11-19 NOTE — PERIOP NOTES
TRANSFER - IN REPORT:    Verbal report received from Samantha Rodriguez(name) on Bernard Leo  being received from [address]) for ordered procedure      Report consisted of patients Situation, Background, Assessment and   Recommendations(SBAR). Information from the following report(s) SBAR, Kardex, Intake/Output, MAR, Recent Results, Med Rec Status, Cardiac Rhythm NSR and Procedure Verification was reviewed with the receiving nurse. Opportunity for questions and clarification was provided. Assessment completed upon patients arrival to unit and care assumed.

## 2021-11-19 NOTE — PROGRESS NOTES
Cardiac Surgery Care Coordinator- Met with the family of Leatha Richter, introduced role of the Cardiac Surgery Coordinator. Reviewed plan of care and day of surgery expectations. Provided family with an update from OR. Encouraged family to verbalize and emotional support given. Will continue to update throughout the day. Orlando Pratt RN     1079 - Met with family of Leatha Richter, provided family with update of on by-pass. Family without questions or concerns at this time. Will continue to follow for educational and emotional needs. Orlando Pratt RN    6657 - Met with family of Leatha Richter, provided family with update of off by-pass. Family without questions or concerns at this time. Will continue to follow. Orlando Pratt RN     3334 - Met with Gonzalo Dasha family and Dr. Adam Cadet. Update given. Encouraged family to verbalize and offered emotional support. Reinforced Surgical waiting room instructions. Family will leave hospital and return after pt has been extubated.   Orlando Pratt RN

## 2021-11-19 NOTE — CONSULTS
Pulmonology Intensive Care Unit Initial Assessment    Subjective:        Subjective:     Critical Care Initial Evaluation Note: 11/19/2021 3:15 PM    Ms. Webb Postal is 80yo CF with h/o HTN, HLD, GERD. She was admitted to medical floor with hypertensive emergency and was also found to have elevated trop. W/u including LHC for NSTEMI showed multivessel disease. She underwent CABGx4 today. Post operatively she is admitted to ICU for post op care. She is intubated and sedated. Critical care consult is requested for co management. Past Medical History:   Diagnosis Date    Anxiety     Hypertension       Past Surgical History:   Procedure Laterality Date    HX CORONARY ARTERY BYPASS GRAFT  11/19/2021    x 4, LIMA to LAD, RSVG to Diag, RSVG to OM, RSVG to PDA    HX KNEE REPLACEMENT Right 2011    HX KNEE REPLACEMENT Left 2013      Prior to Admission medications    Medication Sig Start Date End Date Taking? Authorizing Provider   carvediloL (COREG) 3.125 mg tablet Take 3.125 mg by mouth two (2) times daily (with meals). Yes Provider, Historical   omeprazole (PRILOSEC) 20 mg capsule Take 20 mg by mouth daily. Yes Provider, Historical   diclofenac potassium (CATAFLAM) 50 mg tablet Take 50 mg by mouth daily. Provider, Historical   losartan (COZAAR) 50 mg tablet Take 50 mg by mouth daily. Provider, Historical   aspirin delayed-release 81 mg tablet Take 81 mg by mouth daily. Provider, Historical   triamterene-hydrochlorothiazide (MAXZIDE) 37.5-25 mg per tablet take 1 tablet by mouth once daily for fluid retention and blood pressure 8/4/15   Montserrat Blas MD   simvastatin (ZOCOR) 40 mg tablet Take 1 Tab by mouth nightly.  For cholesterol 5/19/14   Sanjuana Sims NP   verapamil SR (CALAN-SR) 240 mg CR tablet take 1 tablet by mouth NIGHTLY 5/5/14   Montserrat Blas MD   ALPRAZolam [place] [facility]) 0.25 mg tablet take 1 tablet by mouth three times a day if needed for anxiety 2/17/14   Sanjuana Sims NP calcium-vitamin, d3, (OS-MARINA 250) 250-125 mg-unit tablet Take 1 Tab by mouth daily. Provider, Historical   MULTIVITS W-FE,OTHER MIN (CENTRUM PO) Take  by mouth. Provider, Historical     Allergies   Allergen Reactions    Novocain [Procaine] Anaphylaxis     Throat swelling    Ace Inhibitors Cough    Penicillins Rash      Social History     Tobacco Use    Smoking status: Never Smoker    Smokeless tobacco: Never Used   Substance Use Topics    Alcohol use: Not Currently      History reviewed. No pertinent family history. Review of Systems   Unable to perform ROS: Intubated       Objective:     Vital signs reviewed. 11/19 0701 - 11/19 1900  In: 2250 [I.V.:1240]  Out: 755 [Urine:520; Drains:235]  11/17 1901 - 11/19 0700  In: 5 [P.O.:720]  Out: 300 [Urine:300]    Physical Exam  Constitutional:       General: She is not in acute distress. Comments: Intubated and sedated   HENT:      Nose: No congestion or rhinorrhea. Mouth/Throat:      Mouth: Mucous membranes are moist.   Eyes:      General:         Right eye: No discharge. Left eye: No discharge. Conjunctiva/sclera: Conjunctivae normal.   Cardiovascular:      Rate and Rhythm: Normal rate and regular rhythm. Pulses: Normal pulses. Heart sounds: Normal heart sounds. No murmur heard. No friction rub. Pulmonary:      Effort: Pulmonary effort is normal. No respiratory distress. Breath sounds: No stridor. Abdominal:      General: There is no distension. Palpations: Abdomen is soft. Tenderness: There is no abdominal tenderness. Musculoskeletal:      Cervical back: Neck supple. No rigidity. No muscular tenderness. Skin:     General: Skin is warm. Coloration: Skin is not jaundiced or pale.    Neurological:      Comments: Sedated   Psychiatric:         Mood and Affect: Mood normal.         Behavior: Behavior normal.         Data Review:     Recent Results (from the past 24 hour(s))   GLUCOSE, POC Collection Time: 11/18/21  4:29 PM   Result Value Ref Range    Glucose (POC) 118 (H) 65 - 117 mg/dL    Performed by Willard Bonds PCT    GLUCOSE, POC    Collection Time: 11/18/21  8:55 PM   Result Value Ref Range    Glucose (POC) 127 (H) 65 - 117 mg/dL    Performed by Ankush Richards PCT    METABOLIC PANEL, BASIC    Collection Time: 11/19/21  2:06 AM   Result Value Ref Range    Sodium 136 136 - 145 mmol/L    Potassium 4.0 3.5 - 5.1 mmol/L    Chloride 104 97 - 108 mmol/L    CO2 24 21 - 32 mmol/L    Anion gap 8 5 - 15 mmol/L    Glucose 126 (H) 65 - 100 mg/dL    BUN 27 (H) 6 - 20 MG/DL    Creatinine 1.37 (H) 0.55 - 1.02 MG/DL    BUN/Creatinine ratio 20 12 - 20      GFR est AA 44 (L) >60 ml/min/1.73m2    GFR est non-AA 37 (L) >60 ml/min/1.73m2    Calcium 9.6 8.5 - 10.1 MG/DL   GLUCOSE, POC    Collection Time: 11/19/21  5:40 AM   Result Value Ref Range    Glucose (POC) 136 (H) 65 - 117 mg/dL    Performed by Han Gama RN    GLUCOSTABILIZER    Collection Time: 11/19/21  9:29 AM   Result Value Ref Range    Glucose 159 mg/dL    Insulin order 3.0 units/hour    Insulin adminstered 3.0 units/hour    Multiplier 0.030     Low target 100 mg/dL    High target 140 mg/dL    D50 order 0.0 ml    D50 administered 0.00 ml    Minutes until next BG 60 min    Order initials ks     Administered initials ks     GLSCOM Comments     GLUCOSTABILIZER    Collection Time: 11/19/21  9:56 AM   Result Value Ref Range    Glucose 134 mg/dL    Insulin order 2.2 units/hour    Insulin adminstered 2.2 units/hour    Multiplier 0.030     Low target 100 mg/dL    High target 140 mg/dL    D50 order 0.0 ml    D50 administered 0.00 ml    Minutes until next BG 60 min    Order initials ks     Administered initials ks     GLSCOM Comments     GLUCOSTABILIZER    Collection Time: 11/19/21 10:50 AM   Result Value Ref Range    Glucose 126 mg/dL    Insulin order 2.0 units/hour    Insulin adminstered 2.0 units/hour    Multiplier 0.030     Low target 100 mg/dL High target 140 mg/dL    D50 order 0.0 ml    D50 administered 0.00 ml    Minutes until next BG 60 min    Order initials ks     Administered initials ks     GLSCOM Comments     GLUCOSTABILIZER    Collection Time: 11/19/21 12:02 PM   Result Value Ref Range    Glucose 117 mg/dL    Insulin order 1.7 units/hour    Insulin adminstered 1.7 units/hour    Multiplier 0.030     Low target 100 mg/dL    High target 140 mg/dL    D50 order 0.0 ml    D50 administered 0.00 ml    Minutes until next BG 60 min    Order initials lbm     Administered initials lbm     GLSCOM Comments     METABOLIC PANEL, COMPREHENSIVE    Collection Time: 11/19/21  2:04 PM   Result Value Ref Range    Sodium 141 136 - 145 mmol/L    Potassium 3.9 3.5 - 5.1 mmol/L    Chloride 111 (H) 97 - 108 mmol/L    CO2 22 21 - 32 mmol/L    Anion gap 8 5 - 15 mmol/L    Glucose 128 (H) 65 - 100 mg/dL    BUN 24 (H) 6 - 20 MG/DL    Creatinine 1.31 (H) 0.55 - 1.02 MG/DL    BUN/Creatinine ratio 18 12 - 20      GFR est AA 47 (L) >60 ml/min/1.73m2    GFR est non-AA 39 (L) >60 ml/min/1.73m2    Calcium 8.9 8.5 - 10.1 MG/DL    Bilirubin, total 0.6 0.2 - 1.0 MG/DL    ALT (SGPT) 14 12 - 78 U/L    AST (SGOT) 20 15 - 37 U/L    Alk.  phosphatase 24 (L) 45 - 117 U/L    Protein, total 4.9 (L) 6.4 - 8.2 g/dL    Albumin 3.2 (L) 3.5 - 5.0 g/dL    Globulin 1.7 (L) 2.0 - 4.0 g/dL    A-G Ratio 1.9 1.1 - 2.2     MAGNESIUM    Collection Time: 11/19/21  2:04 PM   Result Value Ref Range    Magnesium 1.9 1.6 - 2.4 mg/dL   CBC WITH AUTOMATED DIFF    Collection Time: 11/19/21  2:04 PM   Result Value Ref Range    WBC 13.9 (H) 3.6 - 11.0 K/uL    RBC 3.05 (L) 3.80 - 5.20 M/uL    HGB 9.4 (L) 11.5 - 16.0 g/dL    HCT 28.5 (L) 35.0 - 47.0 %    MCV 93.4 80.0 - 99.0 FL    MCH 30.8 26.0 - 34.0 PG    MCHC 33.0 30.0 - 36.5 g/dL    RDW 14.1 11.5 - 14.5 %    PLATELET 165 (L) 435 - 400 K/uL    MPV 9.5 8.9 - 12.9 FL    NRBC 0.0 0  WBC    ABSOLUTE NRBC 0.00 0.00 - 0.01 K/uL    NEUTROPHILS 82 (H) 32 - 75 % LYMPHOCYTES 10 (L) 12 - 49 %    MONOCYTES 7 5 - 13 %    EOSINOPHILS 0 0 - 7 %    BASOPHILS 0 0 - 1 %    IMMATURE GRANULOCYTES 1 (H) 0.0 - 0.5 %    ABS. NEUTROPHILS 11.3 (H) 1.8 - 8.0 K/UL    ABS. LYMPHOCYTES 1.4 0.8 - 3.5 K/UL    ABS. MONOCYTES 1.0 0.0 - 1.0 K/UL    ABS. EOSINOPHILS 0.1 0.0 - 0.4 K/UL    ABS. BASOPHILS 0.0 0.0 - 0.1 K/UL    ABS. IMM. GRANS. 0.1 (H) 0.00 - 0.04 K/UL    DF AUTOMATED     PTT    Collection Time: 11/19/21  2:04 PM   Result Value Ref Range    aPTT 31.7 (H) 22.1 - 31.0 sec    aPTT, therapeutic range     58.0 - 77.0 SECS   PROTHROMBIN TIME + INR    Collection Time: 11/19/21  2:04 PM   Result Value Ref Range    INR 1.4 (H) 0.9 - 1.1      Prothrombin time 14.3 (H) 9.0 - 11.1 sec   GLUCOSE, POC    Collection Time: 11/19/21  2:06 PM   Result Value Ref Range    Glucose (POC) 128 (H) 65 - 117 mg/dL    Performed by Génesis Hernandez RN ([facility])    BLOOD GAS + IONIZED CALCIUM    Collection Time: 11/19/21  2:06 PM   Result Value Ref Range    pH 7.46 (H) 7.35 - 7.45      PCO2 33 (L) 35 - 45 mmHg    PO2 100 80 - 100 mmHg    BICARBONATE 23 22 - 26 mmol/L    BASE EXCESS 0.0 mmol/L    O2 SAT 98 (H) 92 - 97 %    Calcium, ionized 1.26 1.13 - 1.32 mmol/L    O2 METHOD VENT      FIO2 80 %    MODE SIMV      Tidal volume 450.0      SET RATE 18      PRESSURE SUPPORT 10      PEEP/CPAP 6.0      Sample source ARTERIAL      SITE DRAWN FROM ARTERIAL LINE      ANITA'S TEST NOT APPLICABLE     GLUCOSTABILIZER    Collection Time: 11/19/21  2:06 PM   Result Value Ref Range    Glucose 128 mg/dL    Insulin order 2.0 units/hour    Insulin adminstered 2.0 units/hour    Multiplier 0.030     Low target 100 mg/dL    High target 140 mg/dL    D50 order 0.0 ml    D50 administered 0.00 ml    Minutes until next BG 60 min    Order initials ksc     Administered initials ksc     GLSCOM Comments           Assessment/Plan:     Post cardiac surgery care: S/P CABGx4.  - Evaluation of postoperative labs and imaging studies have been performed.    - Keep on full ventilatory support for the time being and wean as tolerated when more awake. - If unable to wean vent and patient requires increasing vent support will Rx with sedation and analgesia to facilitate vent synchrony.   - HOB elevated to 30 degrees with VAP bundle. - Hemodynamic management per CT surgery postoperative guidelines. - ASA and statin for CAD Rx when feasible. - When hemodynamically tolerated will also consider initiation of beta blockers. - Insulin for glycemic control. Shock. Vasoplegic vs cardiogenic.  - Wean pressors and inotropes as able. - hemodynamic management in collaboration with CT surgery    Ventilator Management. - Keep on full ventilatory support for the time being and wean as tolerated when more awake. - If unable to wean vent and patient requires increasing vent support will Rx with sedation and analgesia to facilitate vent synchrony.   - Post-extubation pulmonary toilette with adequate postop pain control.   - Early mobilization with PT/OT as able    Anemia secondary to acute blood loss  - Follow CT output for now. - Follow CBC   - Supportive transfusions if needed. Thrombocytopenia  - follow CBC for now. Prophylaxis  - DVT prophylaxis. Start [facility] when ok with CTS. - VAP bundle with chlorhexidine    Code status: full code. 40min of CC time spent without overlap and excluding procedures.

## 2021-11-19 NOTE — PROGRESS NOTES
1704: Dropped patients rate to 6. Patient was still very sleepy. Placed patient back on rate of 10.    1739: Patient waking up more. Placed patient on CPAP 5/5. ABG drawn at 1804. Results below:      Results for Shalini Fenton (MRN [id number]) as of 11/19/2021    Ref. Range 11/19/2021 18:04   pH Latest Ref Range: 7.35 - 7.45   7.29 (L)   PCO2 Latest Ref Range: 35 - 45 mmHg 44   PO2 Latest Ref Range: 80 - 100 mmHg 61 (L)   BICARBONATE Latest Ref Range: 22 - 26 mmol/L 20 (L)   O2 SAT Latest Ref Range: 92 - 97 % 89 (L)   BASE DEFICIT Latest Units: mmol/L 6.1   Sample source Latest Units:   ARTERIAL   SITE Latest Units:   DRAWN FROM ARTERIAL LINE   ANITA'S TEST Latest Units:   NOT APPLICABLE   MODE Latest Units:   CPAP   FIO2 Latest Units: % 40   PEEP/CPAP Latest Units:   5.0   PRESSURE SUPPORT Latest Units:   5   O2 METHOD Latest Units:   VENT   SPONTANEOUS RATE Latest Units:   17       Per doctor orders, leave patient on CPAP for an hour and repeat ABG.         Rajendra Bhagat, RRT

## 2021-11-20 ENCOUNTER — APPOINTMENT (OUTPATIENT)
Dept: GENERAL RADIOLOGY | Age: 86
DRG: 233 | End: 2021-11-20
Attending: PHYSICIAN ASSISTANT
Payer: MEDICARE

## 2021-11-20 LAB
ADMINISTERED INITIALS, ADMINIT: NORMAL
ALBUMIN SERPL-MCNC: 3.3 G/DL (ref 3.5–5)
ALBUMIN/GLOB SERPL: 1.7 {RATIO} (ref 1.1–2.2)
ALP SERPL-CCNC: 25 U/L (ref 45–117)
ALT SERPL-CCNC: 15 U/L (ref 12–78)
ANION GAP SERPL CALC-SCNC: 8 MMOL/L (ref 5–15)
ARTERIAL PATENCY WRIST A: ABNORMAL
ARTERIAL PATENCY WRIST A: ABNORMAL
AST SERPL-CCNC: 28 U/L (ref 15–37)
BASE DEFICIT BLDA-SCNC: 2.9 MMOL/L
BASE DEFICIT BLDA-SCNC: 4.6 MMOL/L
BASE DEFICIT BLDV-SCNC: 2.9 MMOL/L
BDY SITE: ABNORMAL
BDY SITE: ABNORMAL
BDY SITE: NORMAL
BILIRUB SERPL-MCNC: 0.4 MG/DL (ref 0.2–1)
BUN SERPL-MCNC: 27 MG/DL (ref 6–20)
BUN/CREAT SERPL: 21 (ref 12–20)
CALCIUM SERPL-MCNC: 8.6 MG/DL (ref 8.5–10.1)
CHLORIDE SERPL-SCNC: 111 MMOL/L (ref 97–108)
CO2 SERPL-SCNC: 22 MMOL/L (ref 21–32)
CREAT SERPL-MCNC: 1.31 MG/DL (ref 0.55–1.02)
D50 ADMINISTERED, D50ADM: 0 ML
D50 ORDER, D50ORD: 0 ML
ERYTHROCYTE [DISTWIDTH] IN BLOOD BY AUTOMATED COUNT: 15.8 % (ref 11.5–14.5)
FIO2 ON VENT: 45 %
FIO2 ON VENT: 50 %
GAS FLOW.O2 SETTING OXYMISER: 10 L/MIN
GLOBULIN SER CALC-MCNC: 2 G/DL (ref 2–4)
GLSCOM COMMENTS: NORMAL
GLUCOSE BLD STRIP.AUTO-MCNC: 102 MG/DL (ref 65–117)
GLUCOSE BLD STRIP.AUTO-MCNC: 105 MG/DL (ref 65–117)
GLUCOSE BLD STRIP.AUTO-MCNC: 110 MG/DL (ref 65–117)
GLUCOSE BLD STRIP.AUTO-MCNC: 119 MG/DL (ref 65–117)
GLUCOSE BLD STRIP.AUTO-MCNC: 120 MG/DL (ref 65–117)
GLUCOSE BLD STRIP.AUTO-MCNC: 120 MG/DL (ref 65–117)
GLUCOSE BLD STRIP.AUTO-MCNC: 122 MG/DL (ref 65–117)
GLUCOSE BLD STRIP.AUTO-MCNC: 125 MG/DL (ref 65–117)
GLUCOSE BLD STRIP.AUTO-MCNC: 126 MG/DL (ref 65–117)
GLUCOSE BLD STRIP.AUTO-MCNC: 127 MG/DL (ref 65–117)
GLUCOSE BLD STRIP.AUTO-MCNC: 129 MG/DL (ref 65–117)
GLUCOSE BLD STRIP.AUTO-MCNC: 132 MG/DL (ref 65–117)
GLUCOSE BLD STRIP.AUTO-MCNC: 83 MG/DL (ref 65–117)
GLUCOSE BLD STRIP.AUTO-MCNC: 92 MG/DL (ref 65–117)
GLUCOSE BLD STRIP.AUTO-MCNC: 93 MG/DL (ref 65–117)
GLUCOSE SERPL-MCNC: 106 MG/DL (ref 65–100)
GLUCOSE, GLC: 102 MG/DL
GLUCOSE, GLC: 105 MG/DL
GLUCOSE, GLC: 110 MG/DL
GLUCOSE, GLC: 119 MG/DL
GLUCOSE, GLC: 120 MG/DL
GLUCOSE, GLC: 120 MG/DL
GLUCOSE, GLC: 122 MG/DL
GLUCOSE, GLC: 125 MG/DL
GLUCOSE, GLC: 126 MG/DL
GLUCOSE, GLC: 127 MG/DL
GLUCOSE, GLC: 129 MG/DL
GLUCOSE, GLC: 132 MG/DL
GLUCOSE, GLC: 83 MG/DL
GLUCOSE, GLC: 92 MG/DL
GLUCOSE, GLC: 93 MG/DL
HCO3 BLDA-SCNC: 20 MMOL/L (ref 22–26)
HCO3 BLDA-SCNC: 22 MMOL/L (ref 22–26)
HCO3 BLDV-SCNC: 23 MMOL/L (ref 23–28)
HCT VFR BLD AUTO: 25.9 % (ref 35–47)
HGB BLD-MCNC: 8.4 G/DL (ref 11.5–16)
HIGH TARGET, HITG: 140 MG/DL
INSULIN ADMINSTERED, INSADM: 0.3 UNITS/HOUR
INSULIN ADMINSTERED, INSADM: 0.4 UNITS/HOUR
INSULIN ADMINSTERED, INSADM: 0.5 UNITS/HOUR
INSULIN ADMINSTERED, INSADM: 0.6 UNITS/HOUR
INSULIN ADMINSTERED, INSADM: 0.7 UNITS/HOUR
INSULIN ADMINSTERED, INSADM: 1 UNITS/HOUR
INSULIN ORDER, INSORD: 0.3 UNITS/HOUR
INSULIN ORDER, INSORD: 0.4 UNITS/HOUR
INSULIN ORDER, INSORD: 0.5 UNITS/HOUR
INSULIN ORDER, INSORD: 0.6 UNITS/HOUR
INSULIN ORDER, INSORD: 0.7 UNITS/HOUR
INSULIN ORDER, INSORD: 1 UNITS/HOUR
IPAP/PIP, IPAPIP: 10
LOW TARGET, LOT: 100 MG/DL
MAGNESIUM SERPL-MCNC: 1.7 MG/DL (ref 1.6–2.4)
MCH RBC QN AUTO: 30 PG (ref 26–34)
MCHC RBC AUTO-ENTMCNC: 32.4 G/DL (ref 30–36.5)
MCV RBC AUTO: 92.5 FL (ref 80–99)
MINUTES UNTIL NEXT BG, NBG: 120 MIN
MINUTES UNTIL NEXT BG, NBG: 60 MIN
MULTIPLIER, MUL: 0.01
MULTIPLIER, MUL: 0.02
MULTIPLIER, MUL: 0.03
NRBC # BLD: 0 K/UL (ref 0–0.01)
NRBC BLD-RTO: 0 PER 100 WBC
ORDER INITIALS, ORDINIT: NORMAL
PCO2 BLDA: 37 MMHG (ref 35–45)
PCO2 BLDA: 37 MMHG (ref 35–45)
PCO2 BLDV: 44.5 MMHG (ref 41–51)
PEEP RESPIRATORY: 5 CM[H2O]
PEEP RESPIRATORY: 5 CM[H2O]
PH BLDA: 7.36 [PH] (ref 7.35–7.45)
PH BLDA: 7.38 [PH] (ref 7.35–7.45)
PH BLDV: 7.33 [PH] (ref 7.32–7.42)
PLATELET # BLD AUTO: 132 K/UL (ref 150–400)
PMV BLD AUTO: 9.8 FL (ref 8.9–12.9)
PO2 BLDA: 53 MMHG (ref 80–100)
PO2 BLDA: 53 MMHG (ref 80–100)
PO2 BLDV: 30 MMHG (ref 25–40)
POTASSIUM SERPL-SCNC: 4.2 MMOL/L (ref 3.5–5.1)
PRESSURE SUPPORT SETTING VENT: 5 CM[H2O]
PROT SERPL-MCNC: 5.3 G/DL (ref 6.4–8.2)
RBC # BLD AUTO: 2.8 M/UL (ref 3.8–5.2)
SAO2 % BLD: 86 % (ref 92–97)
SAO2 % BLD: 87 % (ref 92–97)
SAO2% DEVICE SAO2% SENSOR NAME: ABNORMAL
SAO2% DEVICE SAO2% SENSOR NAME: ABNORMAL
SAO2% DEVICE SAO2% SENSOR NAME: NORMAL
SERVICE CMNT-IMP: ABNORMAL
SERVICE CMNT-IMP: NORMAL
SODIUM SERPL-SCNC: 141 MMOL/L (ref 136–145)
SPECIMEN SITE: ABNORMAL
SPECIMEN SITE: ABNORMAL
SPECIMEN SITE: NORMAL
VENTILATION MODE VENT: ABNORMAL
VENTILATION MODE VENT: ABNORMAL
WBC # BLD AUTO: 11.9 K/UL (ref 3.6–11)

## 2021-11-20 PROCEDURE — 74011000250 HC RX REV CODE- 250: Performed by: PHYSICIAN ASSISTANT

## 2021-11-20 PROCEDURE — 74011250636 HC RX REV CODE- 250/636: Performed by: HOSPITALIST

## 2021-11-20 PROCEDURE — 93005 ELECTROCARDIOGRAM TRACING: CPT

## 2021-11-20 PROCEDURE — 74011250636 HC RX REV CODE- 250/636: Performed by: THORACIC SURGERY (CARDIOTHORACIC VASCULAR SURGERY)

## 2021-11-20 PROCEDURE — 85027 COMPLETE CBC AUTOMATED: CPT

## 2021-11-20 PROCEDURE — P9045 ALBUMIN (HUMAN), 5%, 250 ML: HCPCS | Performed by: PHYSICIAN ASSISTANT

## 2021-11-20 PROCEDURE — 74011000258 HC RX REV CODE- 258: Performed by: PHYSICIAN ASSISTANT

## 2021-11-20 PROCEDURE — 71045 X-RAY EXAM CHEST 1 VIEW: CPT

## 2021-11-20 PROCEDURE — 74011000250 HC RX REV CODE- 250: Performed by: THORACIC SURGERY (CARDIOTHORACIC VASCULAR SURGERY)

## 2021-11-20 PROCEDURE — 2709999900 HC NON-CHARGEABLE SUPPLY

## 2021-11-20 PROCEDURE — 77030013038 HC MSK CPAP FISP -A

## 2021-11-20 PROCEDURE — 74011250637 HC RX REV CODE- 250/637: Performed by: PHYSICIAN ASSISTANT

## 2021-11-20 PROCEDURE — 77010033678 HC OXYGEN DAILY

## 2021-11-20 PROCEDURE — 83735 ASSAY OF MAGNESIUM: CPT

## 2021-11-20 PROCEDURE — 82803 BLOOD GASES ANY COMBINATION: CPT

## 2021-11-20 PROCEDURE — 74011250636 HC RX REV CODE- 250/636: Performed by: PHYSICIAN ASSISTANT

## 2021-11-20 PROCEDURE — 74011250636 HC RX REV CODE- 250/636: Performed by: NURSE PRACTITIONER

## 2021-11-20 PROCEDURE — 94003 VENT MGMT INPAT SUBQ DAY: CPT

## 2021-11-20 PROCEDURE — 80053 COMPREHEN METABOLIC PANEL: CPT

## 2021-11-20 PROCEDURE — 65610000006 HC RM INTENSIVE CARE

## 2021-11-20 PROCEDURE — 82962 GLUCOSE BLOOD TEST: CPT

## 2021-11-20 PROCEDURE — 36415 COLL VENOUS BLD VENIPUNCTURE: CPT

## 2021-11-20 RX ORDER — MAGNESIUM SULFATE 1 G/100ML
1 INJECTION INTRAVENOUS ONCE
Status: COMPLETED | OUTPATIENT
Start: 2021-11-20 | End: 2021-11-20

## 2021-11-20 RX ORDER — FUROSEMIDE 10 MG/ML
20 INJECTION INTRAMUSCULAR; INTRAVENOUS ONCE
Status: COMPLETED | OUTPATIENT
Start: 2021-11-20 | End: 2021-11-20

## 2021-11-20 RX ORDER — FUROSEMIDE 10 MG/ML
40 INJECTION INTRAMUSCULAR; INTRAVENOUS ONCE
Status: COMPLETED | OUTPATIENT
Start: 2021-11-20 | End: 2021-11-20

## 2021-11-20 RX ADMIN — FUROSEMIDE 40 MG: 10 INJECTION, SOLUTION INTRAMUSCULAR; INTRAVENOUS at 12:18

## 2021-11-20 RX ADMIN — Medication 10 ML: at 05:03

## 2021-11-20 RX ADMIN — ACETAMINOPHEN 650 MG: 325 TABLET ORAL at 12:14

## 2021-11-20 RX ADMIN — ACETAMINOPHEN 650 MG: 325 TABLET ORAL at 19:04

## 2021-11-20 RX ADMIN — OXYCODONE 5 MG: 5 TABLET ORAL at 21:22

## 2021-11-20 RX ADMIN — ALBUMIN (HUMAN) 12.5 G: 12.5 INJECTION, SOLUTION INTRAVENOUS at 04:13

## 2021-11-20 RX ADMIN — CEFAZOLIN SODIUM 2 G: 1 INJECTION, POWDER, FOR SOLUTION INTRAMUSCULAR; INTRAVENOUS at 04:23

## 2021-11-20 RX ADMIN — CHLORHEXIDINE GLUCONATE 0.12% ORAL RINSE 10 ML: 1.2 LIQUID ORAL at 09:00

## 2021-11-20 RX ADMIN — CEFAZOLIN SODIUM 2 G: 1 INJECTION, POWDER, FOR SOLUTION INTRAMUSCULAR; INTRAVENOUS at 12:17

## 2021-11-20 RX ADMIN — MAGNESIUM SULFATE HEPTAHYDRATE 1 G: 1 INJECTION, SOLUTION INTRAVENOUS at 05:47

## 2021-11-20 RX ADMIN — ACETAMINOPHEN 650 MG: 325 TABLET ORAL at 17:13

## 2021-11-20 RX ADMIN — SODIUM CHLORIDE 0.4 MCG/KG/HR: 9 INJECTION, SOLUTION INTRAVENOUS at 12:58

## 2021-11-20 RX ADMIN — Medication 10 ML: at 15:21

## 2021-11-20 RX ADMIN — Medication 1 TABLET: at 12:14

## 2021-11-20 RX ADMIN — Medication 40 ML: at 21:26

## 2021-11-20 RX ADMIN — SODIUM CHLORIDE 2.5 MG/HR: 9 INJECTION, SOLUTION INTRAVENOUS at 15:10

## 2021-11-20 RX ADMIN — AMIODARONE HYDROCHLORIDE 400 MG: 200 TABLET ORAL at 17:13

## 2021-11-20 RX ADMIN — CHLORHEXIDINE GLUCONATE 0.12% ORAL RINSE 10 ML: 1.2 LIQUID ORAL at 21:13

## 2021-11-20 RX ADMIN — ACETAMINOPHEN 650 MG: 325 TABLET ORAL at 23:17

## 2021-11-20 RX ADMIN — FUROSEMIDE 20 MG: 10 INJECTION, SOLUTION INTRAMUSCULAR; INTRAVENOUS at 06:26

## 2021-11-20 RX ADMIN — SODIUM CHLORIDE 7.5 MG/HR: 9 INJECTION, SOLUTION INTRAVENOUS at 00:49

## 2021-11-20 RX ADMIN — ALPRAZOLAM 0.5 MG: 0.5 TABLET ORAL at 21:11

## 2021-11-20 RX ADMIN — ALPRAZOLAM 0.5 MG: 0.5 TABLET ORAL at 12:14

## 2021-11-20 RX ADMIN — ATORVASTATIN CALCIUM 80 MG: 40 TABLET, FILM COATED ORAL at 21:11

## 2021-11-20 RX ADMIN — SODIUM CHLORIDE 0.6 MCG/KG/HR: 9 INJECTION, SOLUTION INTRAVENOUS at 01:49

## 2021-11-20 RX ADMIN — DOCUSATE SODIUM 50MG AND SENNOSIDES 8.6MG 1 TABLET: 8.6; 5 TABLET, FILM COATED ORAL at 17:13

## 2021-11-20 RX ADMIN — OXYCODONE 5 MG: 5 TABLET ORAL at 12:30

## 2021-11-20 RX ADMIN — MUPIROCIN: 20 OINTMENT TOPICAL at 12:25

## 2021-11-20 RX ADMIN — Medication 400 MG: at 17:13

## 2021-11-20 RX ADMIN — FUROSEMIDE 40 MG: 10 INJECTION, SOLUTION INTRAMUSCULAR; INTRAVENOUS at 19:04

## 2021-11-20 RX ADMIN — CEFAZOLIN SODIUM 2 G: 1 INJECTION, POWDER, FOR SOLUTION INTRAMUSCULAR; INTRAVENOUS at 19:46

## 2021-11-20 RX ADMIN — MUPIROCIN: 20 OINTMENT TOPICAL at 19:08

## 2021-11-20 RX ADMIN — OXYCODONE 5 MG: 5 TABLET ORAL at 17:21

## 2021-11-20 RX ADMIN — SODIUM CHLORIDE 2.5 MG/HR: 9 INJECTION, SOLUTION INTRAVENOUS at 04:57

## 2021-11-20 RX ADMIN — ASPIRIN 81 MG CHEWABLE TABLET 81 MG: 81 TABLET CHEWABLE at 12:14

## 2021-11-20 NOTE — PROGRESS NOTES
Critical Care Progress Note  Phil Jones MD          Date of Service:  2021  NAME:  Bony Lang  :  1935  MRN:  [id number]      Subjective/Hospital course:      : Patient remains intubated and sedated, on 50% Fio2. She was not extubated yesterday due to high Fio2. Problem list:     Problem list:  Hospital Problems  Date Reviewed: 3/15/2021          Codes Class Noted POA    * (Principal) S/P CABG x 4 ICD-10-CM: Z95.1  ICD-9-CM: V45.81  2021 Unknown    Overview Signed 2021  1:29 PM by TRACY Lopez     x 4, LIMA to LAD, RSVG to Diag, RSVG to OM, RSVG to PDA             CAD (coronary artery disease) ICD-10-CM: I25.10  ICD-9-CM: 414.00  2021 Unknown        NSTEMI (non-ST elevated myocardial infarction) [facility]) ICD-10-CM: I21.4  ICD-9-CM: 410.70  2021 Unknown              Assessment/Plan:     Post cardiac surgery care: S/P CABGx4.  - Evaluation of postoperative labs and imaging studies have been performed. - Keep on full ventilatory support for the time being and wean as tolerated when more awake. - If unable to wean vent and patient requires increasing vent support will Rx with sedation and analgesia to facilitate vent synchrony.   - HOB elevated to 30 degrees with VAP bundle. - Hemodynamic management per CT surgery postoperative guidelines. - ASA and statin for CAD Rx when feasible. - When hemodynamically tolerated will also consider initiation of beta blockers. - Insulin for glycemic control.     Shock. Vasoplegic vs cardiogenic.  - Wean pressors and inotropes as able. - hemodynamic management in collaboration with CT surgery     Ventilator Management. - Keep on full ventilatory support for the time being and wean as tolerated when more awake.    - If unable to wean vent and patient requires increasing vent support will Rx with sedation and analgesia to facilitate vent synchrony.   - Post-extubation pulmonary toilette with adequate postop pain control.   - Early mobilization with PT/OT as able     Anemia secondary to acute blood loss  - Follow CT output for now. - Follow CBC   - Supportive transfusions if needed.      Thrombocytopenia  - follow CBC for now.      Prophylaxis  - DVT prophylaxis. Start [facility] when ok with CTS. - VAP bundle with chlorhexidine     Code status: full code.        40min of CC time spent without overlap and excluding procedures.                      Review of Systems:   Review of systems not obtained due to patient factors. Vital Signs:     Patient Vitals for the past 4 hrs:   BP Temp Pulse Resp SpO2   11/20/21 0800 (!) 110/44 99.3 °F (37.4 °C) 80 15 94 %   11/20/21 0748 -- -- 80 14 94 %   11/20/21 0700 (!) 112/45 99.4 °F (37.4 °C) 80 13 94 %   11/20/21 0600 (!) 115/47 99.3 °F (37.4 °C) 80 16 95 %   11/20/21 0500 (!) 110/46 99.2 °F (37.3 °C) 80 16 94 %        Intake/Output Summary (Last 24 hours) at 11/20/2021 0849  Last data filed at 11/20/2021 0800  Gross per 24 hour   Intake 4898.43 ml   Output 2523 ml   Net 2375.43 ml        Physical Examination:    Physical Exam  Constitutional:       Comments: Intubated, sedated but wakes up and follows commands. HENT:      Head: Normocephalic and atraumatic. Mouth/Throat:      Mouth: Mucous membranes are moist.   Eyes:      Extraocular Movements: Extraocular movements intact. Conjunctiva/sclera: Conjunctivae normal.   Cardiovascular:      Rate and Rhythm: Normal rate and regular rhythm. Pulmonary:      Effort: Pulmonary effort is normal. No respiratory distress. Breath sounds: Normal breath sounds. Abdominal:      General: Abdomen is flat. There is no distension. Palpations: Abdomen is soft. Tenderness: There is no abdominal tenderness. There is no guarding. Musculoskeletal:      Cervical back: Normal range of motion and neck supple. Labs:   Labs and imaging reviewed.       Medications:     Current Facility-Administered Medications   Medication Dose Route Frequency    atorvastatin (LIPITOR) tablet 80 mg  80 mg Oral QHS    alteplase (CATHFLO) 1 mg in sterile water (preservative free) 1 mL injection  1 mg InterCATHeter PRN    bacitracin 500 unit/gram packet 1 Packet  1 Packet Topical PRN    sodium chloride (NS) flush 5-40 mL  5-40 mL IntraVENous Q8H    sodium chloride (NS) flush 5-40 mL  5-40 mL IntraVENous PRN    0.45% sodium chloride infusion  10 mL/hr IntraVENous CONTINUOUS    0.9% sodium chloride infusion  9 mL/hr IntraVENous CONTINUOUS    acetaminophen (TYLENOL) tablet 650 mg  650 mg Oral Q4H    oxyCODONE IR (ROXICODONE) tablet 5 mg  5 mg Oral Q4H PRN    oxyCODONE IR (ROXICODONE) tablet 10 mg  10 mg Oral Q4H PRN    naloxone (NARCAN) injection 0.4 mg  0.4 mg IntraVENous PRN    mupirocin (BACTROBAN) 2 % ointment   Both Nostrils BID    amiodarone (CORDARONE) tablet 400 mg  400 mg Oral Q12H    ondansetron (ZOFRAN) injection 4 mg  4 mg IntraVENous Q4H PRN    albuterol (PROVENTIL VENTOLIN) nebulizer solution 2.5 mg  2.5 mg Nebulization Q4H PRN    aspirin chewable tablet 81 mg  81 mg Oral DAILY    midazolam (VERSED) injection 1 mg  1 mg IntraVENous Q1H PRN    chlorhexidine (PERIDEX) 0.12 % mouthwash 10 mL  10 mL Oral Q12H    magnesium oxide (MAG-OX) tablet 400 mg  400 mg Oral BID    calcium chloride 1 g in 0.9% sodium chloride 250 mL IVPB  1 g IntraVENous PRN    bisacodyL (DULCOLAX) suppository 10 mg  10 mg Rectal DAILY PRN    senna-docusate (PERICOLACE) 8.6-50 mg per tablet 1 Tablet  1 Tablet Oral BID    polyethylene glycol (MIRALAX) packet 17 g  17 g Oral DAILY    ELECTROLYTE REPLACEMENT NOTE: Nurse to review Serum Potassium and Magnesuim levels and Initiate Electrolyte Replacement Protocol as needed  1 Each Other PRN    magnesium sulfate 1 g/100 ml IVPB (premix or compounded)  1 g IntraVENous PRN    insulin regular (NOVOLIN R, HUMULIN R) 100 Units in 0.9% sodium chloride 100 mL infusion  1-50 Units/hr IntraVENous TITRATE    glucose chewable tablet 16 g  4 Tablet Oral PRN    dextrose (D50W) injection syrg 12.5-25 g  12.5-25 g IntraVENous PRN    glucagon (GLUCAGEN) injection 1 mg  1 mg IntraMUSCular PRN    insulin lispro (HUMALOG) injection   SubCUTAneous TIDAC    [START ON 11/21/2021] insulin lispro (HUMALOG) injection   SubCUTAneous AC&HS    insulin glargine (LANTUS) injection 1-50 Units  1-50 Units SubCUTAneous ONCE PRN    diphenhydrAMINE (BENADRYL) capsule 25 mg  25 mg Oral Q6H PRN    diphenhydrAMINE (BENADRYL) injection 25 mg  25 mg IntraVENous Q6H PRN    melatonin tablet 3 mg  3 mg Oral QHS PRN    pantoprazole (PROTONIX) tablet 40 mg  40 mg Oral ACB    potassium chloride 20 mEq in 50 ml IVPB  20 mEq IntraVENous Q2H PRN    potassium chloride 20 mEq in 50 ml IVPB  20 mEq IntraVENous Q2H PRN    heparin (porcine) 1,000 unit/mL injection    PRN    papaverine injection    PRN    ceFAZolin (ANCEF) 2 g in sterile water (preservative free) 20 mL IV syringe  2 g IntraVENous Q8H    fentaNYL (PF) 1,500 mcg/30 mL (50 mcg/mL) infusion  0-25 mcg/hr IntraVENous TITRATE    DOBUTamine (DOBUTREX) 500 mg/250 mL (2,000 mcg/mL) infusion  0-10 mcg/kg/min IntraVENous TITRATE    amiodarone (CORDARONE) 900 mg/250 ml D5W infusion  0.5-1 mg/min IntraVENous TITRATE    dexmedeTOMidine in 0.9 % NaCl (PRECEDEX) 400 mcg/100 mL (4 mcg/mL) infusion soln  0.1-1.5 mcg/kg/hr IntraVENous TITRATE    PHENYLephrine (MACK-SYNEPHRINE) 30 mg in 0.9% sodium chloride 250 mL infusion   mcg/min IntraVENous TITRATE    niCARdipine (CARDENE) 25 mg in 0.9% sodium chloride 250 mL infusion  0-15 mg/hr IntraVENous TITRATE    ALPRAZolam (XANAX) tablet 0.5 mg  0.5 mg Oral PRN    calcium-vitamin D (OS-MARINA +D3) 500 mg-200 unit per tablet 1 Tablet  1 Tablet Oral DAILY     ______________________________________________________________________  EXPECTED LENGTH OF STAY: 2d 12h  ACTUAL LENGTH OF STAY:          6                 Veronika Oliva MD Pulmonary/CCM  [facility]  [phone]  11/20/2021

## 2021-11-20 NOTE — PROGRESS NOTES
1115 - Patient currently on SBT. SPO2 92%. Respirations even and unlabored. Patient appears drowsy but eyes open to voice, follows commands. 1137 - RT at bedside. Patient extubated to Bipap. 1200 - Patient observed waving RUE in air. RN approached the patient to provide assistance. Patient requests water. Patient placed on 6L NC and provided sips of water through a straw. Patient took several sips of water by straw without incident. 1215 - Patient placed back on Bipap. 1710 - Pacer rate lowered to 60.   HR 72, NSR.

## 2021-11-20 NOTE — PROGRESS NOTES
Occupational Therapy:    Orders received and chart reviewed. Patient s/p CABG x 4, POD #1. Pt remains intubated and sedated. Will hold therapy and continue to follow once appropriate.      Thank you,    Angelo Mims, OTR/L

## 2021-11-20 NOTE — PROGRESS NOTES
1900 - Bedside shift change report given to Tony Perez RN (oncoming nurse) by Moreno De Luna RN (offgoing nurse). Report included the following information SBAR, Kardex, Intake/Output, MAR, Recent Results and Cardiac Rhythm Atrial paced. 2000 - Shift assessment complete, see flowsheets for details. Pt currently intubated, eyes open to voice and moves extremities. Precedex currently off. Currently on spontaneous mode on ventilator. Midline incision present with Prevena negative pressure dressing. 3 sofya drains CT in place draining sanguinous output. Ruiz in place draining yellow/clear output. Ace wrap present on L leg. Pulses palpable in all extremities. Lines:  - R IJ [place]  - PIV x 2  - R radial A-line    Drips:  Cardene  - 2 mg/hr  Insulin - 2.3 units/hr  0.45 NS - 10 mL/hr    2045 - Repeat ABG complete, results called to Dr. Iman Bennett. Pt also grimacing, Behavioral pain scale 7, and biting ETT. No orders for IV pain medication (pt still intubated with no route for PO). Dr. Valerio Sessions advised a one time dose of fentanyl and to consult with CCU NP. Ej Simental NP notified, order received for PCA continuous dose of Fentanyl. Asked for one time trial dose of 25 mcg Fentanyl to start, NP agreed and orders placed. Will repeat ABG in 2 hours. 2105 - 25 mcg Fentanyl administered. 2125 - CVP 3-5mmHg and CI drifting from 1.9-2.1. 12.5 g Albumin administered. 2200 - Pt resting comfortably, synchronous with ventilator. When asked if in pain, pt shaking head \"no\". 2238 - Repeat ABG drawn    2250 - Repeat ABG results called to TATIANNA Jones NP. Orders to keep pt intubated over night and start PCA continuous fentanyl at 12.5 mcg/hr. Will plan for SBT in morning. 2338 - CVP 4-6mmHg and U/O dropped from 60-80 mL/hr to 30-40 mL/hr. 12.5 g Albumin administered. 0000 - Reassessment complete, see flowsheets for changes. Lung sounds bilaterally coarse and diminished in the bases.  Pt resting comfortably in bed, when asked if in pain pt shakes head \"no\". 0345 - Morning EKG completed. 0400 - Reassessment complete, see flowsheets for changes. Lung sounds bilaterally clear and diminished in the bases. Morning labs drawn and sent. 0413 - CVP 2-3mmHg U/O dropped from 60 mL/hr to 20-30 mL/hr. 12.5g Albumin administered. 3476 - Repeat ABG drawn. 0430 - Repeat ABG results called to TATIANNA Jones NP. No new orders at present. 0600 -  END OF SHIFT SUMMARY    Drips: Cardene 2.5 mg/hr,    Telemetry: atrial paced    Hemodynamics:   - PAP 34/16 (24)  - SVO2 52  - CO 4.7  - CI 2.4  - CVP 9    UOP: 578 mL /12hr    CT: 330 mL/12hr    I/O 24hr: 2825.4 mL positive    Pertinent labs: mag 1.7, Mag 1g IVBP administered. Pain control: moderate     Pertinent physical assessment: Lung sounds diminished. Friction rub heard upon heart sound auscultation     0630 - Order received for 20 mg IV Lasix from TATIANNA Jones NP    0700 - Bedside shift change report given to Ellen Loyola RN (oncoming nurse) by Yesica Gates RN (offgoing nurse). Report included the following information SBAR, Kardex, Intake/Output, MAR, Recent Results and Cardiac Rhythm Atrial paced.

## 2021-11-20 NOTE — PROGRESS NOTES
Physical therapy:    Orders received and chart reviewed. Patient s/p CABG x 4, POD #1. Pt remains intubated and sedated. Will hold therapy and continue to follow once appropriate.  Thank you    Stefan Peguero, PT, DPT

## 2021-11-20 NOTE — OP NOTES
[address]  OPERATIVE REPORT    Name:  Yumiko Moreno  MR#:  [id number]  :  1935  ACCOUNT #:  [de-identified]  DATE OF SERVICE:  2021      PREOPERATIVE DIAGNOSES:  1. Severe multivessel coronary artery disease. 2.  Diabetes. 3.  Hypertension. 4.  Giant paraesophageal hernia. 5.  Chronic kidney disease, stage III. Creatinine of 1.64. POSTOPERATIVE DIAGNOSES:  1. Severe multivessel coronary artery disease. 2.  Diabetes. 3.  Hypertension. 4.  Giant paraesophageal hernia. 5.  Chronic kidney disease, stage III. Creatinine of 1.64. PROCEDURE PERFORMED:  1. Coronary artery bypass grafting x4:  Reverse saphenous vein graft to diagonal 1, reverse saphenous vein graft to OM1, reverse saphenous vein graft to PDA, left internal mammary artery to LAD. 2.  Endoscopic harvest of the saphenous vein. 3.  Epiaortic ultrasound. SURGEON:  Juju Bauer MD    ASSISTANT:  Maite Kirkpatrick PA-C. The assistance of the PA was required due to the critical nature of the surgery. ANESTHESIA:  General endotracheal.    ANESTHESIOLOGIST:  Mireille Grimm. COMPLICATIONS:  None. SPECIMENS REMOVED:  None. IMPLANTS:  None. ESTIMATED BLOOD LOSS:  500 mL. DETAILS:  The patient is an 16-year-old woman who was found to have an acute coronary syndrome. She was found on catheterization to have several proximal RCA and left main disease. She was referred by Dr. Lili Tinoco for coronary artery bypass grafting. PROCEDURE:  She was prepped and draped in a sterile fashion. A time-out was performed. An incision was made over the sternum and median sternotomy was performed. The left hemisternum was elevated. The left internal mammary artery was dissected free from the left chest wall, large and pulsatile wrapped in papaverine-soaked sponge and heparin was administered for cardiopulmonary bypass. Simultaneously, the PA began endoscopic harvest of the saphenous vein.   A sternal retractor was then placed. The pericardium was opened widely and laterally. The ascending aorta was free of calcium or disease, inspected with my finger as well as an epiaortic ultrasound probe. We then proceeded with aortic and right atrial cannulation. We went on cardiopulmonary bypass. When we had a therapeutic ACT, we then applied the aortic cross clamp. We delivered antegrade cardioplegia and we had a prompt arrest.  I turned my attention to the PDA. This was a moderate-to-large healthy vessel to perform the anastomosis. We had no issues with this. We then turned our attention to the diagonal artery. This was intramyocardial midway down and we found a good place to perform our anastomosis and we had no issue with this. We then turned our attention to the OM, this was just below where the left atrial appendage was. We had no issues with this anastomosis. We then performed a LIMA to LAD. This is a large vessel. We had no problems with this anastomosis. We then performed our three proximal vein grafts to the proximal ascending aorta. We then removed the aortic cross-clamp. We then weaned from cardiopulmonary bypass. We placed atrial and ventricular pacing wires. We dried up our surgical sites. We placed two mediastinal chest tubes and left pleural chest tube. We administered protamine. We decannulated. We placed numerous stainless steel wires and closed the soft tissue in multiple layers. The patient was then safely transferred to the CCU. The PA was critical for endoscopic harvest of the saphenous vein, cannulation, construction of each anastomosis, decannulation, hemostasis, and drying up the wound and then closure of the wound.       MD GRACE [email or web address]  D:  11/19/2021 14:01  T:  11/19/2021 22:42  JOB #:  [id number]

## 2021-11-20 NOTE — PROGRESS NOTES
POD1 s/p CABGx4    Remained intubated overnight due to hypoxemia  IV diuresis this AM  Labs and hemodynamcis look good - CXR wet  Low vent settings    Discussed with Dr. Beto Sanford option of weaning to extubate to bipap.  Will follow closely

## 2021-11-21 ENCOUNTER — APPOINTMENT (OUTPATIENT)
Dept: GENERAL RADIOLOGY | Age: 86
DRG: 233 | End: 2021-11-21
Attending: PHYSICIAN ASSISTANT
Payer: MEDICARE

## 2021-11-21 ENCOUNTER — APPOINTMENT (OUTPATIENT)
Dept: GENERAL RADIOLOGY | Age: 86
DRG: 233 | End: 2021-11-21
Attending: INTERNAL MEDICINE
Payer: MEDICARE

## 2021-11-21 LAB
ADMINISTERED INITIALS, ADMINIT: NORMAL
ALBUMIN SERPL-MCNC: 3.1 G/DL (ref 3.5–5)
ALBUMIN/GLOB SERPL: 1.2 {RATIO} (ref 1.1–2.2)
ALP SERPL-CCNC: 29 U/L (ref 45–117)
ALT SERPL-CCNC: 10 U/L (ref 12–78)
ANION GAP SERPL CALC-SCNC: 10 MMOL/L (ref 5–15)
ANION GAP SERPL CALC-SCNC: 8 MMOL/L (ref 5–15)
AST SERPL-CCNC: 21 U/L (ref 15–37)
ATRIAL RATE: 73 BPM
BILIRUB SERPL-MCNC: 0.8 MG/DL (ref 0.2–1)
BUN SERPL-MCNC: 43 MG/DL (ref 6–20)
BUN SERPL-MCNC: 47 MG/DL (ref 6–20)
BUN/CREAT SERPL: 21 (ref 12–20)
BUN/CREAT SERPL: 23 (ref 12–20)
CALCIUM SERPL-MCNC: 8.4 MG/DL (ref 8.5–10.1)
CALCIUM SERPL-MCNC: 8.7 MG/DL (ref 8.5–10.1)
CALCULATED P AXIS, ECG09: 64 DEGREES
CALCULATED R AXIS, ECG10: 36 DEGREES
CALCULATED T AXIS, ECG11: 23 DEGREES
CHLORIDE SERPL-SCNC: 106 MMOL/L (ref 97–108)
CHLORIDE SERPL-SCNC: 108 MMOL/L (ref 97–108)
CO2 SERPL-SCNC: 22 MMOL/L (ref 21–32)
CO2 SERPL-SCNC: 23 MMOL/L (ref 21–32)
CREAT SERPL-MCNC: 2.02 MG/DL (ref 0.55–1.02)
CREAT SERPL-MCNC: 2.06 MG/DL (ref 0.55–1.02)
D50 ADMINISTERED, D50ADM: 0 ML
D50 ORDER, D50ORD: 0 ML
DIAGNOSIS, 93000: NORMAL
ERYTHROCYTE [DISTWIDTH] IN BLOOD BY AUTOMATED COUNT: 15.7 % (ref 11.5–14.5)
GLOBULIN SER CALC-MCNC: 2.5 G/DL (ref 2–4)
GLSCOM COMMENTS: NORMAL
GLUCOSE BLD STRIP.AUTO-MCNC: 108 MG/DL (ref 65–117)
GLUCOSE BLD STRIP.AUTO-MCNC: 116 MG/DL (ref 65–117)
GLUCOSE BLD STRIP.AUTO-MCNC: 116 MG/DL (ref 65–117)
GLUCOSE BLD STRIP.AUTO-MCNC: 118 MG/DL (ref 65–117)
GLUCOSE BLD STRIP.AUTO-MCNC: 118 MG/DL (ref 65–117)
GLUCOSE BLD STRIP.AUTO-MCNC: 122 MG/DL (ref 65–117)
GLUCOSE BLD STRIP.AUTO-MCNC: 125 MG/DL (ref 65–117)
GLUCOSE BLD STRIP.AUTO-MCNC: 127 MG/DL (ref 65–117)
GLUCOSE BLD STRIP.AUTO-MCNC: 129 MG/DL (ref 65–117)
GLUCOSE BLD STRIP.AUTO-MCNC: 130 MG/DL (ref 65–117)
GLUCOSE BLD STRIP.AUTO-MCNC: 136 MG/DL (ref 65–117)
GLUCOSE BLD STRIP.AUTO-MCNC: 148 MG/DL (ref 65–117)
GLUCOSE SERPL-MCNC: 110 MG/DL (ref 65–100)
GLUCOSE SERPL-MCNC: 140 MG/DL (ref 65–100)
GLUCOSE, GLC: 108 MG/DL
GLUCOSE, GLC: 116 MG/DL
GLUCOSE, GLC: 116 MG/DL
GLUCOSE, GLC: 118 MG/DL
GLUCOSE, GLC: 118 MG/DL
GLUCOSE, GLC: 122 MG/DL
GLUCOSE, GLC: 125 MG/DL
GLUCOSE, GLC: 127 MG/DL
GLUCOSE, GLC: 129 MG/DL
GLUCOSE, GLC: 130 MG/DL
GLUCOSE, GLC: 136 MG/DL
GLUCOSE, GLC: 148 MG/DL
HCT VFR BLD AUTO: 25.8 % (ref 35–47)
HGB BLD-MCNC: 8.2 G/DL (ref 11.5–16)
HIGH TARGET, HITG: 130 MG/DL
HIGH TARGET, HITG: 140 MG/DL
INSULIN ADMINSTERED, INSADM: 0.5 UNITS/HOUR
INSULIN ADMINSTERED, INSADM: 0.6 UNITS/HOUR
INSULIN ADMINSTERED, INSADM: 0.7 UNITS/HOUR
INSULIN ADMINSTERED, INSADM: 1.5 UNITS/HOUR
INSULIN ADMINSTERED, INSADM: 1.7 UNITS/HOUR
INSULIN ADMINSTERED, INSADM: 2.1 UNITS/HOUR
INSULIN ADMINSTERED, INSADM: 2.6 UNITS/HOUR
INSULIN ORDER, INSORD: 0.5 UNITS/HOUR
INSULIN ORDER, INSORD: 0.6 UNITS/HOUR
INSULIN ORDER, INSORD: 0.7 UNITS/HOUR
INSULIN ORDER, INSORD: 1.5 UNITS/HOUR
INSULIN ORDER, INSORD: 1.7 UNITS/HOUR
INSULIN ORDER, INSORD: 2.1 UNITS/HOUR
INSULIN ORDER, INSORD: 2.6 UNITS/HOUR
LOW TARGET, LOT: 100 MG/DL
LOW TARGET, LOT: 95 MG/DL
MAGNESIUM SERPL-MCNC: 2.1 MG/DL (ref 1.6–2.4)
MCH RBC QN AUTO: 29.8 PG (ref 26–34)
MCHC RBC AUTO-ENTMCNC: 31.8 G/DL (ref 30–36.5)
MCV RBC AUTO: 93.8 FL (ref 80–99)
MINUTES UNTIL NEXT BG, NBG: 120 MIN
MINUTES UNTIL NEXT BG, NBG: 60 MIN
MULTIPLIER, MUL: 0.01
MULTIPLIER, MUL: 0.02
MULTIPLIER, MUL: 0.03
NRBC # BLD: 0 K/UL (ref 0–0.01)
NRBC BLD-RTO: 0 PER 100 WBC
ORDER INITIALS, ORDINIT: NORMAL
P-R INTERVAL, ECG05: 192 MS
PLATELET # BLD AUTO: 146 K/UL (ref 150–400)
PMV BLD AUTO: 10.4 FL (ref 8.9–12.9)
POTASSIUM SERPL-SCNC: 3.7 MMOL/L (ref 3.5–5.1)
POTASSIUM SERPL-SCNC: 3.9 MMOL/L (ref 3.5–5.1)
PROT SERPL-MCNC: 5.6 G/DL (ref 6.4–8.2)
Q-T INTERVAL, ECG07: 412 MS
QRS DURATION, ECG06: 76 MS
QTC CALCULATION (BEZET), ECG08: 453 MS
RBC # BLD AUTO: 2.75 M/UL (ref 3.8–5.2)
SERVICE CMNT-IMP: ABNORMAL
SERVICE CMNT-IMP: NORMAL
SODIUM SERPL-SCNC: 136 MMOL/L (ref 136–145)
SODIUM SERPL-SCNC: 141 MMOL/L (ref 136–145)
VENTRICULAR RATE, ECG03: 73 BPM
WBC # BLD AUTO: 16.8 K/UL (ref 3.6–11)

## 2021-11-21 PROCEDURE — 94660 CPAP INITIATION&MGMT: CPT

## 2021-11-21 PROCEDURE — 97530 THERAPEUTIC ACTIVITIES: CPT | Performed by: OCCUPATIONAL THERAPIST

## 2021-11-21 PROCEDURE — 97164 PT RE-EVAL EST PLAN CARE: CPT

## 2021-11-21 PROCEDURE — 80053 COMPREHEN METABOLIC PANEL: CPT

## 2021-11-21 PROCEDURE — 97116 GAIT TRAINING THERAPY: CPT

## 2021-11-21 PROCEDURE — 71045 X-RAY EXAM CHEST 1 VIEW: CPT

## 2021-11-21 PROCEDURE — 82962 GLUCOSE BLOOD TEST: CPT

## 2021-11-21 PROCEDURE — 74011000250 HC RX REV CODE- 250: Performed by: PHYSICIAN ASSISTANT

## 2021-11-21 PROCEDURE — 97168 OT RE-EVAL EST PLAN CARE: CPT | Performed by: OCCUPATIONAL THERAPIST

## 2021-11-21 PROCEDURE — 83735 ASSAY OF MAGNESIUM: CPT

## 2021-11-21 PROCEDURE — 74011000250 HC RX REV CODE- 250: Performed by: THORACIC SURGERY (CARDIOTHORACIC VASCULAR SURGERY)

## 2021-11-21 PROCEDURE — 36415 COLL VENOUS BLD VENIPUNCTURE: CPT

## 2021-11-21 PROCEDURE — 85027 COMPLETE CBC AUTOMATED: CPT

## 2021-11-21 PROCEDURE — 74011250636 HC RX REV CODE- 250/636: Performed by: PHYSICIAN ASSISTANT

## 2021-11-21 PROCEDURE — 74011250637 HC RX REV CODE- 250/637: Performed by: PHYSICIAN ASSISTANT

## 2021-11-21 PROCEDURE — 74011250637 HC RX REV CODE- 250/637: Performed by: INTERNAL MEDICINE

## 2021-11-21 PROCEDURE — 74011250636 HC RX REV CODE- 250/636: Performed by: THORACIC SURGERY (CARDIOTHORACIC VASCULAR SURGERY)

## 2021-11-21 PROCEDURE — 65610000006 HC RM INTENSIVE CARE

## 2021-11-21 RX ORDER — HYDRALAZINE HYDROCHLORIDE 20 MG/ML
10-20 INJECTION INTRAMUSCULAR; INTRAVENOUS
Status: DISCONTINUED | OUTPATIENT
Start: 2021-11-21 | End: 2021-12-06 | Stop reason: HOSPADM

## 2021-11-21 RX ORDER — MAGNESIUM SULFATE 1 G/100ML
1 INJECTION INTRAVENOUS ONCE
Status: DISPENSED | OUTPATIENT
Start: 2021-11-21 | End: 2021-11-21

## 2021-11-21 RX ORDER — POTASSIUM CHLORIDE 20 MEQ/1
20 TABLET, EXTENDED RELEASE ORAL ONCE
Status: COMPLETED | OUTPATIENT
Start: 2021-11-21 | End: 2021-11-21

## 2021-11-21 RX ORDER — TRAMADOL HYDROCHLORIDE 50 MG/1
50 TABLET ORAL
Status: DISCONTINUED | OUTPATIENT
Start: 2021-11-21 | End: 2021-12-03

## 2021-11-21 RX ADMIN — MUPIROCIN: 20 OINTMENT TOPICAL at 09:21

## 2021-11-21 RX ADMIN — Medication 40 ML: at 05:42

## 2021-11-21 RX ADMIN — MUPIROCIN: 20 OINTMENT TOPICAL at 18:11

## 2021-11-21 RX ADMIN — Medication 400 MG: at 18:10

## 2021-11-21 RX ADMIN — DOCUSATE SODIUM 50MG AND SENNOSIDES 8.6MG 1 TABLET: 8.6; 5 TABLET, FILM COATED ORAL at 18:10

## 2021-11-21 RX ADMIN — Medication 10 ML: at 22:16

## 2021-11-21 RX ADMIN — CEFAZOLIN SODIUM 2 G: 1 INJECTION, POWDER, FOR SOLUTION INTRAMUSCULAR; INTRAVENOUS at 04:10

## 2021-11-21 RX ADMIN — Medication 400 MG: at 09:14

## 2021-11-21 RX ADMIN — ALPRAZOLAM 0.5 MG: 0.5 TABLET ORAL at 23:38

## 2021-11-21 RX ADMIN — AMIODARONE HYDROCHLORIDE 400 MG: 200 TABLET ORAL at 09:14

## 2021-11-21 RX ADMIN — OXYCODONE 10 MG: 5 TABLET ORAL at 09:14

## 2021-11-21 RX ADMIN — OXYCODONE 5 MG: 5 TABLET ORAL at 07:10

## 2021-11-21 RX ADMIN — DOCUSATE SODIUM 50MG AND SENNOSIDES 8.6MG 1 TABLET: 8.6; 5 TABLET, FILM COATED ORAL at 09:14

## 2021-11-21 RX ADMIN — PANTOPRAZOLE SODIUM 40 MG: 40 TABLET, DELAYED RELEASE ORAL at 09:13

## 2021-11-21 RX ADMIN — MAGNESIUM SULFATE HEPTAHYDRATE 1 G: 1 INJECTION, SOLUTION INTRAVENOUS at 05:39

## 2021-11-21 RX ADMIN — ATORVASTATIN CALCIUM 80 MG: 40 TABLET, FILM COATED ORAL at 22:16

## 2021-11-21 RX ADMIN — ONDANSETRON 4 MG: 2 INJECTION INTRAMUSCULAR; INTRAVENOUS at 22:15

## 2021-11-21 RX ADMIN — CHLORHEXIDINE GLUCONATE 0.12% ORAL RINSE 10 ML: 1.2 LIQUID ORAL at 20:39

## 2021-11-21 RX ADMIN — OXYCODONE 10 MG: 5 TABLET ORAL at 13:28

## 2021-11-21 RX ADMIN — POTASSIUM CHLORIDE 20 MEQ: 20 TABLET, EXTENDED RELEASE ORAL at 09:14

## 2021-11-21 RX ADMIN — CEFAZOLIN SODIUM 2 G: 1 INJECTION, POWDER, FOR SOLUTION INTRAMUSCULAR; INTRAVENOUS at 12:49

## 2021-11-21 RX ADMIN — Medication 10 ML: at 18:11

## 2021-11-21 RX ADMIN — CHLORHEXIDINE GLUCONATE 0.12% ORAL RINSE 10 ML: 1.2 LIQUID ORAL at 09:00

## 2021-11-21 RX ADMIN — TRAMADOL HYDROCHLORIDE 50 MG: 50 TABLET, COATED ORAL at 20:15

## 2021-11-21 RX ADMIN — ASPIRIN 81 MG CHEWABLE TABLET 81 MG: 81 TABLET CHEWABLE at 09:14

## 2021-11-21 RX ADMIN — AMIODARONE HYDROCHLORIDE 400 MG: 200 TABLET ORAL at 20:39

## 2021-11-21 RX ADMIN — ONDANSETRON 4 MG: 2 INJECTION INTRAMUSCULAR; INTRAVENOUS at 13:03

## 2021-11-21 RX ADMIN — Medication 1 TABLET: at 09:14

## 2021-11-21 RX ADMIN — TRAMADOL HYDROCHLORIDE 50 MG: 50 TABLET, COATED ORAL at 15:06

## 2021-11-21 NOTE — PROGRESS NOTES
Problem: Mobility Impaired (Adult and Pediatric)  Goal: *Acute Goals and Plan of Care (Insert Text)  Description: FUNCTIONAL STATUS PRIOR TO ADMISSION: Patient was independent and active without use of DME however began using rolling walker while in hospital prior to cardiac surgery. Reports that she has been having difficulty with endurance and can only do a little of activity before needing to take a break. Reports \"nighttime weakness\" but no falls at home. HOME SUPPORT PRIOR TO ADMISSION: The patient lived alone with son in the area to provide assistance. Physical Therapy Goals  Initiated 11/21/2021  1. Patient will move from supine to sit and sit to supine , scoot up and down, and roll side to side in bed with contact guard assist within 5 days. 2.  Patient will perform sit to/from stand with contact guard assist within 5 days. 3.  Patient will ambulate 100 feet with least restrictive assistive device and contact guard assist within 5 days. 4.  Patient will ascend/descend 3 stairs with 1 handrail(s) with contact guard assistance within 7 day(s). 5.  Patient will perform cardiac exercises per protocol with supervision/set-up within 5 days. 6.  Patient will verbally and functionally demonstrate 3/3 sternal precautions without cues within 5 days. Initiated 11/15/2021  1. Patient will move from supine to sit and sit to supine  in bed with modified independence within 7 day(s). 2.  Patient will transfer from bed to chair and chair to bed with modified independence using the least restrictive device within 7 day(s). 3.  Patient will perform sit to stand with modified independence within 7 day(s). 4.  Patient will ambulate with modified independence for 250 feet with the least restrictive device within 7 day(s). 5.  Patient will ascend/descend 3 stairs with 1 handrail(s) with modified independence within 7 day(s).      Outcome: Progressing Towards Goal   PHYSICAL THERAPY REEVALUATION  Patient: Reddy Ruff (89 y.o. female)  Date: 11/21/2021  Primary Diagnosis: NSTEMI (non-ST elevated myocardial infarction) ([facility] [address] [I21.4]  CAD (coronary artery disease) [I25.10]  Procedure(s) (LRB):  CORONARY ARTERY BYPASS GRAFT X4 LIMA, [facility] VIA EVH. ECC. EPI AORTIC US BY DR Nela Arriola. (N/A) 2 Days Post-Op   Precautions:   Sternal      ASSESSMENT  Based on the objective data described below, the patient presents with grossly decreased strength, poor activity tolerance, decreased endurance, increased pain levels, impaired sitting and standing balance, drowsiness, and overall impaired functional mobility. Pt extubated to 10L O2, cleared for participation with therapy. Pt required maxAx2 throughout all mobility, including bed mobility, sit<>stand transfer, and brief ambulation w/ HHA. Pt with strong posterior lean in addition to increased trunk flexion. MaxA required for lateral weight shift in order to facilitate advancement of BLEs during gait. Pt with significant fatigue following brief ambulation trial, unable to tolerate performance of cardiac exercises. Pt also would benefit from re-education re: \"move in the tube\" precautions, question retention of information given drowsiness/fatigue this date. O2 sats 87-90% on 10L throughout, all other VSS. Current Level of Function Impacting Discharge (mobility/balance): maxAx2    Functional Outcome Measure: The patient scored 10/100 on the Barthel Index outcome measure which is indicative of significant impairments in ADLs and functional mobility. Other factors to consider for discharge: s/p CABG, lives alone, respiratory status, pain levels     Patient will benefit from skilled therapy intervention to address the above noted impairments.        PLAN :  Recommendations and Planned Interventions: bed mobility training, transfer training, gait training, therapeutic exercises, patient and family training/education, and therapeutic activities Frequency/Duration: Patient will be followed by physical therapy:  daily to address goals. Recommendation for discharge: (in order for the patient to meet his/her long term goals)  To be determined: pending hospital course/progress with therapy however pt likely to need rehab at discharge    This discharge recommendation:  Has been made in collaboration with the attending provider and/or case management    Equipment recommendations for successful discharge (if) home: patient owns DME required for discharge, including SPC and RW         SUBJECTIVE:   Patient stated Am I ever going to get out of this bed?     OBJECTIVE DATA SUMMARY:   HISTORY:    Past Medical History:   Diagnosis Date    Anxiety     Hypertension      Past Surgical History:   Procedure Laterality Date    HX CORONARY ARTERY BYPASS GRAFT  11/19/2021    x 4, LIMA to LAD, RSVG to Diag, RSVG to OM, RSVG to PDA    HX KNEE REPLACEMENT Right 2011    HX KNEE REPLACEMENT Left 2013     Hospital course since last seen and reason for reevaluation: Pt s/p CABG    Personal factors and/or comorbidities impacting plan of care: HTN, anxiety    Home Situation  Home Environment: Private residence  # Steps to Enter: 3  Rails to Enter: Yes  Hand Rails : Bilateral  One/Two Story Residence: One story  Living Alone: Yes  Support Systems: Child(peggy)  Patient Expects to be Discharged to[de-identified] House  Current DME Used/Available at Home: Cane, straight, Walker, rolling  Tub or Shower Type: Shower    EXAMINATION/PRESENTATION/DECISION MAKING:   Critical Behavior:  Neurologic State: Drowsy  Orientation Level: Oriented X4  Cognition: Follows commands  Safety/Judgement: Awareness of environment, Fall prevention, Insight into deficits  Hearing:   Auditory  Auditory Impairment: None  Skin:  dressing clean, dry, intact  Edema: BLEs, BUEs  Range Of Motion:  AROM: Grossly decreased, non-functional                       Strength:    Strength: Grossly decreased, non-functional Tone & Sensation:   Tone: Normal                              Coordination:  Coordination: Grossly decreased, non-functional  Vision:      Functional Mobility:  Bed Mobility:  Rolling: Maximum assistance; Assist x2  Supine to Sit: Maximum assistance; Assist x2     Scooting: Maximum assistance; Assist x2  Transfers:  Sit to Stand: Maximum assistance; Assist x2           Bed to Chair: Maximum assistance; Assist x2              Balance:   Sitting: Impaired  Sitting - Static: Good (unsupported)  Sitting - Dynamic: Fair (occasional)  Standing: Impaired; With support  Standing - Static: Fair  Standing - Dynamic : Poor  Ambulation/Gait Training:  Distance (ft): 2 Feet (ft)  Assistive Device: Gait belt (HHA)  Ambulation - Level of Assistance: Maximum assistance; Assist x2        Gait Abnormalities: Decreased step clearance; Shuffling gait; Trunk sway increased        Base of Support: Widened     Speed/Jes: Slow; Shuffled  Step Length: Right shortened; Left shortened                      Therapeutic Exercises:   Pt unable to tolerate performance of cardiac exercises this date. Functional Measure:  Barthel Index:    Bathin  Bladder: 0  Bowels: 0  Groomin  Dressin  Feedin  Mobility: 0  Stairs: 0  Toilet Use: 5  Transfer (Bed to Chair and Back): 5  Total: 10/100       The Barthel ADL Index: Guidelines  1. The index should be used as a record of what a patient does, not as a record of what a patient could do. 2. The main aim is to establish degree of independence from any help, physical or verbal, however minor and for whatever reason. 3. The need for supervision renders the patient not independent. 4. A patient's performance should be established using the best available evidence. Asking the patient, friends/relatives and nurses are the usual sources, but direct observation and common sense are also important. However direct testing is not needed.   5. Usually the patient's performance over the preceding 24-48 hours is important, but occasionally longer periods will be relevant. 6. Middle categories imply that the patient supplies over 50 per cent of the effort. 7. Use of aids to be independent is allowed. Score Interpretation (from [address] [facility])    Independent   60-79 Minimally independent   40-59 Partially dependent   20-39 Very dependent   <20 Totally dependent     -Juany Perera., Barthel, D.W. (1965). Functional evaluation: the Barthel Index. [address] ([address] [place] [address] (1997). The Barthel activities of daily living index: self-reporting versus actual performance in the old (> or = 75 years). Journal of [address] [place] [address] 45(7), 14 [facility], JRAULF, Alyssa Young., Dennis Escudero. (1999). Measuring the change in disability after inpatient rehabilitation; comparison of the responsiveness of the Barthel Index and Functional [place] Measure. Journal of Neurology, Neurosurgery, and Psychiatry, 66(4), 598-984. QUIANA Ochoa, LUCINDA Timmons, & Jorden Homans, MTHA. (2004) Assessment of post-stroke quality of life in cost-effectiveness studies: The usefulness of the Barthel Index and the EuroQoL-5D. Quality of Life Research, 13, 889-97             Pain Rating:  Unable to rate pain    Activity Tolerance:   O2 sats 87-90% on 10L O2 throughout, BP stable    After treatment patient left in no apparent distress:   Sitting in chair and Call bell within reach    COMMUNICATION/EDUCATION:   The patients plan of care was discussed with: Occupational therapist and Registered nurse. Fall prevention education was provided and the patient/caregiver indicated understanding., Patient/family have participated as able in goal setting and plan of care. , and Patient/family agree to work toward stated goals and plan of care.     Thank you for this referral.  Mere Collado, PT, DPT   Time Calculation: 21 mins

## 2021-11-21 NOTE — PROGRESS NOTES
Problem: Self Care Deficits Care Plan (Adult)  Goal: *Acute Goals and Plan of Care (Insert Text)  Description:   FUNCTIONAL STATUS PRIOR TO ADMISSION:  Pt lives alone, but reports having a supportive family and Denominational family. Pt states that she has been independent in adls and IADLs, including driving, shopping, preparing meals, gardening etc.      HOME SUPPORT: The patient lived alone with family and Denominational family  to provide assistance. Occupational Therapy Goals  Re-eval 11/21/2021 s/p CABG    1. Patient will perform grooming with minimal assistance within 7 days. 2. Patient will perform upper body dressing with moderate assistance  within 7 days. 3. Patient will perform lower body dressing with max assistance  within 7 days. 4. Patient will tolerate 3 minutes of UE cardiac 6 pack exercises within 7 days. 5. Patient will perform toileting max assist within 7 days. 6. Patient will transfer from bedside commode with minimal assistance using the least restrictive device and appropriate durable medical equipment within 7 days. Discontinue all below goals 11/21/2021  Initiated 11/15/2021  1. Patient will perform grooming in stanidng with supervision within 7 day(s). 2.  Patient will perform bathing with supervision within 7 day(s). 3.  Patient will perform upper body dressing and lower body dressing with supervision within 7 day(s). 4.  Patient will perform toilet transfers with supervision within 7 day(s). 5.  Patient will perform all aspects of toileting with independence within 7 day(s). 6.  Patient will participate in upper extremity therapeutic exercise/activities with supervision/set-up for 8 minutes within 7 day(s). 7.  Patient will utilize energy conservation techniques during functional activities with verbal cues within 7 day(s). 8.  Patient will perform standing adls for at least 8 minutes within 7 days.     Outcome: Progressing Towards Goal   OCCUPATIONAL THERAPY RE-EVALUATION  Patient: Josiane Ruth (84 y.o. female)  Date: 11/21/2021  Diagnosis: NSTEMI (non-ST elevated myocardial infarction) ([facility] [address] [I21.4]  CAD (coronary artery disease) [I25.10]   S/P CABG x 4  Procedure(s) (LRB):  CORONARY ARTERY BYPASS GRAFT X4 LIMA, [facility] VIA EVH. ECC. EPI AORTIC US BY DR Lopez [facility]. (N/A) 2 Days Post-Op  Precautions: Sternal (move in the tube)  Chart, occupational therapy assessment, plan of care, and goals were reviewed. ASSESSMENT  Based on the objective data described below, non functional BUE, general weakness, fatigue and decreased O2 sats with minimal activity. All other vitals were stable. Pt was on 10L NC this session and O2 sat was 85-88% with activity and 90-91% at rest.  Pt had prolonged intubation after CABG surgery resulting in significant weakness. Pt needs therapist to assist with functional motion of BUE. She reports increased pain in left shoulder due to chronic bursitis. Cues needed for pt to stay awake during session and to interact. Hand over hand assist needed to bring cup with straw to mouth and R hand is more impaired than left. Two assist needed for sit to stand and stand pivot transfer with moderate hand held assist. She requires total assist for ADLs at this time. Pt will need rehab at discharge. Current Level of Function Impacting Discharge (ADLs): max assist x for bed mobility and stand pivot transfers without assist devices    Other factors to consider for discharge: was living alone and independent prior to hospital stay         PLAN :  Recommendations and Planned Interventions: self care training, functional mobility training, therapeutic exercise, balance training, therapeutic activities, endurance activities, patient education, home safety training, and family training/education    Frequency/Duration: Patient will be followed by occupational therapy 5 times a week to address goals.     Recommend with staff: assist with AAROM, assist with feeding, two assist for transfers    Recommend next OT session: ROM BUE, education on precautions, UB ADLS and transfers    Recommendation for discharge: (in order for the patient to meet his/her long term goals)  Therapy up to 5 days/week in SNF setting vs. IPR pending improved activity tolerance     This discharge recommendation:  Has not yet been discussed the attending provider and/or case management    Equipment recommendations for successful discharge (if) home: needs rehab       SUBJECTIVE:   Patient stated Davide Huertas I ever be able to get out of this bed? I feel like I am going to pass out.     OBJECTIVE DATA SUMMARY:   Hospital course since last seen and reason for reevaluation: had cardiac cath and deemed that she needed CABG which has now been performed. Prolonged intubation after surgery. Cognitive/Behavioral Status:  Neurologic State: Drowsy  Orientation Level: Oriented X4  Cognition: Decreased attention/concentration; Decreased command following  Perception: Cues to maintain midline in sitting; Cues to maintain midline in standing  Perseveration: No perseveration noted  Safety/Judgement: Fall prevention      Edema: severe but (BUE propped on pillows for edema control)    Hearing:   Auditory  Auditory Impairment: None      Range of Motion:    AROM: Grossly decreased, non-functional                         Strength:    Strength: Grossly decreased, non-functional                Coordination:  Coordination: Grossly decreased, non-functional  Fine Motor Skills-Upper: Left Impaired; Right Impaired    Gross Motor Skills-Upper: Left Impaired; Right Impaired    Tone & Sensation:    Tone: Normal                           Functional Mobility and Transfers for ADLs:  Bed Mobility:  Rolling: Maximum assistance; Assist x2  Supine to Sit: Maximum assistance; Assist x2  Scooting: Maximum assistance; Assist x2    Transfers:  Sit to Stand: Maximum assistance; Assist x2  Functional Transfers  Bathroom Mobility:  (unable)  Toilet Transfer : Maximum assistance; Additional time; Assist x2 (to bedside commode)  Bed to Chair: Maximum assistance; Assist x2    Balance:  Sitting: Impaired  Sitting - Static: Good (unsupported)  Sitting - Dynamic: Fair (occasional)  Standing: Impaired; With support  Standing - Static: Fair  Standing - Dynamic : Poor    ADL Assessment:  Feeding: Total assistance    Oral Facial Hygiene/Grooming: Total assistance    Bathing: Total assistance    Upper Body Dressing: Total assistance    Lower Body Dressing: Total assistance    Toileting: Total assistance                ADL Intervention and task modifications:  See assessment, cristina educated pt on move in the tube precautions     Therapeutic Exercises:   AAROM for shoulder flexion 5 reps (unable to tolerate further ROM)    Functional Measure:    Barthel Index:  Bathin  Bladder: 0  Bowels: 0  Groomin  Dressin  Feedin  Mobility: 0  Stairs: 0  Toilet Use: 5  Transfer (Bed to Chair and Back): 5  Total: 10/100      The Barthel ADL Index: Guidelines  1. The index should be used as a record of what a patient does, not as a record of what a patient could do. 2. The main aim is to establish degree of independence from any help, physical or verbal, however minor and for whatever reason. 3. The need for supervision renders the patient not independent. 4. A patient's performance should be established using the best available evidence. Asking the patient, friends/relatives and nurses are the usual sources, but direct observation and common sense are also important. However direct testing is not needed. 5. Usually the patient's performance over the preceding 24-48 hours is important, but occasionally longer periods will be relevant. 6. Middle categories imply that the patient supplies over 50 per cent of the effort. 7. Use of aids to be independent is allowed.     Score Interpretation (from [address] [address])    Independent   60-79 Minimally independent   40-59 Partially dependent   20-39 Very dependent   <20 Totally dependent     -Miriam Perera, Barthel, D.W. ([id number]). Functional evaluation: the Barthel Index. [address] ([address] [place] (1997). The Barthel activities of daily living index: self-reporting versus actual performance in the old (> or = 75 years). Journal of [address] [place] [address] 45(7), 14 [facility], JRAULF, Flor Jameson., Armando Ag. (1999). Measuring the change in disability after inpatient rehabilitation; comparison of the responsiveness of the Barthel Index and Functional [place] Measure. Journal of Neurology, Neurosurgery, and Psychiatry, 66(4), 109-651. Ana M Herrera, N.[place].A, LUCINDA Timmons, & Keri Abarca MTHA. (2004) Assessment of post-stroke quality of life in cost-effectiveness studies: The usefulness of the Barthel Index and the EuroQoL-5D. Quality of Life Research, 13, 427-43         Pain:  Moderate report of pain with activity    Activity Tolerance:   Poor, desaturates with exertion and requires oxygen, requires rest breaks, and observed SOB with activity    After treatment patient left in no apparent distress:   Sitting in chair and Call bell within reach    COMMUNICATION/COLLABORATION:   The patients plan of care was discussed with: Physical therapist, Registered nurse, and patient .      MARKELL Hoyt/L  Time Calculation: 23 mins

## 2021-11-21 NOTE — PROGRESS NOTES
Critical Care Progress Note  Sherrie Quinonez MD          Date of Service:  2021  NAME:  Zack Pedro  :  1935  MRN:  [id number]      Subjective/Hospital course:      : Patient remains intubated and sedated, on 50% Fio2. She was not extubated yesterday due to high Fio2.    Extubated to BiPAP   Transitioned off BiPAP to NC O2 - tolerating well. CXR suggests R apical pneumothorax. Repeat film ordered. Cr increasing - diuresis held         Problem list:     Problem list:  Hospital Problems  Date Reviewed: 3/15/2021          Codes Class Noted POA    * (Principal) S/P CABG x 4 ICD-10-CM: Z95.1  ICD-9-CM: V45.81  2021 Unknown    Overview Signed 2021  1:29 PM by TRACY Keller     x 4, LIMA to LAD, RSVG to Diag, RSVG to OM, RSVG to PDA             CAD (coronary artery disease) ICD-10-CM: I25.10  ICD-9-CM: 414.00  2021 Unknown        NSTEMI (non-ST elevated myocardial infarction) [facility]) ICD-10-CM: I21.4  ICD-9-CM: 410.70  2021 Unknown              Assessment/Plan:     Post cardiac surgery care: S/P CABGx4.   Shock, resolved  Hypertension  Acute hypoxic resp failure          Atelectasis          Possible R PTX  Postop anemia - no active blood loss  Thrombocytopenia, resolving  TEMO, nonoliguric    Supplemental O2 as needed  BiPAP as needed  Recheck CXR this afternoon  ICU hemodynamic and rhythm monitoring  PRN hydralazine  Daily CBC  Follow chem panels  Hold further diuresis  Discussed with Dr Amairani Miles       Code status: full code.        40min of CC time spent without overlap and excluding procedures.                    Vital Signs:     Patient Vitals for the past 4 hrs:   BP Pulse Resp SpO2   21 1209 -- -- 21 (!) 89 %   21 1208 (!) 133/56 (!) 106 29 (!) 85 %   21 1203 -- -- -- (!) 88 %   21 1202 (!) 162/53 100 26 95 %   21 1155 (!) 156/53 98 18 91 %   21 1100 (!) 146/54 89 13 92 %   21 1000 (!) 134/49 86 14 92 % Intake/Output Summary (Last 24 hours) at 11/21/2021 1318  Last data filed at 11/21/2021 1300  Gross per 24 hour   Intake 1469.32 ml   Output 1755 ml   Net -285.68 ml        Physical Examination:    Physical Exam  Constitutional:       Appearance: Normal appearance. HENT:      Head: Normocephalic and atraumatic. Eyes:      Extraocular Movements: Extraocular movements intact. Conjunctiva/sclera: Conjunctivae normal.      Pupils: Pupils are equal, round, and reactive to light. Cardiovascular:      Rate and Rhythm: Normal rate and regular rhythm. Pulmonary:      Effort: Pulmonary effort is normal. No respiratory distress. Breath sounds: Normal breath sounds. Abdominal:      General: Abdomen is flat. Bowel sounds are normal. There is no distension. Palpations: Abdomen is soft. Tenderness: There is no abdominal tenderness. There is no guarding. Musculoskeletal:      Cervical back: Normal range of motion and neck supple. Right lower leg: No edema. Left lower leg: No edema. Skin:     General: Skin is warm and dry. Neurological:      General: No focal deficit present. Mental Status: She is alert. Labs:   Labs and imaging reviewed.       Medications:     Current Facility-Administered Medications   Medication Dose Route Frequency    magnesium sulfate 1 g/100 ml IVPB (premix or compounded)  1 g IntraVENous ONCE    atorvastatin (LIPITOR) tablet 80 mg  80 mg Oral QHS    alteplase (CATHFLO) 1 mg in sterile water (preservative free) 1 mL injection  1 mg InterCATHeter PRN    bacitracin 500 unit/gram packet 1 Packet  1 Packet Topical PRN    sodium chloride (NS) flush 5-40 mL  5-40 mL IntraVENous Q8H    sodium chloride (NS) flush 5-40 mL  5-40 mL IntraVENous PRN    0.45% sodium chloride infusion  10 mL/hr IntraVENous CONTINUOUS    0.9% sodium chloride infusion  9 mL/hr IntraVENous CONTINUOUS    oxyCODONE IR (ROXICODONE) tablet 5 mg  5 mg Oral Q4H PRN    oxyCODONE IR (ROXICODONE) tablet 10 mg  10 mg Oral Q4H PRN    naloxone (NARCAN) injection 0.4 mg  0.4 mg IntraVENous PRN    mupirocin (BACTROBAN) 2 % ointment   Both Nostrils BID    amiodarone (CORDARONE) tablet 400 mg  400 mg Oral Q12H    ondansetron (ZOFRAN) injection 4 mg  4 mg IntraVENous Q4H PRN    albuterol (PROVENTIL VENTOLIN) nebulizer solution 2.5 mg  2.5 mg Nebulization Q4H PRN    aspirin chewable tablet 81 mg  81 mg Oral DAILY    chlorhexidine (PERIDEX) 0.12 % mouthwash 10 mL  10 mL Oral Q12H    magnesium oxide (MAG-OX) tablet 400 mg  400 mg Oral BID    calcium chloride 1 g in 0.9% sodium chloride 250 mL IVPB  1 g IntraVENous PRN    bisacodyL (DULCOLAX) suppository 10 mg  10 mg Rectal DAILY PRN    senna-docusate (PERICOLACE) 8.6-50 mg per tablet 1 Tablet  1 Tablet Oral BID    polyethylene glycol (MIRALAX) packet 17 g  17 g Oral DAILY    ELECTROLYTE REPLACEMENT NOTE: Nurse to review Serum Potassium and Magnesuim levels and Initiate Electrolyte Replacement Protocol as needed  1 Each Other PRN    magnesium sulfate 1 g/100 ml IVPB (premix or compounded)  1 g IntraVENous PRN    insulin regular (NOVOLIN R, HUMULIN R) 100 Units in 0.9% sodium chloride 100 mL infusion  1-50 Units/hr IntraVENous TITRATE    glucose chewable tablet 16 g  4 Tablet Oral PRN    dextrose (D50W) injection syrg 12.5-25 g  12.5-25 g IntraVENous PRN    glucagon (GLUCAGEN) injection 1 mg  1 mg IntraMUSCular PRN    insulin lispro (HUMALOG) injection   SubCUTAneous AC&HS    diphenhydrAMINE (BENADRYL) capsule 25 mg  25 mg Oral Q6H PRN    diphenhydrAMINE (BENADRYL) injection 25 mg  25 mg IntraVENous Q6H PRN    melatonin tablet 3 mg  3 mg Oral QHS PRN    pantoprazole (PROTONIX) tablet 40 mg  40 mg Oral ACB    heparin (porcine) 1,000 unit/mL injection    PRN    papaverine injection    PRN    dexmedeTOMidine in 0.9 % NaCl (PRECEDEX) 400 mcg/100 mL (4 mcg/mL) infusion soln  0.1-1.5 mcg/kg/hr IntraVENous TITRATE    niCARdipine (CARDENE) 25 mg in 0.9% sodium chloride 250 mL infusion  0-15 mg/hr IntraVENous TITRATE    ALPRAZolam (XANAX) tablet 0.5 mg  0.5 mg Oral PRN    calcium-vitamin D (OS-MARINA +D3) 500 mg-200 unit per tablet 1 Tablet  1 Tablet Oral DAILY     ______________________________________________________________________  EXPECTED LENGTH OF STAY: 2d 12h  ACTUAL LENGTH OF STAY:          [address] Rickey Lance MD   Pulmonary/[facility]  [address]  [phone]  11/21/2021

## 2021-11-21 NOTE — PROGRESS NOTES
1940  Lethargic. SPO2 89-92. Poor IS -250 . Precedex turned off. Weak cough. Encourage pulmonary toilet, cough and deep breathing. 20:33  SVO2 recalibrated. Old reading 37 New reading 55.   Pt more alert and sats more consistently 91-92%

## 2021-11-21 NOTE — PROGRESS NOTES
1920: Bedside and Verbal shift change report given to [address] RN (oncoming nurse) by Pastora Mason (offgoing nurse). Report included the following information SBAR, Kardex, Intake/Output, Recent Results and Cardiac Rhythm Sinus Rhythm. 1940: shift assessment complete. See flowsheet for details. Pt A & Ox4, following command but very drowsy with delayed responses. SPO2 in low to mid 90s on 6L NC. Pain 6/10. Cardene rate decreased at due to low pressures. Pt needs assistance and encouragement with IS, IS ~250.    2040: pt less drowsy; however now tachypneic, anxious with systolics between 101-355. Cardene increased. 2105 Pressures continue to increase, pt requesting xanax, 0.5mg xanax and 5mg idalia given. 2215: pt sats in high 80s, when repositioning pt, SPO2 dropped to 78. Increase to 8L NC with only slight improvement. Pt placed back on biPAP. And Precedex restarted at 0.2    2300: Pt no longer tachypneic, SPO2 98% resting quietly    0000: Reassessment complete. No acute changes noted. 0400: Reassessment complete No acute changes noted. SVR: 1078    0445: biPAP weaned to 50%, pt tolerating well.    6776: pt systolics BP increasing while given report, cardene restarted at 2.    0730:            END OF SHIFT SUMMARY    Drips: Cardene 5 mg,    Telemetry: normal sinus rhythm    UOP: 590 /12hr    CT: 190/12hr    I/O 24hr: -395.6    Pertinent labs: crt: 2.02. potassium 3.7    Pain control: well however pt anxious and requiring precedex on BIPAP. Pertinent physical assessment: decreased breath sounds bilaterally, right greater than left. Bedside and Verbal shift change report given to [address] (oncoming nurse) by [address] RN (offgoing nurse). Report included the following information SBAR, Kardex, Intake/Output, Recent Results, Med Rec Status and Cardiac Rhythm Sinus Rhythm.

## 2021-11-22 ENCOUNTER — APPOINTMENT (OUTPATIENT)
Dept: GENERAL RADIOLOGY | Age: 86
DRG: 233 | End: 2021-11-22
Attending: PHYSICIAN ASSISTANT
Payer: MEDICARE

## 2021-11-22 ENCOUNTER — APPOINTMENT (OUTPATIENT)
Dept: GENERAL RADIOLOGY | Age: 86
DRG: 233 | End: 2021-11-22
Attending: NURSE PRACTITIONER
Payer: MEDICARE

## 2021-11-22 LAB
ABO + RH BLD: NORMAL
ADMINISTERED INITIALS, ADMINIT: NORMAL
ALBUMIN SERPL-MCNC: 2.9 G/DL (ref 3.5–5)
ALBUMIN/GLOB SERPL: 0.9 {RATIO} (ref 1.1–2.2)
ALP SERPL-CCNC: 40 U/L (ref 45–117)
ALT SERPL-CCNC: 6 U/L (ref 12–78)
ANION GAP SERPL CALC-SCNC: 9 MMOL/L (ref 5–15)
AST SERPL-CCNC: 20 U/L (ref 15–37)
BILIRUB SERPL-MCNC: 0.5 MG/DL (ref 0.2–1)
BLD PROD TYP BPU: NORMAL
BLOOD GROUP ANTIBODIES SERPL: NORMAL
BPU ID: NORMAL
BUN SERPL-MCNC: 50 MG/DL (ref 6–20)
BUN/CREAT SERPL: 25 (ref 12–20)
CALCIUM SERPL-MCNC: 8.4 MG/DL (ref 8.5–10.1)
CHLORIDE SERPL-SCNC: 106 MMOL/L (ref 97–108)
CO2 SERPL-SCNC: 23 MMOL/L (ref 21–32)
CREAT SERPL-MCNC: 2.04 MG/DL (ref 0.55–1.02)
CROSSMATCH RESULT,%XM: NORMAL
D50 ADMINISTERED, D50ADM: 0 ML
D50 ORDER, D50ORD: 0 ML
ERYTHROCYTE [DISTWIDTH] IN BLOOD BY AUTOMATED COUNT: 15.1 % (ref 11.5–14.5)
GLOBULIN SER CALC-MCNC: 3.1 G/DL (ref 2–4)
GLSCOM COMMENTS: NORMAL
GLUCOSE BLD STRIP.AUTO-MCNC: 101 MG/DL (ref 65–117)
GLUCOSE BLD STRIP.AUTO-MCNC: 108 MG/DL (ref 65–117)
GLUCOSE BLD STRIP.AUTO-MCNC: 109 MG/DL (ref 65–117)
GLUCOSE BLD STRIP.AUTO-MCNC: 112 MG/DL (ref 65–117)
GLUCOSE BLD STRIP.AUTO-MCNC: 123 MG/DL (ref 65–117)
GLUCOSE BLD STRIP.AUTO-MCNC: 128 MG/DL (ref 65–117)
GLUCOSE BLD STRIP.AUTO-MCNC: 141 MG/DL (ref 65–117)
GLUCOSE BLD STRIP.AUTO-MCNC: 148 MG/DL (ref 65–117)
GLUCOSE BLD STRIP.AUTO-MCNC: 152 MG/DL (ref 65–117)
GLUCOSE BLD STRIP.AUTO-MCNC: 91 MG/DL (ref 65–117)
GLUCOSE BLD STRIP.AUTO-MCNC: 94 MG/DL (ref 65–117)
GLUCOSE BLD STRIP.AUTO-MCNC: 97 MG/DL (ref 65–117)
GLUCOSE BLD STRIP.AUTO-MCNC: 99 MG/DL (ref 65–117)
GLUCOSE SERPL-MCNC: 108 MG/DL (ref 65–100)
GLUCOSE, GLC: 101 MG/DL
GLUCOSE, GLC: 108 MG/DL
GLUCOSE, GLC: 108 MG/DL
GLUCOSE, GLC: 109 MG/DL
GLUCOSE, GLC: 112 MG/DL
GLUCOSE, GLC: 123 MG/DL
GLUCOSE, GLC: 128 MG/DL
GLUCOSE, GLC: 152 MG/DL
GLUCOSE, GLC: 91 MG/DL
GLUCOSE, GLC: 94 MG/DL
GLUCOSE, GLC: 97 MG/DL
HCT VFR BLD AUTO: 27.1 % (ref 35–47)
HGB BLD-MCNC: 8.7 G/DL (ref 11.5–16)
HIGH TARGET, HITG: 130 MG/DL
INSULIN ADMINSTERED, INSADM: 0.6 UNITS/HOUR
INSULIN ADMINSTERED, INSADM: 0.7 UNITS/HOUR
INSULIN ADMINSTERED, INSADM: 0.7 UNITS/HOUR
INSULIN ADMINSTERED, INSADM: 0.8 UNITS/HOUR
INSULIN ADMINSTERED, INSADM: 1.4 UNITS/HOUR
INSULIN ADMINSTERED, INSADM: 1.5 UNITS/HOUR
INSULIN ADMINSTERED, INSADM: 1.6 UNITS/HOUR
INSULIN ADMINSTERED, INSADM: 1.9 UNITS/HOUR
INSULIN ADMINSTERED, INSADM: 2.8 UNITS/HOUR
INSULIN ORDER, INSORD: 0.6 UNITS/HOUR
INSULIN ORDER, INSORD: 0.7 UNITS/HOUR
INSULIN ORDER, INSORD: 0.7 UNITS/HOUR
INSULIN ORDER, INSORD: 0.8 UNITS/HOUR
INSULIN ORDER, INSORD: 1.4 UNITS/HOUR
INSULIN ORDER, INSORD: 1.5 UNITS/HOUR
INSULIN ORDER, INSORD: 1.6 UNITS/HOUR
INSULIN ORDER, INSORD: 1.9 UNITS/HOUR
INSULIN ORDER, INSORD: 2.8 UNITS/HOUR
LOW TARGET, LOT: 95 MG/DL
MAGNESIUM SERPL-MCNC: 2.7 MG/DL (ref 1.6–2.4)
MCH RBC QN AUTO: 30.5 PG (ref 26–34)
MCHC RBC AUTO-ENTMCNC: 32.1 G/DL (ref 30–36.5)
MCV RBC AUTO: 95.1 FL (ref 80–99)
MINUTES UNTIL NEXT BG, NBG: 120 MIN
MINUTES UNTIL NEXT BG, NBG: 60 MIN
MULTIPLIER, MUL: 0.02
MULTIPLIER, MUL: 0.03
NRBC # BLD: 0 K/UL (ref 0–0.01)
NRBC BLD-RTO: 0 PER 100 WBC
ORDER INITIALS, ORDINIT: NORMAL
PLATELET # BLD AUTO: 179 K/UL (ref 150–400)
PMV BLD AUTO: 10.1 FL (ref 8.9–12.9)
POTASSIUM SERPL-SCNC: 3.9 MMOL/L (ref 3.5–5.1)
PROT SERPL-MCNC: 6 G/DL (ref 6.4–8.2)
RBC # BLD AUTO: 2.85 M/UL (ref 3.8–5.2)
SERVICE CMNT-IMP: ABNORMAL
SERVICE CMNT-IMP: NORMAL
SODIUM SERPL-SCNC: 138 MMOL/L (ref 136–145)
SPECIMEN EXP DATE BLD: NORMAL
STATUS OF UNIT,%ST: NORMAL
UNIT DIVISION, %UDIV: 0
WBC # BLD AUTO: 19.1 K/UL (ref 3.6–11)

## 2021-11-22 PROCEDURE — 51798 US URINE CAPACITY MEASURE: CPT

## 2021-11-22 PROCEDURE — 71045 X-RAY EXAM CHEST 1 VIEW: CPT

## 2021-11-22 PROCEDURE — 97530 THERAPEUTIC ACTIVITIES: CPT

## 2021-11-22 PROCEDURE — 74011000250 HC RX REV CODE- 250: Performed by: THORACIC SURGERY (CARDIOTHORACIC VASCULAR SURGERY)

## 2021-11-22 PROCEDURE — 83735 ASSAY OF MAGNESIUM: CPT

## 2021-11-22 PROCEDURE — 77030019905 HC CATH URETH INTMIT MDII -A

## 2021-11-22 PROCEDURE — 74011250636 HC RX REV CODE- 250/636: Performed by: NURSE PRACTITIONER

## 2021-11-22 PROCEDURE — 94660 CPAP INITIATION&MGMT: CPT

## 2021-11-22 PROCEDURE — 74011250636 HC RX REV CODE- 250/636: Performed by: PHYSICIAN ASSISTANT

## 2021-11-22 PROCEDURE — 74011250637 HC RX REV CODE- 250/637: Performed by: PHYSICIAN ASSISTANT

## 2021-11-22 PROCEDURE — 82962 GLUCOSE BLOOD TEST: CPT

## 2021-11-22 PROCEDURE — 74011250636 HC RX REV CODE- 250/636: Performed by: THORACIC SURGERY (CARDIOTHORACIC VASCULAR SURGERY)

## 2021-11-22 PROCEDURE — 99233 SBSQ HOSP IP/OBS HIGH 50: CPT | Performed by: CLINICAL NURSE SPECIALIST

## 2021-11-22 PROCEDURE — 77010033678 HC OXYGEN DAILY

## 2021-11-22 PROCEDURE — 65660000000 HC RM CCU STEPDOWN

## 2021-11-22 PROCEDURE — 80053 COMPREHEN METABOLIC PANEL: CPT

## 2021-11-22 PROCEDURE — 74011250637 HC RX REV CODE- 250/637: Performed by: INTERNAL MEDICINE

## 2021-11-22 PROCEDURE — 85027 COMPLETE CBC AUTOMATED: CPT

## 2021-11-22 PROCEDURE — 74011250637 HC RX REV CODE- 250/637: Performed by: THORACIC SURGERY (CARDIOTHORACIC VASCULAR SURGERY)

## 2021-11-22 PROCEDURE — 74011636637 HC RX REV CODE- 636/637: Performed by: PHYSICIAN ASSISTANT

## 2021-11-22 PROCEDURE — 36415 COLL VENOUS BLD VENIPUNCTURE: CPT

## 2021-11-22 RX ORDER — ACETAMINOPHEN 500 MG
1000 TABLET ORAL EVERY 6 HOURS
Status: DISPENSED | OUTPATIENT
Start: 2021-11-22 | End: 2021-11-24

## 2021-11-22 RX ORDER — CARVEDILOL 3.12 MG/1
3.12 TABLET ORAL 2 TIMES DAILY WITH MEALS
Status: DISCONTINUED | OUTPATIENT
Start: 2021-11-22 | End: 2021-11-23

## 2021-11-22 RX ORDER — POTASSIUM CHLORIDE 29.8 MG/ML
20 INJECTION INTRAVENOUS ONCE
Status: COMPLETED | OUTPATIENT
Start: 2021-11-22 | End: 2021-11-22

## 2021-11-22 RX ORDER — HYDROMORPHONE HYDROCHLORIDE 1 MG/ML
0.2 INJECTION, SOLUTION INTRAMUSCULAR; INTRAVENOUS; SUBCUTANEOUS ONCE
Status: COMPLETED | OUTPATIENT
Start: 2021-11-22 | End: 2021-11-22

## 2021-11-22 RX ADMIN — CARVEDILOL 3.12 MG: 3.12 TABLET, FILM COATED ORAL at 09:35

## 2021-11-22 RX ADMIN — AMIODARONE HYDROCHLORIDE 400 MG: 200 TABLET ORAL at 21:21

## 2021-11-22 RX ADMIN — MUPIROCIN: 20 OINTMENT TOPICAL at 09:29

## 2021-11-22 RX ADMIN — CHLORHEXIDINE GLUCONATE 0.12% ORAL RINSE 10 ML: 1.2 LIQUID ORAL at 21:24

## 2021-11-22 RX ADMIN — ONDANSETRON 4 MG: 2 INJECTION INTRAMUSCULAR; INTRAVENOUS at 14:38

## 2021-11-22 RX ADMIN — TRAMADOL HYDROCHLORIDE 50 MG: 50 TABLET, COATED ORAL at 04:45

## 2021-11-22 RX ADMIN — CHLORHEXIDINE GLUCONATE 0.12% ORAL RINSE 10 ML: 1.2 LIQUID ORAL at 09:35

## 2021-11-22 RX ADMIN — INSULIN LISPRO 2 UNITS: 100 INJECTION, SOLUTION INTRAVENOUS; SUBCUTANEOUS at 16:30

## 2021-11-22 RX ADMIN — POTASSIUM CHLORIDE 20 MEQ: 400 INJECTION, SOLUTION INTRAVENOUS at 04:46

## 2021-11-22 RX ADMIN — ATORVASTATIN CALCIUM 80 MG: 40 TABLET, FILM COATED ORAL at 21:20

## 2021-11-22 RX ADMIN — ACETAMINOPHEN 1000 MG: 500 TABLET, FILM COATED ORAL at 23:46

## 2021-11-22 RX ADMIN — AMIODARONE HYDROCHLORIDE 400 MG: 200 TABLET ORAL at 09:35

## 2021-11-22 RX ADMIN — ACETAMINOPHEN 1000 MG: 500 TABLET, FILM COATED ORAL at 17:05

## 2021-11-22 RX ADMIN — ACETAMINOPHEN 1000 MG: 500 TABLET, FILM COATED ORAL at 09:35

## 2021-11-22 RX ADMIN — CARVEDILOL 3.12 MG: 3.12 TABLET, FILM COATED ORAL at 17:05

## 2021-11-22 RX ADMIN — ASPIRIN 81 MG CHEWABLE TABLET 81 MG: 81 TABLET CHEWABLE at 09:35

## 2021-11-22 RX ADMIN — Medication 10 ML: at 21:49

## 2021-11-22 RX ADMIN — PANTOPRAZOLE SODIUM 40 MG: 40 TABLET, DELAYED RELEASE ORAL at 09:35

## 2021-11-22 RX ADMIN — MUPIROCIN: 20 OINTMENT TOPICAL at 17:06

## 2021-11-22 RX ADMIN — Medication 1 TABLET: at 09:35

## 2021-11-22 RX ADMIN — ACETAMINOPHEN 1000 MG: 500 TABLET, FILM COATED ORAL at 13:56

## 2021-11-22 RX ADMIN — HYDROMORPHONE HYDROCHLORIDE 0.2 MG: 1 INJECTION, SOLUTION INTRAMUSCULAR; INTRAVENOUS; SUBCUTANEOUS at 10:21

## 2021-11-22 RX ADMIN — DOCUSATE SODIUM 50MG AND SENNOSIDES 8.6MG 1 TABLET: 8.6; 5 TABLET, FILM COATED ORAL at 09:35

## 2021-11-22 RX ADMIN — DOCUSATE SODIUM 50MG AND SENNOSIDES 8.6MG 1 TABLET: 8.6; 5 TABLET, FILM COATED ORAL at 17:05

## 2021-11-22 RX ADMIN — Medication 10 ML: at 06:05

## 2021-11-22 RX ADMIN — Medication 10 ML: at 14:39

## 2021-11-22 NOTE — PROGRESS NOTES
Comprehensive Nutrition Assessment    Type and Reason for Visit: Initial, RD nutrition re-screen/LOS    Nutrition Recommendations/Plan:   Continue diet as tolerated  If BG consistently >200mg/dL can add Diabetic diet restriction (although pt not eating much currently)  RD to add Glucerna TID     Nutrition Assessment:   Pt admitted for CABG x 4. PMH: HTN, large hiatal hernia. Chart reviewed for LOS, case discussed during CCU rounds. Pt working with PT at time of visit, at time of second visit she was sleeping soundly. No family in the room to speak with. RN reports she refused all solids but did drink some liquids for breakfast this morning. Noted she received zofran twice yesterday, not sure if nausea is playing a roll in poor appetite today. No DM hx noted, A1c 7 (normal for age). For now will add PO supplements TID and monitor acceptance. Would encourage PO intake. Appetite appears to have been good preop. Patient Vitals for the past 168 hrs:   % Diet Eaten   11/18/21 1215 76 - 100%   11/18/21 0831 51 - 75%   11/17/21 1024 51 - 75%   11/16/21 0743 1 - 25%   11/15/21 1503 76 - 100%       Estimated Daily Nutrient Needs:  Energy (kcal): MSJ 1700 (1313 x 1.3); Weight Used for Energy Requirements: Current  Protein (g): 92-125g (1.1-1.5gPro/kg); Weight Used for Protein Requirements: Current  Fluid (ml/day): 1700mL; Method Used for Fluid Requirements: 1 ml/kcal      Nutrition Related Findings:  Meds: oscal, humalog, protonix, miralax, KCl, pericolace; Drips: insulin. Edmea: +2-all extremities. BM 11/17      Wounds:    Surgical incision       Current Nutrition Therapies:  ADULT DIET Regular; Low Fat/Low Chol/High Fiber/ANTONETTE  ADULT ORAL NUTRITION SUPPLEMENT Breakfast, Lunch, Dinner; Diabetic Supplement    Anthropometric Measures:  · Height:  5' 7\" (170.2 cm)  · Current Body Wt:  83.5 kg (184 lb 1.4 oz)    · Ideal Body Wt:  135 lbs:  136.4 %   · BMI Category: Overweight (BMI 25.0-29. 9)       Nutrition Diagnosis:   · Inadequate protein-energy intake related to other (specify) (poor appetite) as evidenced by intake 0-25%      Nutrition Interventions:   Food and/or Nutrient Delivery: Continue current diet, Start oral nutrition supplement  Nutrition Education and Counseling: No recommendations at this time  Coordination of Nutrition Care: Continue to monitor while inpatient, Interdisciplinary rounds    Goals:  Pt will consume >50% of meals/supplements in 2-4 days.        Nutrition Monitoring and Evaluation:   Behavioral-Environmental Outcomes: None identified  Food/Nutrient Intake Outcomes: Food and nutrient intake, Supplement intake  Physical Signs/Symptoms Outcomes: Biochemical data, Nutrition focused physical findings, Skin, Weight    Discharge Planning:    Continue current diet, Continue oral nutrition supplement     Electronically signed by Viann Schaumann, RD, [address]  on 11/22/2021 at 1:51 PM    Contact: [phone]

## 2021-11-22 NOTE — PROCEDURES
Midsternal and sofya drain dressings removed. Sites cleansed with CHG. CT x3 removed without difficulty. Vaseline gauze and 4x4 dressing applied. CANDACE. RN updated. Pt at bedside to get her up and moving.

## 2021-11-22 NOTE — DIABETES MGMT
[facility]    CLINICAL NURSE SPECIALIST CONSULT     INITIAL NOTE    Initial Presentation   Alyx Palacios is a 81 y.o. female admitted 11/14 from [facility] with N/v and elevated BP. BP at -105. Troponin on admission 0.44. S/p CABG x4 on 11/19/21    HX:   Past Medical History:   Diagnosis Date    Anxiety     Hypertension         INITIAL DX:   NSTEMI (non-ST elevated myocardial infarction) ([address] [I21.4]  CAD (coronary artery disease) [I25.10]     Current Treatment     TX: CABG x4     Consulted by Provider for advanced diabetes nursing assessment and care for:   [x] Transitioning off Bri Contras   [x] Inpatient management strategy  [x] Survival skill education    Hospital Course   Clinical progress has been complicated by NSTEMI with CABG x4 11/19/21.    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 Subjective   I didn't know I had diabetes\"  PO intake remains low. \  [facility]    Patient reports the following home diabetes self-management practices: deferred      Overall evaluation:    [x] Achieving A1c target with drug therapy & self-care practices for age     Objective   Physical exam  General Over weight  Female  in no acute distress/ill-appearing. Conversant and cooperative  Neuro  Alert, oriented   Vital Signs   Visit Vitals  BP (!) 142/55   Pulse 99   Temp 98 °F (36.7 °C)   Resp 21   Ht 5' 7\" (1.702 m)   Wt 83.5 kg (184 lb)   SpO2 96%   BMI 28.82 kg/m²     Skin  Warm and dry. Acanthosis noted along neckline.  No lipohypertrophy or lipoatrophy noted at injection sites   Heart   Regular rate and rhythm. No murmurs, rubs or gallops  Lungs  Clear to auscultation without rales or rhonchi  Extremities No foot wounds    Diabetic foot exam:  deferred       Laboratory  Recent Labs     11/22/21  0244 11/21/21  1814 11/21/21  0411 11/20/21  0351 11/20/21  0351   * 140* 110*   < > 106*   AGAP 9 8 10   < > 8   WBC 19.1*  --  16.8*  --  11.9*   CREA 2.04* 2.06* 2.02*   < > 1.31*   GFRNA 23* 23* 23*   < > 39*   AST 20  --  21  --  28   ALT 6*  --  10*  --  15    < > = values in this interval not displayed. Factors impacting BG management  Factor Dose Comments   Nutrition:  Standard meals     60 grams/meal      Pain Post op    Infection N/A    Other:   Kidney function  Liver function     GFR 23/ Cr+2.04  WNL      Blood glucose pattern      Significant diabetes-related events over the past 24-72 hours  On insulin infusion - Insulin requirements have been low over weekend. Pre-op BG were within goal 100-180 without used of any insulins. Assessment and Plan   Nursing Diagnosis Risk for unstable blood glucose pattern   Nursing Intervention Domain 5250 Decision-making Support   Nursing Interventions Examined current inpatient diabetes control   Explored factors facilitating and impeding inpatient management  Identified self-management practices impeding diabetes control  Explored corrective strategies with patient and responsible inpatient provider   Informed patient of rational for insulin strategy while hospitalized     Evaluation   This  female with likely NEW ONSET Type 2 diabetes was admitted with NSTEMI and subsequently required CABG x4 - DM2 history, but not taking any diabetes medications.  A1C 7.0%- Given her age would be conservative with insulin dosing while hopsitalized- Prior to surgery she required no correctioanl insulins to keep BG in goal.  After brief conversation with patient, she stated she did not know she had diabetes  Recommendations   1. Insulin infusion per post-operative protocol    2. Give Lantus 2 hrs prior transitioning off insulin gtt-    3. Blood glucose monitoring ACHS once off insulin infusion. If glucose within goal, ok to trend on am labs and d/c correctional insulin. Discharge Planning   1. A1C 7%. Given renal status, not a candidate for Metformin therapy. Would encourage diet control and once daily BG monitoring once she is home    2. Prescription for glucometer kit (Meter, Lancets (100), Strips (100)). Patient to obtain a blood glucose reading four times daily. First thing in the morning prior to eating and drinking anything then before lunch, dinner and bedtime. Create a log and present to PCP for interpretation-      3. Follow up  With PCP -will route note  Billing Code(s)   54525    Before making these care recommendations, I personally reviewed the hospitalization record, including notes, laboratory & diagnostic data and current medications, and examined the patient at the bedside (circumstances permitting) before making care recommendations.      Total minutes: [address] JESUS Castillo  Diabetes Clinical Nurse Specialist  [facility]  Access via [address] [place] [address]

## 2021-11-22 NOTE — PROGRESS NOTES
[facility] ICU Progress Note    Admit Date: 2021  POD:  3 Days Post-Op    Procedure:  Procedure(s):  CORONARY ARTERY BYPASS GRAFT X4 LIMA, LSVH VIA EVH. ECC. EPI AORTIC US BY DR Keara Wagoner. Subjective:   Pt seen with Dr. Adam Cadet. Pt up in chair. On 6L NC. C/o SOB, pain on inspiration. Tmax 99.1. Objective:   Vitals:  Blood pressure (!) 142/55, pulse 99, temperature 98 °F (36.7 °C), resp. rate 21, height 5' 7\" (1.702 m), weight 184 lb (83.5 kg), SpO2 96 %. Temp (24hrs), Av.1 °F (36.7 °C), Min:98 °F (36.7 °C), Max:98.3 °F (36.8 °C)    EKG/Rhythm:  NSR 90-100s    CT Output: 220mL (50mL last 12 hrs)    Oxygen Therapy:  Oxygen Therapy  O2 Sat (%): 96 % (21 0600)  Pulse via Oximetry: 99 beats per minute (21 0600)  O2 Device: Nasal cannula (21 0400)  Skin Assessment: Clean, dry, & intact (21 0800)  Skin Protection for O2 Device: Yes (21 040)  Orientation: Bilateral (21)  Location: Cheek (21 040)  Interventions: Mouth Care; Reposition Device (21)  O2 Flow Rate (L/min): 6 l/min (21 040)  FIO2 (%): 50 % (21 0729)    CXR:   CXR Results  (Last 48 hours)               21 0446  XR CHEST PORT Final result    Impression:  No definite changes. Narrative:  Clinical indication: Postop heart. Portable AP upright view of the chest obtained, comparison . Cardiomegaly and bilateral lung infiltrates unchanged. Left chest tube in place,   central line in place. 21 1427  XR CHEST PORT Final result    Impression:  Slight improvement in the right pneumothorax. Small bilateral   pleural effusions and underlying consolidation have increased. Narrative: Indication: Follow-up pneumothorax       Comparison to earlier the same day. Portable exam obtained at 1402 demonstrates   a slight interval improvement in the right pneumothorax compared to prior   examination.  However, small bilateral pleural effusions and underlying   consolidation have not increased. 11/21/21 0437  XR CHEST PORT Final result    Impression:      1. Moderate right apical pneumothorax. 2. Unchanged mild edema and mild bilateral pleural effusions, greater on the   left. Findings were called to Dr. Tobi Pineda at 8:25 AM 11/21/2021       Narrative:  EXAM: XR CHEST PORT       HISTORY: postop heart. COMPARISON: 11/20/2021       FINDINGS: Portable AP. The patient is status post median sternotomy. The   endotracheal tube has been removed. A right IJ Revere-Silver catheter terminates in   the main pulmonary artery. A left subxiphoid chest tube is unchanged. There is a   moderate-sized right apical pneumothorax with 5.7 cm of pleural separation. There is a background of mild to moderate edema. There are small bilateral   pleural effusions, greater on the left. The heart is mildly enlarged, but   unchanged. Admission Weight: Last Weight   Weight: 184 lb (83.5 kg) Weight: 184 lb (83.5 kg)     Intake / Output / Drain:  Current Shift: No intake/output data recorded. Last 24 hrs.:     Intake/Output Summary (Last 24 hours) at 11/22/2021 1009  Last data filed at 11/22/2021 0700  Gross per 24 hour   Intake 508.13 ml   Output 955 ml   Net -446.87 ml       EXAM:  General:     NAD, pleasant mood                                                      Lungs:   Clear to auscultation bilaterally. Incision:  Covered by Prevena - remove today. Heart:  Regular rate and rhythm, S1, S2 normal, no murmur, click, rub or gallop. Abdomen:   Soft, non-tender. Bowel sounds hypoacive. No masses,  No organomegaly. Extremities:  No edema. PPP. Neurologic:  Gross motor and sensory apparatus intact.      Labs:   Recent Labs     11/22/21  0949 11/22/21  0603 11/22/21  0244 11/19/21  1406 11/19/21  1404   WBC  --   --  19.1*   < > 13.9*   HGB  --   --  8.7*   < > 9.4*   HCT  --   --  27.1*   < > 28.5*   PLT  --   --  179   < > 131*   NA  --   --  138 < > 141   K  --   --  3.9   < > 3.9   BUN  --   --  50*   < > 24*   CREA  --   --  2.04*   < > 1.31*   GLU  --   --  108*   < > 128*   GLUCPOC 152*   < >  --    < >  --    INR  --   --   --   --  1.4*    < > = values in this interval not displayed. Assessment:     Principal Problem:    S/P CABG x 4 (2021)      Overview: x 4, LIMA to LAD, RSVG to Diag, RSVG to OM, RSVG to PDA    Active Problems:    NSTEMI (non-ST elevated myocardial infarction) ([facility] (2021)      CAD (coronary artery disease) (2021)       Plan/Recommendations/Medical Decision Makin. NSTEMI, Mv CAD s/p CABG x4: Cont ASA/statin. Start BB today. Remove CT this am  2. Uncontrolled Hypertension: on Coreg, losartan, verapamil PTA. Will resume Coreg today   3. Large hiatal hernia: CT with Large hiatal hernia containing nearly the entire stomach, mesenteric fat, and the splenic flexure of the colon. Needs FU as OP  4. TEMO on CKD stage 3: Monitor. Cr 2.04 this am. Gentle hydration. 5. Anxiety: PRN xanax  6. Postop shock, vasoplegia vs cardiogenic: Off all vasopressors. Resolved. 7.  Postop pain control: Add scheduled tylenol to 1,000mg Q6hr. Cont PRN Oxycodone and ultram. Will give dilaudid prior to Ct removal this am.   8. Atelectasis: Increase mobility. Hourly I.S. Wean oxygen for O2 sat > 92%. Down to 6L NC. OOB TID, ambulating BID. 9. Leukocytosis: Increase mobility. Pulm toileting. Daily incisional care to sternal incision and EVH site. Dispo: PT/OT. OOB TID, ambulating BID. Transfer to stepdown. Remove CT this am. Case management following to aid in d/c planning, likely need IPR at discharge.      Signed By: Lashawn Wells NP

## 2021-11-22 NOTE — PROGRESS NOTES
Problem: Mobility Impaired (Adult and Pediatric)  Goal: *Acute Goals and Plan of Care (Insert Text)  Description: FUNCTIONAL STATUS PRIOR TO ADMISSION: Patient was independent and active without use of DME however began using rolling walker while in hospital prior to cardiac surgery. Reports that she has been having difficulty with endurance and can only do a little of activity before needing to take a break. Reports \"nighttime weakness\" but no falls at home. HOME SUPPORT PRIOR TO ADMISSION: The patient lived alone with son in the area to provide assistance. Physical Therapy Goals  Initiated 11/21/2021  1. Patient will move from supine to sit and sit to supine , scoot up and down, and roll side to side in bed with contact guard assist within 5 days. 2.  Patient will perform sit to/from stand with contact guard assist within 5 days. 3.  Patient will ambulate 100 feet with least restrictive assistive device and contact guard assist within 5 days. 4.  Patient will ascend/descend 3 stairs with 1 handrail(s) with contact guard assistance within 7 day(s). 5.  Patient will perform cardiac exercises per protocol with supervision/set-up within 5 days. 6.  Patient will verbally and functionally demonstrate 3/3 sternal precautions without cues within 5 days. Initiated 11/15/2021  1. Patient will move from supine to sit and sit to supine  in bed with modified independence within 7 day(s). 2.  Patient will transfer from bed to chair and chair to bed with modified independence using the least restrictive device within 7 day(s). 3.  Patient will perform sit to stand with modified independence within 7 day(s). 4.  Patient will ambulate with modified independence for 250 feet with the least restrictive device within 7 day(s). 5.  Patient will ascend/descend 3 stairs with 1 handrail(s) with modified independence within 7 day(s).      Outcome: Progressing Towards Goal    PHYSICAL THERAPY TREATMENT  Patient: Brooke Sims (89 y.o. female)  Date: 11/22/2021  Diagnosis: NSTEMI (non-ST elevated myocardial infarction) ([facility] [address] [I21.4]  CAD (coronary artery disease) [I25.10]   S/P CABG x 4  Procedure(s) (LRB):  CORONARY ARTERY BYPASS GRAFT X4 [place], [address] [place] VIA EVH. ECC. EPI AORTIC US BY DR Anita Cordero. (N/A) 3 Days Post-Op  Precautions: Sternal (move in the tube)  Chart, physical therapy assessment, plan of care and goals were reviewed. ASSESSMENT  Patient continues with skilled PT services and is progressing towards goals. Pt was received in supine on 6L and cleared by nursing to mobilize. Pt extremely lethargic and eyes closed during most of session. She required 2 person to come to the edge of the bed. Able to stand and take a few steps to the chair with increased assistance for guiding hips. She was left sitting up and working with OT on exercises. Patient is demonstrating understanding of mindful-based movements (\"move in the tube\") principles of keeping UEs proximal to ribcage to prevent lateral pull on the sternum during load-bearing activities with verbal cues required for compliance. Current Level of Function Impacting Discharge (mobility/balance): mod A x 2    Other factors to consider for discharge:          PLAN :  Patient continues to benefit from skilled intervention to address the above impairments. Continue treatment per established plan of care. to address goals. Recommendation for discharge: (in order for the patient to meet his/her long term goals)  Rehab    This discharge recommendation:  Has not yet been discussed the attending provider and/or case management    IF patient discharges home will need the following DME: to be determined (TBD)       SUBJECTIVE:   Patient stated i am awake.     OBJECTIVE DATA SUMMARY:   Patient mobilized on continuous portable monitor/telemetry.   Critical Behavior:  Neurologic State: Drowsy, Eyes open to stimulus  Orientation Level: Oriented X4  Cognition: Decreased attention/concentration, Follows commands  Safety/Judgement: Fall prevention    Functional Mobility Training:  Bed Mobility:  Rolling: Moderate assistance; Assist x2  Supine to Sit: Moderate assistance; Assist x2     Scooting: Moderate assistance          Transfers:  Sit to Stand: Minimum assistance  Stand to Sit: Minimum assistance                               Balance:  Sitting: Impaired  Sitting - Static: Good (unsupported)  Sitting - Dynamic: Fair (occasional)  Standing: Impaired  Standing - Static: Fair; Constant support  Standing - Dynamic : Fair; Constant support    Ambulation/Gait Training:  Distance (ft): 2 Feet (ft)  Assistive Device:  (HHA)  Ambulation - Level of Assistance: Moderate assistance; Assist x2        Gait Abnormalities: Decreased step clearance; Shuffling gait; Trunk sway increased        Base of Support: Widened     Speed/Jes: Shuffled  Step Length: Left shortened; Right shortened                    Cardiac diagnosis intervention:  Patient instructed and educated on mindful movement principles based on Move in The Tube concept to include maintaining bilateral elbows close to rib cage when performing any load-bearing activity such as getting in/out of bed, pushing up from a chair, opening a door, or lifting a box. Patient was given a handout with diagrams of each correct/incorrect method of performing each of the above tasks. Therapeutic Exercises:   Patient instructed on the benefits and demonstrated cardiac exercises while sitting with OT Instructed and indicated understanding on how to progress reps, sets against gravity, pacing through progressive muscle strengthening standing based on surgeon clearance for more weight in prep for functional activity. Instruction on the use of household items in place of weights as needed.      CARDIAC  EXERCISE   Sets   Reps   Active Active Assist   Passive Self ROM   Comments   Shoulder flexion 1 5 [x] []                                            []                                            []                                               Shoulder abduction 1      5 [x]                                            []                                            []                                            []                                               Scapular elevation 1 5 [x]                                            []                                            []                                            []                                               Scapular retraction 1 5 [x]                                            []                                            []                                            []                                               Trunk rotation 1 5 [x]                                            []                                            []                                            [x]                                               Trunk sidebending 1 5 [x]                                            []                                            []                                            []                                                  []                                            []                                            []                                            []                                                     Activity Tolerance:   Fair    After treatment patient left in no apparent distress:   Sitting in chair and Call bell within reach    COMMUNICATION/COLLABORATION:   The patients plan of care was discussed with: Occupational therapist and Registered nurse.      Andrew Craig, PT, DPT   Time Calculation: 21 mins

## 2021-11-22 NOTE — PROGRESS NOTES
Critical Care Progress Note  Katya Lopez MD          Date of Service:  2021  NAME:  Bony Lang  :  1935  MRN:  [id number]      Subjective/Hospital course:      : Patient remains intubated and sedated, on 50% Fio2. She was not extubated yesterday due to high Fio2.    Extubated to BiPAP   Transitioned off BiPAP to NC O2 - tolerating well. CXR suggests R apical pneumothorax. Repeat film ordered. Cr increasing - diuresis held         Problem list:     Problem list:  Hospital Problems  Date Reviewed: 3/15/2021          Codes Class Noted POA    * (Principal) S/P CABG x 4 ICD-10-CM: Z95.1  ICD-9-CM: V45.81  2021 Unknown    Overview Signed 2021  1:29 PM by TRACY Lopez     x 4, LIMA to LAD, RSVG to Diag, RSVG to OM, RSVG to PDA             CAD (coronary artery disease) ICD-10-CM: I25.10  ICD-9-CM: 414.00  2021 Unknown        NSTEMI (non-ST elevated myocardial infarction) [facility]) ICD-10-CM: I21.4  ICD-9-CM: 410.70  2021 Unknown              Assessment/Plan:     Post cardiac surgery care: S/P CABGx4. Shock, resolved  Hypertension, controlled  Acute hypoxic resp failure          Atelectasis          Possible R PTX  Postop anemia - no active blood loss  Thrombocytopenia, resolving  TEMO, nonoliguric    Supplemental O2 as needed  BiPAP as needed  Airway hygiene/IS  ICU hemodynamic and rhythm monitoring  PRN hydralazine  Daily CBC  Follow chem panels  Auto-diuresing  Discussed with Dr Lily Fountain team       Code status: full code.                    Vital Signs:     No data found. Intake/Output Summary (Last 24 hours) at 2021 1257  Last data filed at 2021 1000  Gross per 24 hour   Intake 508.13 ml   Output 805 ml   Net -296.87 ml        Physical Examination:    Physical Exam  Constitutional:       Appearance: Normal appearance. HENT:      Head: Normocephalic and atraumatic.    Eyes:      Extraocular Movements: Extraocular movements intact. Conjunctiva/sclera: Conjunctivae normal.      Pupils: Pupils are equal, round, and reactive to light. Cardiovascular:      Rate and Rhythm: Normal rate and regular rhythm. Pulmonary:      Effort: Pulmonary effort is normal. No respiratory distress. Breath sounds: Normal breath sounds. Abdominal:      General: Abdomen is flat. Bowel sounds are normal. There is no distension. Palpations: Abdomen is soft. Tenderness: There is no abdominal tenderness. There is no guarding. Musculoskeletal:      Cervical back: Normal range of motion and neck supple. Right lower leg: No edema. Left lower leg: No edema. Skin:     General: Skin is warm and dry. Neurological:      General: No focal deficit present. Mental Status: She is alert. Labs:   Labs and imaging reviewed.       Medications:     Current Facility-Administered Medications   Medication Dose Route Frequency    carvediloL (COREG) tablet 3.125 mg  3.125 mg Oral BID WITH MEALS    acetaminophen (TYLENOL) tablet 1,000 mg  1,000 mg Oral Q6H    hydrALAZINE (APRESOLINE) 20 mg/mL injection 10-20 mg  10-20 mg IntraVENous Q6H PRN    traMADoL (ULTRAM) tablet 50 mg  50 mg Oral Q6H PRN    atorvastatin (LIPITOR) tablet 80 mg  80 mg Oral QHS    alteplase (CATHFLO) 1 mg in sterile water (preservative free) 1 mL injection  1 mg InterCATHeter PRN    bacitracin 500 unit/gram packet 1 Packet  1 Packet Topical PRN    sodium chloride (NS) flush 5-40 mL  5-40 mL IntraVENous Q8H    sodium chloride (NS) flush 5-40 mL  5-40 mL IntraVENous PRN    0.45% sodium chloride infusion  10 mL/hr IntraVENous CONTINUOUS    0.9% sodium chloride infusion  9 mL/hr IntraVENous CONTINUOUS    oxyCODONE IR (ROXICODONE) tablet 5 mg  5 mg Oral Q4H PRN    oxyCODONE IR (ROXICODONE) tablet 10 mg  10 mg Oral Q4H PRN    naloxone (NARCAN) injection 0.4 mg  0.4 mg IntraVENous PRN    mupirocin (BACTROBAN) 2 % ointment   Both Nostrils BID    amiodarone (CORDARONE) tablet 400 mg  400 mg Oral Q12H    ondansetron (ZOFRAN) injection 4 mg  4 mg IntraVENous Q4H PRN    albuterol (PROVENTIL VENTOLIN) nebulizer solution 2.5 mg  2.5 mg Nebulization Q4H PRN    aspirin chewable tablet 81 mg  81 mg Oral DAILY    chlorhexidine (PERIDEX) 0.12 % mouthwash 10 mL  10 mL Oral Q12H    [Held by provider] magnesium oxide (MAG-OX) tablet 400 mg  400 mg Oral BID    calcium chloride 1 g in 0.9% sodium chloride 250 mL IVPB  1 g IntraVENous PRN    bisacodyL (DULCOLAX) suppository 10 mg  10 mg Rectal DAILY PRN    senna-docusate (PERICOLACE) 8.6-50 mg per tablet 1 Tablet  1 Tablet Oral BID    polyethylene glycol (MIRALAX) packet 17 g  17 g Oral DAILY    ELECTROLYTE REPLACEMENT NOTE: Nurse to review Serum Potassium and Magnesuim levels and Initiate Electrolyte Replacement Protocol as needed  1 Each Other PRN    magnesium sulfate 1 g/100 ml IVPB (premix or compounded)  1 g IntraVENous PRN    insulin regular (NOVOLIN R, HUMULIN R) 100 Units in 0.9% sodium chloride 100 mL infusion  1-50 Units/hr IntraVENous TITRATE    glucose chewable tablet 16 g  4 Tablet Oral PRN    dextrose (D50W) injection syrg 12.5-25 g  12.5-25 g IntraVENous PRN    glucagon (GLUCAGEN) injection 1 mg  1 mg IntraMUSCular PRN    insulin lispro (HUMALOG) injection   SubCUTAneous AC&HS    diphenhydrAMINE (BENADRYL) capsule 25 mg  25 mg Oral Q6H PRN    diphenhydrAMINE (BENADRYL) injection 25 mg  25 mg IntraVENous Q6H PRN    melatonin tablet 3 mg  3 mg Oral QHS PRN    pantoprazole (PROTONIX) tablet 40 mg  40 mg Oral ACB    heparin (porcine) 1,000 unit/mL injection    PRN    ALPRAZolam (XANAX) tablet 0.5 mg  0.5 mg Oral PRN    calcium-vitamin D (OS-MARINA +D3) 500 mg-200 unit per tablet 1 Tablet  1 Tablet Oral DAILY     ______________________________________________________________________  EXPECTED LENGTH OF STAY: 8d 14h  ACTUAL LENGTH OF STAY:          8                 Marysol Goss MD   Pulmonary/[facility]  [facility]  [phone]  11/22/2021

## 2021-11-22 NOTE — PROGRESS NOTES
20:32  Return to be x 2 assist.  Stronger standing than expected and walked with assistance to bed. 06:00  Cardene remained off overnight. Telemetry SR-ST . Medicated for pain x 2.  Kcl given.    cc/ 12 .  CT 50 cc/ 12 hr.  I/O -490 cc/ 24 hr.

## 2021-11-22 NOTE — PROGRESS NOTES
Physician Progress Note      PATIENT:               Eun Vieira  CSN #:                  [id number]  :                       1935  ADMIT DATE:       2021 5:31 AM  DISCH DATE:  RESPONDING  PROVIDER #:        Koffi Chavez NP        QUERY TEXT:    Type of Shock: Please provide further specificity, if known. Clinical indicators include:  Options provided:  -- Cardiogenic shock  -- Septic shock  -- Hypovolemic shock  -- Hemorrhagic shock due to trauma  -- Hemorrhagic shock related to surgery  -- Anaphylactic shock  -- Other - I will add my own diagnosis  -- Disagree - Not applicable / Not valid  -- Disagree - Clinically Unable to determine / Unknown        PROVIDER RESPONSE TEXT:    The patient has cardiogenic shock.       Electronically signed by:  Koffi Chavez NP 2021 2:10 PM

## 2021-11-22 NOTE — PROGRESS NOTES
Problem: Self Care Deficits Care Plan (Adult)  Goal: *Acute Goals and Plan of Care (Insert Text)  Description:   FUNCTIONAL STATUS PRIOR TO ADMISSION:  Pt lives alone, but reports having a supportive family and [employer] family. Pt states that she has been independent in adls and IADLs, including driving, shopping, preparing meals, gardening etc.      HOME SUPPORT: The patient lived alone with family and [employer] family  to provide assistance. Occupational Therapy Goals  Re-eval 11/21/2021 s/p CABG    1. Patient will perform grooming with minimal assistance within 7 days. 2. Patient will perform upper body dressing with moderate assistance  within 7 days. 3. Patient will perform lower body dressing with max assistance  within 7 days. 4. Patient will tolerate 3 minutes of UE cardiac 6 pack exercises within 7 days. 5. Patient will perform toileting max assist within 7 days. 6. Patient will transfer from bedside commode with minimal assistance using the least restrictive device and appropriate durable medical equipment within 7 days. Discontinue all below goals 11/21/2021  Initiated 11/15/2021  1. Patient will perform grooming in stanidng with supervision within 7 day(s). 2.  Patient will perform bathing with supervision within 7 day(s). 3.  Patient will perform upper body dressing and lower body dressing with supervision within 7 day(s). 4.  Patient will perform toilet transfers with supervision within 7 day(s). 5.  Patient will perform all aspects of toileting with independence within 7 day(s). 6.  Patient will participate in upper extremity therapeutic exercise/activities with supervision/set-up for 8 minutes within 7 day(s). 7.  Patient will utilize energy conservation techniques during functional activities with verbal cues within 7 day(s). 8.  Patient will perform standing adls for at least 8 minutes within 7 days.     Outcome: Progressing Towards Goal   OCCUPATIONAL THERAPY TREATMENT  Patient: Vashti Faria (78 y.o. female)  Date: 11/22/2021  Diagnosis: NSTEMI (non-ST elevated myocardial infarction) ([facility] [address] [I21.4]  CAD (coronary artery disease) [I25.10]   S/P CABG x 4  Procedure(s) (LRB):  CORONARY ARTERY BYPASS GRAFT X4 [place], [address] [place] VIA [place]. ECC. EPI AORTIC US BY DR Galina Mina. (N/A) 3 Days Post-Op  Precautions: Sternal (move in the tube)  Chart, occupational therapy assessment, plan of care, and goals were reviewed. ASSESSMENT  Patient continues with skilled OT services and is progressing towards goals. Pt was supine in bed and had pain med prior to having her chest tubes pulled and she was falling asleep during tx. Pt was mod of 2 for bed mobility, and able to scoot to EOb with CGA. She was mod of 2 for sit to stand and transfer to chair. Worked on BUE ex cardiac ex and able to perform 3 x and pt fell asleep. Left pt up in chair and call bell with in reach, and all VSS    Current Level of Function Impacting Discharge (ADLs): max for ADLs             PLAN :  Patient continues to benefit from skilled intervention to address the above impairments. Continue treatment per established plan of care to address goals. Recommend with staff: Sharad Cordero for meals     Recommend next OT session: work on UB ADLs     Recommendation for discharge: (in order for the patient to meet his/her long term goals)  To be determined: pending progress, rehab VS home with 24/7 assist.     This discharge recommendation:  Has been made in collaboration with the attending provider and/or case management    IF patient discharges home will need the following DME: tbd       SUBJECTIVE:   Patient stated I can try. Usha Hensley    OBJECTIVE DATA SUMMARY:   Cognitive/Behavioral Status:  Neurologic State: Drowsy  Orientation Level: Oriented to person; Oriented to place; Oriented to situation  Cognition: Decreased command following;  Other (comment) (pt was drowsy due to pain med)  Perception: Appears intact  Perseveration: No perseveration noted  Safety/Judgement: Fall prevention    Functional Mobility and Transfers for ADLs:  Bed Mobility:  Rolling: Moderate assistance; Assist x2  Supine to Sit: Moderate assistance; Assist x2  Scooting: Moderate assistance    Transfers:  Sit to Stand: Minimum assistance          Balance:  Sitting: Impaired  Sitting - Static: Good (unsupported)  Sitting - Dynamic: Fair (occasional)  Standing: Impaired  Standing - Static: Fair; Constant support  Standing - Dynamic : Fair; Constant support    ADL Intervention:  Feeding  Feeding Assistance: Set-up    Grooming  Position Performed: Seated in chair  Washing Face: Contact guard assistance; Set-up  Washing Hands: Contact guard assistance; Set-up  Brushing Teeth: Contact guard assistance; Set-up    Upper Body Bathing  Bathing Assistance: Minimum assistance  Position Performed: Seated in chair    Lower Body Bathing  Bathing Assistance: Maximum assistance  Perineal  : Maximum assistance  Position Performed: Supine              Toileting  Toileting Assistance: Maximum assistance  Bladder Hygiene: Maximum assistance  Bowel Hygiene: Maximum assistance    Cognitive Retraining  Safety/Judgement: Fall prevention      Increase activity tolerance for home, work, and sexual intercourse by pacing self with increasing the arm exercises, sitting duration, frequency OOB, walking, standing, and ADLs. Instructed and indicated understanding of s/s of too much activity, how to respond to s/s safely. Therapeutic Exercises:   Patient instructed on the benefits and demonstrated cardiac exercises while sitting  with Minimum assistance. Instructed and indicated understanding on how to progress reps, sets against gravity, weights, standing and so on based on cardiologist clearance for more weight in prep for basic and instrumental ADLs. Instruction on the use of household items in place of weights as needed.     CARDIAC   EXERCISE    Sets    Reps    Active  Active Assist    Passive Self ROM    Comments    Shoulder flexion  1  5  [x]                            []                             []                             []                         Shoulder abduction  1  5  [x]                             []                             []                             []                                Scapular retraction  1  5  [x]                             []                             []                             []                                  Pain:  none    Activity Tolerance:   Poor    After treatment patient left in no apparent distress:   Sitting in chair and Call bell within reach    COMMUNICATION/COLLABORATION:   The patients plan of care was discussed with: Physical therapist and Registered nurse.      Allie Joseph OT  Time Calculation: 13 mins

## 2021-11-23 LAB
ALBUMIN SERPL-MCNC: 2.6 G/DL (ref 3.5–5)
ALBUMIN/GLOB SERPL: 0.8 {RATIO} (ref 1.1–2.2)
ALP SERPL-CCNC: 64 U/L (ref 45–117)
ALT SERPL-CCNC: 7 U/L (ref 12–78)
ANION GAP SERPL CALC-SCNC: 8 MMOL/L (ref 5–15)
AST SERPL-CCNC: 15 U/L (ref 15–37)
BILIRUB SERPL-MCNC: 0.4 MG/DL (ref 0.2–1)
BUN SERPL-MCNC: 59 MG/DL (ref 6–20)
BUN/CREAT SERPL: 28 (ref 12–20)
CALCIUM SERPL-MCNC: 8.6 MG/DL (ref 8.5–10.1)
CHLORIDE SERPL-SCNC: 105 MMOL/L (ref 97–108)
CO2 SERPL-SCNC: 22 MMOL/L (ref 21–32)
CREAT SERPL-MCNC: 2.11 MG/DL (ref 0.55–1.02)
ERYTHROCYTE [DISTWIDTH] IN BLOOD BY AUTOMATED COUNT: 14.6 % (ref 11.5–14.5)
GLOBULIN SER CALC-MCNC: 3.2 G/DL (ref 2–4)
GLUCOSE BLD STRIP.AUTO-MCNC: 151 MG/DL (ref 65–117)
GLUCOSE BLD STRIP.AUTO-MCNC: 158 MG/DL (ref 65–117)
GLUCOSE BLD STRIP.AUTO-MCNC: 167 MG/DL (ref 65–117)
GLUCOSE BLD STRIP.AUTO-MCNC: 176 MG/DL (ref 65–117)
GLUCOSE SERPL-MCNC: 182 MG/DL (ref 65–100)
HCT VFR BLD AUTO: 26.6 % (ref 35–47)
HGB BLD-MCNC: 8.4 G/DL (ref 11.5–16)
MCH RBC QN AUTO: 30.8 PG (ref 26–34)
MCHC RBC AUTO-ENTMCNC: 31.6 G/DL (ref 30–36.5)
MCV RBC AUTO: 97.4 FL (ref 80–99)
NRBC # BLD: 0 K/UL (ref 0–0.01)
NRBC BLD-RTO: 0 PER 100 WBC
PHOSPHATE SERPL-MCNC: 3 MG/DL (ref 2.6–4.7)
PLATELET # BLD AUTO: 215 K/UL (ref 150–400)
PMV BLD AUTO: 10.1 FL (ref 8.9–12.9)
POTASSIUM SERPL-SCNC: 4.5 MMOL/L (ref 3.5–5.1)
PROT SERPL-MCNC: 5.8 G/DL (ref 6.4–8.2)
RBC # BLD AUTO: 2.73 M/UL (ref 3.8–5.2)
SERVICE CMNT-IMP: ABNORMAL
SODIUM SERPL-SCNC: 135 MMOL/L (ref 136–145)
WBC # BLD AUTO: 13 K/UL (ref 3.6–11)

## 2021-11-23 PROCEDURE — 97116 GAIT TRAINING THERAPY: CPT

## 2021-11-23 PROCEDURE — 97110 THERAPEUTIC EXERCISES: CPT

## 2021-11-23 PROCEDURE — 77010033678 HC OXYGEN DAILY

## 2021-11-23 PROCEDURE — 84100 ASSAY OF PHOSPHORUS: CPT

## 2021-11-23 PROCEDURE — 36415 COLL VENOUS BLD VENIPUNCTURE: CPT

## 2021-11-23 PROCEDURE — 74011636637 HC RX REV CODE- 636/637: Performed by: PHYSICIAN ASSISTANT

## 2021-11-23 PROCEDURE — 97535 SELF CARE MNGMENT TRAINING: CPT

## 2021-11-23 PROCEDURE — 74011250637 HC RX REV CODE- 250/637: Performed by: PHYSICIAN ASSISTANT

## 2021-11-23 PROCEDURE — 2709999900 HC NON-CHARGEABLE SUPPLY

## 2021-11-23 PROCEDURE — 74011000250 HC RX REV CODE- 250: Performed by: THORACIC SURGERY (CARDIOTHORACIC VASCULAR SURGERY)

## 2021-11-23 PROCEDURE — 97530 THERAPEUTIC ACTIVITIES: CPT

## 2021-11-23 PROCEDURE — 82962 GLUCOSE BLOOD TEST: CPT

## 2021-11-23 PROCEDURE — 85027 COMPLETE CBC AUTOMATED: CPT

## 2021-11-23 PROCEDURE — 65660000000 HC RM CCU STEPDOWN

## 2021-11-23 PROCEDURE — 80053 COMPREHEN METABOLIC PANEL: CPT

## 2021-11-23 PROCEDURE — 74011250637 HC RX REV CODE- 250/637: Performed by: THORACIC SURGERY (CARDIOTHORACIC VASCULAR SURGERY)

## 2021-11-23 PROCEDURE — 74011250637 HC RX REV CODE- 250/637: Performed by: NURSE PRACTITIONER

## 2021-11-23 RX ORDER — SODIUM CHLORIDE 0.9 % (FLUSH) 0.9 %
5-40 SYRINGE (ML) INJECTION AS NEEDED
Status: DISCONTINUED | OUTPATIENT
Start: 2021-11-23 | End: 2021-12-06 | Stop reason: HOSPADM

## 2021-11-23 RX ORDER — SODIUM CHLORIDE 0.9 % (FLUSH) 0.9 %
5-40 SYRINGE (ML) INJECTION EVERY 8 HOURS
Status: DISCONTINUED | OUTPATIENT
Start: 2021-11-23 | End: 2021-12-06 | Stop reason: HOSPADM

## 2021-11-23 RX ORDER — CARVEDILOL 3.12 MG/1
3.12 TABLET ORAL ONCE
Status: COMPLETED | OUTPATIENT
Start: 2021-11-23 | End: 2021-11-23

## 2021-11-23 RX ORDER — CARVEDILOL 6.25 MG/1
6.25 TABLET ORAL 2 TIMES DAILY WITH MEALS
Status: DISCONTINUED | OUTPATIENT
Start: 2021-11-23 | End: 2021-12-06

## 2021-11-23 RX ADMIN — INSULIN LISPRO 2 UNITS: 100 INJECTION, SOLUTION INTRAVENOUS; SUBCUTANEOUS at 12:21

## 2021-11-23 RX ADMIN — CARVEDILOL 3.12 MG: 3.12 TABLET, FILM COATED ORAL at 08:25

## 2021-11-23 RX ADMIN — INSULIN LISPRO 2 UNITS: 100 INJECTION, SOLUTION INTRAVENOUS; SUBCUTANEOUS at 09:22

## 2021-11-23 RX ADMIN — Medication 10 ML: at 22:06

## 2021-11-23 RX ADMIN — MUPIROCIN: 20 OINTMENT TOPICAL at 08:25

## 2021-11-23 RX ADMIN — AMIODARONE HYDROCHLORIDE 400 MG: 200 TABLET ORAL at 08:25

## 2021-11-23 RX ADMIN — POLYETHYLENE GLYCOL 3350 17 G: 17 POWDER, FOR SOLUTION ORAL at 08:24

## 2021-11-23 RX ADMIN — Medication 10 ML: at 17:50

## 2021-11-23 RX ADMIN — Medication 1 TABLET: at 08:25

## 2021-11-23 RX ADMIN — ACETAMINOPHEN 1000 MG: 500 TABLET, FILM COATED ORAL at 17:50

## 2021-11-23 RX ADMIN — PANTOPRAZOLE SODIUM 40 MG: 40 TABLET, DELAYED RELEASE ORAL at 08:25

## 2021-11-23 RX ADMIN — ATORVASTATIN CALCIUM 80 MG: 40 TABLET, FILM COATED ORAL at 22:12

## 2021-11-23 RX ADMIN — ACETAMINOPHEN 1000 MG: 500 TABLET, FILM COATED ORAL at 06:10

## 2021-11-23 RX ADMIN — MUPIROCIN: 20 OINTMENT TOPICAL at 17:51

## 2021-11-23 RX ADMIN — AMIODARONE HYDROCHLORIDE 400 MG: 200 TABLET ORAL at 20:43

## 2021-11-23 RX ADMIN — CARVEDILOL 3.12 MG: 3.12 TABLET, FILM COATED ORAL at 12:15

## 2021-11-23 RX ADMIN — CARVEDILOL 6.25 MG: 6.25 TABLET, FILM COATED ORAL at 17:50

## 2021-11-23 RX ADMIN — ASPIRIN 81 MG CHEWABLE TABLET 81 MG: 81 TABLET CHEWABLE at 08:24

## 2021-11-23 RX ADMIN — CHLORHEXIDINE GLUCONATE 0.12% ORAL RINSE 10 ML: 1.2 LIQUID ORAL at 08:25

## 2021-11-23 RX ADMIN — DOCUSATE SODIUM 50MG AND SENNOSIDES 8.6MG 1 TABLET: 8.6; 5 TABLET, FILM COATED ORAL at 08:25

## 2021-11-23 RX ADMIN — INSULIN LISPRO 2 UNITS: 100 INJECTION, SOLUTION INTRAVENOUS; SUBCUTANEOUS at 17:50

## 2021-11-23 RX ADMIN — OXYCODONE 5 MG: 5 TABLET ORAL at 02:11

## 2021-11-23 RX ADMIN — CHLORHEXIDINE GLUCONATE 0.12% ORAL RINSE 10 ML: 1.2 LIQUID ORAL at 20:43

## 2021-11-23 RX ADMIN — DOCUSATE SODIUM 50MG AND SENNOSIDES 8.6MG 1 TABLET: 8.6; 5 TABLET, FILM COATED ORAL at 17:50

## 2021-11-23 RX ADMIN — Medication 20 ML: at 06:44

## 2021-11-23 RX ADMIN — OXYCODONE 5 MG: 5 TABLET ORAL at 19:45

## 2021-11-23 NOTE — DIABETES MGMT
[address] [facility]    CLINICAL NURSE SPECIALIST CONSULT     Initial Presentation   Stephanie Sheldon is a 81 y.o. female transferred to [facility] 11/14/21 with hypertension, nausea & vomiting after originally presenting to [address] [place] [address] ER. Systolic BP markedly elevated    HX:   Past Medical History:   Diagnosis Date    Anxiety     Hypertension       INITIAL DX:   NSTEMI (non-ST elevated myocardial infarction) ([facility] [address] [I21.4]  CAD (coronary artery disease) [I25.10]     Current Treatment     TX: 11/19/21 CABG x 4, LIMA to LAD, RSVG to Diag, RSVG to OM, RSVG to PDA  Left Greater Saphenous Vein EVH Placement of Prevena Incision Management System    Consulted by Provider for advanced diabetes nursing assessment and care for:   [x] Inpatient management strategy    Hospital Course   Clinical progress has been uncomplicated. 11/14/21 Cardiology consult: Findings favored medication management. Echo: Normal LV & RV Fxn; no valve dz; mild LVH  11/16/21 Positive stress test  11/18/21 CV surgery consult: Multivessel CAD including left main with preserved EF: Plan for CABG 11-19-21 first case for Dr. Cheyenne Hardy. Preop workup ordered, be sure to complete today. NPO at midnight  11/19/21 Remains intubated & sedated  11/20/21 Extubated to BiPap  11/21/21 Transitioned to [facility] [place]. CXR: Right apical pneumothorax  11/23/21 Using O2NC. Tolerating small amount of food. Coughing after use of incentive spirometer. Up to chair    Diabetes History   Patient denies personal and family history of diabetes PTA. Admission . A1c 7%. Diabetes Medication History  Key Antihyperglycemic Medications     Patient is on no antihyperglycemic meds. Subjective   No one has diabetes. I haven't even eaten meat for a couple months since my son got  and moved out.     Patient reports the following home self-care practices:   Eating pattern   [x] Eating a carbohydrate-controlled mealplan  [x] Breakfast Ronaldford Spikes Biscuit with OJ & coffee  [x] Lunch  Chicken salad sandwich or a salad with iced tea  [x] Dinner  Baked potato or salad  Physical activity pattern - Used to do her house and yard work until a month ago when she just couldn't continue to do so     Objective   Physical exam  General Overweight female. Ill-appearing. Cooperative  Neuro  Alert, oriented   Vital Signs   Visit Vitals  BP (!) 143/49   Pulse 89   Temp 99 °F (37.2 °C)   Resp 19   Ht 5' 7\" (1.702 m)   Wt 83.5 kg (184 lb)   SpO2 94%   BMI 28.82 kg/m²     Laboratory  Recent Labs     11/23/21  0358 11/22/21  0244 11/21/21  1814 11/21/21  0411 11/21/21  0411   * 108* 140*   < > 110*   AGAP 8 9 8   < > 10   WBC 13.0* 19.1*  --   --  16.8*   CREA 2.11* 2.04* 2.06*   < > 2.02*   GFRNA 22* 23* 23*   < > 23*   AST 15 20  --   --  21   ALT 7* 6*  --   --  10*    < > = values in this interval not displayed. Factors impacting BG management  Factor Dose Comments   Nutrition:  Standard meals       Drugs:  Blood transfusion(s)   PRBCs 11/19/21   A1cs inaccurate   Pain Oxycodone prn    Other:   Kidney function  Liver function    Creatinine in the 2s; GFR in the 20s  Normal     Blood glucose pattern      Significant diabetes-related events over the past 24-72 hours  11/22/21 Transitioned off GS   11/23/21 BGs in the 140-180s    Assessment and Plan   Nursing Diagnosis Risk for unstable blood glucose pattern   Nursing Intervention Domain 8680 Decision-making Support   Nursing Interventions Examined current inpatient diabetes control   Explored factors facilitating and impeding inpatient management     Evaluation   This overweight  female did not have diabetes PTA. Admission A1c indicates early Type 2 diabetes. BGs easily controlled on Glucostabilzier post-operatively. Will require some preliminary education to address survival diabetes skills before discharge.  Suspect she will need oral agents, e.g. metformin (after renal recovery and off O2) and a DPP-4 or low dose sulfonylurea. This patient would benefit from diabetes self-management education and support [facility] after [facility]. Recommendations     [x] Use of Subcutaneous Insulin Order set (9862)  Insulin Dosing Specific recommendation   Basal                                      (Based on weight, BMI & GFR)     Nutritional                                      (Based on CHO/dextrose load)  DPP-4 once she starts eating   Corrective                                    (Useful in adjusting insulin dosing) [x] Normal sensitivity Metformin ER 500mg twice daily when renal parameter recover     Billing Code(s)   [x] 99856 IP subsequent hospital care - 35 minutes    Before making these care recommendations, I personally reviewed the hospitalization record, including notes, laboratory & diagnostic data and current medications, and examined the patient at the bedside (circumstances permitting) before making care recommendations.      Total minutes: Belkis Mcdermott, CNS  Diabetes Clinical Nurse Specialist  [facility]  Access via [employer]

## 2021-11-23 NOTE — PROGRESS NOTES
Transition of Care Plan:     RUR:7% low risk  Disposition:home with [address]  Follow up appointments:PCP and specialists as indicated  DME needed: RW  Transportation at [address] transport  [employer] or means to access home: pt has access       IM Medicare Letter:2nd letter to be given at d/c  Is patient a BCPI-A Bundle: n/a                   If yes, was Bundle Letter given?:     Caregiver Contact:son Merline Snipe [phone]  Discharge Caregiver contacted prior to discharge? Reason for Admission:  NSTEMI    Pt lives alone in a 1 level home with 4 HERMAN. She is independent with ADL's and IADL's and drives. Her preferred pharmacy is [facility] in [place]. She confirmed that her physician is Dr. Darryl Zapata but she does not know her first name. Pt declined SNF but is willing to have [facility]. She confirms that she has someone that can assist her at home. In a later conversation she also confirm that she has family that can assist.    Pt underwent CABG on 11/19 and is recovering in CCU. PT/OT have evaluated and are recommending IP Rehab at [place]. CM spoke w/ pt to provide [facility] for Rehab providers and her preference was [facility]. Ref sent via [facility]. THELMA is following to assist w/ [place] plans.     BARON Lamb

## 2021-11-23 NOTE — PROGRESS NOTES
1915: Bedside and Verbal shift change report given to [address] (oncoming nurse) by Brigida Hicks RN (offgoing nurse). Report included the following information SBAR, Kardex, Intake/Output, Recent Results and Cardiac Rhythm NSR.     2000: shift assessment complete. See flowsheet for details. Pt A+Ox4, resting quietly in bed on NC 6L. Pt denies pain. 2100: pt says that right side of chest feels \"heavy\". She denies pain on inspiration just says that \"it doesn't feel right\". Lung sounds are diminished but seem to have improved since I cared for her on 11/20. Tiffany Sorenson NP notified and chest x-ray ordered. 2300: pt has not voided bladder scan shows 127mL    2350: straight cath per protocol, 200mL urine strained. 0000: Reassessment complete, no acute changes noted. 0400: reassessment complete, no acute changes noted. 0645: Pt got full bed bath and up to chair, 2x assist , pt tolerated well.    0715: Bedside and Verbal shift change report given to Shweta JOHNSON (oncoming nurse) by Flora Brown RN (offgoing nurse). Report included the following information SBAR, Kardex, Intake/Output, Recent Results, Med Rec Status and Cardiac Rhythm NSR.

## 2021-11-23 NOTE — PROGRESS NOTES
Critical Care Progress Note  Donna Umanzor MD          Date of Service:  2021  NAME:  Bernard Bailey  :  1935  MRN:  [id number]      Subjective/Hospital course:      : Patient remains intubated and sedated, on 50% Fio2. She was not extubated yesterday due to high Fio2.    Extubated to BiPAP   Transitioned off BiPAP to NC O2 - tolerating well. CXR suggests R apical pneumothorax. Repeat film ordered. Cr increasing - diuresis held   No major changes   No major changes. Remains on NC O2 @ 3 LPM. Transfer to SDU ordered         Problem list:     Problem list:  Hospital Problems  Date Reviewed: 3/15/2021          Codes Class Noted POA    * (Principal) S/P CABG x 4 ICD-10-CM: Z95.1  ICD-9-CM: V45.81  2021 Unknown    Overview Signed 2021  1:29 PM by TRACY Mcdowell     x 4, LIMA to LAD, RSVG to Diag, RSVG to OM, RSVG to PDA             CAD (coronary artery disease) ICD-10-CM: I25.10  ICD-9-CM: 414.00  2021 Unknown        NSTEMI (non-ST elevated myocardial infarction) [facility]) ICD-10-CM: I21.4  ICD-9-CM: 410.70  2021 Unknown              Assessment/Plan:     Post cardiac surgery care: S/P CABGx4.   Shock, resolved  Hypertension, controlled  Sinus tachycardia, mild  Acute hypoxic resp failure, resolving          Atelectasis          Pulm edema  Postop anemia - no active blood loss  Thrombocytopenia, resolved  TEMO, nonoliguric    Cont supplemental O2 as needed  Cont hemodynamic and rhythm monitoring  PRN hydralazine  Carvedilol initiated   Daily CBC  Follow chem panels  Discussed with Dr Pineda Aguila team  CCM Service will sign off/ Please call if we can be of further assistance     Code status: full code.                    Vital Signs:     Patient Vitals for the past 4 hrs:   BP Temp Pulse Resp SpO2   21 1000 (!) 142/29 -- -- -- 93 %   21 0900 (!) 136/52 -- 85 17 95 %   21 0830 -- -- 84 14 95 %   21 0800 (!) 131/48 99 °F (37.2 °C) 84 11 97 %   11/23/21 0743 -- -- -- -- 98 %   11/23/21 0730 -- -- 86 17 98 %   11/23/21 0700 (!) 135/50 -- 86 11 96 %        Intake/Output Summary (Last 24 hours) at 11/23/2021 1037  Last data filed at 11/23/2021 0000  Gross per 24 hour   Intake 300 ml   Output 200 ml   Net 100 ml        Physical Examination:    Physical Exam  Constitutional:       Appearance: Normal appearance. HENT:      Head: Normocephalic and atraumatic. Eyes:      Extraocular Movements: Extraocular movements intact. Conjunctiva/sclera: Conjunctivae normal.      Pupils: Pupils are equal, round, and reactive to light. Cardiovascular:      Rate and Rhythm: Normal rate and regular rhythm. Pulmonary:      Effort: Pulmonary effort is normal. No respiratory distress. Breath sounds: Rales present. Abdominal:      General: Abdomen is flat. Bowel sounds are normal. There is no distension. Palpations: Abdomen is soft. Tenderness: There is no abdominal tenderness. There is no guarding. Musculoskeletal:      Cervical back: Normal range of motion and neck supple. Right lower leg: No edema. Left lower leg: No edema. Skin:     General: Skin is warm and dry. Neurological:      General: No focal deficit present. Mental Status: She is alert. Labs:   Labs and imaging reviewed.       Medications:     Current Facility-Administered Medications   Medication Dose Route Frequency    carvediloL (COREG) tablet 6.25 mg  6.25 mg Oral BID WITH MEALS    carvediloL (COREG) tablet 3.125 mg  3.125 mg Oral ONCE    acetaminophen (TYLENOL) tablet 1,000 mg  1,000 mg Oral Q6H    hydrALAZINE (APRESOLINE) 20 mg/mL injection 10-20 mg  10-20 mg IntraVENous Q6H PRN    traMADoL (ULTRAM) tablet 50 mg  50 mg Oral Q6H PRN    atorvastatin (LIPITOR) tablet 80 mg  80 mg Oral QHS    alteplase (CATHFLO) 1 mg in sterile water (preservative free) 1 mL injection  1 mg InterCATHeter PRN    bacitracin 500 unit/gram packet 1 Packet  1 Packet Topical PRN    sodium chloride (NS) flush 5-40 mL  5-40 mL IntraVENous Q8H    sodium chloride (NS) flush 5-40 mL  5-40 mL IntraVENous PRN    oxyCODONE IR (ROXICODONE) tablet 5 mg  5 mg Oral Q4H PRN    oxyCODONE IR (ROXICODONE) tablet 10 mg  10 mg Oral Q4H PRN    naloxone (NARCAN) injection 0.4 mg  0.4 mg IntraVENous PRN    mupirocin (BACTROBAN) 2 % ointment   Both Nostrils BID    amiodarone (CORDARONE) tablet 400 mg  400 mg Oral Q12H    ondansetron (ZOFRAN) injection 4 mg  4 mg IntraVENous Q4H PRN    albuterol (PROVENTIL VENTOLIN) nebulizer solution 2.5 mg  2.5 mg Nebulization Q4H PRN    aspirin chewable tablet 81 mg  81 mg Oral DAILY    chlorhexidine (PERIDEX) 0.12 % mouthwash 10 mL  10 mL Oral Q12H    [Held by provider] magnesium oxide (MAG-OX) tablet 400 mg  400 mg Oral BID    calcium chloride 1 g in 0.9% sodium chloride 250 mL IVPB  1 g IntraVENous PRN    bisacodyL (DULCOLAX) suppository 10 mg  10 mg Rectal DAILY PRN    senna-docusate (PERICOLACE) 8.6-50 mg per tablet 1 Tablet  1 Tablet Oral BID    polyethylene glycol (MIRALAX) packet 17 g  17 g Oral DAILY    ELECTROLYTE REPLACEMENT NOTE: Nurse to review Serum Potassium and Magnesuim levels and Initiate Electrolyte Replacement Protocol as needed  1 Each Other PRN    magnesium sulfate 1 g/100 ml IVPB (premix or compounded)  1 g IntraVENous PRN    glucose chewable tablet 16 g  4 Tablet Oral PRN    dextrose (D50W) injection syrg 12.5-25 g  12.5-25 g IntraVENous PRN    glucagon (GLUCAGEN) injection 1 mg  1 mg IntraMUSCular PRN    insulin lispro (HUMALOG) injection   SubCUTAneous AC&HS    diphenhydrAMINE (BENADRYL) capsule 25 mg  25 mg Oral Q6H PRN    diphenhydrAMINE (BENADRYL) injection 25 mg  25 mg IntraVENous Q6H PRN    melatonin tablet 3 mg  3 mg Oral QHS PRN    pantoprazole (PROTONIX) tablet 40 mg  40 mg Oral ACB    heparin (porcine) 1,000 unit/mL injection    PRN    ALPRAZolam (XANAX) tablet 0.5 mg  0.5 mg Oral PRN  calcium-vitamin D (OS-MARINA +D3) 500 mg-200 unit per tablet 1 Tablet  1 Tablet Oral DAILY     ______________________________________________________________________  EXPECTED LENGTH OF STAY: 8d 14h  ACTUAL LENGTH OF STAY:          [address] MD Kimo   Pulmonary/[facility]  [address]  [phone]  11/23/2021

## 2021-11-23 NOTE — PROGRESS NOTES
Problem: Mobility Impaired (Adult and Pediatric)  Goal: *Acute Goals and Plan of Care (Insert Text)  Description: FUNCTIONAL STATUS PRIOR TO ADMISSION: Patient was independent and active without use of DME however began using rolling walker while in hospital prior to cardiac surgery. Reports that she has been having difficulty with endurance and can only do a little of activity before needing to take a break. Reports \"nighttime weakness\" but no falls at home. HOME SUPPORT PRIOR TO ADMISSION: The patient lived alone with son in the area to provide assistance. Physical Therapy Goals  Initiated 11/21/2021  1. Patient will move from supine to sit and sit to supine , scoot up and down, and roll side to side in bed with contact guard assist within 5 days. 2.  Patient will perform sit to/from stand with contact guard assist within 5 days. 3.  Patient will ambulate 100 feet with least restrictive assistive device and contact guard assist within 5 days. 4.  Patient will ascend/descend 3 stairs with 1 handrail(s) with contact guard assistance within 7 day(s). 5.  Patient will perform cardiac exercises per protocol with supervision/set-up within 5 days. 6.  Patient will verbally and functionally demonstrate 3/3 sternal precautions without cues within 5 days. Initiated 11/15/2021  1. Patient will move from supine to sit and sit to supine  in bed with modified independence within 7 day(s). 2.  Patient will transfer from bed to chair and chair to bed with modified independence using the least restrictive device within 7 day(s). 3.  Patient will perform sit to stand with modified independence within 7 day(s). 4.  Patient will ambulate with modified independence for 250 feet with the least restrictive device within 7 day(s). 5.  Patient will ascend/descend 3 stairs with 1 handrail(s) with modified independence within 7 day(s).      Outcome: Progressing Towards Goal    PHYSICAL THERAPY TREATMENT  Patient: Angelita Whipple (89 y.o. female)  Date: 11/23/2021  Diagnosis: NSTEMI (non-ST elevated myocardial infarction) ([facility] [address] [I21.4]  CAD (coronary artery disease) [I25.10]   S/P CABG x 4  Procedure(s) (LRB):  CORONARY ARTERY BYPASS GRAFT X4 LIMA, [facility] VIA EVH. ECC. EPI AORTIC US BY DR Baldev Mckeon. (N/A) 4 Days Post-Op  Precautions: Sternal (move in the tube)  Chart, physical therapy assessment, plan of care and goals were reviewed. ASSESSMENT  Patient continues with skilled PT services and is progressing towards goals. Pt was received sitting up in the chair on 6L and cleared by nursing to mobilize. Pt with very poor activity tolerance. Spent several minutes working on scooting and weight shifting with she needed mod A. Stood and ambulated forward 10ft with rollator and chair follow. She was returned to sitting and pt completely exhausted. Pt with possible drop in BP, but vitals stable when sitting. OT attempted to perform exercises, but pt with significantly poor  shoulder ROM. Patient is not demonstrating understanding of mindful-based movements (\"move in the tube\") principles of keeping UEs proximal to ribcage to prevent lateral pull on the sternum during load-bearing activities with verbal cues required for compliance. Current Level of Function Impacting Discharge (mobility/balance): mod A x 2    Other factors to consider for discharge:          PLAN :  Patient continues to benefit from skilled intervention to address the above impairments. Continue treatment per established plan of care. to address goals. Recommendation for discharge: (in order for the patient to meet his/her long term goals)  Therapy up to 5 days/week in SNF setting    This discharge recommendation:  Has not yet been discussed the attending provider and/or case management    IF patient discharges home will need the following DME: to be determined (TBD)       SUBJECTIVE:   Patient stated i can't.     OBJECTIVE DATA SUMMARY: Patient mobilized on continuous portable monitor/telemetry. Critical Behavior:  Neurologic State: Alert, Appropriate for age  Orientation Level: Appropriate for age  Cognition: Appropriate decision making, Appropriate for age attention/concentration, Appropriate safety awareness, Follows commands  Safety/Judgement: Fall prevention    Functional Mobility Training:  Bed Mobility:         Session began and ended in sitting             Transfers:  Sit to Stand: Minimum assistance; Moderate assistance; Assist x2  Stand to Sit: Minimum assistance; Assist x2                               Balance:  Sitting: Impaired  Sitting - Static: Good (unsupported)  Sitting - Dynamic: Fair (occasional)  Standing: Impaired  Standing - Static: Fair; Constant support  Standing - Dynamic : Fair; Constant support    Ambulation/Gait Training:  Distance (ft): 10 Feet (ft)  Assistive Device: Walker, rollator (chair follow)  Ambulation - Level of Assistance: Moderate assistance        Gait Abnormalities: Decreased step clearance; Shuffling gait; Trunk sway increased        Base of Support: Widened     Speed/Jes: Pace decreased (<100 feet/min); Shuffled  Step Length: Left shortened; Right shortened                  Cardiac diagnosis intervention:  Patient instructed and educated on mindful movement principles based on Move in The Tube concept to include maintaining bilateral elbows close to rib cage when performing any load-bearing activity such as getting in/out of bed, pushing up from a chair, opening a door, or lifting a box. Patient was given a handout with diagrams of each correct/incorrect method of performing each of the above tasks. Therapeutic Exercises:   Patient instructed on the benefits and demonstrated cardiac exercises while sitting with Moderate Assistance.  Instructed and indicated understanding on how to progress reps, sets against gravity, pacing through progressive muscle strengthening standing based on surgeon clearance for more weight in prep for functional activity. Instruction on the use of household items in place of weights as needed. CARDIAC  EXERCISE   Sets   Reps   Active Active Assist   Passive Self ROM   Comments   Shoulder flexion 1 5 []                                            [x]                                            []                                            []                                               Shoulder abduction 1      5 []                                            [x]                                            []                                            []                                               Scapular elevation 1 5 []                                            [x]                                            []                                            []                                               Scapular retraction 1 5 []                                            [x]                                            []                                            []                                               Trunk rotation 1 5 []                                            []                                            []                                            []                                               Trunk sidebending 1 5 []                                            []                                            []                                            []                                                  []                                            []                                            []                                            []                                                     Activity Tolerance:   Poor    After treatment patient left in no apparent distress:   Sitting in chair and Call bell within reach    COMMUNICATION/COLLABORATION:   The patients plan of care was discussed with: Occupational therapist and Registered nurse. Marcianne Aschoff, PT, DPT   Time Calculation: 23 mins

## 2021-11-23 NOTE — PROGRESS NOTES
Problem: Self Care Deficits Care Plan (Adult)  Goal: *Acute Goals and Plan of Care (Insert Text)  Description:   FUNCTIONAL STATUS PRIOR TO ADMISSION:  Pt lives alone, but reports having a supportive family and [employer] family. Pt states that she has been independent in adls and IADLs, including driving, shopping, preparing meals, gardening etc.      HOME SUPPORT: The patient lived alone with family and [employer] family  to provide assistance. Occupational Therapy Goals  Re-eval 11/21/2021 s/p CABG    1. Patient will perform grooming with minimal assistance within 7 days. 2. Patient will perform upper body dressing with moderate assistance  within 7 days. 3. Patient will perform lower body dressing with max assistance  within 7 days. 4. Patient will tolerate 3 minutes of UE cardiac 6 pack exercises within 7 days. 5. Patient will perform toileting max assist within 7 days. 6. Patient will transfer from bedside commode with minimal assistance using the least restrictive device and appropriate durable medical equipment within 7 days. Discontinue all below goals 11/21/2021  Initiated 11/15/2021  1. Patient will perform grooming in stanidng with supervision within 7 day(s). 2.  Patient will perform bathing with supervision within 7 day(s). 3.  Patient will perform upper body dressing and lower body dressing with supervision within 7 day(s). 4.  Patient will perform toilet transfers with supervision within 7 day(s). 5.  Patient will perform all aspects of toileting with independence within 7 day(s). 6.  Patient will participate in upper extremity therapeutic exercise/activities with supervision/set-up for 8 minutes within 7 day(s). 7.  Patient will utilize energy conservation techniques during functional activities with verbal cues within 7 day(s). 8.  Patient will perform standing adls for at least 8 minutes within 7 days.     Outcome: Progressing Towards Goal   OCCUPATIONAL THERAPY TREATMENT  Patient: Bernard Bailey (32 y.o. female)  Date: 11/23/2021  Diagnosis: NSTEMI (non-ST elevated myocardial infarction) ([facility] [address] [I21.4]  CAD (coronary artery disease) [I25.10]   S/P CABG x 4  Procedure(s) (LRB):  CORONARY ARTERY BYPASS GRAFT X4 LIMA, [facility]SV[facility] VIA EVH. ECC. EPI AORTIC US BY DR Amelia Keyes. (N/A) 4 Days Post-Op  Precautions: Sternal (move in the tube)  Chart, occupational therapy assessment, plan of care, and goals were reviewed. ASSESSMENT  Patient continues with skilled OT services and is progressing towards goals. Pt was sitting up in chair and she was lethargic. Pt needed max VC to wake up and max to mod for BUE ex. Pt has increased crepitus in left shoulder and needed more assist with shoulder flexion/abduction. She needed max to mod assist to scoot forward to edge of chair and was min of 2 to stand. With cardiac cart pt walked to end of bed with chair follow. Her BP dropped, and she was exhausted. Pt was left sitting up in chair with in call bell with in reach. Pt will need rehab    Patient is not verbalizing and is not demonstrating understanding of mindful-based movements (\"move in the tube\") principles of keeping UEs proximal to ribcage to prevent lateral pull on the sternum during load-bearing activities with verbal and tactile cues required for compliance. Current Level of Function Impacting Discharge (ADLs): max for ADLs              PLAN :  Patient continues to benefit from skilled intervention to address the above impairments. Continue treatment per established plan of care to address goals.     Recommend with staff: OOB for meals     Recommend next OT session: work on grooming sitting up in chair    Recommendation for discharge: (in order for the patient to meet his/her long term goals)  Therapy up to 5 days/week in SNF setting    This discharge recommendation:  Has been made in collaboration with the attending provider and/or case management    IF patient discharges home will need the following DME: tbd       SUBJECTIVE:   Patient stated I am so tired. Raina Rogers    OBJECTIVE DATA SUMMARY:   Cognitive/Behavioral Status:  Neurologic State: Alert; Appropriate for age  Orientation Level: Appropriate for age  Cognition: Appropriate decision making; Appropriate for age attention/concentration; Appropriate safety awareness; Follows commands             Functional Mobility and Transfers for ADLs:      Transfers:  Sit to Stand: Minimum assistance; Moderate assistance; Assist x2          Balance:  Sitting: Impaired  Sitting - Static: Good (unsupported)  Sitting - Dynamic: Fair (occasional)  Standing: Impaired  Standing - Static: Fair; Constant support  Standing - Dynamic : Fair; Constant support    ADL Intervention:         Therapeutic Exercises:   Patient instructed on the benefits and demonstrated cardiac exercises while sitting  with Moderate Assistance and Maximum assistance. Instructed and indicated understanding on how to progress reps, sets against gravity, pacing through progressive muscle strengthening standing based on surgeon clearance for more weight in prep for basic and instrumental ADLs. Instruction on the use of household items in place of weights as needed.     CARDIAC   EXERCISE    Sets    Reps    Active  Active Assist    Passive  Self ROM    Comments    Shoulder flexion  1  5   [x]                            []                             []                             []                                Shoulder abduction  1  5  [x]                             []                             []                             []                                Scapular elevation  1  5  [x]                             []                              []                             []                                Scapular retraction  1  5  [x]                             []                             []                             []                                Trunk rotation  1  5 [x]                             []                             []                             []                                Trunk sidebending  1  5  [x]                             []                              []                             []                                          Pain:  Pain in shoulders but did not rate    Activity Tolerance:   Poor    After treatment patient left in no apparent distress:   Sitting in chair and Call bell within reach    COMMUNICATION/COLLABORATION:   The patients plan of care was discussed with: Physical therapist and Registered nurse.      Allie Alvarez OT  Time Calculation: 23 mins

## 2021-11-23 NOTE — INTERDISCIPLINARY ROUNDS
Interdisciplinary team rounds were held 11/23/2021  with the following team members:Diabetes Treatment Specialist, Nursing, Nutrition, Pharmacy, Physical Therapy, Physician and Clinical Coordinator. Plan of care discussed. Goal: See MD orders and progress notes for further  interventions and desired outcomes.

## 2021-11-23 NOTE — PROGRESS NOTES
[facility] ICU Progress Note    Admit Date: 2021  POD:  4 Days Post-Op    Procedure:  Procedure(s):  CORONARY ARTERY BYPASS GRAFT X4 LIMA, [facility] VIA EVH. ECC. EPI AORTIC US BY DR Adrian Tong. Subjective:   Pt seen with Dr. Zulma Light. Up in chair, on 3 L via NC. No new issues. Not walking much. Objective:   Vitals:  Blood pressure (!) 131/48, pulse 84, temperature 99 °F (37.2 °C), resp. rate 14, height 5' 7\" (1.702 m), weight 184 lb (83.5 kg), SpO2 95 %. Temp (24hrs), Av.9 °F (37.2 °C), Min:98.6 °F (37 °C), Max:99.1 °F (37.3 °C)    EKG/Rhythm:  NSR 80s    Oxygen Therapy:  Oxygen Therapy  O2 Sat (%): 95 % (21 0830)  Pulse via Oximetry: 84 beats per minute (21 0830)  O2 Device: Nasal cannula (21 08)  Skin Assessment: Clean, dry, & intact (21 0400)  Skin Protection for O2 Device: No (21 0000)  Orientation: Bilateral (21)  Location: Cheek (21)  Interventions: Mouth Care (21)  O2 Flow Rate (L/min): 3 l/min (21 0830)  FIO2 (%): 50 % (21 0729)    CXR:   CXR Results  (Last 48 hours)               21 4367  XR CHEST PORT Final result    Impression:  1. Interval removal of left chest tube. No pneumothorax. 2. Unchanged pulmonary edema, small bilateral pleural effusions and bibasilar   atelectasis. Narrative:  EXAM:  XR CHEST PORT       INDICATION:   decreased breath sounds on right       COMPARISON: Chest radiograph 2021. FINDINGS: AP radiograph of the chest was obtained. Interval removal of left chest tube and right IJ MAC. Unchanged bilateral   interstitial and alveolar opacities. Unchanged small bilateral pleural effusions   and bibasilar atelectasis. No pneumothorax. Unchanged cardiomegaly. 21 0446  XR CHEST PORT Final result    Impression:  No definite changes. Narrative:  Clinical indication: Postop heart. Portable AP upright view of the chest obtained, comparison . Cardiomegaly and bilateral lung infiltrates unchanged. Left chest tube in place,   central line in place. 11/21/21 1427  XR CHEST PORT Final result    Impression:  Slight improvement in the right pneumothorax. Small bilateral   pleural effusions and underlying consolidation have increased. Narrative: Indication: Follow-up pneumothorax       Comparison to earlier the same day. Portable exam obtained at 1402 demonstrates   a slight interval improvement in the right pneumothorax compared to prior   examination. However, small bilateral pleural effusions and underlying   consolidation have not increased. Admission Weight: Last Weight   Weight: 184 lb (83.5 kg) Weight: 184 lb (83.5 kg)     Intake / Output / Drain:  Current Shift: No intake/output data recorded. Last 24 hrs.:     Intake/Output Summary (Last 24 hours) at 11/23/2021 0913  Last data filed at 11/23/2021 0000  Gross per 24 hour   Intake 300 ml   Output 200 ml   Net 100 ml       EXAM:  General:     NAD, pleasant mood                                                      Lungs:   Clear to auscultation bilaterally. Incision:  CDI   Heart:  Regular rate and rhythm, S1, S2 normal, no murmur, click, rub or gallop. Abdomen:   Soft, non-tender. Bowel sounds hypoacive. No masses,  No organomegaly. Extremities:  No edema. PPP. Neurologic:  Gross motor and sensory apparatus intact. Labs:   Recent Labs     11/23/21  0824 11/23/21  0358 11/22/21  2110   WBC  --  13.0*  --    HGB  --  8.4*  --    HCT  --  26.6*  --    PLT  --  215  --    NA  --  135*  --    K  --  4.5  --    BUN  --  59*  --    CREA  --  2.11*  --    GLU  --  182*  --    GLUCPOC 158*  --    < >    < > = values in this interval not displayed.         Assessment:     Principal Problem:    S/P CABG x 4 (11/19/2021)      Overview: x 4, LIMA to LAD, RSVG to Diag, RSVG to OM, RSVG to PDA    Active Problems:    NSTEMI (non-ST elevated myocardial infarction) ([facility] [address] (2021)      CAD (coronary artery disease) (2021)       Plan/Recommendations/Medical Decision Makin. NSTEMI, Mv CAD s/p CABG x4: Cont ASA/statin/BB  2. Uncontrolled Hypertension: on Coreg, losartan, verapamil PTA. Cont coreg. Hold losartan given renal function. 3. Large hiatal hernia: CT with Large hiatal hernia containing nearly the entire stomach, mesenteric fat, and the splenic flexure of the colon. Needs FU as OP  4. TEMO on CKD stage 3: Monitor. Remains in the low 2s. Hold diuresis. Straight cath overnight. 5. Anxiety: PRN xanax  6. Postop shock, vasoplegia vs cardiogenic: Off all vasopressors. Resolved. 7.  Postop pain control: Add scheduled tylenol to 1,000mg Q6hr. Cont PRN Oxycodone and ultram. Will give dilaudid prior to Ct removal this am.   8. Atelectasis: Increase mobility. Hourly I.S. Wean oxygen for O2 sat > 92%. Down to 3L NC. OOB TID, ambulating BID. 9. Leukocytosis: Increase mobility. Pulm toileting. Daily incisional care to sternal incision and EVH site. Dispo: PT/OT. OOB TID, ambulating BID. Transfer to SDU pending.  Shelter referral.    Signed By: TRACY Lord

## 2021-11-24 ENCOUNTER — APPOINTMENT (OUTPATIENT)
Dept: GENERAL RADIOLOGY | Age: 86
DRG: 233 | End: 2021-11-24
Attending: THORACIC SURGERY (CARDIOTHORACIC VASCULAR SURGERY)
Payer: MEDICARE

## 2021-11-24 ENCOUNTER — APPOINTMENT (OUTPATIENT)
Dept: GENERAL RADIOLOGY | Age: 86
DRG: 233 | End: 2021-11-24
Attending: PHYSICIAN ASSISTANT
Payer: MEDICARE

## 2021-11-24 LAB
ALBUMIN SERPL-MCNC: 2.6 G/DL (ref 3.5–5)
ALBUMIN/GLOB SERPL: 0.8 {RATIO} (ref 1.1–2.2)
ALP SERPL-CCNC: 72 U/L (ref 45–117)
ALT SERPL-CCNC: 8 U/L (ref 12–78)
ANION GAP SERPL CALC-SCNC: 6 MMOL/L (ref 5–15)
AST SERPL-CCNC: 16 U/L (ref 15–37)
B PERT DNA SPEC QL NAA+PROBE: NOT DETECTED
BILIRUB SERPL-MCNC: 0.5 MG/DL (ref 0.2–1)
BORDETELLA PARAPERTUSSIS PCR, BORPAR: NOT DETECTED
BUN SERPL-MCNC: 61 MG/DL (ref 6–20)
BUN/CREAT SERPL: 34 (ref 12–20)
C PNEUM DNA SPEC QL NAA+PROBE: NOT DETECTED
CALCIUM SERPL-MCNC: 8.6 MG/DL (ref 8.5–10.1)
CHLORIDE SERPL-SCNC: 105 MMOL/L (ref 97–108)
CO2 SERPL-SCNC: 23 MMOL/L (ref 21–32)
CREAT SERPL-MCNC: 1.82 MG/DL (ref 0.55–1.02)
ERYTHROCYTE [DISTWIDTH] IN BLOOD BY AUTOMATED COUNT: 14.4 % (ref 11.5–14.5)
FLUAV H1 2009 PAND RNA SPEC QL NAA+PROBE: NOT DETECTED
FLUAV H1 RNA SPEC QL NAA+PROBE: NOT DETECTED
FLUAV H3 RNA SPEC QL NAA+PROBE: NOT DETECTED
FLUAV SUBTYP SPEC NAA+PROBE: NOT DETECTED
FLUBV RNA SPEC QL NAA+PROBE: NOT DETECTED
GLOBULIN SER CALC-MCNC: 3.3 G/DL (ref 2–4)
GLUCOSE BLD STRIP.AUTO-MCNC: 138 MG/DL (ref 65–117)
GLUCOSE BLD STRIP.AUTO-MCNC: 180 MG/DL (ref 65–117)
GLUCOSE BLD STRIP.AUTO-MCNC: 180 MG/DL (ref 65–117)
GLUCOSE BLD STRIP.AUTO-MCNC: 205 MG/DL (ref 65–117)
GLUCOSE SERPL-MCNC: 136 MG/DL (ref 65–100)
HADV DNA SPEC QL NAA+PROBE: NOT DETECTED
HCOV 229E RNA SPEC QL NAA+PROBE: NOT DETECTED
HCOV HKU1 RNA SPEC QL NAA+PROBE: NOT DETECTED
HCOV NL63 RNA SPEC QL NAA+PROBE: NOT DETECTED
HCOV OC43 RNA SPEC QL NAA+PROBE: NOT DETECTED
HCT VFR BLD AUTO: 26.2 % (ref 35–47)
HGB BLD-MCNC: 8 G/DL (ref 11.5–16)
HMPV RNA SPEC QL NAA+PROBE: NOT DETECTED
HPIV1 RNA SPEC QL NAA+PROBE: NOT DETECTED
HPIV2 RNA SPEC QL NAA+PROBE: NOT DETECTED
HPIV3 RNA SPEC QL NAA+PROBE: NOT DETECTED
HPIV4 RNA SPEC QL NAA+PROBE: NOT DETECTED
M PNEUMO DNA SPEC QL NAA+PROBE: NOT DETECTED
MCH RBC QN AUTO: 30.4 PG (ref 26–34)
MCHC RBC AUTO-ENTMCNC: 30.5 G/DL (ref 30–36.5)
MCV RBC AUTO: 99.6 FL (ref 80–99)
NRBC # BLD: 0 K/UL (ref 0–0.01)
NRBC BLD-RTO: 0 PER 100 WBC
PLATELET # BLD AUTO: 278 K/UL (ref 150–400)
PMV BLD AUTO: 9.7 FL (ref 8.9–12.9)
POTASSIUM SERPL-SCNC: 4.2 MMOL/L (ref 3.5–5.1)
PROT SERPL-MCNC: 5.9 G/DL (ref 6.4–8.2)
RBC # BLD AUTO: 2.63 M/UL (ref 3.8–5.2)
RSV RNA SPEC QL NAA+PROBE: NOT DETECTED
RV+EV RNA SPEC QL NAA+PROBE: NOT DETECTED
SARS-COV-2 PCR, COVPCR: NOT DETECTED
SERVICE CMNT-IMP: ABNORMAL
SODIUM SERPL-SCNC: 134 MMOL/L (ref 136–145)
WBC # BLD AUTO: 10.6 K/UL (ref 3.6–11)

## 2021-11-24 PROCEDURE — 97530 THERAPEUTIC ACTIVITIES: CPT

## 2021-11-24 PROCEDURE — 74011250637 HC RX REV CODE- 250/637: Performed by: NURSE PRACTITIONER

## 2021-11-24 PROCEDURE — 36415 COLL VENOUS BLD VENIPUNCTURE: CPT

## 2021-11-24 PROCEDURE — 97530 THERAPEUTIC ACTIVITIES: CPT | Performed by: OCCUPATIONAL THERAPIST

## 2021-11-24 PROCEDURE — 0202U NFCT DS 22 TRGT SARS-COV-2: CPT

## 2021-11-24 PROCEDURE — 97535 SELF CARE MNGMENT TRAINING: CPT | Performed by: OCCUPATIONAL THERAPIST

## 2021-11-24 PROCEDURE — 51798 US URINE CAPACITY MEASURE: CPT

## 2021-11-24 PROCEDURE — 65660000000 HC RM CCU STEPDOWN

## 2021-11-24 PROCEDURE — 71046 X-RAY EXAM CHEST 2 VIEWS: CPT

## 2021-11-24 PROCEDURE — 74011250637 HC RX REV CODE- 250/637: Performed by: PHYSICIAN ASSISTANT

## 2021-11-24 PROCEDURE — 85027 COMPLETE CBC AUTOMATED: CPT

## 2021-11-24 PROCEDURE — 99233 SBSQ HOSP IP/OBS HIGH 50: CPT | Performed by: CLINICAL NURSE SPECIALIST

## 2021-11-24 PROCEDURE — 74011636637 HC RX REV CODE- 636/637: Performed by: NURSE PRACTITIONER

## 2021-11-24 PROCEDURE — 82962 GLUCOSE BLOOD TEST: CPT

## 2021-11-24 PROCEDURE — 71045 X-RAY EXAM CHEST 1 VIEW: CPT

## 2021-11-24 PROCEDURE — 74011000250 HC RX REV CODE- 250: Performed by: NURSE PRACTITIONER

## 2021-11-24 PROCEDURE — 80053 COMPREHEN METABOLIC PANEL: CPT

## 2021-11-24 RX ORDER — HEPARIN SODIUM 5000 [USP'U]/ML
5000 INJECTION, SOLUTION INTRAVENOUS; SUBCUTANEOUS EVERY 8 HOURS
Status: DISCONTINUED | OUTPATIENT
Start: 2021-11-24 | End: 2021-11-28

## 2021-11-24 RX ADMIN — DOCUSATE SODIUM 50MG AND SENNOSIDES 8.6MG 1 TABLET: 8.6; 5 TABLET, FILM COATED ORAL at 08:21

## 2021-11-24 RX ADMIN — OXYCODONE 5 MG: 5 TABLET ORAL at 10:04

## 2021-11-24 RX ADMIN — PANTOPRAZOLE SODIUM 40 MG: 40 TABLET, DELAYED RELEASE ORAL at 08:21

## 2021-11-24 RX ADMIN — Medication 1 TABLET: at 08:22

## 2021-11-24 RX ADMIN — CHLORHEXIDINE GLUCONATE 0.12% ORAL RINSE 10 ML: 1.2 LIQUID ORAL at 21:42

## 2021-11-24 RX ADMIN — Medication 10 ML: at 13:04

## 2021-11-24 RX ADMIN — CARVEDILOL 6.25 MG: 6.25 TABLET, FILM COATED ORAL at 08:21

## 2021-11-24 RX ADMIN — INSULIN LISPRO 2 UNITS: 100 INJECTION, SOLUTION INTRAVENOUS; SUBCUTANEOUS at 16:55

## 2021-11-24 RX ADMIN — OXYCODONE 5 MG: 5 TABLET ORAL at 02:41

## 2021-11-24 RX ADMIN — MUPIROCIN: 20 OINTMENT TOPICAL at 08:21

## 2021-11-24 RX ADMIN — ATORVASTATIN CALCIUM 80 MG: 40 TABLET, FILM COATED ORAL at 21:42

## 2021-11-24 RX ADMIN — CHLORHEXIDINE GLUCONATE 0.12% ORAL RINSE 10 ML: 1.2 LIQUID ORAL at 08:21

## 2021-11-24 RX ADMIN — OXYCODONE 5 MG: 5 TABLET ORAL at 22:01

## 2021-11-24 RX ADMIN — OXYCODONE 5 MG: 5 TABLET ORAL at 16:55

## 2021-11-24 RX ADMIN — AMIODARONE HYDROCHLORIDE 400 MG: 200 TABLET ORAL at 08:21

## 2021-11-24 RX ADMIN — CARVEDILOL 6.25 MG: 6.25 TABLET, FILM COATED ORAL at 16:55

## 2021-11-24 RX ADMIN — ASPIRIN 81 MG CHEWABLE TABLET 81 MG: 81 TABLET CHEWABLE at 08:21

## 2021-11-24 RX ADMIN — AMIODARONE HYDROCHLORIDE 400 MG: 200 TABLET ORAL at 21:41

## 2021-11-24 RX ADMIN — ACETAMINOPHEN 1000 MG: 500 TABLET, FILM COATED ORAL at 00:53

## 2021-11-24 RX ADMIN — INSULIN LISPRO 3 UNITS: 100 INJECTION, SOLUTION INTRAVENOUS; SUBCUTANEOUS at 13:03

## 2021-11-24 RX ADMIN — Medication 10 ML: at 21:42

## 2021-11-24 RX ADMIN — MELATONIN 3 MG: at 22:01

## 2021-11-24 RX ADMIN — DOCUSATE SODIUM 50MG AND SENNOSIDES 8.6MG 1 TABLET: 8.6; 5 TABLET, FILM COATED ORAL at 16:56

## 2021-11-24 NOTE — PROGRESS NOTES
End of Shift Note    Bedside shift change report given to [address] (oncoming nurse) by Charla Rodriguez RN (offgoing nurse). Report included the following information SBAR, Kardex, Intake/Output, MAR, Recent Results and Cardiac Rhythm NSR    Shift worked:  7p-7a     Shift summary and any significant changes:     Still requiring 2L NC, only achieving 300mL IS. Very weak all extremities. Voiding. No BM/active BS. Wound care/pacer care completed, CHG bath given. Concerns for physician to address:  struggling with IS, very deconditioned     Zone phone for oncoming shift:   [phone]       Activity:  Activity Level: Up with Assistance  Number times ambulated in hallways past shift: 0  Number of times OOB to chair past shift: 1    Cardiac:   Cardiac Monitoring: Yes      Cardiac Rhythm: Sinus Rhythm    Access:   Current line(s): PIV     Genitourinary:   Urinary status: voiding    Respiratory:   O2 Device: Nasal cannula  Chronic home O2 use?: NO  Incentive spirometer at bedside: YES  Actual Volume (ml): 250 ml  GI:  Last Bowel Movement Date: 11/17/21  Current diet:  ADULT DIET Regular; Low Fat/Low Chol/High Fiber/ANTONETTE  ADULT ORAL NUTRITION SUPPLEMENT Breakfast, Lunch, Dinner; Diabetic Supplement  Passing flatus: YES  Tolerating current diet: YES       Pain Management:   Patient states pain is manageable on current regimen: YES    Skin:  Ilia Score: 17  Interventions: float heels, increase time out of bed, PT/OT consult and nutritional support     Patient Safety:  Fall Score:  Total Score: 3  Interventions: bed/chair alarm, assistive device (walker, cane, etc), gripper socks, pt to call before getting OOB and stay with me (per policy)  High Fall Risk: Yes    Length of Stay:  Expected LOS: 8d 14h  Actual LOS: Amy Sen RN

## 2021-11-24 NOTE — PROGRESS NOTES
[facility] Floor Progress Note    Admit Date: 2021  POD:  5 Days Post-Op    Procedure:  Procedure(s):  CORONARY ARTERY BYPASS GRAFT X4 LIMA, [facility] VIA EVH. ECC. EPI AORTIC US BY DR Anita Cordero. Subjective:   Pt seen with Dr. Jessica Chaudhary. Up in chair, on 2L via NC. No new issues. Needs improvement with I.S. states she did walk yesterday     Objective:   Vitals:  Blood pressure 127/67, pulse 80, temperature 98.8 °F (37.1 °C), resp. rate 16, height 5' 7\" (1.702 m), weight 190 lb 7.6 oz (86.4 kg), SpO2 95 %. Temp (24hrs), Av.4 °F (36.9 °C), Min:98 °F (36.7 °C), Max:98.8 °F (37.1 °C)    EKG/Rhythm:  NSR 70-80s    Oxygen Therapy:  Oxygen Therapy  O2 Sat (%): 95 % (21 0938)  Pulse via Oximetry: 81 beats per minute (21 0743)  O2 Device: Nasal cannula (21 7205)  Skin Assessment: Clean, dry, & intact (21)  Skin Protection for O2 Device: N/A (2143)  Orientation: Bilateral (21)  Location: Cheek (21)  Interventions: Mouth Care (21)  O2 Flow Rate (L/min): 2 l/min (21 0938)  FIO2 (%): 50 % (21 0729)    CXR:   CXR Results  (Last 48 hours)               21 0606  XR CHEST PORT Final result    Impression:  1. Unchanged small right apical pneumothorax. 2. Unchanged pulmonary interstitial and alveolar edema, small bilateral pleural   effusions and bibasilar atelectasis. Narrative:  EXAM:  XR CHEST PORT       INDICATION:   post op heart       COMPARISON: Chest radiograph 2021. FINDINGS: AP radiograph of the chest was obtained. Unchanged small right apical pneumothorax. Unchanged bilateral interstitial and   alveolar opacities. Unchanged small bilateral pleural effusions and bibasilar   atelectasis. Unchanged cardiomegaly. 21  XR CHEST PORT Final result    Impression:  1. Interval removal of left chest tube. No pneumothorax.    2. Unchanged pulmonary edema, small bilateral pleural effusions and bibasilar   atelectasis. Narrative:  EXAM:  XR CHEST PORT       INDICATION:   decreased breath sounds on right       COMPARISON: Chest radiograph 11/22/2021. FINDINGS: AP radiograph of the chest was obtained. Interval removal of left chest tube and right IJ MAC. Unchanged bilateral   interstitial and alveolar opacities. Unchanged small bilateral pleural effusions   and bibasilar atelectasis. No pneumothorax. Unchanged cardiomegaly. Admission Weight: Last Weight   Weight: 184 lb (83.5 kg) Weight: 190 lb 7.6 oz (86.4 kg)     Intake / Output / Drain:  Current Shift: 11/24 0701 - 11/24 1900  In: 240 [P.O.:240]  Out: -   Last 24 hrs.:     Intake/Output Summary (Last 24 hours) at 11/24/2021 0956  Last data filed at 11/24/2021 0743  Gross per 24 hour   Intake 700 ml   Output 1050 ml   Net -350 ml       EXAM:  General:     NAD, pleasant mood                                                      Lungs:   Clear to auscultation bilaterally. Incision:  FRAN[place]KA. No erythema, swelling, or drainage noted. Heart:  Regular rate and rhythm, S1, S2 normal, no murmur, click, rub or gallop. Abdomen:   Soft, non-tender. Bowel sounds hypoacive. No masses,  No organomegaly. Extremities:  No edema. PPP. Neurologic:  Gross motor and sensory apparatus intact. Labs:   Recent Labs     11/24/21  0738 11/24/21  0259 11/23/21  2214   WBC  --  10.6  --    HGB  --  8.0*  --    HCT  --  26.2*  --    PLT  --  278  --    NA  --  134*  --    K  --  4.2  --    BUN  --  61*  --    CREA  --  1.82*  --    GLU  --  136*  --    GLUCPOC 138*  --    < >    < > = values in this interval not displayed.         Assessment:     Principal Problem:    S/P CABG x 4 (11/19/2021)      Overview: x 4, LIMA to LAD, RSVG to Diag, RSVG to OM, RSVG to PDA    Active Problems:    NSTEMI (non-ST elevated myocardial infarction) ([facility] [address] (11/14/2021)      CAD (coronary artery disease) (11/18/2021)       Plan/Recommendations/Medical Decision Makin. NSTEMI, Mv CAD s/p CABG x4: Cont ASA/statin/BB. Will need Plavix - hold until we know pneumo is stable. Cut wires today. 2. Uncontrolled Hypertension: on Coreg, losartan, verapamil PTA. Cont coreg. Hold losartan given renal function. 3. Large hiatal hernia: CT with Large hiatal hernia containing nearly the entire stomach, mesenteric fat, and the splenic flexure of the colon. Needs FU as OP  4. TEMO on CKD stage 3: Monitor - creatinine down to 1.82. Cont to hold diuresis. 5. Anxiety: PRN xanax  6. Postop shock, vasoplegia vs cardiogenic: Off all vasopressors. Resolved. 7.  Postop pain control: Cont scheduled tylenol to 1,000mg Q6hr. Cont PRN Oxycodone and ultram.   8. Atelectasis, apical pneumo: Increase mobility. Hourly I.S. Wean oxygen for O2 sat > 92%. Down to 2L NC. OOB TID, ambulating BID. CXR not improving, needs intense hourly pulmonary toileting. Will obtain STAT Pa/lat now, later today and tomorrow AM to monitor for worsening. 9. Leukocytosis: Increase mobility. Pulm toileting. Daily incisional care to sternal incision and EVH site. Dispo: PT/OT. OOB TID, ambulating BID. PCU. Cut AV wires today. Case management following to aid in discharge planning, Shelter referral sent. Will send PCR today for placement.      Signed By: Lashawn Wells NP

## 2021-11-24 NOTE — PROGRESS NOTES
Problem: Falls - Risk of  Goal: *Absence of Falls  Description: Document Sofia Fothergill Fall Risk and appropriate interventions in the flowsheet.   Outcome: Progressing Towards Goal  Note: Fall Risk Interventions:  Mobility Interventions: Bed/chair exit alarm, OT consult for ADLs, Patient to call before getting OOB, PT Consult for mobility concerns, PT Consult for assist device competence, Strengthening exercises (ROM-active/passive)    Mentation Interventions: Adequate sleep, hydration, pain control, Door open when patient unattended, Familiar objects from home, Family/sitter at bedside    Medication Interventions: Bed/chair exit alarm, Patient to call before getting OOB, Teach patient to arise slowly    Elimination Interventions: Call light in reach, Patient to call for help with toileting needs, Stay With Me (per policy)

## 2021-11-24 NOTE — PROGRESS NOTES
Problem: Self Care Deficits Care Plan (Adult)  Goal: *Acute Goals and Plan of Care (Insert Text)  Description:   FUNCTIONAL STATUS PRIOR TO ADMISSION:  Pt lives alone, but reports having a supportive family and [employer] family. Pt states that she has been independent in adls and IADLs, including driving, shopping, preparing meals, gardening etc.      HOME SUPPORT: The patient lived alone with family and [employer] family  to provide assistance. Occupational Therapy Goals  Re-eval 11/21/2021 s/p CABG    1. Patient will perform grooming with minimal assistance within 7 days. 2. Patient will perform upper body dressing with moderate assistance  within 7 days. 3. Patient will perform lower body dressing with max assistance  within 7 days. 4. Patient will tolerate 3 minutes of UE cardiac 6 pack exercises within 7 days. 5. Patient will perform toileting max assist within 7 days. 6. Patient will transfer from bedside commode with minimal assistance using the least restrictive device and appropriate durable medical equipment within 7 days. Discontinue all below goals 11/21/2021  Initiated 11/15/2021  1. Patient will perform grooming in stanidng with supervision within 7 day(s). 2.  Patient will perform bathing with supervision within 7 day(s). 3.  Patient will perform upper body dressing and lower body dressing with supervision within 7 day(s). 4.  Patient will perform toilet transfers with supervision within 7 day(s). 5.  Patient will perform all aspects of toileting with independence within 7 day(s). 6.  Patient will participate in upper extremity therapeutic exercise/activities with supervision/set-up for 8 minutes within 7 day(s). 7.  Patient will utilize energy conservation techniques during functional activities with verbal cues within 7 day(s). 8.  Patient will perform standing adls for at least 8 minutes within 7 days.     Outcome: Progressing Towards Goal   OCCUPATIONAL THERAPY TREATMENT  Patient: Vashti Faria (29 y.o. female)  Date: 11/24/2021  Diagnosis: NSTEMI (non-ST elevated myocardial infarction) ([facility] [address] [I21.4]  CAD (coronary artery disease) [I25.10]   S/P CABG x 4  Procedure(s) (LRB):  CORONARY ARTERY BYPASS GRAFT X4 LIMA, [facility] VIA EVH. ECC. EPI AORTIC US BY DR Galina Mina. (N/A) 5 Days Post-Op  Precautions: Sternal (move in the tube)  Chart, occupational therapy assessment, plan of care, and goals were reviewed. ASSESSMENT  Patient continues with skilled OT services and is slowly making inconsistent progress towards goals. Upon arrival to room pt was seated at bedside recliner chair and was asleep with her breakfast in front of her. She hadn't eaten much but reported that she \"did the best I could. \"  Minimal interactions or verbalizations noted from pt and she remains very fatigued. Increased time needed for all mobility attempts and for initation of tasks due to drowsiness. Max hand over hand assist needed for pt to comb hair with right dominant hand due to weakness. Pt dropped the comb 3 times without hand over hand assist.  She was able to brush teeth seated but needed bilateral arms propped on pillows to effectively reach mouth and needed help putting toothpaste on tooth brush. Sit to stand performed with min assist x2 but pt was unable to remain standing long enough to get BP reading and BP was low. She reported dizziness with standing and BP and dizziness improved with rest.  O2 sat on 2L was stable. She is highly fatigued and prior to admit and complicated hospital course pt was independent and living alone. Noted that pt only wants home health but this is not recommended or feasible at this time due to the above. Recommend SNF for rehab at discharge.        Vitals:      11/24/21 0934 11/24/21 0938   BP:   (!) 101/42 127/67   BP 2:       BP 1 Location:   Left upper arm Left upper arm   BP Patient Position:   Sitting  Comment: after standing attempt at BP and report of dizziness Sitting   Pulse:   86 80   Pulse 2:       Temp:       Resp:       Height:       Weight:       SpO2:   96% 2L 95%L                                 Patient is not verbalizing and is not demonstrating understanding of mindful-based movements (\"move in the tube\") principles of keeping UEs proximal to ribcage to prevent lateral pull on the sternum during load-bearing activities with visual, verbal, manual and tactile cues required for compliance. Current Level of Function Impacting Discharge (ADLs): min assist x2 sit to stand, max assist combing hair hand over hand assist, min assist brushing of teeth    Other factors to consider for discharge: very slow progression with therapy, poor activity tolerance           PLAN :  Patient continues to benefit from skilled intervention to address the above impairments. Continue treatment per established plan of care to address goals. Recommend with staff: mobilize at least 3x a day to bedside chair for meals    Recommend next OT session: activity tolerance tasks, ADLS    Recommendation for discharge: (in order for the patient to meet his/her long term goals)  Therapy up to 5 days/week in SNF setting    This discharge recommendation:  Has been made in collaboration with the attending provider and/or case management    IF patient discharges home will need the following DME: needs rehab       SUBJECTIVE:   Patient stated My back is killing me.     OBJECTIVE DATA SUMMARY:   Cognitive/Behavioral Status:  Neurologic State: Drowsy  Orientation Level: Oriented X4  Cognition: Decreased attention/concentration; Decreased command following (flat affect)  Perception: Cues to maintain midline in standing  Perseveration: No perseveration noted  Safety/Judgement: Fall prevention    Functional Mobility and Transfers for ADLs:  Bed Mobility:  Scooting: Minimum assistance (seated at bedside chair)    Transfers:  Sit to Stand: Minimum assistance; Assist x2  Functional Transfers  Bathroom Mobility:  (unable due to weakness and orthostasis)       Balance:  Sitting: Impaired  Sitting - Static: Fair (occasional)  Sitting - Dynamic: Fair (occasional)  Standing: Impaired  Standing - Static: Fair; Constant support  Standing - Dynamic : Fair; Constant support    ADL Intervention:       Grooming  Brushing Teeth: Minimum assistance (hand weakness, hand over hand put toothpaste on brush)  Brushing/Combing Hair: Maximum assistance (hand over hand assist right hand)           Cognitive Retraining  Problem Solving: Identifying the problem; Identifying the task  Safety/Judgement: Fall prevention    Patient instructed and educated on mindful movement principles based on Move in The Tube concept to include maintaining bilateral elbows close to rib cage when performing any load-bearing activity such as getting in/out of bed, pushing up from a chair, opening a door, or lifting a box. Patient was given a handout with diagrams of each correct/incorrect method of performing each of the above tasks. Patient instructed on the ability to utilize upper extremities outside the tube when doing any non-load bearing activity such as washing hair/body, brushing teeth, retrieving clothing items, or scratching your back. Patient encouraged to also perform upper extremity exercises \"outside of the tube\" to prevent scar tissue formation around sternal incision site. Therapeutic Exercises:   Patient instructed on the benefits and demonstrated cardiac exercises while seated with Moderate Assistance. Instructed and indicated understanding on how to progress reps, sets against gravity, pacing through progressive muscle strengthening standing based on surgeon clearance for more weight in prep for basic and instrumental ADLs. Instruction on the use of household items in place of weights as needed.     CARDIAC   EXERCISE    Sets    Reps    Active  Active Assist    Passive  Self ROM    Comments    Shoulder flexion  1  5   [x]                            [x]                             []                             []                                          Scapular elevation  1  5  [x]                             []                              []                             []                                                                  Pain:  Moderate pain incisions     Activity Tolerance:   Poor, requires frequent rest breaks, observed SOB with activity and signs and symptoms of orthostatic hypotension    After treatment patient left in no apparent distress:   Sitting in chair and Call bell within reach    COMMUNICATION/COLLABORATION:   The patients plan of care was discussed with: Physical therapist, Registered nurse and patient.      Sherry Rodriguez OTR/L  Time Calculation: 23 mins

## 2021-11-24 NOTE — PROGRESS NOTES
0700: Bedside shift change report given to Glenda Dueñas (oncoming nurse) by Carolina Fernandez RN (offgoing nurse). Report included the following information SBAR, Kardex, Intake/Output, MAR and Recent Results. 0900: Cardiac surgery rounding on patient. Will encourage IS and ambulation. 0930: PT working with patient. Patient able to ambulate a short distance in room. Patient OOB in recliner, call bell in reach. 1007: PRN oxycodone given for back pain 7 out of 10.     1235: Patient off the floor for CXR. 1455: Wires cut at this time, patient tolerated well.     1630: Dr. Colt Lin at bedside assessing patient. Reading CXR results. Patient able to get to 500 on IS, will keep encouraging patient ot use IS and ambulation. 1631: Patient off the floor for CXR. 1655: PRN oxycodone given for 7 out fo 10 back pain. 1900: End of Shift Note    Bedside shift change report given to oncoming RN (oncoming nurse) by Stone Pham (offgoing nurse). Report included the following information SBAR, Kardex, Intake/Output, MAR and Recent Results    Shift worked:  9547-2494     Shift summary and any significant changes:    Encourage ambulation and IS, wires cut. Concerns for physician to address:  none     [facility]   [facility]       Activity:  Activity Level:  Up with Assistance  Number times ambulated in hallways past shift: 0  Number of times OOB to chair past shift: 5    Cardiac:   Cardiac Monitoring: Yes      Cardiac Rhythm: Sinus Rhythm    Access:   Current line(s): PIV     Genitourinary:   Urinary status: voiding    Respiratory:   O2 Device: Nasal cannula  Chronic home O2 use?: NO  Incentive spirometer at bedside: YES  Actual Volume (ml): 500 ml  GI:  Last Bowel Movement Date: 11/17/21  Current diet:  ADULT DIET Regular; Low Fat/Low Chol/High Fiber/ANTONETTE  ADULT ORAL NUTRITION SUPPLEMENT Breakfast, Lunch, Dinner; Diabetic Supplement  Passing flatus: YES  Tolerating current diet: YES       Pain Management:   Patient states pain is manageable on current regimen: YES    Skin:  Ilia Score: 18  Interventions: float heels, increase time out of bed and PT/OT consult    Patient Safety:  Fall Score:  Total Score: 3  Interventions: bed/chair alarm, gripper socks and pt to call before getting OOB  High Fall Risk: Yes    Length of Stay:  Expected LOS: 8d 14h  Actual LOS: [address]

## 2021-11-24 NOTE — DIABETES MGMT
[facility]  PROGRAM FOR DIABETES HEALTH    CLINICAL NURSE SPECIALIST FOLLOW-UP    Initial Presentation   Bernard Bailey is a 81 y.o. female transferred to [facility] 11/14/21 with hypertension, nausea & vomiting after originally presenting to [address] [place] [address] ER. Systolic BP markedly elevated    HX:   Past Medical History:   Diagnosis Date    Anxiety     Hypertension       INITIAL DX:   NSTEMI (non-ST elevated myocardial infarction) ([address] [I21.4]  CAD (coronary artery disease) [I25.10]     Current Treatment     TX: 11/19/21 CABG x 4, LIMA to LAD, RSVG to Diag, RSVG to OM, RSVG to PDA  Left Greater Saphenous Vein EVH Placement of Prevena Incision Management System  Insulin , BP management, Pain management,Coreg, Clot prevent, Cardiac management  Consulted by Provider for advanced diabetes nursing assessment and care for:   [x] Inpatient management strategy    Hospital Course   Clinical progress has been uncomplicated. 11/14/21 Cardiology consult: Findings favored medication management. Echo: Normal LV & RV Fxn; no valve dz; mild LVH  11/16/21 Positive stress test  11/18/21 CV surgery consult: Multivessel CAD including left main with preserved EF: Plan for CABG 11-19-21 first case for Dr. Lilly Guardado. Preop workup ordered, be sure to complete today. NPO at midnight  11/19/21 Remains intubated & sedated  11/20/21 Extubated to BiPap  11/21/21 Transitioned to [facility]. CXR: Right apical pneumothorax  11/23/21 Using O2NC. Tolerating small amount of food. Coughing after use of incentive spirometer. Up to chair    Diabetes History   Patient denies personal and family history of diabetes PTA. Admission . A1c 7%. Diabetes Medication History  Key Antihyperglycemic Medications     Patient is on no antihyperglycemic meds.          Subjective   I was fine yesterday but today I'm in pain\"    Patient reports the following home self-care practices:   Eating pattern   [x] Eating a carbohydrate-controlled mealplan  [x] Breakfast Raulito Ferreira Kishore Spinner with OJ & coffee  [x] Lunch  Chicken salad sandwich or a salad with iced tea  [x] Dinner  Baked potato or salad  Physical activity pattern - Used to do her house and yard work until a month ago when she just couldn't continue to do so     Objective   Physical exam  General Overweight female. Ill-appearing. Cooperative  Neuro  Alert, oriented   Vital Signs   Visit Vitals  BP (!) 113/45 (BP 1 Location: Left upper arm, BP Patient Position: At rest)   Pulse 75   Temp 98.1 °F (36.7 °C)   Resp 16   Ht 5' 7\" (1.702 m)   Wt 86.4 kg (190 lb 7.6 oz)   SpO2 94%   BMI 29.83 kg/m²     Laboratory  Recent Labs     11/24/21  0259 11/23/21  0358 11/22/21  0244   * 182* 108*   AGAP 6 8 9   WBC 10.6 13.0* 19.1*   CREA 1.82* 2.11* 2.04*   GFRNA 26* 22* 23*   AST 16 15 20   ALT 8* 7* 6*     Factors impacting BG management  Factor Dose Comments   Nutrition:  Standard meals       Drugs:  Blood transfusion(s)   PRBCs 11/19/21   A1cs inaccurate   Pain Oxycodone prn    Other:   Kidney function  Liver function    Creatinine in the 2s; GFR in the 20s  Normal     Blood glucose pattern      Significant diabetes-related events over the past 24-72 hours  11/22/21 Transitioned off GS   11/23/21 BGs in the 140-180s  11/24/21 BG's trending 136-205    Assessment and Plan   Nursing Diagnosis Risk for unstable blood glucose pattern   Nursing Intervention Domain 8439 Decision-making Support   Nursing Interventions Examined current inpatient diabetes control   Explored factors facilitating and impeding inpatient management     Evaluation   This overweight  female did not have diabetes PTA. Admission A1c indicates early Type 2 diabetes. BGs easily controlled on Glucostabilzier post-operatively. Will require some preliminary education to address survival diabetes skills before discharge. Suspect she will need oral agents, e.g. metformin (after renal recovery and off O2) and a DPP-4 or low dose sulfonylurea.     The patient is ate approx 30% of breakfast. This is a newly DM diagnosed patient and has not required much insulin to control blood sugars. I suspect the patient may do well with a DPP-4 to control diabetes. Recommendations     [x] Use of Subcutaneous Insulin Order set (3636)  Insulin Dosing Specific recommendation   Basal                                      (Based on weight, BMI & GFR)     Nutritional                                      (Based on CHO/dextrose load)  DPP-4 once she starts eating   Corrective                                    (Useful in adjusting insulin dosing) [x] Normal sensitivity Metformin ER 500mg twice daily when renal parameter recover     Billing Code(s)   [x] 62213 IP subsequent hospital care - 35 minutes    Before making these care recommendations, I personally reviewed the hospitalization record, including notes, laboratory & diagnostic data and current medications, and examined the patient at the bedside (circumstances permitting) before making care recommendations.      Total minutes: 175 Agus Bishop MSN, RN, [facility]  JESUS Knowles  Diabetes Clinical Nurse Specialist  Program for Diabetes Health  Access via [address] [place] [address]

## 2021-11-24 NOTE — PROGRESS NOTES
Problem: Mobility Impaired (Adult and Pediatric)  Goal: *Acute Goals and Plan of Care (Insert Text)  Description: FUNCTIONAL STATUS PRIOR TO ADMISSION: Patient was independent and active without use of DME however began using rolling walker while in hospital prior to cardiac surgery. Reports that she has been having difficulty with endurance and can only do a little of activity before needing to take a break. Reports \"nighttime weakness\" but no falls at home. HOME SUPPORT PRIOR TO ADMISSION: The patient lived alone with son in the area to provide assistance. Physical Therapy Goals  Initiated 11/21/2021  1. Patient will move from supine to sit and sit to supine , scoot up and down, and roll side to side in bed with contact guard assist within 5 days. 2.  Patient will perform sit to/from stand with contact guard assist within 5 days. 3.  Patient will ambulate 100 feet with least restrictive assistive device and contact guard assist within 5 days. 4.  Patient will ascend/descend 3 stairs with 1 handrail(s) with contact guard assistance within 7 day(s). 5.  Patient will perform cardiac exercises per protocol with supervision/set-up within 5 days. 6.  Patient will verbally and functionally demonstrate 3/3 sternal precautions without cues within 5 days. Initiated 11/15/2021  1. Patient will move from supine to sit and sit to supine  in bed with modified independence within 7 day(s). 2.  Patient will transfer from bed to chair and chair to bed with modified independence using the least restrictive device within 7 day(s). 3.  Patient will perform sit to stand with modified independence within 7 day(s). 4.  Patient will ambulate with modified independence for 250 feet with the least restrictive device within 7 day(s). 5.  Patient will ascend/descend 3 stairs with 1 handrail(s) with modified independence within 7 day(s).      Outcome: Progressing Towards Goal     PHYSICAL THERAPY TREATMENT  Patient: Eulalia Luciano (79 y.o. female)  Date: 11/24/2021  Diagnosis: NSTEMI (non-ST elevated myocardial infarction) ([facility] [address] [I21.4]  CAD (coronary artery disease) [I25.10]   S/P CABG x 4  Procedure(s) (LRB):  CORONARY ARTERY BYPASS GRAFT X4 LIMA, [facility] VIA EVH. ECC. EPI AORTIC US BY DR Iraj Petit. (N/A) 5 Days Post-Op  Precautions: Sternal (move in the tube)  Chart, physical therapy assessment, plan of care and goals were reviewed. ASSESSMENT  Patient continues with skilled PT services and is progressing towards goals. Pt was received sitting up in the chair sleeping and cleared by nursing to mobilize. She continues to have very poor activity tolerance and slowed processing. Difficulty with scooting and needed multiple attempts to stand. Stood and worked on ambulating forward 2ft, she reported severe dizziness and then returned to sitting. She was completely exhausted and fatigued. BP stopped but stable. At this point pt cannot tolerate 3 hours of therapy, but will continue to work on tolerance. Patient is demonstrating understanding of mindful-based movements (\"move in the tube\") principles of keeping UEs proximal to ribcage to prevent lateral pull on the sternum during load-bearing activities with verbal and tactile cues required for compliance. Current Level of Function Impacting Discharge (mobility/balance): min/mod    Other factors to consider for discharge:          PLAN :  Patient continues to benefit from skilled intervention to address the above impairments. Continue treatment per established plan of care. to address goals.     Recommendation for discharge: (in order for the patient to meet his/her long term goals)  Therapy up to 5 days/week in SNF setting    This discharge recommendation:  Has not yet been discussed the attending provider and/or case management    IF patient discharges home will need the following DME: to be determined (TBD)       SUBJECTIVE:   Patient stated i can't do 3 hours.    OBJECTIVE DATA SUMMARY:   Patient mobilized on continuous portable monitor/telemetry. Critical Behavior:  Neurologic State: Drowsy  Orientation Level: Oriented X4  Cognition: Decreased attention/concentration, Decreased command following (flat affect)  Safety/Judgement: Fall prevention    Functional Mobility Training:  Bed Mobility:           Scooting: Minimum assistance (seated at bedside chair)          Transfers:  Sit to Stand: Minimum assistance; Assist x2  Stand to Sit: Minimum assistance; Assist x2                               Balance:  Sitting: Impaired  Sitting - Static: Fair (occasional)  Sitting - Dynamic: Fair (occasional)  Standing: Impaired  Standing - Static: Fair; Constant support  Standing - Dynamic : Fair; Constant support    Ambulation/Gait Training:  Distance (ft): 2 Feet (ft)  Assistive Device: Walker, rolling  Ambulation - Level of Assistance: Moderate assistance        Gait Abnormalities: Decreased step clearance; Shuffling gait; Trunk sway increased        Base of Support: Widened     Speed/Jes: Pace decreased (<100 feet/min); Shuffled  Step Length: Left shortened; Right shortened                        Cardiac diagnosis intervention:  Patient instructed and educated on mindful movement principles based on Move in The Tube concept to include maintaining bilateral elbows close to rib cage when performing any load-bearing activity such as getting in/out of bed, pushing up from a chair, opening a door, or lifting a box. Patient was given a handout with diagrams of each correct/incorrect method of performing each of the above tasks. Activity Tolerance:   Poor    After treatment patient left in no apparent distress:   Sitting in chair and Call bell within reach    COMMUNICATION/COLLABORATION:   The patients plan of care was discussed with: Occupational therapist and Registered nurse.      Lizabeth Andujar PT, DPT   Time Calculation: 19 mins

## 2021-11-24 NOTE — PROCEDURES
AV wire site cleansed with CHG prep. A and V wires cut per protocol. Pt educated. RN updated. VSS. Asked RN to bring pt a surgical bra.

## 2021-11-24 NOTE — PROGRESS NOTES
TRANSFER - IN REPORT:    Verbal report received from Sahra Ryan RN(name) on Bony Lang  being received from CCU(unit) for routine progression of care      Report consisted of patients Situation, Background, Assessment and   Recommendations(SBAR). Information from the following report(s) SBAR, Intake/Output, MAR, Recent Results and Cardiac Rhythm NSR was reviewed with the receiving nurse. Opportunity for questions and clarification was provided. Assessment completed upon patients arrival to unit and care assumed.

## 2021-11-24 NOTE — PROGRESS NOTES
.1930: Bedside and Verbal shift change report given to Lakisha Bashir (oncoming nurse) by Jonathan Vences RN (offgoing nurse). Report included the following information SBAR, Kardex, Intake/Output, MAR, Recent Results, Med Rec Status and Cardiac Rhythm NSR.    2000: shift assssment complete. See flowsheet for details. Pt resting quietly in bed, A+Ox4 on room air. Pacer wire care performed and wires are isolated and taped to chest.    2130: TRANSFER - OUT REPORT:    Verbal report given to Charity Fabian RN(name) on Kathryne Landau  being transferred to PCU(unit) for routine post - op       Report consisted of patients Situation, Background, Assessment and   Recommendations(SBAR). Information from the following report(s) SBAR, Kardex, Procedure Summary, Intake/Output, MAR, Recent Results, Cardiac Rhythm NSR and Quality Measures was reviewed with the receiving nurse. Lines:   Peripheral IV 11/13/21 Posterior; Right Hand (Active)   Site Assessment Clean, dry, & intact 11/23/21 2000   Phlebitis Assessment 0 11/23/21 2000   Infiltration Assessment 0 11/23/21 2000   Dressing Status Clean, dry, & intact 11/23/21 2000   Dressing Type Tape; Transparent 11/23/21 2000   Hub Color/Line Status Blue; Flushed; Capped 11/23/21 2000   Action Taken Open ports on tubing capped 11/23/21 2000   Alcohol Cap Used Yes 11/23/21 2000        Opportunity for questions and clarification was provided.       Patient transported with:   Monitor  O2 @ 2 liters  Registered Nurse

## 2021-11-24 NOTE — PROGRESS NOTES
0700:  Bedside and Verbal shift change report given to Shweta JOHNSON  (oncoming nurse) by Manasa Martines RN  (offgoing nurse). Report included the following information SBAR, Kardex, ED Summary, OR Summary, Procedure Summary, Intake/Output, MAR, Accordion, Recent Results, Med Rec Status, Cardiac Rhythm NSR , Alarm Parameters , Pre Procedure Checklist, Procedure Verification and Quality Measures. 0800:  Assessment completed see complex assessment page for details. 1000:  Pt remains in chair no complaints of pain O2 being weaned. 1200:  Reassessment completed no change noted. 1300:  Pt able to get up to Bedside Commode and able to urinate 250 cc.    1600:  Reassessment completed no change noted. 1800:  Family at the bedside update provided      31 75 62:  Pt ambulated to [facility] able to urinate 125 cc.    1900:  Pt placed back in bed with two assist.      1920: Bedside and Verbal shift change report given to [address]  (oncoming nurse) by Rose Brand RN  (offgoing nurse). Report included the following information SBAR, Kardex, ED Summary, OR Summary, Procedure Summary, Intake/Output, MAR, Accordion, Recent Results, Med Rec Status, Cardiac Rhythm NSR, Alarm Parameters , Pre Procedure Checklist, Procedure Verification and Quality Measures.

## 2021-11-24 NOTE — CARDIO/PULMONARY
Cardiac Rehab Note: chart review/referral     Consult has been acknowledged     Smoking history assessed. Patient is a non smoker. Smoking Cessation Program link has not been added to the AVS.     EF 65%  on 11/14/2021 per echo. CABG  educational folder with \"Cardiac Surgery Post-Op Instructions\" book, heart healthy eating, warning signs, heart facts, heart aware, resources, and CABG Surgery booklet to Geovani Mcfarlaneat on 11/24/2021. Educated using teach back method. Reviewed the use of bear for sternal support, daily weight and temperature monitoring, showering restrictions, signs and symptoms of infection at surgery sites, daily walking and arm exercises, and use of incentive spirometer. Geovani Gan was able to demonstrate proper use of incentive spirometer. Reviewed risk factors for CAD to include the following: family history, elevated BMI, hyperlipidemia, hypertension, diabetes, stress, and smoking. Discussed Heart Healthy/Low Sodium (less than 2000 mg.) diet. Emphasized the value of cardiac rehab. Discussed Cardiac Rehab Program format, benefits, and encouraged enrollment to assist with risk modification and management. Patient presently not able to participate in cardiac rehab, she lives over an hour away and does not drive. Geovani Gan verbalized understanding with questions answered. CP Rehab will continue to follow for educational needs.

## 2021-11-25 ENCOUNTER — APPOINTMENT (OUTPATIENT)
Dept: GENERAL RADIOLOGY | Age: 86
DRG: 233 | End: 2021-11-25
Attending: THORACIC SURGERY (CARDIOTHORACIC VASCULAR SURGERY)
Payer: MEDICARE

## 2021-11-25 ENCOUNTER — APPOINTMENT (OUTPATIENT)
Dept: GENERAL RADIOLOGY | Age: 86
DRG: 233 | End: 2021-11-25
Attending: PHYSICIAN ASSISTANT
Payer: MEDICARE

## 2021-11-25 LAB
ALBUMIN SERPL-MCNC: 2.6 G/DL (ref 3.5–5)
ALBUMIN/GLOB SERPL: 0.7 {RATIO} (ref 1.1–2.2)
ALP SERPL-CCNC: 75 U/L (ref 45–117)
ALT SERPL-CCNC: 12 U/L (ref 12–78)
ANION GAP SERPL CALC-SCNC: 6 MMOL/L (ref 5–15)
AST SERPL-CCNC: 16 U/L (ref 15–37)
BILIRUB SERPL-MCNC: 0.4 MG/DL (ref 0.2–1)
BUN SERPL-MCNC: 58 MG/DL (ref 6–20)
BUN/CREAT SERPL: 33 (ref 12–20)
CALCIUM SERPL-MCNC: 8.8 MG/DL (ref 8.5–10.1)
CHLORIDE SERPL-SCNC: 105 MMOL/L (ref 97–108)
CO2 SERPL-SCNC: 24 MMOL/L (ref 21–32)
CREAT SERPL-MCNC: 1.78 MG/DL (ref 0.55–1.02)
ERYTHROCYTE [DISTWIDTH] IN BLOOD BY AUTOMATED COUNT: 14 % (ref 11.5–14.5)
GLOBULIN SER CALC-MCNC: 3.7 G/DL (ref 2–4)
GLUCOSE BLD STRIP.AUTO-MCNC: 154 MG/DL (ref 65–117)
GLUCOSE BLD STRIP.AUTO-MCNC: 157 MG/DL (ref 65–117)
GLUCOSE BLD STRIP.AUTO-MCNC: 180 MG/DL (ref 65–117)
GLUCOSE BLD STRIP.AUTO-MCNC: 189 MG/DL (ref 65–117)
GLUCOSE SERPL-MCNC: 137 MG/DL (ref 65–100)
HCT VFR BLD AUTO: 26.2 % (ref 35–47)
HGB BLD-MCNC: 8.2 G/DL (ref 11.5–16)
MCH RBC QN AUTO: 30.7 PG (ref 26–34)
MCHC RBC AUTO-ENTMCNC: 31.3 G/DL (ref 30–36.5)
MCV RBC AUTO: 98.1 FL (ref 80–99)
NRBC # BLD: 0 K/UL (ref 0–0.01)
NRBC BLD-RTO: 0 PER 100 WBC
PLATELET # BLD AUTO: 354 K/UL (ref 150–400)
PMV BLD AUTO: 9.3 FL (ref 8.9–12.9)
POTASSIUM SERPL-SCNC: 4.6 MMOL/L (ref 3.5–5.1)
PROT SERPL-MCNC: 6.3 G/DL (ref 6.4–8.2)
RBC # BLD AUTO: 2.67 M/UL (ref 3.8–5.2)
SERVICE CMNT-IMP: ABNORMAL
SODIUM SERPL-SCNC: 135 MMOL/L (ref 136–145)
WBC # BLD AUTO: 10.2 K/UL (ref 3.6–11)

## 2021-11-25 PROCEDURE — 65660000000 HC RM CCU STEPDOWN

## 2021-11-25 PROCEDURE — 74011636637 HC RX REV CODE- 636/637: Performed by: NURSE PRACTITIONER

## 2021-11-25 PROCEDURE — 77010033678 HC OXYGEN DAILY

## 2021-11-25 PROCEDURE — 71045 X-RAY EXAM CHEST 1 VIEW: CPT

## 2021-11-25 PROCEDURE — 74011250637 HC RX REV CODE- 250/637: Performed by: PHYSICIAN ASSISTANT

## 2021-11-25 PROCEDURE — 80053 COMPREHEN METABOLIC PANEL: CPT

## 2021-11-25 PROCEDURE — 85027 COMPLETE CBC AUTOMATED: CPT

## 2021-11-25 PROCEDURE — 36415 COLL VENOUS BLD VENIPUNCTURE: CPT

## 2021-11-25 PROCEDURE — 74011250637 HC RX REV CODE- 250/637: Performed by: NURSE PRACTITIONER

## 2021-11-25 PROCEDURE — 82962 GLUCOSE BLOOD TEST: CPT

## 2021-11-25 PROCEDURE — 74011000250 HC RX REV CODE- 250: Performed by: NURSE PRACTITIONER

## 2021-11-25 RX ADMIN — ASPIRIN 81 MG CHEWABLE TABLET 81 MG: 81 TABLET CHEWABLE at 08:09

## 2021-11-25 RX ADMIN — OXYCODONE 5 MG: 5 TABLET ORAL at 08:09

## 2021-11-25 RX ADMIN — OXYCODONE 5 MG: 5 TABLET ORAL at 21:30

## 2021-11-25 RX ADMIN — Medication 10 ML: at 06:17

## 2021-11-25 RX ADMIN — Medication 1 TABLET: at 08:09

## 2021-11-25 RX ADMIN — Medication 10 ML: at 13:14

## 2021-11-25 RX ADMIN — OXYCODONE 5 MG: 5 TABLET ORAL at 12:03

## 2021-11-25 RX ADMIN — OXYCODONE 5 MG: 5 TABLET ORAL at 16:17

## 2021-11-25 RX ADMIN — INSULIN LISPRO 2 UNITS: 100 INJECTION, SOLUTION INTRAVENOUS; SUBCUTANEOUS at 12:03

## 2021-11-25 RX ADMIN — ATORVASTATIN CALCIUM 80 MG: 40 TABLET, FILM COATED ORAL at 21:30

## 2021-11-25 RX ADMIN — INSULIN LISPRO 2 UNITS: 100 INJECTION, SOLUTION INTRAVENOUS; SUBCUTANEOUS at 16:44

## 2021-11-25 RX ADMIN — CHLORHEXIDINE GLUCONATE 0.12% ORAL RINSE 10 ML: 1.2 LIQUID ORAL at 08:09

## 2021-11-25 RX ADMIN — CARVEDILOL 6.25 MG: 6.25 TABLET, FILM COATED ORAL at 08:09

## 2021-11-25 RX ADMIN — Medication 10 ML: at 21:30

## 2021-11-25 RX ADMIN — DOCUSATE SODIUM 50MG AND SENNOSIDES 8.6MG 1 TABLET: 8.6; 5 TABLET, FILM COATED ORAL at 16:18

## 2021-11-25 RX ADMIN — DOCUSATE SODIUM 50MG AND SENNOSIDES 8.6MG 1 TABLET: 8.6; 5 TABLET, FILM COATED ORAL at 08:09

## 2021-11-25 RX ADMIN — CARVEDILOL 6.25 MG: 6.25 TABLET, FILM COATED ORAL at 16:17

## 2021-11-25 RX ADMIN — MELATONIN 3 MG: at 21:30

## 2021-11-25 RX ADMIN — POLYETHYLENE GLYCOL 3350 17 G: 17 POWDER, FOR SOLUTION ORAL at 08:09

## 2021-11-25 RX ADMIN — PANTOPRAZOLE SODIUM 40 MG: 40 TABLET, DELAYED RELEASE ORAL at 08:09

## 2021-11-25 RX ADMIN — CHLORHEXIDINE GLUCONATE 0.12% ORAL RINSE 10 ML: 1.2 LIQUID ORAL at 21:30

## 2021-11-25 RX ADMIN — AMIODARONE HYDROCHLORIDE 400 MG: 200 TABLET ORAL at 08:09

## 2021-11-25 RX ADMIN — AMIODARONE HYDROCHLORIDE 400 MG: 200 TABLET ORAL at 21:29

## 2021-11-25 RX ADMIN — INSULIN LISPRO 2 UNITS: 100 INJECTION, SOLUTION INTRAVENOUS; SUBCUTANEOUS at 08:09

## 2021-11-25 NOTE — PROGRESS NOTES
[facility] VAD/Heart Failure Progress Note    Admit Date: 2021  POD:  6 Days Post-Op    Procedure:  Procedure(s):  CORONARY ARTERY BYPASS GRAFT X4 LIMA, [facility] VIA EVH. ECC. EPI AORTIC US BY DR John Polanco. Subjective:   PTX about the same; Cr 1.8; working on rehab      Objective:   Vitals:  Blood pressure (!) 133/44, pulse 82, temperature 99.1 °F (37.3 °C), resp. rate 18, height 5' 7\" (1.702 m), weight 171 lb 15.3 oz (78 kg), SpO2 95 %. Temp (24hrs), Av.7 °F (37.1 °C), Min:98.1 °F (36.7 °C), Max:99.6 °F (37.6 °C)    Hemodynamics:   CO: CO (l/min): 4.5 l/min   CI: CI (l/min/m2): 2.3 l/min/m2   CVP: CVP (mmHg): 10 mmHg (21 0815)   SVR: SVR (dyne*sec)/cm5: 1078 (dyne*sec)/cm5 (21 0989)   PAP Systolic: PAP Systolic: 36 (55/15/73 7658)   PAP Diastolic: PAP Diastolic: 17 (85/36/94 0737)   PVR:     SV02: SVO2 (%): 62 % (21 0800)   SCV02:      VAD Interrogation:      EKG/Rhythm:      Extubation Date / Time:     CT Output:     Ventilator:  Ventilator Volumes  Vt Set (ml): 450 ml (21)  Vt Exhaled (Machine Breath) (ml): 470 ml (21)  Vt Spont (ml): 367 ml (21)  Ve Observed (l/min): 8 l/min (21)    Oxygen Therapy:  Oxygen Therapy  O2 Sat (%): 95 % (21)  Pulse via Oximetry: 71 beats per minute (21 0410)  O2 Device: Nasal cannula (21)  Skin Assessment: Clean, dry, & intact (21 1442)  Skin Protection for O2 Device: N/A (21 144)  Orientation: Bilateral (21)  Location: Cheek (21)  Interventions: Mouth Care (21)  O2 Flow Rate (L/min): 2 l/min (21 0724)  FIO2 (%): 50 % (21 0729)    CXR:    Admission Weight: Last Weight   Weight: 184 lb (83.5 kg) Weight: 171 lb 15.3 oz (78 kg)     Intake / Output / Drain:  Current Shift: No intake/output data recorded.   Last 24 hrs.:     Intake/Output Summary (Last 24 hours) at 2021 0800  Last data filed at 2021 2120  Gross per 24 hour   Intake 480 ml   Output 1400 ml   Net -920 ml           No results for input(s): CPK, CKMB, TROIQ in the last 72 hours. Recent Labs     11/25/21  0611 11/24/21  0259 11/23/21  0358   * 134* 135*   K 4.6 4.2 4.5   CO2 24 23 22   BUN 58* 61* 59*   CREA 1.78* 1.82* 2.11*   * 136* 182*   PHOS  --   --  3.0   WBC 10.2 10.6 13.0*   HGB 8.2* 8.0* 8.4*   HCT 26.2* 26.2* 26.6*    278 215     No results for input(s): INR, PTP, APTT, INREXT in the last 72 hours.   No lab exists for component: PBNP        Current Facility-Administered Medications:     [Held by provider] heparin (porcine) injection 5,000 Units, 5,000 Units, SubCUTAneous, Q8H, Stuart Wilde MD    sodium chloride (NS) flush 5-40 mL, 5-40 mL, IntraVENous, Q8H, Micaela Steele, NP, 10 mL at 11/25/21 0617    sodium chloride (NS) flush 5-40 mL, 5-40 mL, IntraVENous, PRN, Micaela Steele NP    carvediloL (COREG) tablet 6.25 mg, 6.25 mg, Oral, BID WITH MEALS, Chino Moon PA, 6.25 mg at 11/24/21 1655    hydrALAZINE (APRESOLINE) 20 mg/mL injection 10-20 mg, 10-20 mg, IntraVENous, Q6H PRN, Micaela Steele, NP    traMADoL (ULTRAM) tablet 50 mg, 50 mg, Oral, Q6H PRN, Micaela Steele, NP, 50 mg at 11/22/21 0445    atorvastatin (LIPITOR) tablet 80 mg, 80 mg, Oral, QHS, Micaela Steele, NP, 80 mg at 11/24/21 2142    bacitracin 500 unit/gram packet 1 Packet, 1 Packet, Topical, PRN, Micaela Steele, NP    sodium chloride (NS) flush 5-40 mL, 5-40 mL, IntraVENous, Q8H, Micaela Steele, NP, 10 mL at 11/24/21 2142    sodium chloride (NS) flush 5-40 mL, 5-40 mL, IntraVENous, PRN, Micaela Steele, NP    oxyCODONE IR (ROXICODONE) tablet 5 mg, 5 mg, Oral, Q4H PRN, Micaela Steele, NP, 5 mg at 11/24/21 2201    oxyCODONE IR (ROXICODONE) tablet 10 mg, 10 mg, Oral, Q4H PRN, Micaela Steele, NP, 10 mg at 11/21/21 1328    naloxone (NARCAN) injection 0.4 mg, 0.4 mg, IntraVENous, PRN, Bety Steele NP   amiodarone (CORDARONE) tablet 400 mg, 400 mg, Oral, Q12H, Micaela Steele, NP, 400 mg at 11/24/21 2141    ondansetron (ZOFRAN) injection 4 mg, 4 mg, IntraVENous, Q4H PRN, Micaela Steele, NP, 4 mg at 11/22/21 1438    albuterol (PROVENTIL VENTOLIN) nebulizer solution 2.5 mg, 2.5 mg, Nebulization, Q4H PRN, Micaela Steele NP    aspirin chewable tablet 81 mg, 81 mg, Oral, DAILY, Micaela Steele, NP, 81 mg at 11/24/21 0821    chlorhexidine (PERIDEX) 0.12 % mouthwash 10 mL, 10 mL, Oral, Q12H, Micaela Steele NP, 10 mL at 11/24/21 2142    [Held by provider] magnesium oxide (MAG-OX) tablet 400 mg, 400 mg, Oral, BID, Micaela Steele, NP, 400 mg at 11/21/21 1810    bisacodyL (DULCOLAX) suppository 10 mg, 10 mg, Rectal, DAILY PRN, Micaela Steele NP    senna-docusate (PERICOLACE) 8.6-50 mg per tablet 1 Tablet, 1 Tablet, Oral, BID, Micaela Steele NP, 1 Tablet at 11/24/21 1656    polyethylene glycol (MIRALAX) packet 17 g, 17 g, Oral, DAILY, Micaela Steele NP, 17 g at 11/23/21 0824    glucose chewable tablet 16 g, 4 Tablet, Oral, PRN, Micaela Steele, NP    dextrose (D50W) injection syrg 12.5-25 g, 12.5-25 g, IntraVENous, PRN, Micaela Steele NP    glucagon (GLUCAGEN) injection 1 mg, 1 mg, IntraMUSCular, PRN, Micaela Steele, NP    insulin lispro (HUMALOG) injection, , SubCUTAneous, AC&HS, Micaela Steele NP, 2 Units at 11/24/21 1655    diphenhydrAMINE (BENADRYL) capsule 25 mg, 25 mg, Oral, Q6H PRN, Micaela Steele NP    diphenhydrAMINE (BENADRYL) injection 25 mg, 25 mg, IntraVENous, Q6H PRN, Micaela Steele NP    melatonin tablet 3 mg, 3 mg, Oral, QHS PRN, Micaela Steele NP, 3 mg at 11/24/21 2201    pantoprazole (PROTONIX) tablet 40 mg, 40 mg, Oral, ACB, Micaela Steele NP, 40 mg at 11/24/21 0821    heparin (porcine) 1,000 unit/mL injection, , , PRN, Giovana Bourne MD, 2,000 Units at 11/19/21 0902    ALPRAZolam (XANAX) tablet 0.5 mg, 0.5 mg, Oral, PRN, Ivette, Micaela ALAS NP, 0.5 mg at 11/21/21 3198    calcium-vitamin D (OS-MARINA +D3) 500 mg-200 unit per tablet 1 Tablet, 1 Tablet, Oral, DAILY, Micaela Steele NP, 1 Tablet at 11/24/21 7657        Signed By: Jaqueline De Los Santos MD

## 2021-11-25 NOTE — PROGRESS NOTES
1900: Bedside shift change report given to Yeison Valladares (oncoming nurse) by El Grimm RN (offgoing nurse). Report included the following information SBAR, Kardex, ED Summary, Intake/Output, MAR, Recent Results and Cardiac Rhythm NSR. End of Shift Note    Bedside shift change report given to MEGAN Boateng (oncoming nurse) by Supriya Lutz RN (offgoing nurse). Report included the following information SBAR, Kardex, Procedure Summary, Intake/Output, MAR, Recent Results and Cardiac Rhythm NSR    Shift worked:  0713-1390     Shift summary and any significant changes:     Patient complained of generalized pain 4/10 throughout the night. Oxycodone given. Concerns for physician to address:  none at this time      [facility]          Activity:  Activity Level: Up with Assistance  Number times ambulated in hallways past shift: 0  Number of times OOB to chair past shift: 1    Cardiac:   Cardiac Monitoring: Yes      Cardiac Rhythm: Sinus Rhythm    Access:   Current line(s): PIV     Genitourinary:   Urinary status: voiding    Respiratory:   O2 Device: Nasal cannula  Chronic home O2 use?: NO  Incentive spirometer at bedside: YES  Actual Volume (ml): 500 ml  GI:  Last Bowel Movement Date: 11/17/21  Current diet:  ADULT DIET Regular; Low Fat/Low Chol/High Fiber/ANTONETTE  ADULT ORAL NUTRITION SUPPLEMENT Breakfast, Lunch, Dinner; Diabetic Supplement  Passing flatus: YES  Tolerating current diet: YES       Pain Management:   Patient states pain is manageable on current regimen: YES    Skin:  Ilia Score: 18  Interventions: float heels, increase time out of bed, PT/OT consult and internal/external urinary devices    Patient Safety:  Fall Score:  Total Score: 3  Interventions: bed/chair alarm, assistive device (walker, cane, etc), gripper socks, pt to call before getting OOB and stay with me (per policy)  High Fall Risk: Yes    Length of Stay:  Expected LOS: 8d 14h  Actual LOS: 1309 [facility], RN

## 2021-11-25 NOTE — PROGRESS NOTES
0700- Report given to Liliane Paul RN by off going nurse. Pt has been up in recliner all shift per her request. Pt very week. Mod assist to [facility].     1730- Pt complaining of increased pain on R flank. RR 18. SATS 98% on 2 L NC. Page to Dr. Carolyn Nails. 1755- Pt states pain has fully subsided. 1900- Report given to oncoming nurse by Liliane Paul RN.

## 2021-11-26 ENCOUNTER — APPOINTMENT (OUTPATIENT)
Dept: GENERAL RADIOLOGY | Age: 86
DRG: 233 | End: 2021-11-26
Attending: PHYSICIAN ASSISTANT
Payer: MEDICARE

## 2021-11-26 LAB
ALBUMIN SERPL-MCNC: 2.7 G/DL (ref 3.5–5)
ALBUMIN/GLOB SERPL: 0.7 {RATIO} (ref 1.1–2.2)
ALP SERPL-CCNC: 75 U/L (ref 45–117)
ALT SERPL-CCNC: 12 U/L (ref 12–78)
ANION GAP SERPL CALC-SCNC: 7 MMOL/L (ref 5–15)
AST SERPL-CCNC: 16 U/L (ref 15–37)
BILIRUB SERPL-MCNC: 0.5 MG/DL (ref 0.2–1)
BUN SERPL-MCNC: 52 MG/DL (ref 6–20)
BUN/CREAT SERPL: 33 (ref 12–20)
CALCIUM SERPL-MCNC: 9 MG/DL (ref 8.5–10.1)
CHLORIDE SERPL-SCNC: 104 MMOL/L (ref 97–108)
CO2 SERPL-SCNC: 25 MMOL/L (ref 21–32)
CREAT SERPL-MCNC: 1.57 MG/DL (ref 0.55–1.02)
ERYTHROCYTE [DISTWIDTH] IN BLOOD BY AUTOMATED COUNT: 13.8 % (ref 11.5–14.5)
GLOBULIN SER CALC-MCNC: 3.7 G/DL (ref 2–4)
GLUCOSE BLD STRIP.AUTO-MCNC: 117 MG/DL (ref 65–117)
GLUCOSE BLD STRIP.AUTO-MCNC: 170 MG/DL (ref 65–117)
GLUCOSE BLD STRIP.AUTO-MCNC: 172 MG/DL (ref 65–117)
GLUCOSE BLD STRIP.AUTO-MCNC: 229 MG/DL (ref 65–117)
GLUCOSE SERPL-MCNC: 161 MG/DL (ref 65–100)
HCT VFR BLD AUTO: 27.7 % (ref 35–47)
HGB BLD-MCNC: 8.6 G/DL (ref 11.5–16)
MCH RBC QN AUTO: 30.2 PG (ref 26–34)
MCHC RBC AUTO-ENTMCNC: 31 G/DL (ref 30–36.5)
MCV RBC AUTO: 97.2 FL (ref 80–99)
NRBC # BLD: 0 K/UL (ref 0–0.01)
NRBC BLD-RTO: 0 PER 100 WBC
PLATELET # BLD AUTO: 401 K/UL (ref 150–400)
PMV BLD AUTO: 8.9 FL (ref 8.9–12.9)
POTASSIUM SERPL-SCNC: 4.7 MMOL/L (ref 3.5–5.1)
PROT SERPL-MCNC: 6.4 G/DL (ref 6.4–8.2)
RBC # BLD AUTO: 2.85 M/UL (ref 3.8–5.2)
SERVICE CMNT-IMP: ABNORMAL
SERVICE CMNT-IMP: NORMAL
SODIUM SERPL-SCNC: 136 MMOL/L (ref 136–145)
WBC # BLD AUTO: 10.6 K/UL (ref 3.6–11)

## 2021-11-26 PROCEDURE — 74011250637 HC RX REV CODE- 250/637: Performed by: NURSE PRACTITIONER

## 2021-11-26 PROCEDURE — 65660000000 HC RM CCU STEPDOWN

## 2021-11-26 PROCEDURE — 97535 SELF CARE MNGMENT TRAINING: CPT

## 2021-11-26 PROCEDURE — 97116 GAIT TRAINING THERAPY: CPT

## 2021-11-26 PROCEDURE — 74011250637 HC RX REV CODE- 250/637: Performed by: PHYSICIAN ASSISTANT

## 2021-11-26 PROCEDURE — 97530 THERAPEUTIC ACTIVITIES: CPT

## 2021-11-26 PROCEDURE — 71045 X-RAY EXAM CHEST 1 VIEW: CPT

## 2021-11-26 PROCEDURE — 80053 COMPREHEN METABOLIC PANEL: CPT

## 2021-11-26 PROCEDURE — 36415 COLL VENOUS BLD VENIPUNCTURE: CPT

## 2021-11-26 PROCEDURE — 97110 THERAPEUTIC EXERCISES: CPT

## 2021-11-26 PROCEDURE — 74011250636 HC RX REV CODE- 250/636: Performed by: NURSE PRACTITIONER

## 2021-11-26 PROCEDURE — 77010033678 HC OXYGEN DAILY

## 2021-11-26 PROCEDURE — 82962 GLUCOSE BLOOD TEST: CPT

## 2021-11-26 PROCEDURE — 85027 COMPLETE CBC AUTOMATED: CPT

## 2021-11-26 PROCEDURE — 74011636637 HC RX REV CODE- 636/637: Performed by: NURSE PRACTITIONER

## 2021-11-26 RX ORDER — GLIMEPIRIDE 4 MG/1
2 TABLET ORAL
Status: DISCONTINUED | OUTPATIENT
Start: 2021-11-26 | End: 2021-11-30

## 2021-11-26 RX ORDER — AMLODIPINE BESYLATE 5 MG/1
5 TABLET ORAL DAILY
Status: DISCONTINUED | OUTPATIENT
Start: 2021-11-26 | End: 2021-11-27

## 2021-11-26 RX ADMIN — CARVEDILOL 6.25 MG: 6.25 TABLET, FILM COATED ORAL at 08:35

## 2021-11-26 RX ADMIN — Medication 1 TABLET: at 08:34

## 2021-11-26 RX ADMIN — DOCUSATE SODIUM 50MG AND SENNOSIDES 8.6MG 1 TABLET: 8.6; 5 TABLET, FILM COATED ORAL at 15:46

## 2021-11-26 RX ADMIN — AMIODARONE HYDROCHLORIDE 400 MG: 200 TABLET ORAL at 08:34

## 2021-11-26 RX ADMIN — GLIMEPIRIDE 2 MG: 4 TABLET ORAL at 09:51

## 2021-11-26 RX ADMIN — OXYCODONE 5 MG: 5 TABLET ORAL at 08:35

## 2021-11-26 RX ADMIN — AMIODARONE HYDROCHLORIDE 400 MG: 200 TABLET ORAL at 21:54

## 2021-11-26 RX ADMIN — INSULIN LISPRO 3 UNITS: 100 INJECTION, SOLUTION INTRAVENOUS; SUBCUTANEOUS at 11:32

## 2021-11-26 RX ADMIN — Medication 10 ML: at 22:00

## 2021-11-26 RX ADMIN — CARVEDILOL 6.25 MG: 6.25 TABLET, FILM COATED ORAL at 15:46

## 2021-11-26 RX ADMIN — DOCUSATE SODIUM 50MG AND SENNOSIDES 8.6MG 1 TABLET: 8.6; 5 TABLET, FILM COATED ORAL at 08:34

## 2021-11-26 RX ADMIN — OXYCODONE 5 MG: 5 TABLET ORAL at 15:46

## 2021-11-26 RX ADMIN — PANTOPRAZOLE SODIUM 40 MG: 40 TABLET, DELAYED RELEASE ORAL at 08:35

## 2021-11-26 RX ADMIN — OXYCODONE 5 MG: 5 TABLET ORAL at 21:54

## 2021-11-26 RX ADMIN — ASPIRIN 81 MG CHEWABLE TABLET 81 MG: 81 TABLET CHEWABLE at 08:35

## 2021-11-26 RX ADMIN — DIPHENHYDRAMINE HYDROCHLORIDE 25 MG: 25 CAPSULE ORAL at 21:54

## 2021-11-26 RX ADMIN — INSULIN LISPRO 2 UNITS: 100 INJECTION, SOLUTION INTRAVENOUS; SUBCUTANEOUS at 08:35

## 2021-11-26 RX ADMIN — Medication 10 ML: at 14:34

## 2021-11-26 RX ADMIN — POLYETHYLENE GLYCOL 3350 17 G: 17 POWDER, FOR SOLUTION ORAL at 08:35

## 2021-11-26 RX ADMIN — AMLODIPINE BESYLATE 5 MG: 5 TABLET ORAL at 08:34

## 2021-11-26 RX ADMIN — Medication 10 ML: at 06:08

## 2021-11-26 RX ADMIN — ATORVASTATIN CALCIUM 80 MG: 40 TABLET, FILM COATED ORAL at 21:55

## 2021-11-26 RX ADMIN — ONDANSETRON 4 MG: 2 INJECTION INTRAMUSCULAR; INTRAVENOUS at 14:33

## 2021-11-26 NOTE — PROGRESS NOTES
Transition of Care Plan:     RUR:7% low risk  Disposition:home with [facility]  Follow up appointments:PCP and specialists as indicated  DME needed: RW  Transportation at [address] [facility]  Alka Rahman or means to access home: pt has access       IM Medicare Letter:2nd letter to be given at d/c  Is patient a BCPI-A Bundle: n/a                   If yes, was Bundle Letter given?:     Caregiver Contact:zay Flynn [phone]  Discharge Caregiver contacted prior to discharge?        Per liason at [address] , they have denied patient and recommend snf level of care. Spoke with patient and daughter and patient does not want to go to [facility]. List of snf given to daughter and she will review with her mother and inform CM today or weekend CM of their first, second and third choice. Jocelyne Reed  RN BSN CRM        [phone]

## 2021-11-26 NOTE — PROGRESS NOTES
0700- Report given to Rika Collado RN by off going nurse. 1430- Pt medicated with PRN zofran for nausea. 1900- Report given to oncoming nurse by Rika Collado RN.

## 2021-11-26 NOTE — PROGRESS NOTES
Problem: Self Care Deficits Care Plan (Adult)  Goal: *Acute Goals and Plan of Care (Insert Text)  Description:   FUNCTIONAL STATUS PRIOR TO ADMISSION:  Pt lives alone, but reports having a supportive family and [employer] family. Pt states that she has been independent in adls and IADLs, including driving, shopping, preparing meals, gardening etc.      HOME SUPPORT: The patient lived alone with family and Catholic family  to provide assistance. Occupational Therapy Goals  Re-eval 11/21/2021 s/p CABG    1. Patient will perform grooming with minimal assistance within 7 days. 2. Patient will perform upper body dressing with moderate assistance  within 7 days. 3. Patient will perform lower body dressing with max assistance  within 7 days. 4. Patient will tolerate 3 minutes of UE cardiac 6 pack exercises within 7 days. 5. Patient will perform toileting max assist within 7 days. 6. Patient will transfer from bedside commode with minimal assistance using the least restrictive device and appropriate durable medical equipment within 7 days. Discontinue all below goals 11/21/2021  Initiated 11/15/2021  1. Patient will perform grooming in stanidng with supervision within 7 day(s). 2.  Patient will perform bathing with supervision within 7 day(s). 3.  Patient will perform upper body dressing and lower body dressing with supervision within 7 day(s). 4.  Patient will perform toilet transfers with supervision within 7 day(s). 5.  Patient will perform all aspects of toileting with independence within 7 day(s). 6.  Patient will participate in upper extremity therapeutic exercise/activities with supervision/set-up for 8 minutes within 7 day(s). 7.  Patient will utilize energy conservation techniques during functional activities with verbal cues within 7 day(s). 8.  Patient will perform standing adls for at least 8 minutes within 7 days.     Outcome: Progressing Towards Goal   OCCUPATIONAL THERAPY TREATMENT  Patient: Loc Chino (89 y.o. female)  Date: 11/26/2021  Diagnosis: NSTEMI (non-ST elevated myocardial infarction) ([facility] [address] [I21.4]  CAD (coronary artery disease) [I25.10]   S/P CABG x 4  Procedure(s) (LRB):  CORONARY ARTERY BYPASS GRAFT X4 LIMA, [facility] VIA EVH. ECC. EPI AORTIC US BY DR Ade Clay. (N/A) 7 Days Post-Op  Precautions: Sternal (move in the tube)  Chart, occupational therapy assessment, plan of care, and goals were reviewed. ASSESSMENT  Patient continues with skilled OT services and is progressing towards goals. Pt was sitting up in chair when OT arrived and was on 2 liters of NC and all VSS. She was able to scoot forward to edge chair with extra time. She was min of 2 to stand and with RW and chair follow she walked to sink in room with min  After sitting and resting she stood with min assist and was SBA for standing at sink to brush her teeth. Pt was rolled back beside bed in chair due to fatigue. She was able to perform ex sitting in chair with CGA. Pt is progressing and remains weak and would benefit from rehab. Patient is not verbalizing and is demonstrating understanding of mindful-based movements (\"move in the tube\") principles of keeping UEs proximal to ribcage to prevent lateral pull on the sternum during load-bearing activities with verbal and tactile cues required for compliance. Current Level of Function Impacting Discharge (ADLs): max for LB ADLs and min to CGA for UB              PLAN :  Patient continues to benefit from skilled intervention to address the above impairments. Continue treatment per established plan of care to address goals. Recommend with staff: Coty Estrada for meals and use of BSC for toileting.       Recommend next OT session: work standing at sink and washing dary area    Recommendation for discharge: (in order for the patient to meet his/her long term goals)  Therapy up to 5 days/week in SNF setting    This discharge recommendation:  Has been made in collaboration with the attending provider and/or case management    IF patient discharges home will need the following DME: tbd       SUBJECTIVE:   Patient stated  How many time a day do you say Good Job? Errol Dumont    OBJECTIVE DATA SUMMARY:   Cognitive/Behavioral Status:  Neurologic State: Alert  Orientation Level: Appropriate for age  Cognition: Appropriate decision making  Perception: Appears intact  Perseveration: No perseveration noted  Safety/Judgement: Awareness of environment    Functional Mobility and Transfers for ADLs:           Transfers:  Sit to Stand: Minimum assistance; Assist x2  Functional Transfers  Toilet Transfer : Minimum assistance; Assist x1; Other (comment) ([facility])       Balance:  Sitting: Impaired  Sitting - Static: Good (unsupported)  Sitting - Dynamic: Fair (occasional)  Standing: Impaired  Standing - Static: Fair; Constant support  Standing - Dynamic : Fair; Constant support    ADL Intervention:  Feeding  Feeding Assistance: Set-up    Grooming  Position Performed: Standing  Brushing Teeth: Stand-by assistance; Set-up    Upper Body Bathing  Bathing Assistance: Contact guard assistance  Position Performed: Seated in chair    Lower Body Bathing  Bathing Assistance: Maximum assistance  Perineal  : Maximum assistance  Position Performed: Standing; Supine              Toileting  Toileting Assistance: Maximum assistance  Bladder Hygiene: Maximum assistance  Bowel Hygiene: Maximum assistance    Cognitive Retraining  Safety/Judgement: Awareness of environment    Patient instructed and educated on mindful movement principles based on Move in The Tube concept to include maintaining bilateral elbows close to rib cage when performing any load-bearing activity such as getting in/out of bed, pushing up from a chair, opening a door, or lifting a box. Patient was given a handout with diagrams of each correct/incorrect method of performing each of the above tasks.    Patient instructed on the ability to utilize upper extremities outside the tube when doing any non-load bearing activity such as washing hair/body, brushing teeth, retrieving clothing items, or scratching your back. Patient encouraged to also perform upper extremity exercises \"outside of the tube\" to prevent scar tissue formation around sternal incision site. Patient instructed in detail about activities to heed with caution, allowing pain to be the guide. These activities include but are not limited to: mowing the lawn, riding a bike, walking a dog, lifting a child, workshop hobbies, golfing, sexual activity, vacuuming, fishing, scrubbing the floors, and moving furniture. Patient was given the [address] in the [place] handout to describe each of these activities in detail. Therapeutic Exercises:   Patient instructed on the benefits and demonstrated cardiac exercises while sitting with Contact guard assistance. Instructed and indicated understanding on how to progress reps, sets against gravity, pacing through progressive muscle strengthening standing based on surgeon clearance for more weight in prep for basic and instrumental ADLs. Instruction on the use of household items in place of weights as needed.     CARDIAC   EXERCISE    Sets    Reps    Active  Active Assist    Passive  Self ROM    Comments    Shoulder flexion  1  5   [x]                            []                             []                             []                                Shoulder abduction  1  5  [x]                             []                             []                             []                                Scapular elevation  1  5  [x]                             []                              []                             []                                Scapular retraction  1  5  [x]                             []                             []                             []                                Trunk rotation  1  5  [x] []                             []                             []                                Trunk sidebending  1  5  [x]                             []                              []                             []                                          Pain:  none    Activity Tolerance:   Fair    After treatment patient left in no apparent distress:   Sitting in chair and Call bell within reach    COMMUNICATION/COLLABORATION:   The patients plan of care was discussed with: Physical therapist and Registered nurse.      Allie Londono OT  Time Calculation: 26 mins

## 2021-11-26 NOTE — PROGRESS NOTES
Problem: Mobility Impaired (Adult and Pediatric)  Goal: *Acute Goals and Plan of Care (Insert Text)  Description: FUNCTIONAL STATUS PRIOR TO ADMISSION: Patient was independent and active without use of DME however began using rolling walker while in hospital prior to cardiac surgery. Reports that she has been having difficulty with endurance and can only do a little of activity before needing to take a break. Reports \"nighttime weakness\" but no falls at home. HOME SUPPORT PRIOR TO ADMISSION: The patient lived alone with son in the area to provide assistance. reviewed 11/26/2021  1. Patient will move from supine to sit and sit to supine , scoot up and down, and roll side to side in bed with min A within 5 days. 2.  Patient will perform sit to/from stand with contact guard assist within 5 days. 3.  Patient will ambulate 50 feet with least restrictive assistive device and contact guard assist within 5 days. 4.  Patient will perform cardiac exercises per protocol with supervision/set-up within 5 days. 5.  Patient will verbally and functionally demonstrate 3/3 sternal precautions without cues within 5 days. Physical Therapy Goals  Initiated 11/21/2021  1. Patient will move from supine to sit and sit to supine , scoot up and down, and roll side to side in bed with contact guard assist within 5 days. 2.  Patient will perform sit to/from stand with contact guard assist within 5 days. 3.  Patient will ambulate 100 feet with least restrictive assistive device and contact guard assist within 5 days. 4.  Patient will ascend/descend 3 stairs with 1 handrail(s) with contact guard assistance within 7 day(s). 5.  Patient will perform cardiac exercises per protocol with supervision/set-up within 5 days. 6.  Patient will verbally and functionally demonstrate 3/3 sternal precautions without cues within 5 days. Initiated 11/15/2021  1.   Patient will move from supine to sit and sit to supine  in bed with modified independence within 7 day(s). 2.  Patient will transfer from bed to chair and chair to bed with modified independence using the least restrictive device within 7 day(s). 3.  Patient will perform sit to stand with modified independence within 7 day(s). 4.  Patient will ambulate with modified independence for 250 feet with the least restrictive device within 7 day(s). 5.  Patient will ascend/descend 3 stairs with 1 handrail(s) with modified independence within 7 day(s). Outcome: Progressing Towards Goal    PHYSICAL THERAPY TREATMENT: WEEKLY REASSESSMENT  Patient: Loki Solano (64 y.o. female)  Date: 11/26/2021  Diagnosis: NSTEMI (non-ST elevated myocardial infarction) ([facility] [address] [I21.4]  CAD (coronary artery disease) [I25.10]   S/P CABG x 4  Procedure(s) (LRB):  CORONARY ARTERY BYPASS GRAFT X4 LIMA, [facility] VIA EVH. ECC. EPI AORTIC US BY DR Cathay Denver. (N/A) 7 Days Post-Op  Precautions: Sternal (move in the tube)  Chart, physical therapy assessment, plan of care and goals were reviewed. ASSESSMENT  Patient continues with skilled PT services and is progressing towards goals. Pt was received sitting up in the chair and cleared by nursing to mobilize. She is more alert today, but remains very weak. She was able to stand suing rocking technique and increased assistance. Ambulated around the bed with a chair follow to the sink. Vitals stable during session on 3L. She rested in the chair and then stood at the sink with OT to work on standing tolerance while brushing teeth. She was completely exhausted after minimal activity and at this time is still unable to tolerate 3 hours of therapy. Patient is demonstrating understanding of mindful-based movements (\"move in the tube\") principles of keeping UEs proximal to ribcage to prevent lateral pull on the sternum during load-bearing activities with verbal and manual cues required for compliance.     Patient's progression toward goals since last assessment: making slow but small progress    Current Level of Function Impacting Discharge (mobility/balance): min             PLAN :  Goals have been updated based on progression since last assessment. Patient continues to benefit from skilled intervention to address the above impairments. Recommendations and Planned Interventions: bed mobility training, transfer training, gait training, therapeutic exercises, patient and family training/education, and therapeutic activities      Frequency/Duration: Patient will be followed by physical therapy:  daily to address goals. Recommendation for discharge: (in order for the patient to meet his/her long term goals)  Therapy up to 5 days/week in SNF setting    This discharge recommendation:  Has not yet been discussed the attending provider and/or case management    IF patient discharges home will need the following DME: to be determined (TBD)       SUBJECTIVE:   Patient stated i can't imagine doing 3 hours of this.     OBJECTIVE DATA SUMMARY:   HISTORY:    Past Medical History:   Diagnosis Date    Anxiety     Hypertension      Past Surgical History:   Procedure Laterality Date    HX CORONARY ARTERY BYPASS GRAFT  11/19/2021    x 4, LIMA to LAD, RSVG to Diag, RSVG to OM, RSVG to PDA    HX KNEE REPLACEMENT Right 2011    HX KNEE REPLACEMENT Left 2013       Personal factors and/or comorbidities impacting plan of care:     Patient mobilized on continuous portable monitor/telemetry.   Critical Behavior:  Neurologic State: Alert  Orientation Level: Oriented X4  Cognition: Follows commands  Safety/Judgement: Fall prevention    Functional Mobility Training:  Bed Mobility:         Session began and ended in sitting             Transfers:  Sit to Stand: Minimum assistance; Assist x2  Stand to Sit: Minimum assistance; Assist x2                               Balance:  Sitting: Impaired  Sitting - Static: Good (unsupported)  Sitting - Dynamic: Fair (occasional)  Standing: Impaired  Standing - Static: Fair; Constant support  Standing - Dynamic : Fair; Constant support    Ambulation/Gait Training:  Distance (ft): 15 Feet (ft)  Assistive Device: Walker, rolling (chair follow)  Ambulation - Level of Assistance: Minimal assistance        Gait Abnormalities: Decreased step clearance; Shuffling gait; Trunk sway increased        Base of Support: Widened     Speed/Jes: Pace decreased (<100 feet/min); Shuffled  Step Length: Left shortened; Right shortened                     Cardiac diagnosis intervention:  Patient instructed and educated on mindful movement principles based on Move in The Tube concept to include maintaining bilateral elbows close to rib cage when performing any load-bearing activity such as getting in/out of bed, pushing up from a chair, opening a door, or lifting a box. Patient was given a handout with diagrams of each correct/incorrect method of performing each of the above tasks. Therapeutic Exercises:   Patient instructed on the benefits and demonstrated cardiac exercises while sitting with Minimum assistance. Instructed and indicated understanding on how to progress reps, sets against gravity, pacing through progressive muscle strengthening standing based on surgeon clearance for more weight in prep for functional activity. Instruction on the use of household items in place of weights as needed.      CARDIAC  EXERCISE   Sets   Reps   Active Active Assist   Passive Self ROM   Comments   Shoulder flexion 1 5 [x]                                            []                                            []                                            []                                               Shoulder abduction 1      5 []                                            []                                            []                                            []                                               Scapular elevation 1 5 [x]                                            [] []                                            []                                               Scapular retraction 1 5 [x]                                            []                                            []                                            []                                               Trunk rotation 1 5 []                                            []                                            []                                            []                                               Trunk sidebending 1 5 []                                            []                                            []                                            []                                                  []                                            []                                            []                                            []                                                   Pain Rating:  No major complaints     Activity Tolerance:   Fair      After treatment patient left in no apparent distress:   Sitting in chair and Call bell within reach    COMMUNICATION/COLLABORATION:   The patients plan of care was discussed with: Occupational therapist and Registered nurse.      Luc Rodriguez, PT, DPT   Time Calculation: 27 mins

## 2021-11-26 NOTE — PROGRESS NOTES
Comprehensive Nutrition Assessment    Type and Reason for Visit: Reassess    Nutrition Recommendations/Plan  Consistent carb/ANTONETTE diet   Discontinue Glucerna, Try Ensure high protein     Nutrition Assessment:     Chart reviewed; patient medically noted for CABG. PMH GERD and DM. Receiving a cardiac diet. Patient reports a poor appetite; struggling with nausea but utilizing medications to help. She is trying to drink the Glucerna but it is too sweet. Will try ensure high protein instead. Added CCD to diet to help optimize BG control; uncontrolled BG can contribute to nausea as well. Will liberalize cardiac restrictions for now to allow more options; cardiac diet much more restrictive than a CCD. Menu at bedside. Encouraged intake of meals as tolerated. Estimated Daily Nutrient Needs:  Energy (kcal): 1752 kcal (BMR 1348 x 1. 3AF); Weight Used for Energy Requirements: Current  Protein (g): 87g (1.0 g/kg bw); Weight Used for Protein Requirements: Current  Fluid (ml/day): 1750 mL; Method Used for Fluid Requirements: 1 ml/kcal    Nutrition Related Findings:  K+ 4.7, -[phone]  1-2+ edema  BM 11/24  Norvasc, Atorvastatin, Os-radha, Coreg, Amaryl, Humalog, Protonix, Miralax, Pericolace      Wounds:    Surgical incision       Current Nutrition Therapies:  ADULT DIET Regular; Low Fat/Low Chol/High Fiber/ANTONETTE  ADULT ORAL NUTRITION SUPPLEMENT Breakfast, Lunch, Dinner; Diabetic Supplement    Anthropometric Measures:  · Height:  5' 7\" (170.2 cm)  · Current Body Wt:  86.7 kg (191 lb 2.2 oz)     · BMI Category: Overweight (BMI 25.0-29. 9)       Nutrition Diagnosis:   · Inadequate protein-energy intake related to  (decreased appetite, nausea) as evidenced by intake 0-25%, intake 26-50%    Nutrition Interventions:   Food and/or Nutrient Delivery: Modify oral nutrition supplement, Modify current diet  Nutrition Education and Counseling: No recommendations at this time  Coordination of Nutrition Care: Continue to monitor while inpatient    Goals:  PO intake at least 50% of meals/supplements next 3-5 days       Nutrition Monitoring and Evaluation:   Behavioral-Environmental Outcomes: None identified  Food/Nutrient Intake Outcomes: Food and nutrient intake, Supplement intake  Physical Signs/Symptoms Outcomes: Biochemical data, Weight    Discharge Planning:    Continue current diet, Continue oral nutrition supplement     Electronically signed by Milagro Nicole RD on 11/26/2021 at 3:57 PM    Contact: ext [phone]

## 2021-11-26 NOTE — PROGRESS NOTES
Transition of Care Plan:     RUR:7% low risk  Disposition:home with [facility]  Follow up appointments:PCP and specialists as indicated  DME needed: RW  Transportation at [facility] transport  [employer] or means to access home: pt has access       IM Medicare Letter:2nd letter to be given at d/c  Is patient a BCPI-A Bundle: n/a                   If yes, was Bundle Letter given?:     Caregiver Contact:son Marbella Chisholm [phone]  Discharge Caregiver contacted prior to discharge?       Spoke with liason at [address] today and she will review medicals and get back with me. Jocelyne Reed  RN BSN CRM        [phone]

## 2021-11-26 NOTE — PROGRESS NOTES
CSS Floor Progress Note    Admit Date: 2021  POD:  7 Days Post-Op    Procedure:  Procedure(s):  CORONARY ARTERY BYPASS GRAFT X4 LIMA, [facility] VIA EVH. ECC. EPI AORTIC US BY DR Yazan Plata. Subjective:   Pt seen with Dr. Javon Ruiz. Pt sitting up in chair. Afebrile, on 2L NC. Still weak. Objective:   Vitals:  Blood pressure (!) 158/44, pulse 82, temperature 99.1 °F (37.3 °C), resp. rate 18, height 5' 7\" (1.702 m), weight 191 lb 2.2 oz (86.7 kg), SpO2 95 %. Temp (24hrs), Av.9 °F (37.2 °C), Min:98.6 °F (37 °C), Max:99.1 °F (37.3 °C)    EKG/Rhythm:  NSR 70-80s    Oxygen Therapy:  Oxygen Therapy  O2 Sat (%): 95 % (21)  Pulse via Oximetry: 82 beats per minute (21)  O2 Device: Nasal cannula (21)  Skin Assessment: Clean, dry, & intact (21)  Skin Protection for O2 Device: N/A (21 144)  Orientation: Bilateral (21)  Location: Cheek (21)  Interventions: Mouth Care (21)  O2 Flow Rate (L/min): 2 l/min (21)  FIO2 (%): 50 % (21 0729)    CXR:   CXR Results  (Last 48 hours)               21 0608  XR CHEST PORT Final result    Impression:  Small bilateral pleural effusions. Cardiomegaly. Narrative:  PORTABLE CHEST RADIOGRAPH/S: 2021 6:08 AM       INDICATION: Postop heart. COMPARISON: 2021, 2021,       TECHNIQUE: Portable frontal upright radiograph/s of the chest.       FINDINGS:    Passive atelectasis is associated with small bilateral pleural effusions. The   heart is enlarged, even given portable technique, and there is pulmonary   vascular crowding. The central airways are obscured by right rotation. No   pneumothorax. 21 0825  XR CHEST PORT Final result    Impression:      Persistent small right apical pneumothorax, not significantly changed since   2021.            Narrative:  EXAM:  XR CHEST PORT       INDICATION:  Pneumothorax       COMPARISON:  2021 at 4:55 AM       FINDINGS:       A portable AP radiograph of the chest was obtained at 18:17 hours. There is a   small right apical pneumothorax which is not significantly changed since   11/24/2021, but which is better seen than on the prior study of earlier today   because of overlapping ribs on the earlier study. Bibasilar and perihilar airspace disease is unchanged. 11/25/21 0502  XR CHEST PORT Final result    Impression:  Bibasilar atelectasis and small bilateral pleural effusions. Narrative:  PORTABLE CHEST RADIOGRAPH/S: 11/25/2021 5:02 AM       INDICATION: Postop heart. COMPARISON: 11/24/2021, 11/22/2021, 11/21/2021. CT chest 11/18/2021. TECHNIQUE: Portable frontal radiograph/s of the chest.       FINDINGS:    Passive atelectasis is associated with bilateral pleural effusions. Right   perihilar airspace consolidation may be due to enlarged pulmonary arteries, or   may be attributable to rotation and the massive paraesophageal hernia seen on   prior CT. The heart is probably enlarged, even given portable technique. The   central airways are obscured by right rotation. No pneumothorax. Left greater   than right glenohumeral osteoarthritis. 11/24/21 1631  XR CHEST PA LAT Final result    Impression:  Unchanged mild right apical pneumothorax. Unchanged mild bilateral pleural   effusions and mild edema           Narrative:  EXAM: XR CHEST PA LAT       HISTORY: to check the progress of the penumothroax. COMPARISON: 11/24/2021       FINDINGS: 2 view(s) of the chest. The patient is status post post median   sternotomy. The heart is moderately enlarged, but unchanged. There are unchanged   mild bilateral pleural effusions. There is an unchanged mild right apical   pneumothorax. There is unchanged mild edema. The bones are osteopenic. Multilevel spondylosis is present in the spine.            11/24/21 1235  XR CHEST PA LAT Final result    Impression:  No change small right apical pneumothorax. Continued follow-up is suggested. Narrative:  Exam:  2 view chest       Indication: Pneumothorax. COMPARISON: 11/24/2021 and 0529       PA and lateral views demonstrate moderate cardiomegaly in this patient status   post median sternotomy. The right apical pneumothorax is unchanged. Measures   approximately 2.5 cm in height. Small-to-moderate bilateral pleural effusions with moderate bibasilar   atelectasis right greater than left persist. No pulmonary edema. Admission Weight: Last Weight   Weight: 184 lb (83.5 kg) Weight: 191 lb 2.2 oz (86.7 kg)     Intake / Output / Drain:  Current Shift: No intake/output data recorded. Last 24 hrs.:     Intake/Output Summary (Last 24 hours) at 11/26/2021 0800  Last data filed at 11/25/2021 1734  Gross per 24 hour   Intake 700 ml   Output 650 ml   Net 50 ml       EXAM:  General:     NAD, pleasant mood                                                      Lungs:   Clear to auscultation bilaterally. Incision:  FRANCHESKA. No erythema, swelling, or drainage noted. Heart:  Regular rate and rhythm, S1, S2 normal, no murmur, click, rub or gallop. Abdomen:   Soft, non-tender. Bowel sounds hypoacive. No masses,  No organomegaly. Extremities:  1+ LE edema. PPP. Neurologic:  Gross motor and sensory apparatus intact. Labs:   Recent Labs     11/26/21  0738 11/26/21  0047 11/25/21  2125   WBC  --  10.6  --    HGB  --  8.6*  --    HCT  --  27.7*  --    PLT  --  401*  --    NA  --  136  --    K  --  4.7  --    BUN  --  52*  --    CREA  --  1.57*  --    GLU  --  161*  --    GLUCPOC 170*  --    < >    < > = values in this interval not displayed.         Assessment:     Principal Problem:    S/P CABG x 4 (11/19/2021)      Overview: x 4, LIMA to LAD, RSVG to Diag, RSVG to OM, RSVG to PDA    Active Problems:    NSTEMI (non-ST elevated myocardial infarction) ([facility] [address] (11/14/2021)      CAD (coronary artery disease) (11/18/2021) Plan/Recommendations/Medical Decision Makin. NSTEMI, Mv CAD s/p CABG x4: Cont ASA/statin/BB. Will need Plavix - hold until we know pneumo is stable. 2. Uncontrolled Hypertension: on Coreg, losartan, verapamil PTA. Cont coreg. Hold losartan given renal function. Start norvasc. 3. Large hiatal hernia: CT with Large hiatal hernia containing nearly the entire stomach, mesenteric fat, and the splenic flexure of the colon. Needs FU as OP  4. TEMO on CKD stage 3: Monitor - creatinine down to 1.57  Cont to hold diuresis. 5. Anxiety: PRN xanax  6. Postop shock, vasoplegia vs cardiogenic: Off all vasopressors. Resolved. 7.  Postop pain control: Cont scheduled tylenol to 1,000mg Q6hr. Cont PRN Oxycodone and ultram.   8. Atelectasis, apical pneumo: Increase mobility. Hourly I.S. Wean oxygen for O2 sat > 92%. Down to 2L NC. OOB TID, ambulating BID. CXR not improving, needs intense hourly pulmonary toileting. Daily CXR. 9. Leukocytosis: Increase mobility. Pulm toileting. Daily incisional care to sternal incision and EVH site. Improving  10. DM type II: New diagnosis. A1C 7, start amaryl, cont SSI. Needs education for diabetes. Diabetes management following. Dispo: PT/OT. OOB TID, ambulating BID. Case management following to aid in discharge planning, Shelter referral sent.  COVID PCR negative.      Signed By: Shar Kay NP

## 2021-11-26 NOTE — PROGRESS NOTES
1900: Bedside shift change report given to Yeison Valladares (oncoming nurse) by Barbara Arceo RN (offgoing nurse). Report included the following information SBAR, Kardex, Procedure Summary, Intake/Output, MAR, Recent Results and Cardiac Rhythm NSR. End of Shift Note    Bedside shift change report given to MEGAN Boateng (oncoming nurse) by Radha Santoro RN (offgoing nurse). Report included the following information SBAR, Kardex, Procedure Summary, Intake/Output, MAR, Recent Results and Cardiac Rhythm NSR    Shift worked:  [id number]     Shift summary and any significant changes:     none at this time      Concerns for physician to address:  none at this time     [facility]          Activity:  Activity Level: Up with Assistance  Number times ambulated in hallways past shift: 0  Number of times OOB to chair past shift: 1    Cardiac:   Cardiac Monitoring: Yes      Cardiac Rhythm: Sinus Rhythm    Access:   Current line(s): PIV     Genitourinary:   Urinary status: voiding    Respiratory:   O2 Device: Nasal cannula  Chronic home O2 use?: NO  Incentive spirometer at bedside: YES  Actual Volume (ml): 500 ml  GI:  Last Bowel Movement Date: 11/17/21  Current diet:  ADULT DIET Regular; Low Fat/Low Chol/High Fiber/ANTONETTE  ADULT ORAL NUTRITION SUPPLEMENT Breakfast, Lunch, Dinner; Diabetic Supplement  Passing flatus: YES  Tolerating current diet: YES       Pain Management:   Patient states pain is manageable on current regimen: YES    Skin:  Ilia Score: 18  Interventions: turn team, float heels, increase time out of bed and PT/OT consult    Patient Safety:  Fall Score:  Total Score: 3  Interventions: bed/chair alarm, assistive device (walker, cane, etc), gripper socks, pt to call before getting OOB and stay with me (per policy)  High Fall Risk: Yes    Length of Stay:  Expected LOS: 8d 14h  Actual LOS: Isabelle Briceño, ALEX

## 2021-11-27 ENCOUNTER — APPOINTMENT (OUTPATIENT)
Dept: GENERAL RADIOLOGY | Age: 86
DRG: 233 | End: 2021-11-27
Attending: PHYSICIAN ASSISTANT
Payer: MEDICARE

## 2021-11-27 LAB
ANION GAP SERPL CALC-SCNC: 8 MMOL/L (ref 5–15)
BASOPHILS # BLD: 0.1 K/UL (ref 0–0.1)
BASOPHILS NFR BLD: 0 % (ref 0–1)
BUN SERPL-MCNC: 45 MG/DL (ref 6–20)
BUN/CREAT SERPL: 29 (ref 12–20)
CALCIUM SERPL-MCNC: 8.8 MG/DL (ref 8.5–10.1)
CHLORIDE SERPL-SCNC: 105 MMOL/L (ref 97–108)
CO2 SERPL-SCNC: 23 MMOL/L (ref 21–32)
CREAT SERPL-MCNC: 1.57 MG/DL (ref 0.55–1.02)
DIFFERENTIAL METHOD BLD: ABNORMAL
EOSINOPHIL # BLD: 0.3 K/UL (ref 0–0.4)
EOSINOPHIL NFR BLD: 3 % (ref 0–7)
ERYTHROCYTE [DISTWIDTH] IN BLOOD BY AUTOMATED COUNT: 13.8 % (ref 11.5–14.5)
GLUCOSE BLD STRIP.AUTO-MCNC: 103 MG/DL (ref 65–117)
GLUCOSE BLD STRIP.AUTO-MCNC: 124 MG/DL (ref 65–117)
GLUCOSE BLD STRIP.AUTO-MCNC: 128 MG/DL (ref 65–117)
GLUCOSE BLD STRIP.AUTO-MCNC: 180 MG/DL (ref 65–117)
GLUCOSE SERPL-MCNC: 102 MG/DL (ref 65–100)
HCT VFR BLD AUTO: 29.5 % (ref 35–47)
HGB BLD-MCNC: 8.6 G/DL (ref 11.5–16)
IMM GRANULOCYTES # BLD AUTO: 0.1 K/UL (ref 0–0.04)
IMM GRANULOCYTES NFR BLD AUTO: 1 % (ref 0–0.5)
LYMPHOCYTES # BLD: 1.7 K/UL (ref 0.8–3.5)
LYMPHOCYTES NFR BLD: 15 % (ref 12–49)
MCH RBC QN AUTO: 30.5 PG (ref 26–34)
MCHC RBC AUTO-ENTMCNC: 29.2 G/DL (ref 30–36.5)
MCV RBC AUTO: 104.6 FL (ref 80–99)
MONOCYTES # BLD: 1.4 K/UL (ref 0–1)
MONOCYTES NFR BLD: 12 % (ref 5–13)
NEUTS SEG # BLD: 7.7 K/UL (ref 1.8–8)
NEUTS SEG NFR BLD: 68 % (ref 32–75)
NRBC # BLD: 0 K/UL (ref 0–0.01)
NRBC BLD-RTO: 0 PER 100 WBC
PLATELET # BLD AUTO: 447 K/UL (ref 150–400)
PMV BLD AUTO: 9 FL (ref 8.9–12.9)
POTASSIUM SERPL-SCNC: 4.5 MMOL/L (ref 3.5–5.1)
RBC # BLD AUTO: 2.82 M/UL (ref 3.8–5.2)
SERVICE CMNT-IMP: ABNORMAL
SERVICE CMNT-IMP: NORMAL
SODIUM SERPL-SCNC: 136 MMOL/L (ref 136–145)
WBC # BLD AUTO: 11.3 K/UL (ref 3.6–11)

## 2021-11-27 PROCEDURE — 36415 COLL VENOUS BLD VENIPUNCTURE: CPT

## 2021-11-27 PROCEDURE — 74011636637 HC RX REV CODE- 636/637: Performed by: NURSE PRACTITIONER

## 2021-11-27 PROCEDURE — 74011250636 HC RX REV CODE- 250/636: Performed by: NURSE PRACTITIONER

## 2021-11-27 PROCEDURE — 85025 COMPLETE CBC W/AUTO DIFF WBC: CPT

## 2021-11-27 PROCEDURE — 65660000000 HC RM CCU STEPDOWN

## 2021-11-27 PROCEDURE — 74011250637 HC RX REV CODE- 250/637: Performed by: NURSE PRACTITIONER

## 2021-11-27 PROCEDURE — 97116 GAIT TRAINING THERAPY: CPT

## 2021-11-27 PROCEDURE — 80048 BASIC METABOLIC PNL TOTAL CA: CPT

## 2021-11-27 PROCEDURE — 82962 GLUCOSE BLOOD TEST: CPT

## 2021-11-27 PROCEDURE — 71045 X-RAY EXAM CHEST 1 VIEW: CPT

## 2021-11-27 PROCEDURE — 77010033678 HC OXYGEN DAILY

## 2021-11-27 PROCEDURE — 97110 THERAPEUTIC EXERCISES: CPT

## 2021-11-27 PROCEDURE — 74011250637 HC RX REV CODE- 250/637: Performed by: PHYSICIAN ASSISTANT

## 2021-11-27 RX ORDER — AMLODIPINE BESYLATE 5 MG/1
5 TABLET ORAL ONCE
Status: COMPLETED | OUTPATIENT
Start: 2021-11-27 | End: 2021-11-27

## 2021-11-27 RX ORDER — AMLODIPINE BESYLATE 5 MG/1
10 TABLET ORAL DAILY
Status: DISCONTINUED | OUTPATIENT
Start: 2021-11-28 | End: 2021-12-06 | Stop reason: HOSPADM

## 2021-11-27 RX ADMIN — PANTOPRAZOLE SODIUM 40 MG: 40 TABLET, DELAYED RELEASE ORAL at 08:24

## 2021-11-27 RX ADMIN — AMIODARONE HYDROCHLORIDE 400 MG: 200 TABLET ORAL at 20:15

## 2021-11-27 RX ADMIN — GLIMEPIRIDE 2 MG: 4 TABLET ORAL at 08:32

## 2021-11-27 RX ADMIN — POLYETHYLENE GLYCOL 3350 17 G: 17 POWDER, FOR SOLUTION ORAL at 08:24

## 2021-11-27 RX ADMIN — OXYCODONE 5 MG: 5 TABLET ORAL at 05:14

## 2021-11-27 RX ADMIN — ATORVASTATIN CALCIUM 80 MG: 40 TABLET, FILM COATED ORAL at 21:13

## 2021-11-27 RX ADMIN — AMLODIPINE BESYLATE 5 MG: 5 TABLET ORAL at 08:23

## 2021-11-27 RX ADMIN — Medication 10 ML: at 06:00

## 2021-11-27 RX ADMIN — Medication 1 TABLET: at 08:23

## 2021-11-27 RX ADMIN — DOCUSATE SODIUM 50MG AND SENNOSIDES 8.6MG 1 TABLET: 8.6; 5 TABLET, FILM COATED ORAL at 08:23

## 2021-11-27 RX ADMIN — INSULIN LISPRO 2 UNITS: 100 INJECTION, SOLUTION INTRAVENOUS; SUBCUTANEOUS at 17:03

## 2021-11-27 RX ADMIN — AMLODIPINE BESYLATE 5 MG: 5 TABLET ORAL at 11:29

## 2021-11-27 RX ADMIN — OXYCODONE 5 MG: 5 TABLET ORAL at 08:24

## 2021-11-27 RX ADMIN — ASPIRIN 81 MG CHEWABLE TABLET 81 MG: 81 TABLET CHEWABLE at 08:23

## 2021-11-27 RX ADMIN — CARVEDILOL 6.25 MG: 6.25 TABLET, FILM COATED ORAL at 08:24

## 2021-11-27 RX ADMIN — ONDANSETRON 4 MG: 2 INJECTION INTRAMUSCULAR; INTRAVENOUS at 08:32

## 2021-11-27 RX ADMIN — CARVEDILOL 6.25 MG: 6.25 TABLET, FILM COATED ORAL at 16:57

## 2021-11-27 RX ADMIN — Medication 10 ML: at 14:54

## 2021-11-27 RX ADMIN — MELATONIN 3 MG: at 21:13

## 2021-11-27 RX ADMIN — DOCUSATE SODIUM 50MG AND SENNOSIDES 8.6MG 1 TABLET: 8.6; 5 TABLET, FILM COATED ORAL at 16:58

## 2021-11-27 RX ADMIN — BISACODYL 10 MG: 10 SUPPOSITORY RECTAL at 05:14

## 2021-11-27 RX ADMIN — Medication 20 ML: at 21:14

## 2021-11-27 RX ADMIN — OXYCODONE 5 MG: 5 TABLET ORAL at 20:15

## 2021-11-27 RX ADMIN — Medication 10 ML: at 15:13

## 2021-11-27 RX ADMIN — OXYCODONE 5 MG: 5 TABLET ORAL at 14:51

## 2021-11-27 RX ADMIN — AMIODARONE HYDROCHLORIDE 400 MG: 200 TABLET ORAL at 08:23

## 2021-11-27 RX ADMIN — Medication 10 ML: at 21:14

## 2021-11-27 NOTE — PROGRESS NOTES
0700- Report given to Juany Head RN by off going nurse. 1900- Report given to oncoming nurse by Juany Head RN.

## 2021-11-27 NOTE — ROUTINE PROCESS
0600 Patient received from [facility]. Patient oriented to the room. Registration amd admitting MD made aware. 0700 Report given to Julianna Tejada RN. SBAR were discussed. , RN assumed care of the pt. Cardiology consult order to be called this am addressed during report.    Britta Silvestre RN
End of Shift Note    Bedside shift change report given to Sarah (oncoming nurse) by Deisi Chairez (offgoing nurse). Report included the following information SBAR, Kardex, Intake/Output, MAR, Recent Results, Med Rec Status, Cardiac Rhythm NSR and Alarm Parameters     Shift worked:  7p-7a     Shift summary and any significant changes:     No acute concerns overnight  Complete CHG bath given and sternal incision cleaned as per unit protocol     Concerns for physician to address:  None     Zone phone for oncoming shift:   XXX       Activity:  Activity Level: Up with Assistance  Number times ambulated in hallways past shift: 0  Number of times OOB to chair past shift: 1    Cardiac:   Cardiac Monitoring: Yes      Cardiac Rhythm: Sinus Rhythm    Access:   Current line(s): PIV     Genitourinary:   Urinary status: voiding    Respiratory:   O2 Device: None (Room air)  Chronic home O2 use?: NO  Incentive spirometer at bedside: YES  Actual Volume (ml): 600 ml  GI:  Last Bowel Movement Date: 11/17/21  Current diet:  ADULT ORAL NUTRITION SUPPLEMENT Breakfast, Lunch, Dinner; Low Calorie/High Protein  ADULT DIET Regular; 4 carb choices (60 gm/meal); No Salt Added (3-4 gm)  Passing flatus: YES  Tolerating current diet: YES       Pain Management:   Patient states pain is manageable on current regimen: YES    Skin:  Ilia Score: 18  Interventions: increase time out of bed    Patient Safety:  Fall Score:  Total Score: 3  Interventions: bed/chair alarm and pt to call before getting OOB  High Fall Risk: Yes    Length of Stay:  Expected LOS: 8d 14h  Actual LOS: [address]
Implemented All Fall with Harm Risk Interventions:  [place] to call system. Call bell, personal items and telephone within reach. Instruct patient to call for assistance. Room bathroom lighting operational. Non-slip footwear when patient is off stretcher. Physically safe environment: no spills, clutter or unnecessary equipment. Stretcher in lowest position, wheels locked, appropriate side rails in place. Provide visual cue, wrist band, yellow gown, etc. Monitor gait and stability. Monitor for mental status changes and reorient to person, place, and time. Review medications for side effects contributing to fall risk. Reinforce activity limits and safety measures with patient and family. Provide visual clues: red socks.

## 2021-11-27 NOTE — PROGRESS NOTES
Problem: Mobility Impaired (Adult and Pediatric)  Goal: *Acute Goals and Plan of Care (Insert Text)  Description: FUNCTIONAL STATUS PRIOR TO ADMISSION: Patient was independent and active without use of DME however began using rolling walker while in hospital prior to cardiac surgery. Reports that she has been having difficulty with endurance and can only do a little of activity before needing to take a break. Reports \"nighttime weakness\" but no falls at home. HOME SUPPORT PRIOR TO ADMISSION: The patient lived alone with son in the area to provide assistance. reviewed 11/26/2021  1. Patient will move from supine to sit and sit to supine , scoot up and down, and roll side to side in bed with min A within 5 days. 2.  Patient will perform sit to/from stand with contact guard assist within 5 days. 3.  Patient will ambulate 50 feet with least restrictive assistive device and contact guard assist within 5 days. 4.  Patient will perform cardiac exercises per protocol with supervision/set-up within 5 days. 5.  Patient will verbally and functionally demonstrate 3/3 sternal precautions without cues within 5 days. Physical Therapy Goals  Initiated 11/21/2021  1. Patient will move from supine to sit and sit to supine , scoot up and down, and roll side to side in bed with contact guard assist within 5 days. 2.  Patient will perform sit to/from stand with contact guard assist within 5 days. 3.  Patient will ambulate 100 feet with least restrictive assistive device and contact guard assist within 5 days. 4.  Patient will ascend/descend 3 stairs with 1 handrail(s) with contact guard assistance within 7 day(s). 5.  Patient will perform cardiac exercises per protocol with supervision/set-up within 5 days. 6.  Patient will verbally and functionally demonstrate 3/3 sternal precautions without cues within 5 days. Initiated 11/15/2021  1.   Patient will move from supine to sit and sit to supine  in bed with modified independence within 7 day(s). 2.  Patient will transfer from bed to chair and chair to bed with modified independence using the least restrictive device within 7 day(s). 3.  Patient will perform sit to stand with modified independence within 7 day(s). 4.  Patient will ambulate with modified independence for 250 feet with the least restrictive device within 7 day(s). 5.  Patient will ascend/descend 3 stairs with 1 handrail(s) with modified independence within 7 day(s). Outcome: Progressing Towards Goal   PHYSICAL THERAPY TREATMENT  Patient: Joaquín Sandhu (37 y.o. female)  Date: 11/27/2021  Diagnosis: NSTEMI (non-ST elevated myocardial infarction) ([facility] [address] [I21.4]  CAD (coronary artery disease) [I25.10]   S/P CABG x 4  Procedure(s) (LRB):  CORONARY ARTERY BYPASS GRAFT X4 [facility], [facility]IA [facility]. ECC. EPI AORTIC US BY DR Bri Grace. (N/A) 8 Days Post-Op  Precautions: Sternal (move in the tube)  Chart, physical therapy assessment, plan of care and goals were reviewed. ASSESSMENT  Patient continues with skilled PT services and is progressing towards goals. Pt presents with decreased strength and endurance. Pt preformed sit to stand transfer at min A with rocking for momentum. Pt ambulated 30ft with RW at [address]. Pt with a chair follow but not needing it today. Pt requiring cueing to stand upright and take longer steps. Pt fatigued after ambulation. Pt performed cardiac exercises with supervision. Pt with improved mobility tolerance. Patient is verbalizing understanding of mindful-based movements (\"move in the tube\") principles of keeping UEs proximal to ribcage to prevent lateral pull on the sternum during load-bearing activities with verbal cues required for compliance.     Current Level of Function Impacting Discharge (mobility/balance) sit to stand transfer at min A, ambulation at [address]    Other factors to consider for discharge: decreased endurance, pain          PLAN :  Patient continues to benefit from skilled intervention to address the above impairments. Continue treatment per established plan of care. to address goals. Recommendation for discharge: (in order for the patient to meet his/her long term goals)  Therapy up to 5 days/week in SNF setting    This discharge recommendation:  Has been made in collaboration with the attending provider and/or case management    IF patient discharges home will need the following DME: to be determined (TBD)       SUBJECTIVE:   Patient stated  Did I do a little better?     OBJECTIVE DATA SUMMARY:   Patient mobilized on continuous portable monitor/telemetry. Critical Behavior:  Neurologic State: Alert  Orientation Level: Appropriate for age  Cognition: Follows commands  Safety/Judgement: Awareness of environment    Functional Mobility Training:  Bed Mobility:                      Transfers:  Sit to Stand: Minimum assistance  Stand to Sit: Contact guard assistance                               Balance:  Sitting: Impaired  Sitting - Static: Good (unsupported)  Sitting - Dynamic: Fair (occasional)  Standing: Impaired  Standing - Static: Fair; Constant support  Standing - Dynamic : Fair; Constant support    Ambulation/Gait Training:  Distance (ft): 30 Feet (ft)  Assistive Device: Gait belt; Walker, rolling  Ambulation - Level of Assistance: Contact guard assistance; Additional time        Gait Abnormalities: Decreased step clearance; Shuffling gait        Base of Support: Widened     Speed/Jes: Pace decreased (<100 feet/min)  Step Length: Left shortened; Right shortened        Cardiac diagnosis intervention:  Patient instructed and educated on mindful movement principles based on Move in The Tube concept to include maintaining bilateral elbows close to rib cage when performing any load-bearing activity such as getting in/out of bed, pushing up from a chair, opening a door, or lifting a box.   Patient was given a handout with diagrams of each correct/incorrect method of performing each of the above tasks. Therapeutic Exercises:   Patient instructed on the benefits and demonstrated cardiac exercises while sitting with Supervision. Instructed and indicated understanding on how to progress reps, sets against gravity, pacing through progressive muscle strengthening standing based on surgeon clearance for more weight in prep for functional activity. Instruction on the use of household items in place of weights as needed.      CARDIAC  EXERCISE   Sets   Reps   Active Active Assist   Passive Self ROM   Comments   Shoulder flexion 1 5 [x]                                            []                                            []                                            []                                               Shoulder abduction 1      5 [x]                                            []                                            []                                            []                                               Scapular elevation 1 5 [x]                                            []                                            []                                            []                                               Scapular retraction 1 5 [x]                                            []                                            []                                            []                                               Trunk rotation 1 5 [x]                                            []                                            []                                            []                                               Trunk sidebending 1 5 [x]                                            []                                            []                                            []                                                  []                                            []                                            [] []                                                   Pain Rating:  Pt with no complaints of pain. Activity Tolerance:   Fair, SpO2 stable on RA, and requires rest breaks    After treatment patient left in no apparent distress:   Sitting in chair and Call bell within reach    COMMUNICATION/COLLABORATION:   The patients plan of care was discussed with: Registered nurse.      Humza Myrick PTA   Time Calculation: 23 mins

## 2021-11-28 ENCOUNTER — APPOINTMENT (OUTPATIENT)
Dept: ULTRASOUND IMAGING | Age: 86
DRG: 233 | End: 2021-11-28
Attending: THORACIC SURGERY (CARDIOTHORACIC VASCULAR SURGERY)
Payer: MEDICARE

## 2021-11-28 ENCOUNTER — APPOINTMENT (OUTPATIENT)
Dept: GENERAL RADIOLOGY | Age: 86
DRG: 233 | End: 2021-11-28
Attending: PHYSICIAN ASSISTANT
Payer: MEDICARE

## 2021-11-28 LAB
ANION GAP SERPL CALC-SCNC: 6 MMOL/L (ref 5–15)
BASOPHILS # BLD: 0 K/UL (ref 0–0.1)
BASOPHILS NFR BLD: 0 % (ref 0–1)
BUN SERPL-MCNC: 45 MG/DL (ref 6–20)
BUN/CREAT SERPL: 28 (ref 12–20)
CALCIUM SERPL-MCNC: 8.9 MG/DL (ref 8.5–10.1)
CHLORIDE SERPL-SCNC: 104 MMOL/L (ref 97–108)
CO2 SERPL-SCNC: 24 MMOL/L (ref 21–32)
CREAT SERPL-MCNC: 1.58 MG/DL (ref 0.55–1.02)
DIFFERENTIAL METHOD BLD: ABNORMAL
EOSINOPHIL # BLD: 0.3 K/UL (ref 0–0.4)
EOSINOPHIL NFR BLD: 3 % (ref 0–7)
ERYTHROCYTE [DISTWIDTH] IN BLOOD BY AUTOMATED COUNT: 13.8 % (ref 11.5–14.5)
GLUCOSE BLD STRIP.AUTO-MCNC: 149 MG/DL (ref 65–117)
GLUCOSE BLD STRIP.AUTO-MCNC: 151 MG/DL (ref 65–117)
GLUCOSE BLD STRIP.AUTO-MCNC: 175 MG/DL (ref 65–117)
GLUCOSE BLD STRIP.AUTO-MCNC: 98 MG/DL (ref 65–117)
GLUCOSE SERPL-MCNC: 78 MG/DL (ref 65–100)
HCT VFR BLD AUTO: 29.2 % (ref 35–47)
HGB BLD-MCNC: 8.5 G/DL (ref 11.5–16)
IMM GRANULOCYTES # BLD AUTO: 0.1 K/UL (ref 0–0.04)
IMM GRANULOCYTES NFR BLD AUTO: 1 % (ref 0–0.5)
LYMPHOCYTES # BLD: 1.6 K/UL (ref 0.8–3.5)
LYMPHOCYTES NFR BLD: 15 % (ref 12–49)
MCH RBC QN AUTO: 30.5 PG (ref 26–34)
MCHC RBC AUTO-ENTMCNC: 29.1 G/DL (ref 30–36.5)
MCV RBC AUTO: 104.7 FL (ref 80–99)
MONOCYTES # BLD: 1.2 K/UL (ref 0–1)
MONOCYTES NFR BLD: 11 % (ref 5–13)
NEUTS SEG # BLD: 7.3 K/UL (ref 1.8–8)
NEUTS SEG NFR BLD: 70 % (ref 32–75)
NRBC # BLD: 0 K/UL (ref 0–0.01)
NRBC BLD-RTO: 0 PER 100 WBC
PLATELET # BLD AUTO: 515 K/UL (ref 150–400)
PMV BLD AUTO: 9 FL (ref 8.9–12.9)
POTASSIUM SERPL-SCNC: 4.6 MMOL/L (ref 3.5–5.1)
RBC # BLD AUTO: 2.79 M/UL (ref 3.8–5.2)
SERVICE CMNT-IMP: ABNORMAL
SERVICE CMNT-IMP: NORMAL
SODIUM SERPL-SCNC: 134 MMOL/L (ref 136–145)
WBC # BLD AUTO: 10.5 K/UL (ref 3.6–11)

## 2021-11-28 PROCEDURE — 82962 GLUCOSE BLOOD TEST: CPT

## 2021-11-28 PROCEDURE — 74011250637 HC RX REV CODE- 250/637: Performed by: NURSE PRACTITIONER

## 2021-11-28 PROCEDURE — 74011250636 HC RX REV CODE- 250/636: Performed by: NURSE PRACTITIONER

## 2021-11-28 PROCEDURE — 93971 EXTREMITY STUDY: CPT

## 2021-11-28 PROCEDURE — 65660000000 HC RM CCU STEPDOWN

## 2021-11-28 PROCEDURE — 74011636637 HC RX REV CODE- 636/637: Performed by: NURSE PRACTITIONER

## 2021-11-28 PROCEDURE — 71045 X-RAY EXAM CHEST 1 VIEW: CPT

## 2021-11-28 PROCEDURE — 80048 BASIC METABOLIC PNL TOTAL CA: CPT

## 2021-11-28 PROCEDURE — 77010033678 HC OXYGEN DAILY

## 2021-11-28 PROCEDURE — 97116 GAIT TRAINING THERAPY: CPT

## 2021-11-28 PROCEDURE — 85025 COMPLETE CBC W/AUTO DIFF WBC: CPT

## 2021-11-28 PROCEDURE — 97110 THERAPEUTIC EXERCISES: CPT

## 2021-11-28 PROCEDURE — 36415 COLL VENOUS BLD VENIPUNCTURE: CPT

## 2021-11-28 PROCEDURE — 74011250636 HC RX REV CODE- 250/636: Performed by: THORACIC SURGERY (CARDIOTHORACIC VASCULAR SURGERY)

## 2021-11-28 PROCEDURE — 74011250637 HC RX REV CODE- 250/637: Performed by: PHYSICIAN ASSISTANT

## 2021-11-28 RX ORDER — ENOXAPARIN SODIUM 100 MG/ML
90 INJECTION SUBCUTANEOUS EVERY 24 HOURS
Status: DISCONTINUED | OUTPATIENT
Start: 2021-11-28 | End: 2021-11-29

## 2021-11-28 RX ADMIN — AMLODIPINE BESYLATE 10 MG: 5 TABLET ORAL at 08:35

## 2021-11-28 RX ADMIN — ALPRAZOLAM 0.5 MG: 0.5 TABLET ORAL at 05:52

## 2021-11-28 RX ADMIN — AMIODARONE HYDROCHLORIDE 400 MG: 200 TABLET ORAL at 20:27

## 2021-11-28 RX ADMIN — Medication 10 ML: at 16:02

## 2021-11-28 RX ADMIN — INSULIN LISPRO 2 UNITS: 100 INJECTION, SOLUTION INTRAVENOUS; SUBCUTANEOUS at 12:09

## 2021-11-28 RX ADMIN — Medication 1 TABLET: at 11:59

## 2021-11-28 RX ADMIN — TRAMADOL HYDROCHLORIDE 50 MG: 50 TABLET, COATED ORAL at 05:52

## 2021-11-28 RX ADMIN — CARVEDILOL 6.25 MG: 6.25 TABLET, FILM COATED ORAL at 08:35

## 2021-11-28 RX ADMIN — CARVEDILOL 6.25 MG: 6.25 TABLET, FILM COATED ORAL at 18:04

## 2021-11-28 RX ADMIN — DOCUSATE SODIUM 50MG AND SENNOSIDES 8.6MG 1 TABLET: 8.6; 5 TABLET, FILM COATED ORAL at 08:35

## 2021-11-28 RX ADMIN — DOCUSATE SODIUM 50MG AND SENNOSIDES 8.6MG 1 TABLET: 8.6; 5 TABLET, FILM COATED ORAL at 18:04

## 2021-11-28 RX ADMIN — MELATONIN 3 MG: at 21:46

## 2021-11-28 RX ADMIN — TRAMADOL HYDROCHLORIDE 50 MG: 50 TABLET, COATED ORAL at 12:09

## 2021-11-28 RX ADMIN — ENOXAPARIN SODIUM 90 MG: 100 INJECTION SUBCUTANEOUS at 21:38

## 2021-11-28 RX ADMIN — Medication 10 ML: at 21:38

## 2021-11-28 RX ADMIN — POLYETHYLENE GLYCOL 3350 17 G: 17 POWDER, FOR SOLUTION ORAL at 08:35

## 2021-11-28 RX ADMIN — ASPIRIN 81 MG CHEWABLE TABLET 81 MG: 81 TABLET CHEWABLE at 08:34

## 2021-11-28 RX ADMIN — INSULIN LISPRO 2 UNITS: 100 INJECTION, SOLUTION INTRAVENOUS; SUBCUTANEOUS at 18:04

## 2021-11-28 RX ADMIN — ATORVASTATIN CALCIUM 80 MG: 40 TABLET, FILM COATED ORAL at 21:38

## 2021-11-28 RX ADMIN — TRAMADOL HYDROCHLORIDE 50 MG: 50 TABLET, COATED ORAL at 18:04

## 2021-11-28 RX ADMIN — PANTOPRAZOLE SODIUM 40 MG: 40 TABLET, DELAYED RELEASE ORAL at 08:35

## 2021-11-28 RX ADMIN — ALPRAZOLAM 0.5 MG: 0.5 TABLET ORAL at 20:27

## 2021-11-28 RX ADMIN — ONDANSETRON 4 MG: 2 INJECTION INTRAMUSCULAR; INTRAVENOUS at 12:09

## 2021-11-28 RX ADMIN — Medication 10 ML: at 05:53

## 2021-11-28 RX ADMIN — AMIODARONE HYDROCHLORIDE 400 MG: 200 TABLET ORAL at 08:35

## 2021-11-28 RX ADMIN — OXYCODONE 5 MG: 5 TABLET ORAL at 16:02

## 2021-11-28 NOTE — PROGRESS NOTES
Earlier they paged TRACY Valenzuela . Did not get a call back so recalled the service. Pt now pain relived but worried about extra swelling.

## 2021-11-28 NOTE — PROGRESS NOTES
[facility] FLOOR Progress Note    Admit Date: 2021  POD: 9 Days Post-Op      Procedure:  Procedure(s):  CORONARY ARTERY BYPASS GRAFT X4 [facility], [facility]IA [facility]. ECC. EPI AORTIC US BY DR Navneet De Leon. Subjective:     Looks good; rehab next week . Objective:     Blood pressure (!) 121/45, pulse 70, temperature 98.4 °F (36.9 °C), resp. rate 20, height 5' 7\" (1.702 m), weight 190 lb 4.1 oz (86.3 kg), SpO2 97 %. Temp (24hrs), Av.8 °F (37.1 °C), Min:98.4 °F (36.9 °C), Max:99 °F (37.2 °C)        Oxygen:    CXR    Medications reviewed    Admission Weight: Last Weight   Weight: 184 lb (83.5 kg) Weight: 190 lb 4.1 oz (86.3 kg)     Intake / Output / Drain:  Current Shift: No intake/output data recorded.   Last 24 hrs.:  1901 -  0700  In: 900 [P.O.:900]  Out: 700 [Urine:700]    EXAM:  Visit Vitals  BP (!) 121/45   Pulse 70   Temp 98.4 °F (36.9 °C)   Resp 20   Ht 5' 7\" (1.702 m)   Wt 190 lb 4.1 oz (86.3 kg)   SpO2 97%   BMI 29.80 kg/m²                           Labs:  Recent Results (from the past 24 hour(s))   GLUCOSE, POC    Collection Time: 21 11:26 AM   Result Value Ref Range    Glucose (POC) 128 (H) 65 - 117 mg/dL    Performed by Raymon Alex PCT    GLUCOSE, POC    Collection Time: 21  4:12 PM   Result Value Ref Range    Glucose (POC) 180 (H) 65 - 117 mg/dL    Performed by Raymon Alex PCT    GLUCOSE, POC    Collection Time: 21  8:52 PM   Result Value Ref Range    Glucose (POC) 124 (H) 65 - 117 mg/dL    Performed by Samira Rosado RN    CBC WITH AUTOMATED DIFF    Collection Time: 21  5:36 AM   Result Value Ref Range    WBC 10.5 3.6 - 11.0 K/uL    RBC 2.79 (L) 3.80 - 5.20 M/uL    HGB 8.5 (L) 11.5 - 16.0 g/dL    HCT 29.2 (L) 35.0 - 47.0 %    .7 (H) 80.0 - 99.0 FL    MCH 30.5 26.0 - 34.0 PG    MCHC 29.1 (L) 30.0 - 36.5 g/dL    RDW 13.8 11.5 - 14.5 %    PLATELET 174 (H) 373 - 400 K/uL    MPV 9.0 8.9 - 12.9 FL    NRBC 0.0 0  WBC    ABSOLUTE NRBC 0.00 0.00 - 0.01 K/uL    NEUTROPHILS 70 32 - 75 %    LYMPHOCYTES 15 12 - 49 %    MONOCYTES 11 5 - 13 %    EOSINOPHILS 3 0 - 7 %    BASOPHILS 0 0 - 1 %    IMMATURE GRANULOCYTES 1 (H) 0.0 - 0.5 %    ABS. NEUTROPHILS 7.3 1.8 - 8.0 K/UL    ABS. LYMPHOCYTES 1.6 0.8 - 3.5 K/UL    ABS. MONOCYTES 1.2 (H) 0.0 - 1.0 K/UL    ABS. EOSINOPHILS 0.3 0.0 - 0.4 K/UL    ABS. BASOPHILS 0.0 0.0 - 0.1 K/UL    ABS. IMM.  GRANS. 0.1 (H) 0.00 - 0.04 K/UL    DF AUTOMATED     METABOLIC PANEL, BASIC    Collection Time: 11/28/21  5:36 AM   Result Value Ref Range    Sodium 134 (L) 136 - 145 mmol/L    Potassium 4.6 3.5 - 5.1 mmol/L    Chloride 104 97 - 108 mmol/L    CO2 24 21 - 32 mmol/L    Anion gap 6 5 - 15 mmol/L    Glucose 78 65 - 100 mg/dL    BUN 45 (H) 6 - 20 MG/DL    Creatinine 1.58 (H) 0.55 - 1.02 MG/DL    BUN/Creatinine ratio 28 (H) 12 - 20      GFR est AA 38 (L) >60 ml/min/1.73m2    GFR est non-AA 31 (L) >60 ml/min/1.73m2    Calcium 8.9 8.5 - 10.1 MG/DL         Signed By: Janie Ramírez MD

## 2021-11-28 NOTE — PROGRESS NOTES
Problem: Mobility Impaired (Adult and Pediatric)  Goal: *Acute Goals and Plan of Care (Insert Text)  Description: FUNCTIONAL STATUS PRIOR TO ADMISSION: Patient was independent and active without use of DME however began using rolling walker while in hospital prior to cardiac surgery. Reports that she has been having difficulty with endurance and can only do a little of activity before needing to take a break. Reports \"nighttime weakness\" but no falls at home. HOME SUPPORT PRIOR TO ADMISSION: The patient lived alone with son in the area to provide assistance. reviewed 11/26/2021  1. Patient will move from supine to sit and sit to supine , scoot up and down, and roll side to side in bed with min A within 5 days. 2.  Patient will perform sit to/from stand with contact guard assist within 5 days. 3.  Patient will ambulate 50 feet with least restrictive assistive device and contact guard assist within 5 days. 4.  Patient will perform cardiac exercises per protocol with supervision/set-up within 5 days. 5.  Patient will verbally and functionally demonstrate 3/3 sternal precautions without cues within 5 days. Physical Therapy Goals  Initiated 11/21/2021  1. Patient will move from supine to sit and sit to supine , scoot up and down, and roll side to side in bed with contact guard assist within 5 days. 2.  Patient will perform sit to/from stand with contact guard assist within 5 days. 3.  Patient will ambulate 100 feet with least restrictive assistive device and contact guard assist within 5 days. 4.  Patient will ascend/descend 3 stairs with 1 handrail(s) with contact guard assistance within 7 day(s). 5.  Patient will perform cardiac exercises per protocol with supervision/set-up within 5 days. 6.  Patient will verbally and functionally demonstrate 3/3 sternal precautions without cues within 5 days. Initiated 11/15/2021  1.   Patient will move from supine to sit and sit to supine  in bed with modified independence within 7 day(s). 2.  Patient will transfer from bed to chair and chair to bed with modified independence using the least restrictive device within 7 day(s). 3.  Patient will perform sit to stand with modified independence within 7 day(s). 4.  Patient will ambulate with modified independence for 250 feet with the least restrictive device within 7 day(s). 5.  Patient will ascend/descend 3 stairs with 1 handrail(s) with modified independence within 7 day(s). Outcome: Progressing Towards Goal   PHYSICAL THERAPY TREATMENT  Patient: Juan Sood (35 y.o. female)  Date: 11/28/2021  Diagnosis: NSTEMI (non-ST elevated myocardial infarction) ([facility] [address] [I21.4]  CAD (coronary artery disease) [I25.10]   S/P CABG x 4  Procedure(s) (LRB):  CORONARY ARTERY BYPASS GRAFT X4 [facility], [facility]IA [facility]. ECC. EPI AORTIC US BY DR Rosa Diaz. (N/A) 9 Days Post-Op  Precautions: Sternal (move in the tube)  Chart, physical therapy assessment, plan of care and goals were reviewed. ASSESSMENT  Patient continues with skilled PT services and is progressing towards goals. Patient was seated in chair this afternoon (tried in AM but she had just gotten back to chair after having a bowel movement which wiped her out) Patient was cleared by RN and seen in room for ex and gait. Patient had trouble scooting to edge of chair to stand - needed min A to scoot with gluteal weight shifting. Pt then did sitting cardiac exercises . Pt went from sitting to standing with min A and mod verbal cueing using proper sternal precaution technique, and ambulated 30 ft in room to doorway and back with RW and CGA with o2. All vitals monitored and stable. Patient c/o legs feeling heavy and much harder to walk today vs yesterday. As she got back to chair pt said L LE was very painful/achey and pt was quite uncomfortable. LE was swollen, hard, reddened and slightly warmer than R to touch. Nurse notified and came in room to examine LE. Painful area was lateral thigh not where vein was harvested. PT recommended gentle movement, ankle pumps, elevated legs in chair and suggested ice. RN was going to call MD.  Patient elected to stay in chair. Patient is demonstrating understanding of mindful-based movements (\"move in the tube\") principles of keeping UEs proximal to ribcage to prevent lateral pull on the sternum during load-bearing activities with verbal cues required for compliance. Current Level of Function Impacting Discharge (mobility/balance): ambulating with RW 30 ft, achey/swollen L LE today    Other factors to consider for discharge: lives alone will need rehab         PLAN :  Patient continues to benefit from skilled intervention to address the above impairments. Continue treatment per established plan of care. to address goals. Recommendation for discharge: (in order for the patient to meet his/her long term goals)  Therapy up to 5 days/week in SNF setting    This discharge recommendation:  Has been made in collaboration with the attending provider and/or case management    IF patient discharges home will need the following DME: to be determined (TBD)       SUBJECTIVE:   Patient stated It feels much worse today than yesterday.     OBJECTIVE DATA SUMMARY:   Patient mobilized on continuous portable monitor/telemetry.   Critical Behavior:  Neurologic State: Alert  Orientation Level: Oriented X4  Cognition: Appropriate decision making, Appropriate safety awareness, Appropriate for age attention/concentration  Safety/Judgement: Awareness of environment    Functional Mobility Training: worked on sit to stand using no armrests, and sternal precautions, patient needs only min A, nearly CGA with this activity        Transfers:  Sit to Stand: Minimum assistance  Stand to Sit: Contact guard assistance       Balance:  Sitting: Impaired  Sitting - Static: Good (unsupported)  Sitting - Dynamic: Fair (occasional)  Standing: Impaired  Standing - Static: Fair; Constant support  Standing - Dynamic : Fair; Constant support    Ambulation/Gait Training:  Distance (ft): 30 Feet (ft)  Assistive Device: Walker, rolling; Gait belt  Ambulation - Level of Assistance: Contact guard assistance        Gait Abnormalities: Decreased step clearance; Antalgic (L LE felt heavy and painful)        Base of Support: Widened     Speed/Jes: Pace decreased (<100 feet/min)  Step Length: Right shortened; Left shortened           Cardiac diagnosis intervention:  Patient instructed and educated on mindful movement principles based on Move in The Tube concept to include maintaining bilateral elbows close to rib cage when performing any load-bearing activity such as getting in/out of bed, pushing up from a chair, opening a door, or lifting a box. Patient was given a handout with diagrams of each correct/incorrect method of performing each of the above tasks. Therapeutic Exercises:   Patient instructed on the benefits and demonstrated cardiac exercises while sitting with Minimum assistance. Instructed and indicated understanding on how to progress reps, sets against gravity, pacing through progressive muscle strengthening standing based on surgeon clearance for more weight in prep for functional activity. Instruction on the use of household items in place of weights as needed.      CARDIAC  EXERCISE   Sets   Reps   Active Active Assist   Passive Self ROM   Comments   Shoulder flexion 1 5 [x]                                            []                                            []                                            []                                               Shoulder abduction 1      5 [x]                                            []                                            []                                            []                                               Scapular elevation 1 5 [x]                                            [] []                                            []                                               Scapular retraction 1 5 [x]                                            []                                            []                                            []                                               Trunk rotation 1 5 [x]                                            []                                            []                                            [x]                                               Trunk sidebending 1 5 [x]                                            []                                            []                                            []                                                  []                                            []                                            []                                            []                                                   Pain Rating:  10 now in L thigh, earlier pt had  back soreness and normal sternal pain. Pain is not radiating sciatica like pain, but swelling type pain. This began during her ambulation. Activity Tolerance:   requires frequent rest breaks and observed SOB with activity    After treatment patient left in no apparent distress:   Sitting in chair and Call bell within reach    COMMUNICATION/COLLABORATION:   The patients plan of care was discussed with: Registered nurse.      Minor Earing, PT   Time Calculation: 28 mins

## 2021-11-28 NOTE — PROGRESS NOTES
Pt had just finished walking with physical therapy when she had pain in side of left leg from knee up, aching . Also told therapist that the leg felt like 100 lbs  Left leg appears more swollen than earlier and feels slightly warmer . Pt medicated for pain of a 9 and Dr Lopez Mike service called. pt stated was a new pain she never had before

## 2021-11-28 NOTE — PROGRESS NOTES
Bedside and Verbal shift change report given to [address]  (oncoming nurse) by Dalton Cox  (offgoing nurse). Report included the following information SBAR, Kardex, ED Summary, OR Summary, Procedure Summary, Intake/Output, MAR, Recent Results and Cardiac Rhythm . Abhilash Edmar

## 2021-11-29 ENCOUNTER — APPOINTMENT (OUTPATIENT)
Dept: GENERAL RADIOLOGY | Age: 86
DRG: 233 | End: 2021-11-29
Attending: PHYSICIAN ASSISTANT
Payer: MEDICARE

## 2021-11-29 LAB
ANION GAP SERPL CALC-SCNC: 4 MMOL/L (ref 5–15)
BASOPHILS # BLD: 0 K/UL (ref 0–0.1)
BASOPHILS NFR BLD: 0 % (ref 0–1)
BUN SERPL-MCNC: 57 MG/DL (ref 6–20)
BUN/CREAT SERPL: 29 (ref 12–20)
CALCIUM SERPL-MCNC: 8.9 MG/DL (ref 8.5–10.1)
CHLORIDE SERPL-SCNC: 100 MMOL/L (ref 97–108)
CO2 SERPL-SCNC: 29 MMOL/L (ref 21–32)
CREAT SERPL-MCNC: 1.96 MG/DL (ref 0.55–1.02)
DIFFERENTIAL METHOD BLD: ABNORMAL
EOSINOPHIL # BLD: 0.3 K/UL (ref 0–0.4)
EOSINOPHIL NFR BLD: 2 % (ref 0–7)
ERYTHROCYTE [DISTWIDTH] IN BLOOD BY AUTOMATED COUNT: 13.8 % (ref 11.5–14.5)
GLUCOSE BLD STRIP.AUTO-MCNC: 101 MG/DL (ref 65–117)
GLUCOSE BLD STRIP.AUTO-MCNC: 138 MG/DL (ref 65–117)
GLUCOSE BLD STRIP.AUTO-MCNC: 185 MG/DL (ref 65–117)
GLUCOSE BLD STRIP.AUTO-MCNC: 211 MG/DL (ref 65–117)
GLUCOSE BLD STRIP.AUTO-MCNC: 236 MG/DL (ref 65–117)
GLUCOSE SERPL-MCNC: 113 MG/DL (ref 65–100)
HCT VFR BLD AUTO: 27.7 % (ref 35–47)
HGB BLD-MCNC: 8.6 G/DL (ref 11.5–16)
IMM GRANULOCYTES # BLD AUTO: 0.1 K/UL (ref 0–0.04)
IMM GRANULOCYTES NFR BLD AUTO: 1 % (ref 0–0.5)
LYMPHOCYTES # BLD: 1.9 K/UL (ref 0.8–3.5)
LYMPHOCYTES NFR BLD: 14 % (ref 12–49)
MAGNESIUM SERPL-MCNC: 2.5 MG/DL (ref 1.6–2.4)
MCH RBC QN AUTO: 29.9 PG (ref 26–34)
MCHC RBC AUTO-ENTMCNC: 31 G/DL (ref 30–36.5)
MCV RBC AUTO: 96.2 FL (ref 80–99)
MONOCYTES # BLD: 1.3 K/UL (ref 0–1)
MONOCYTES NFR BLD: 10 % (ref 5–13)
NEUTS SEG # BLD: 10 K/UL (ref 1.8–8)
NEUTS SEG NFR BLD: 73 % (ref 32–75)
NRBC # BLD: 0 K/UL (ref 0–0.01)
NRBC BLD-RTO: 0 PER 100 WBC
PLATELET # BLD AUTO: 511 K/UL (ref 150–400)
PMV BLD AUTO: 9.1 FL (ref 8.9–12.9)
POTASSIUM SERPL-SCNC: 5.3 MMOL/L (ref 3.5–5.1)
RBC # BLD AUTO: 2.88 M/UL (ref 3.8–5.2)
SERVICE CMNT-IMP: ABNORMAL
SERVICE CMNT-IMP: NORMAL
SODIUM SERPL-SCNC: 133 MMOL/L (ref 136–145)
WBC # BLD AUTO: 13.6 K/UL (ref 3.6–11)

## 2021-11-29 PROCEDURE — 80048 BASIC METABOLIC PNL TOTAL CA: CPT

## 2021-11-29 PROCEDURE — 74011250637 HC RX REV CODE- 250/637: Performed by: NURSE PRACTITIONER

## 2021-11-29 PROCEDURE — 51798 US URINE CAPACITY MEASURE: CPT

## 2021-11-29 PROCEDURE — 65660000000 HC RM CCU STEPDOWN

## 2021-11-29 PROCEDURE — 36415 COLL VENOUS BLD VENIPUNCTURE: CPT

## 2021-11-29 PROCEDURE — 74011250637 HC RX REV CODE- 250/637: Performed by: PHYSICIAN ASSISTANT

## 2021-11-29 PROCEDURE — 83735 ASSAY OF MAGNESIUM: CPT

## 2021-11-29 PROCEDURE — 82962 GLUCOSE BLOOD TEST: CPT

## 2021-11-29 PROCEDURE — 97535 SELF CARE MNGMENT TRAINING: CPT

## 2021-11-29 PROCEDURE — 97110 THERAPEUTIC EXERCISES: CPT

## 2021-11-29 PROCEDURE — 71045 X-RAY EXAM CHEST 1 VIEW: CPT

## 2021-11-29 PROCEDURE — 85025 COMPLETE CBC W/AUTO DIFF WBC: CPT

## 2021-11-29 PROCEDURE — 74011636637 HC RX REV CODE- 636/637: Performed by: NURSE PRACTITIONER

## 2021-11-29 RX ORDER — ACETAMINOPHEN 500 MG
1000 TABLET ORAL
Status: DISCONTINUED | OUTPATIENT
Start: 2021-11-29 | End: 2021-12-06 | Stop reason: HOSPADM

## 2021-11-29 RX ADMIN — CARVEDILOL 6.25 MG: 6.25 TABLET, FILM COATED ORAL at 16:52

## 2021-11-29 RX ADMIN — AMIODARONE HYDROCHLORIDE 400 MG: 200 TABLET ORAL at 08:44

## 2021-11-29 RX ADMIN — TRAMADOL HYDROCHLORIDE 50 MG: 50 TABLET, COATED ORAL at 21:28

## 2021-11-29 RX ADMIN — Medication 10 ML: at 21:20

## 2021-11-29 RX ADMIN — Medication 1 TABLET: at 08:44

## 2021-11-29 RX ADMIN — AMLODIPINE BESYLATE 10 MG: 5 TABLET ORAL at 08:44

## 2021-11-29 RX ADMIN — ASPIRIN 81 MG CHEWABLE TABLET 81 MG: 81 TABLET CHEWABLE at 08:44

## 2021-11-29 RX ADMIN — PANTOPRAZOLE SODIUM 40 MG: 40 TABLET, DELAYED RELEASE ORAL at 08:44

## 2021-11-29 RX ADMIN — Medication 10 ML: at 13:02

## 2021-11-29 RX ADMIN — GLIMEPIRIDE 2 MG: 4 TABLET ORAL at 08:44

## 2021-11-29 RX ADMIN — AMIODARONE HYDROCHLORIDE 400 MG: 200 TABLET ORAL at 21:28

## 2021-11-29 RX ADMIN — CARVEDILOL 6.25 MG: 6.25 TABLET, FILM COATED ORAL at 08:44

## 2021-11-29 RX ADMIN — APIXABAN 10 MG: 5 TABLET, FILM COATED ORAL at 13:02

## 2021-11-29 RX ADMIN — APIXABAN 10 MG: 5 TABLET, FILM COATED ORAL at 21:28

## 2021-11-29 RX ADMIN — INSULIN LISPRO 2 UNITS: 100 INJECTION, SOLUTION INTRAVENOUS; SUBCUTANEOUS at 16:52

## 2021-11-29 RX ADMIN — INSULIN LISPRO 2 UNITS: 100 INJECTION, SOLUTION INTRAVENOUS; SUBCUTANEOUS at 21:27

## 2021-11-29 RX ADMIN — OXYCODONE 5 MG: 5 TABLET ORAL at 16:51

## 2021-11-29 RX ADMIN — ATORVASTATIN CALCIUM 80 MG: 40 TABLET, FILM COATED ORAL at 21:28

## 2021-11-29 RX ADMIN — DIPHENHYDRAMINE HYDROCHLORIDE 25 MG: 25 CAPSULE ORAL at 21:28

## 2021-11-29 NOTE — PROGRESS NOTES
Problem: Falls - Risk of  Goal: *Absence of Falls  Description: Document Richar Cross Fall Risk and appropriate interventions in the flowsheet. Outcome: Progressing Towards Goal  Note: Fall Risk Interventions:  Mobility Interventions: Assess mobility with egress test, Communicate number of staff needed for ambulation/transfer, OT consult for ADLs, PT Consult for mobility concerns    Mentation Interventions: Adequate sleep, hydration, pain control, Door open when patient unattended, Evaluate medications/consider consulting pharmacy, Eyeglasses and hearing aids, Familiar objects from home    Medication Interventions: Assess postural VS orthostatic hypotension, Evaluate medications/consider consulting pharmacy, Patient to call before getting OOB, Utilize gait belt for transfers/ambulation    Elimination Interventions: Call light in reach, Elevated toilet seat, Patient to call for help with toileting needs, Toilet paper/wipes in reach, Urinal in reach              Problem: Diabetes Self-Management  Goal: *Disease process and treatment process  Description: Define diabetes and identify own type of diabetes; list 3 options for treating diabetes. Outcome: Progressing Towards Goal  Goal: *Incorporating nutritional management into lifestyle  Description: Describe effect of type, amount and timing of food on blood glucose; list 3 methods for planning meals. Outcome: Progressing Towards Goal  Goal: *Incorporating physical activity into lifestyle  Description: State effect of exercise on blood glucose levels. Outcome: Progressing Towards Goal  Goal: *Developing strategies to promote health/change behavior  Description: Define the ABC's of diabetes; identify appropriate screenings, schedule and personal plan for screenings. Outcome: Progressing Towards Goal  Goal: *Using medications safely  Description: State effect of diabetes medications on diabetes; name diabetes medication taking, action and side effects.   Outcome: Progressing Towards Goal  Goal: *Monitoring blood glucose, interpreting and using results  Description: Identify recommended blood glucose targets  and personal targets.   Outcome: Progressing Towards Goal

## 2021-11-29 NOTE — DIABETES MGMT
[address]     CLINICAL NURSE SPECIALIST FOLLOW-UP     Initial Presentation   Jen Novak is a 81 y.o. female transferred to [facility] 11/14/21 with hypertension, nausea & vomiting after originally presenting to [address] [place] [address] ER. Systolic BP markedly elevated     HX:   Past Medical History (click to expand or collapse)        Past Medical History:   Diagnosis Date    Anxiety      Hypertension           INITIAL DX:   NSTEMI (non-ST elevated myocardial infarction) ([facility] [address] [I21.4]  CAD (coronary artery disease) [I25.10]     Diabetes History   Patient denies personal and family history of diabetes PTA. Admission . A1c 7%.     Diabetes Medication History  Key Antihyperglycemic Medications      Patient is on no antihyperglycemic meds.      Blood glucose pattern      Recommendations       Referral to Diabetes Self-management Education     Signing off    JESUS Polo  Diabetes Clinical Nurse Specialist  [facility]  Access via [facility]

## 2021-11-29 NOTE — PROGRESS NOTES
Bedside and Verbal shift change report given to Mulu Villatoro (oncoming nurse) by Jorge Luis Biggs (offgoing nurse). Report included the following information SBAR, Kardex, OR Summary, Procedure Summary, Intake/Output, MAR and Recent Results. 1950: Duplex being done at bedside, left leg warm to touch and swollen around knee area. Patient with complaints of pain in thigh. : Patient assisted back to bed from recliner, patient anxious about results of test, PRN Xanax given per request    : Spoke with Dr. Minal Joseph and updated him on results of duplex study. Test positive for DVT. Lovenox ordered and pharmacy to dose    2140: Lovenox given    0200: Patient sleeping comfortably, vital signs remain stable    0400: Blood work drawn, sternotomy wound care performed.      1243: Portable chest xray done at bedside

## 2021-11-29 NOTE — PROGRESS NOTES
Page Dr Lang Dry since I could not reach PA x 2. So MD ordered duplex of LLE to R?O DVT. Report to Manuel Bloom RN  As duplex being done.

## 2021-11-29 NOTE — PROGRESS NOTES
Problem: Mobility Impaired (Adult and Pediatric)  Goal: *Acute Goals and Plan of Care (Insert Text)  Description: FUNCTIONAL STATUS PRIOR TO ADMISSION: Patient was independent and active without use of DME however began using rolling walker while in hospital prior to cardiac surgery. Reports that she has been having difficulty with endurance and can only do a little of activity before needing to take a break. Reports \"nighttime weakness\" but no falls at home. HOME SUPPORT PRIOR TO ADMISSION: The patient lived alone with son in the area to provide assistance. reviewed 11/26/2021  1. Patient will move from supine to sit and sit to supine , scoot up and down, and roll side to side in bed with min A within 5 days. 2.  Patient will perform sit to/from stand with contact guard assist within 5 days. 3.  Patient will ambulate 50 feet with least restrictive assistive device and contact guard assist within 5 days. 4.  Patient will perform cardiac exercises per protocol with supervision/set-up within 5 days. 5.  Patient will verbally and functionally demonstrate 3/3 sternal precautions without cues within 5 days. Physical Therapy Goals  Initiated 11/21/2021  1. Patient will move from supine to sit and sit to supine , scoot up and down, and roll side to side in bed with contact guard assist within 5 days. 2.  Patient will perform sit to/from stand with contact guard assist within 5 days. 3.  Patient will ambulate 100 feet with least restrictive assistive device and contact guard assist within 5 days. 4.  Patient will ascend/descend 3 stairs with 1 handrail(s) with contact guard assistance within 7 day(s). 5.  Patient will perform cardiac exercises per protocol with supervision/set-up within 5 days. 6.  Patient will verbally and functionally demonstrate 3/3 sternal precautions without cues within 5 days. Initiated 11/15/2021  1.   Patient will move from supine to sit and sit to supine  in bed with modified independence within 7 day(s). 2.  Patient will transfer from bed to chair and chair to bed with modified independence using the least restrictive device within 7 day(s). 3.  Patient will perform sit to stand with modified independence within 7 day(s). 4.  Patient will ambulate with modified independence for 250 feet with the least restrictive device within 7 day(s). 5.  Patient will ascend/descend 3 stairs with 1 handrail(s) with modified independence within 7 day(s). Outcome: Progressing Towards Goal    PHYSICAL THERAPY TREATMENT  Patient: Alyx Palacios (89 y.o. female)  Date: 11/29/2021  Diagnosis: NSTEMI (non-ST elevated myocardial infarction) ([facility] [address] [I21.4]  CAD (coronary artery disease) [I25.10]   S/P CABG x 4  Procedure(s) (LRB):  CORONARY ARTERY BYPASS GRAFT X4 LIMA, [facility] VIA EVH. ECC. EPI AORTIC US BY DR Chiara Esteban. (N/A) 10 Days Post-Op  Precautions: Sternal (move in the tube)  Chart, physical therapy assessment, plan of care and goals were reviewed. ASSESSMENT  Patient continues with skilled PT services and is progressing towards goals. Discussed with nursing MD, pt on bedrest due to DVTs but cleared to mobilize UEs. Worked on cardiac exercises in supine. She continues to need increased assistance for shoulder flexion and abduction. Vitals stable during session. Patient is demonstrating understanding of mindful-based movements (\"move in the tube\") principles of keeping UEs proximal to ribcage to prevent lateral pull on the sternum during load-bearing activities with verbal cues required for compliance. Current Level of Function Impacting Discharge (mobility/balance): assistance with UE exercsies    Other factors to consider for discharge: DVTs         PLAN :  Patient continues to benefit from skilled intervention to address the above impairments. Continue treatment per established plan of care. to address goals.     Recommendation for discharge: (in order for the patient to meet his/her long term goals)  Therapy up to 5 days/week in SNF setting    This discharge recommendation:  Has not yet been discussed the attending provider and/or case management    IF patient discharges home will need the following DME: to be determined (TBD)       SUBJECTIVE:   Patient stated Johnathon Alvarez is the point.     OBJECTIVE DATA SUMMARY:   Patient mobilized on continuous portable monitor/telemetry. Critical Behavior:  Neurologic State: Alert, Appropriate for age  Orientation Level: Oriented X4  Cognition: Appropriate for age attention/concentration, Appropriate safety awareness, Follows commands  Safety/Judgement: Awareness of environment                  Cardiac diagnosis intervention:  Patient instructed and educated on mindful movement principles based on Move in The Tube concept to include maintaining bilateral elbows close to rib cage when performing any load-bearing activity such as getting in/out of bed, pushing up from a chair, opening a door, or lifting a box. Patient was given a handout with diagrams of each correct/incorrect method of performing each of the above tasks. Therapeutic Exercises:   Patient instructed on the benefits and demonstrated cardiac exercises while supine with Minimum assistance. Instructed and indicated understanding on how to progress reps, sets against gravity, pacing through progressive muscle strengthening standing based on surgeon clearance for more weight in prep for functional activity. Instruction on the use of household items in place of weights as needed.      CARDIAC  EXERCISE   Sets   Reps   Active Active Assist   Passive Self ROM   Comments   Shoulder flexion 1 5 []                                            [x]                                            []                                            []                                               Shoulder abduction 1      5 []                                            [x] []                                            []                                               Scapular elevation 1 10 [x]                                            []                                            []                                            []                                               Scapular retraction 1 10 [x]                                            []                                            []                                            []                                               Trunk rotation 1 10 [x]                                            []                                            []                                            [x]                                               Trunk sidebending 1 10 [x]                                            []                                            []                                            []                                                  []                                            []                                            []                                            []                                                   Pain Rating:  No complaints    Activity Tolerance:   Fair    After treatment patient left in no apparent distress:   Supine in bed and Call bell within reach    COMMUNICATION/COLLABORATION:   The patients plan of care was discussed with: Occupational therapist and Registered nurse.      Mary Lou Fontenot, PT, DPT   Time Calculation: 14 mins

## 2021-11-29 NOTE — PROGRESS NOTES
Transition of Care Plan:     RUR:7% low risk  Disposition:home with [facility]  Follow up appointments:PCP and specialists as indicated  DME needed: RW  Transportation at [address] transport  [address] or means to access home: pt has access       IM Medicare Letter:2nd letter to be given at d/c  Is patient a BCPI-A Bundle: n/a                   If yes, was Bundle Letter given?:     Caregiver Contact:son Josh Velazquez [phone]  Discharge Caregiver contacted prior to discharge? Spoke with daughter today re dcp. She and her mother has chosen 3 snf. Her first choice is [facility], second choice is [facility] and third choice is [facility]. Message left for [facility] to return my call. Referrals sent out to all three and will await the response. Jocelyne Reed  RN BSN CRM        [phone]

## 2021-11-29 NOTE — PROGRESS NOTES
0700- Report given to Mayank Fuentes RN by off going nurse. 1500- Pt unable to urinate. Bladderscan reveals 476ml. Straigh cath performed per order. 400 ml of urine drained. 1900- Report given to oncoming nurse by Mayank Fuentes RN.

## 2021-11-29 NOTE — PROGRESS NOTES
Problem: Self Care Deficits Care Plan (Adult)  Goal: *Acute Goals and Plan of Care (Insert Text)  Description:   FUNCTIONAL STATUS PRIOR TO ADMISSION:  Pt lives alone, but reports having a supportive family and [employer] family. Pt states that she has been independent in adls and IADLs, including driving, shopping, preparing meals, gardening etc.      HOME SUPPORT: The patient lived alone with family and [employer] family  to provide assistance. Occupational Therapy Goals  Revised 11/29/2021 during weekly re-eval  1. Patient will perform grooming with contact guard assistance within 7 days. 2. Patient will perform upper body dressing with moderate assistance  within 7 days. 3. Patient will perform lower body dressing with max assistance  within 7 days. 4. Patient will tolerate 3 minutes of UE cardiac 6 pack exercises within 7 days. 5. Patient will perform toileting moderate assist within 7 days. 6. Patient will transfer from bedside commode with contact guard assistance using the least restrictive device and appropriate durable medical equipment within 7 days. Re-eval 11/21/2021 s/p CABG    1. Patient will perform grooming with minimal assistance within 7 days. Met, upgraded   2. Patient will perform upper body dressing with moderate assistance  within 7 days. Not met, continue   3. Patient will perform lower body dressing with max assistance  within 7 days. Not met, continue   4. Patient will tolerate 3 minutes of UE cardiac 6 pack exercises within 7 days. Not met, continue   5. Patient will perform toileting max assist within 7 days. Met, upgraded   6. Patient will transfer from bedside commode with minimal assistance using the least restrictive device and appropriate durable medical equipment within 7 days. Met, upgraded     Discontinue all below goals 11/21/2021  Initiated 11/15/2021  1. Patient will perform grooming in [facility] with supervision within 7 day(s).   2.  Patient will perform bathing with supervision within 7 day(s). 3.  Patient will perform upper body dressing and lower body dressing with supervision within 7 day(s). 4.  Patient will perform toilet transfers with supervision within 7 day(s). 5.  Patient will perform all aspects of toileting with independence within 7 day(s). 6.  Patient will participate in upper extremity therapeutic exercise/activities with supervision/set-up for 8 minutes within 7 day(s). 7.  Patient will utilize energy conservation techniques during functional activities with verbal cues within 7 day(s). 8.  Patient will perform standing adls for at least 8 minutes within 7 days. Outcome: Progressing Towards Goal   OCCUPATIONAL THERAPY RE-EVALUATION  Patient: Rosalio Sung (6 y.o. female)  Date: 11/29/2021  Diagnosis: NSTEMI (non-ST elevated myocardial infarction) ([facility] [address] [I21.4]  CAD (coronary artery disease) [I25.10]   S/P CABG x 4  Procedure(s) (LRB):  CORONARY ARTERY BYPASS GRAFT X4 LIMA, [facility] VIA EVH. ECC. EPI AORTIC US BY DR Yazan Plata. (N/A) 10 Days Post-Op  Precautions: Sternal (move in the tube)  Chart, occupational therapy assessment, plan of care, and goals were reviewed. ASSESSMENT  Based on the objective data described below, patient continues to progress towards therapy goals and has met 2/6 goals which have been upgraded to meet patient's functional level. Patient continues to be limited by general weakness, impaired balance, sternal precautions/\"move in the tube\", poor activity tolerance, decreased ROM in bilateral shoulders, and pain. Patient currently on bed rest for DVT but cleared for UE movement/activities. Patient receive semisupine in bed on 2L O2 completing cardiac exercises with PTA. Patient agreeable for bed level grooming session and completed with set-up/stand-by to min assist. Patient reported decreased appetite and encouraged to increase calorie intake.  Patient was left semisupine in bed with family present in room and repositioned for comfort. Patient would continue to benefit from skilled OT service during acute hospital stay. Recommend patient discharge to SNF rehab prior to returning home. Current Level of Function Impacting Discharge (ADLs): set-up/supervision to total assist for self-care    Other factors to consider for discharge: fall risk, sternal precautions, DVT         PLAN :  Recommendations and Planned Interventions: self care training, functional mobility training, therapeutic exercise, balance training, therapeutic activities, endurance activities, patient education, home safety training and family training/education    Frequency/Duration: Patient will be followed by occupational therapy 4 times a week to address goals. Recommendation for discharge: (in order for the patient to meet his/her long term goals)  Therapy up to 5 days/week in SNF setting    This discharge recommendation:  Has been made in collaboration with the attending provider and/or case management    Equipment recommendations for successful discharge (if) home: TBD in SNF rehab       SUBJECTIVE:   Patient stated I'm just not hungry.     OBJECTIVE DATA SUMMARY:     Cognitive/Behavioral Status:  Neurologic State: Alert  Orientation Level: Oriented X4  Cognition: Appropriate for age attention/concentration; Follows commands  Perception: Appears intact  Perseveration: No perseveration noted  Safety/Judgement: Awareness of environment    Hearing:   Auditory  Auditory Impairment: None    Vision/Perceptual:    Corrective Lenses: Glasses    Range of Motion:  AROM: Generally decreased, functional    Strength:  Strength: Generally decreased, functional    Coordination:  Coordination: Generally decreased, functional  Fine Motor Skills-Upper: Left Impaired; Right Impaired    Gross Motor Skills-Upper: Left Impaired; Right Impaired    Tone & Sensation:  Tone: Normal    Functional Mobility and Transfers for ADLs:  Bed Mobility:  Unable to attempt secondary to patient on strict bed rest for LE DVT    ADL Assessment:  Feeding: Setup; Supervision  Oral Facial Hygiene/Grooming: Minimum assistance  Bathing: Maximum assistance  Upper Body Dressing: Total assistance  Lower Body Dressing: Total assistance  Toileting: Maximum assistance    ADL Intervention and task modifications:    Grooming  Position Performed:  (semisupine in bed)  Washing Face: Set-up; Stand-by assistance  Washing Hands: Set-up; Stand-by assistance  Brushing Teeth: Minimum assistance  Brushing/Combing Hair: Minimum assistance  Cues: Physical assistance; Tactile cues provided; Verbal cues provided    Cognitive Retraining  Safety/Judgement: Awareness of environment    Functional Measure:    Barthel Index:  Bathin  Bladder: 5  Bowels: 5  Groomin  Dressin  Feeding: 10  Mobility: 0  Stairs: 0  Toilet Use: 0  Transfer (Bed to Chair and Back): 0  Total: 20/100      The Barthel ADL Index: Guidelines  1. The index should be used as a record of what a patient does, not as a record of what a patient could do. 2. The main aim is to establish degree of independence from any help, physical or verbal, however minor and for whatever reason. 3. The need for supervision renders the patient not independent. 4. A patient's performance should be established using the best available evidence. Asking the patient, friends/relatives and nurses are the usual sources, but direct observation and common sense are also important. However direct testing is not needed. 5. Usually the patient's performance over the preceding 24-48 hours is important, but occasionally longer periods will be relevant. 6. Middle categories imply that the patient supplies over 50 per cent of the effort. 7. Use of aids to be independent is allowed. Score Interpretation (from [address] [place] [address])    Independent   60-79 Minimally independent   40-59 Partially dependent   20-39 Very dependent   <20 Totally dependent     -Juany Perera., Barthel, DSUNITA. (1965). Functional evaluation: the Barthel Index. [address] [facility] ([address] (1997). The Barthel activities of daily living index: self-reporting versus actual performance in the old (> or = 75 years). Journal of [address] [place] [address] 45(7), [facility], KIT, Juan Jose Rizzo., Yvonne Callejas. (1999). Measuring the change in disability after inpatient rehabilitation; comparison of the responsiveness of the Barthel Index and Functional McMullen Measure. Journal of Neurology, Neurosurgery, and Psychiatry, 66(4), 219-559. NEYDA Galvan.A, LUCINDA Timmons, & Jose Miller M.A. (2004) Assessment of post-stroke quality of life in cost-effectiveness studies: The usefulness of the Barthel Index and the EuroQoL-5D. Quality of Life Research, 13, 427-43     Pain:  Patient c/o bilateral shoulder pain. Activity Tolerance:   Fair    After treatment patient left in no apparent distress:   Supine in bed, Call bell within reach, Caregiver / family present, and Side rails x 3    COMMUNICATION/COLLABORATION:   The patients plan of care was discussed with: Physical therapist and Registered nurse.      MARKELL Jasso/L  Time Calculation: 12 mins

## 2021-11-29 NOTE — PROGRESS NOTES
CSS Floor Progress Note    Admit Date: 2021  POD:  7 Days Post-Op    Procedure:  Procedure(s):  CORONARY ARTERY BYPASS GRAFT X4 LIMA, [facility] VIA EVH. ECC. EPI AORTIC US BY DR Keara Wagoner. Subjective:   Pt seen with Dr. Deng Grayson. Pt resting up in bed on 2L NC. Afebrile. No complaints. Objective:   Vitals:  Blood pressure (!) 119/42, pulse 60, temperature 98.5 °F (36.9 °C), resp. rate 18, height 5' 7\" (1.702 m), weight 192 lb 4.8 oz (87.2 kg), SpO2 96 %. Temp (24hrs), Av.3 °F (36.8 °C), Min:97.9 °F (36.6 °C), Max:98.8 °F (37.1 °C)    EKG/Rhythm:  NSR 60-70s    Oxygen Therapy:  Oxygen Therapy  O2 Sat (%): 96 % (21 1107)  Pulse via Oximetry: 61 beats per minute (21 0300)  O2 Device: Nasal cannula (21 07)  Skin Assessment: Clean, dry, & intact (21)  Skin Protection for O2 Device: No (21 040)  Orientation: Bilateral (21)  Location: Cheek (21)  Interventions: Mouth Care; Reposition Device (21)  O2 Flow Rate (L/min): 2 l/min (21 0742)  FIO2 (%): 50 % (21 0729)    CXR:   CXR Results  (Last 48 hours)               21 0519  XR CHEST PORT Final result    Impression:   impression: Improvement in congestive changes. Narrative:  Clinical indication: Postop heart. Portable AP erect upright view of the chest obtained, comparison . The heart remains prominent. Mild vascular congestion is improved. Bilateral   pleural reaction larger on the left. 21 0507  XR CHEST PORT Final result    Impression:  1. Unchanged pulmonary interstitial edema, small bilateral pleural effusions and   bibasilar atelectasis. Unchanged cardiomegaly. Narrative:  EXAM:  XR CHEST PORT       INDICATION:   post op heart       COMPARISON: Chest radiograph 2021. FINDINGS: AP radiograph of the chest was obtained. Unchanged small bilateral pleural effusions and bibasilar atelectasis.  Unchanged diffuse bilateral interstitial opacities. No pneumothorax. Unchanged   cardiomegaly with postsurgical changes of CABG. Admission Weight: Last Weight   Weight: 184 lb (83.5 kg) Weight: 192 lb 4.8 oz (87.2 kg)     Intake / Output / Drain:  Current Shift: 701 - 1900  In: 200 [P.O.:200]  Out: -   Last 24 hrs.:     Intake/Output Summary (Last 24 hours) at 2021 1141  Last data filed at 2021 0930  Gross per 24 hour   Intake 1020 ml   Output --   Net 1020 ml       EXAM:  General:     NAD, pleasant mood                                                      Lungs:   Clear to auscultation bilaterally. Incision:  FRANCHESKA. No erythema, swelling, or drainage noted. Gauze placed in breast fold    Heart:  Regular rate and rhythm, S1, S2 normal, no murmur, click, rub or gallop. Abdomen:   Soft, non-tender. Bowel sounds hypoactive. No masses,  No organomegaly. + BM    Extremities:  2+ LE edema. PPP. Neurologic:  Gross motor and sensory apparatus intact. Labs:   Recent Labs     21  1117 21  0727 21  0405   WBC  --   --  13.6*   HGB  --   --  8.6*   HCT  --   --  27.7*   PLT  --   --  511*   NA  --   --  133*   K  --   --  5.3*   BUN  --   --  57*   CREA  --   --  1.96*   GLU  --   --  113*   GLUCPOC 138*   < >  --     < > = values in this interval not displayed. Assessment:     Principal Problem:    S/P CABG x 4 (2021)      Overview: x 4, LIMA to LAD, RSVG to Diag, RSVG to OM, RSVG to PDA    Active Problems:    NSTEMI (non-ST elevated myocardial infarction) ([address] (2021)      CAD (coronary artery disease) (2021)       Plan/Recommendations/Medical Decision Makin. NSTEMI, Mv CAD s/p CABG x4: Cont ASA/statin/BB. 2. Uncontrolled Hypertension: on Coreg, losartan, verapamil PTA. Hold losartan given renal function. Cont Coreg & norvasc.   3. Large hiatal hernia: CT with Large hiatal hernia containing nearly the entire stomach, mesenteric fat, and the splenic flexure of the colon. Needs FU as OP  4. TEMO on CKD stage 3: Cont to hold diuresis. Monitor - creatinine up to 1.96  5. Anxiety: PRN xanax  6. Postop shock, vasoplegia vs cardiogenic: Off all vasopressors. Resolved. 7.  Postop pain control: Cont tylenol to 1,000mg Q6hr PRN. Cont PRN Oxycodone and ultram.   8. Atelectasis, apical pneumo: Increase mobility. Hourly I.S. Wean oxygen for O2 sat > 92%. On 2L NC. OOB TID, ambulating BID. CXR not improving, needs intense hourly pulmonary toileting. Daily CXR. 9. Leukocytosis: Increase mobility. Pulm toileting. Daily incisional care to sternal incision and EVH site. Improving  10. DM type II: New diagnosis. A1C 7, start amaryl, cont SSI. Needs education for diabetes. Diabetes management following. 11. Acute DVT: started on Lovenox yesterday. Change to Eliquis today - spoke with pharmacy for dosing    Dispo: PT/OT. OOB TID, ambulating BID. Case management following to aid in discharge planning, Shelter referral sent. 11/24 COVID PCR negative.      Signed By: Harlee Fabry, NP

## 2021-11-29 NOTE — PROGRESS NOTES
Apixaban Dosing  Indication:  DVT new    Regimen:  10mg BID start now, dc lovenox    Thank you,  Carolann Tian, [facility]

## 2021-11-30 ENCOUNTER — APPOINTMENT (OUTPATIENT)
Dept: GENERAL RADIOLOGY | Age: 86
DRG: 233 | End: 2021-11-30
Attending: PHYSICIAN ASSISTANT
Payer: MEDICARE

## 2021-11-30 LAB
ANION GAP SERPL CALC-SCNC: 4 MMOL/L (ref 5–15)
APPEARANCE UR: ABNORMAL
BACTERIA URNS QL MICRO: ABNORMAL /HPF
BASOPHILS # BLD: 0 K/UL (ref 0–0.1)
BASOPHILS NFR BLD: 0 % (ref 0–1)
BILIRUB UR QL: NEGATIVE
BUN SERPL-MCNC: 64 MG/DL (ref 6–20)
BUN/CREAT SERPL: 33 (ref 12–20)
CALCIUM SERPL-MCNC: 8.9 MG/DL (ref 8.5–10.1)
CHLORIDE SERPL-SCNC: 100 MMOL/L (ref 97–108)
CO2 SERPL-SCNC: 26 MMOL/L (ref 21–32)
COLOR UR: ABNORMAL
CREAT SERPL-MCNC: 1.93 MG/DL (ref 0.55–1.02)
DIFFERENTIAL METHOD BLD: ABNORMAL
EOSINOPHIL # BLD: 0.3 K/UL (ref 0–0.4)
EOSINOPHIL NFR BLD: 2 % (ref 0–7)
EPITH CASTS URNS QL MICRO: ABNORMAL /LPF
ERYTHROCYTE [DISTWIDTH] IN BLOOD BY AUTOMATED COUNT: 13.7 % (ref 11.5–14.5)
GLUCOSE BLD STRIP.AUTO-MCNC: 120 MG/DL (ref 65–117)
GLUCOSE BLD STRIP.AUTO-MCNC: 148 MG/DL (ref 65–117)
GLUCOSE BLD STRIP.AUTO-MCNC: 170 MG/DL (ref 65–117)
GLUCOSE BLD STRIP.AUTO-MCNC: 198 MG/DL (ref 65–117)
GLUCOSE SERPL-MCNC: 109 MG/DL (ref 65–100)
GLUCOSE UR STRIP.AUTO-MCNC: NEGATIVE MG/DL
HCT VFR BLD AUTO: 26.8 % (ref 35–47)
HGB BLD-MCNC: 8.2 G/DL (ref 11.5–16)
HGB UR QL STRIP: ABNORMAL
HYALINE CASTS URNS QL MICRO: ABNORMAL /LPF (ref 0–5)
IMM GRANULOCYTES # BLD AUTO: 0.1 K/UL (ref 0–0.04)
IMM GRANULOCYTES NFR BLD AUTO: 1 % (ref 0–0.5)
KETONES UR QL STRIP.AUTO: NEGATIVE MG/DL
LEUKOCYTE ESTERASE UR QL STRIP.AUTO: ABNORMAL
LYMPHOCYTES # BLD: 1.5 K/UL (ref 0.8–3.5)
LYMPHOCYTES NFR BLD: 11 % (ref 12–49)
MCH RBC QN AUTO: 29.5 PG (ref 26–34)
MCHC RBC AUTO-ENTMCNC: 30.6 G/DL (ref 30–36.5)
MCV RBC AUTO: 96.4 FL (ref 80–99)
MONOCYTES # BLD: 1 K/UL (ref 0–1)
MONOCYTES NFR BLD: 7 % (ref 5–13)
MUCOUS THREADS URNS QL MICRO: ABNORMAL /LPF
NEUTS SEG # BLD: 10.4 K/UL (ref 1.8–8)
NEUTS SEG NFR BLD: 79 % (ref 32–75)
NITRITE UR QL STRIP.AUTO: NEGATIVE
NRBC # BLD: 0 K/UL (ref 0–0.01)
NRBC BLD-RTO: 0 PER 100 WBC
PH UR STRIP: 5 [PH] (ref 5–8)
PLATELET # BLD AUTO: 555 K/UL (ref 150–400)
PMV BLD AUTO: 9 FL (ref 8.9–12.9)
POTASSIUM SERPL-SCNC: 5.7 MMOL/L (ref 3.5–5.1)
PROT UR STRIP-MCNC: NEGATIVE MG/DL
RBC # BLD AUTO: 2.78 M/UL (ref 3.8–5.2)
RBC #/AREA URNS HPF: ABNORMAL /HPF (ref 0–5)
SERVICE CMNT-IMP: ABNORMAL
SODIUM SERPL-SCNC: 130 MMOL/L (ref 136–145)
SP GR UR REFRACTOMETRY: 1.02 (ref 1–1.03)
UA: UC IF INDICATED,UAUC: ABNORMAL
UROBILINOGEN UR QL STRIP.AUTO: 0.2 EU/DL (ref 0.2–1)
WBC # BLD AUTO: 13.3 K/UL (ref 3.6–11)
WBC URNS QL MICRO: ABNORMAL /HPF (ref 0–4)

## 2021-11-30 PROCEDURE — 80048 BASIC METABOLIC PNL TOTAL CA: CPT

## 2021-11-30 PROCEDURE — 81001 URINALYSIS AUTO W/SCOPE: CPT

## 2021-11-30 PROCEDURE — 36415 COLL VENOUS BLD VENIPUNCTURE: CPT

## 2021-11-30 PROCEDURE — 74011250637 HC RX REV CODE- 250/637: Performed by: NURSE PRACTITIONER

## 2021-11-30 PROCEDURE — 74011250636 HC RX REV CODE- 250/636: Performed by: NURSE PRACTITIONER

## 2021-11-30 PROCEDURE — 87086 URINE CULTURE/COLONY COUNT: CPT

## 2021-11-30 PROCEDURE — 97530 THERAPEUTIC ACTIVITIES: CPT

## 2021-11-30 PROCEDURE — 51798 US URINE CAPACITY MEASURE: CPT

## 2021-11-30 PROCEDURE — 71045 X-RAY EXAM CHEST 1 VIEW: CPT

## 2021-11-30 PROCEDURE — 65660000000 HC RM CCU STEPDOWN

## 2021-11-30 PROCEDURE — 82962 GLUCOSE BLOOD TEST: CPT

## 2021-11-30 PROCEDURE — 85025 COMPLETE CBC W/AUTO DIFF WBC: CPT

## 2021-11-30 PROCEDURE — 77010033678 HC OXYGEN DAILY

## 2021-11-30 PROCEDURE — 97535 SELF CARE MNGMENT TRAINING: CPT

## 2021-11-30 PROCEDURE — 74011250637 HC RX REV CODE- 250/637: Performed by: PHYSICIAN ASSISTANT

## 2021-11-30 PROCEDURE — 74011636637 HC RX REV CODE- 636/637: Performed by: NURSE PRACTITIONER

## 2021-11-30 RX ORDER — GLIMEPIRIDE 4 MG/1
2 TABLET ORAL
Status: DISCONTINUED | OUTPATIENT
Start: 2021-11-30 | End: 2021-12-02

## 2021-11-30 RX ADMIN — PANTOPRAZOLE SODIUM 40 MG: 40 TABLET, DELAYED RELEASE ORAL at 08:43

## 2021-11-30 RX ADMIN — ONDANSETRON 4 MG: 2 INJECTION INTRAMUSCULAR; INTRAVENOUS at 09:49

## 2021-11-30 RX ADMIN — DIPHENHYDRAMINE HYDROCHLORIDE 25 MG: 25 CAPSULE ORAL at 21:37

## 2021-11-30 RX ADMIN — ONDANSETRON 4 MG: 2 INJECTION INTRAMUSCULAR; INTRAVENOUS at 04:22

## 2021-11-30 RX ADMIN — DOCUSATE SODIUM 50MG AND SENNOSIDES 8.6MG 1 TABLET: 8.6; 5 TABLET, FILM COATED ORAL at 17:01

## 2021-11-30 RX ADMIN — DOCUSATE SODIUM 50MG AND SENNOSIDES 8.6MG 1 TABLET: 8.6; 5 TABLET, FILM COATED ORAL at 08:43

## 2021-11-30 RX ADMIN — AMLODIPINE BESYLATE 10 MG: 5 TABLET ORAL at 08:43

## 2021-11-30 RX ADMIN — CARVEDILOL 6.25 MG: 6.25 TABLET, FILM COATED ORAL at 08:43

## 2021-11-30 RX ADMIN — ONDANSETRON 4 MG: 2 INJECTION INTRAMUSCULAR; INTRAVENOUS at 19:56

## 2021-11-30 RX ADMIN — ATORVASTATIN CALCIUM 80 MG: 40 TABLET, FILM COATED ORAL at 21:37

## 2021-11-30 RX ADMIN — TRAMADOL HYDROCHLORIDE 50 MG: 50 TABLET, COATED ORAL at 08:43

## 2021-11-30 RX ADMIN — APIXABAN 10 MG: 5 TABLET, FILM COATED ORAL at 21:36

## 2021-11-30 RX ADMIN — ASPIRIN 81 MG CHEWABLE TABLET 81 MG: 81 TABLET CHEWABLE at 08:43

## 2021-11-30 RX ADMIN — INSULIN LISPRO 2 UNITS: 100 INJECTION, SOLUTION INTRAVENOUS; SUBCUTANEOUS at 11:25

## 2021-11-30 RX ADMIN — Medication 10 ML: at 14:13

## 2021-11-30 RX ADMIN — SODIUM ZIRCONIUM CYCLOSILICATE 10 G: 10 POWDER, FOR SUSPENSION ORAL at 17:21

## 2021-11-30 RX ADMIN — ONDANSETRON 4 MG: 2 INJECTION INTRAMUSCULAR; INTRAVENOUS at 15:21

## 2021-11-30 RX ADMIN — AMIODARONE HYDROCHLORIDE 400 MG: 200 TABLET ORAL at 21:37

## 2021-11-30 RX ADMIN — Medication 10 ML: at 21:40

## 2021-11-30 RX ADMIN — GLIMEPIRIDE 2 MG: 4 TABLET ORAL at 08:45

## 2021-11-30 RX ADMIN — GLIMEPIRIDE 2 MG: 4 TABLET ORAL at 17:01

## 2021-11-30 RX ADMIN — SODIUM ZIRCONIUM CYCLOSILICATE 10 G: 10 POWDER, FOR SUSPENSION ORAL at 11:17

## 2021-11-30 RX ADMIN — ALPRAZOLAM 0.5 MG: 0.5 TABLET ORAL at 19:56

## 2021-11-30 RX ADMIN — Medication 1 TABLET: at 08:44

## 2021-11-30 RX ADMIN — CARVEDILOL 6.25 MG: 6.25 TABLET, FILM COATED ORAL at 17:01

## 2021-11-30 RX ADMIN — INSULIN LISPRO 2 UNITS: 100 INJECTION, SOLUTION INTRAVENOUS; SUBCUTANEOUS at 17:00

## 2021-11-30 RX ADMIN — APIXABAN 10 MG: 5 TABLET, FILM COATED ORAL at 08:43

## 2021-11-30 RX ADMIN — AMIODARONE HYDROCHLORIDE 400 MG: 200 TABLET ORAL at 08:43

## 2021-11-30 NOTE — PROGRESS NOTES
Problem: Mobility Impaired (Adult and Pediatric)  Goal: *Acute Goals and Plan of Care (Insert Text)  Description: FUNCTIONAL STATUS PRIOR TO ADMISSION: Patient was independent and active without use of DME however began using rolling walker while in hospital prior to cardiac surgery. Reports that she has been having difficulty with endurance and can only do a little of activity before needing to take a break. Reports \"nighttime weakness\" but no falls at home. HOME SUPPORT PRIOR TO ADMISSION: The patient lived alone with son in the area to provide assistance. reviewed 11/26/2021  1. Patient will move from supine to sit and sit to supine , scoot up and down, and roll side to side in bed with min A within 5 days. 2.  Patient will perform sit to/from stand with contact guard assist within 5 days. 3.  Patient will ambulate 50 feet with least restrictive assistive device and contact guard assist within 5 days. 4.  Patient will perform cardiac exercises per protocol with supervision/set-up within 5 days. 5.  Patient will verbally and functionally demonstrate 3/3 sternal precautions without cues within 5 days. Physical Therapy Goals  Initiated 11/21/2021  1. Patient will move from supine to sit and sit to supine , scoot up and down, and roll side to side in bed with contact guard assist within 5 days. 2.  Patient will perform sit to/from stand with contact guard assist within 5 days. 3.  Patient will ambulate 100 feet with least restrictive assistive device and contact guard assist within 5 days. 4.  Patient will ascend/descend 3 stairs with 1 handrail(s) with contact guard assistance within 7 day(s). 5.  Patient will perform cardiac exercises per protocol with supervision/set-up within 5 days. 6.  Patient will verbally and functionally demonstrate 3/3 sternal precautions without cues within 5 days. Initiated 11/15/2021  1.   Patient will move from supine to sit and sit to supine  in bed with modified independence within 7 day(s). 2.  Patient will transfer from bed to chair and chair to bed with modified independence using the least restrictive device within 7 day(s). 3.  Patient will perform sit to stand with modified independence within 7 day(s). 4.  Patient will ambulate with modified independence for 250 feet with the least restrictive device within 7 day(s). 5.  Patient will ascend/descend 3 stairs with 1 handrail(s) with modified independence within 7 day(s). Outcome: Progressing Towards Goal   PHYSICAL THERAPY TREATMENT  Patient: Severo Leavell (67 y.o. female)  Date: 11/30/2021  Diagnosis: NSTEMI (non-ST elevated myocardial infarction) ([facility] [address] [I21.4]  CAD (coronary artery disease) [I25.10]   S/P CABG x 4  Procedure(s) (LRB):  CORONARY ARTERY BYPASS GRAFT X4 LIMA, [facility] VIA EVH. ECC. EPI AORTIC US BY DR Javan Drake. (N/A) 11 Days Post-Op  Precautions: Sternal (move in the tube)  Chart, physical therapy assessment, plan of care and goals were reviewed. ASSESSMENT  Patient continues with skilled PT services and is progressing towards goals. Patient was previously on bed rest orders and verified with nursing and cardiothoracic NP, cleared to mobilize this visit. Patient was received in supine and required VC'ing with sternal precautions, to reach towards the rail with arms within the tube. Pt had difficulty bending her LLE needing Pat to perform log roll to sit to the edge of the bed. Patient was light headed and nauseous upon sitting, requiring a rest break and given an emesis bag. BP was WNL. Patient was just dry heaving during session with no emesis. Nursing was notified and administered medication before further treatment. Patient needed reminders for sternal precautions with standing from the EOB and was limited in tolerance to activity this visit and transferred short distance to the chair with Pat x2.  Patient was left sitting upright with emeses bag and vitals stable throughout treatment. Patient Vitals for the past 4 hrs:   Temp Pulse Resp BP Position   11/30/21 0958 -- -- -- (!) 123/49 Sitting; Post-activity   11/30/21 0947 -- -- -- (!) 133/55 Sitting; Pre-activity   11/30/21 0945 -- -- -- 120/68 Sitting; Pre-activity   11/30/21 0939 -- -- -- (!) 139/52 Supine         Patient is demonstrating understanding of mindful-based movements (\"move in the tube\") principles of keeping UEs proximal to ribcage to prevent lateral pull on the sternum during load-bearing activities with verbal cues required for compliance. Current Level of Function Impacting Discharge (mobility/balance): Requires Pat x2 with all bed mobility, transfers, and gait. Other factors to consider for discharge: Pt with LLE DVT, fully anti-coagulated         PLAN :  Patient continues to benefit from skilled intervention to address the above impairments. Continue treatment per established plan of care. to address goals. Recommendation for discharge: (in order for the patient to meet his/her long term goals)  Therapy up to 5 days/week in SNF setting    This discharge recommendation:  Has been made in collaboration with the attending provider and/or case management    IF patient discharges home will need the following DME: to be determined (TBD)       SUBJECTIVE:   Patient stated you know they told me not to move my legs.     OBJECTIVE DATA SUMMARY:   Patient mobilized on continuous portable monitor/telemetry.   Critical Behavior:  Neurologic State: Alert  Orientation Level: Oriented X4  Cognition: Appropriate for age attention/concentration, Follows commands  Safety/Judgement: Awareness of environment    Functional Mobility Training:  Bed Mobility:  Rolling: Minimum assistance; Assist x2  Supine to Sit: Minimum assistance; Assist x2                Transfers:  Sit to Stand: Minimum assistance; Assist x2  Stand to Sit: Minimum assistance        Bed to Chair: Minimum assistance; Assist x2 Balance:  Sitting: Impaired  Sitting - Static: Good (unsupported)  Sitting - Dynamic: Fair (occasional)  Standing: Impaired  Standing - Static: Fair; Constant support  Standing - Dynamic : Fair; Constant support    Ambulation/Gait Training:    Patient was able to ambulate few steps to the chair from edge of bed HHA Pat x2. Cardiac diagnosis intervention:  Patient instructed and educated on mindful movement principles based on Move in The Tube concept to include maintaining bilateral elbows close to rib cage when performing any load-bearing activity such as getting in/out of bed, pushing up from a chair, opening a door, or lifting a box. Patient was given a handout with diagrams of each correct/incorrect method of performing each of the above tasks. Activity Tolerance:   Poor    After treatment patient left in no apparent distress:   Sitting in chair and Call bell within reach    COMMUNICATION/COLLABORATION:   The patients plan of care was discussed with: Occupational therapist and Registered nurse.      Marcianne Aschoff, PT, DPT   Time Calculation: 28 mins

## 2021-11-30 NOTE — PROGRESS NOTES
Problem: Self Care Deficits Care Plan (Adult)  Goal: *Acute Goals and Plan of Care (Insert Text)  Description:   FUNCTIONAL STATUS PRIOR TO ADMISSION:  Pt lives alone, but reports having a supportive family and [place] family. Pt states that she has been independent in adls and IADLs, including driving, shopping, preparing meals, gardening etc.      HOME SUPPORT: The patient lived alone with family and [place] family  to provide assistance. Occupational Therapy Goals  Revised 11/29/2021 during weekly re-eval  1. Patient will perform grooming with contact guard assistance within 7 days. 2. Patient will perform upper body dressing with moderate assistance  within 7 days. 3. Patient will perform lower body dressing with max assistance  within 7 days. 4. Patient will tolerate 3 minutes of UE cardiac 6 pack exercises within 7 days. 5. Patient will perform toileting moderate assist within 7 days. 6. Patient will transfer from bedside commode with contact guard assistance using the least restrictive device and appropriate durable medical equipment within 7 days. Re-eval 11/21/2021 s/p CABG    1. Patient will perform grooming with minimal assistance within 7 days. Met, upgraded   2. Patient will perform upper body dressing with moderate assistance  within 7 days. Not met, continue   3. Patient will perform lower body dressing with max assistance  within 7 days. Not met, continue   4. Patient will tolerate 3 minutes of UE cardiac 6 pack exercises within 7 days. Not met, continue   5. Patient will perform toileting max assist within 7 days. Met, upgraded   6. Patient will transfer from bedside commode with minimal assistance using the least restrictive device and appropriate durable medical equipment within 7 days. Met, upgraded     Discontinue all below goals 11/21/2021  Initiated 11/15/2021  1. Patient will perform grooming in [facility] with supervision within 7 day(s).   2.  Patient will perform bathing with supervision within 7 day(s). 3.  Patient will perform upper body dressing and lower body dressing with supervision within 7 day(s). 4.  Patient will perform toilet transfers with supervision within 7 day(s). 5.  Patient will perform all aspects of toileting with independence within 7 day(s). 6.  Patient will participate in upper extremity therapeutic exercise/activities with supervision/set-up for 8 minutes within 7 day(s). 7.  Patient will utilize energy conservation techniques during functional activities with verbal cues within 7 day(s). 8.  Patient will perform standing adls for at least 8 minutes within 7 days. Outcome: Progressing Towards Goal   OCCUPATIONAL THERAPY TREATMENT  Patient: Kathryne Landau (82 y.o. female)  Date: 11/30/2021  Diagnosis: NSTEMI (non-ST elevated myocardial infarction) ([facility] [address] [I21.4]  CAD (coronary artery disease) [I25.10]   S/P CABG x 4  Procedure(s) (LRB):  CORONARY ARTERY BYPASS GRAFT X4 LIMA, [facility] VIA EVH. ECC. EPI AORTIC US BY DR Ashu Chavarria. (N/A) 11 Days Post-Op  Precautions: Sternal (move in the tube)  Chart, occupational therapy assessment, plan of care, and goals were reviewed. ASSESSMENT  Patient continues with skilled OT services and is progressing towards goals. Pt was supine in bed with  1 liter of NC and her O2% was 90-92. Pt was educated on move in the tube and she did well with following her sternal precautions. Pt was min of 2 for supine to sit and once she was sitting on EOB she started to feel nauseous and was having dry heaves. Nursing gave pt IV meds for nausea and she was able to stand with min of 2 and min of 2 for transfer to chair. Pt continued to feel nauseous.   Pt was left sitting up in recliner and her O2% dropped to 88-89% with standing and transfer but once she was sitting, her O2% increased to 90's    Patient is verbalizing and is not demonstrating understanding of mindful-based movements (\"move in the tube\") principles of keeping UEs proximal to ribcage to prevent lateral pull on the sternum during load-bearing activities with verbal and manual cues required for compliance. Current Level of Function Impacting Discharge (ADLs): max for LB ADLs and min for UB ADLs           PLAN :  Patient continues to benefit from skilled intervention to address the above impairments. Continue treatment per established plan of care to address goals. Recommend with staff: Chino Hart for meals and use of BSC    Recommend next OT session: work on UB ADLs and donning pull over shirt    Recommendation for discharge: (in order for the patient to meet his/her long term goals)  Therapy up to 5 days/week in SNF setting    This discharge recommendation:  Has been made in collaboration with the attending provider and/or case management    IF patient discharges home will need the following DME: tbd       SUBJECTIVE:   Patient stated I still feel nauseous. My leg gave out last time I tried to get to the chair.  Dagoberto Patel    OBJECTIVE DATA SUMMARY:   Cognitive/Behavioral Status:  Neurologic State: Alert  Orientation Level: Oriented X4  Cognition: Follows commands  Perception: Appears intact  Perseveration: No perseveration noted  Safety/Judgement: Awareness of environment    Functional Mobility and Transfers for ADLs:  Bed Mobility:  Rolling: Minimum assistance; Assist x2  Supine to Sit: Minimum assistance; Assist x2    Transfers:  Sit to Stand: Minimum assistance; Assist x2  Functional Transfers  Toilet Transfer : Minimum assistance; Assist x1; Other (comment) (bsc)  Bed to Chair: Minimum assistance; Assist x2    Balance:  Sitting: Impaired  Sitting - Static: Good (unsupported)  Sitting - Dynamic: Fair (occasional)  Standing: Impaired  Standing - Static: Fair; Constant support  Standing - Dynamic : Fair; Constant support    ADL Intervention:  Feeding  Feeding Assistance: Set-up    Grooming  Position Performed: Seated in chair  Washing Face: Independent  Washing Hands: Independent  Brushing Teeth: Independent    Upper Body Bathing  Bathing Assistance: Minimum assistance; Contact guard assistance  Position Performed: Seated in chair    Lower Body Bathing  Bathing Assistance: Maximum assistance  Perineal  : Maximum assistance  Position Performed: Supine; Standing              Toileting  Toileting Assistance: Maximum assistance; Moderate assistance  Bladder Hygiene: Maximum assistance; Moderate assistance  Bowel Hygiene: Maximum assistance    Cognitive Retraining  Safety/Judgement: Awareness of environment    Patient instructed and educated on mindful movement principles based on Move in The Tube concept to include maintaining bilateral elbows close to rib cage when performing any load-bearing activity such as getting in/out of bed, pushing up from a chair, opening a door, or lifting a box. Patient was given a handout with diagrams of each correct/incorrect method of performing each of the above tasks. Patient instructed on the ability to utilize upper extremities outside the tube when doing any non-load bearing activity such as washing hair/body, brushing teeth, retrieving clothing items, or scratching your back. Patient encouraged to also perform upper extremity exercises \"outside of the tube\" to prevent scar tissue formation around sternal incision site. Patient instructed in detail about activities to heed with caution, allowing pain to be the guide. These activities include but are not limited to: mowing the lawn, riding a bike, walking a dog, lifting a child, workshop hobbies, golfing, sexual activity, vacuuming, fishing, scrubbing the floors, and moving furniture. Patient was given the [address] in the [place] handout to describe each of these activities in detail. Patient instructed no asymmetrical reaching over head to ensure B UEs when shoulders >90* i.e. reaching in cabinets and dressing.  Instruction on upper body dressing techniques of over head, then arms through to decrease pain and unilateral shoulder flexion >90*. Instruction on the benefits of utilizing B UEs during functional tasks i.e. opening the fridge, stepping into the tub. Pain:  none    Activity Tolerance:   Fair    After treatment patient left in no apparent distress:   Sitting in chair, Call bell within reach and Bed / chair alarm activated    COMMUNICATION/COLLABORATION:   The patients plan of care was discussed with: Physical therapist and Registered nurse.      Allie Crabtree OT  Time Calculation: 28 mins

## 2021-11-30 NOTE — PROGRESS NOTES
CSS Floor Progress Note    Admit Date: 2021  POD:  8 Days Post-Op    Procedure:  Procedure(s):  CORONARY ARTERY BYPASS GRAFT X4 LIMA, [facility] VIA EVH. ECC. EPI AORTIC US BY DR Socrates Pennington. Subjective:   Pt seen with Dr. Vahid Crowe. Pt resting up in bed on 1L NC. Afebrile. Complaining of pain in her left leg. Objective:   Vitals:  Blood pressure (!) 156/50, pulse 71, temperature 99 °F (37.2 °C), resp. rate 18, height 5' 7\" (1.702 m), weight 207 lb 1.6 oz (93.9 kg), SpO2 92 %. Temp (24hrs), Av.5 °F (36.9 °C), Min:98 °F (36.7 °C), Max:99 °F (37.2 °C)    EKG/Rhythm:  NSR 60-70s    Oxygen Therapy:  Oxygen Therapy  O2 Sat (%): 92 % (21)  Pulse via Oximetry: 71 beats per minute (21)  O2 Device: Nasal cannula (21)  Skin Assessment: Clean, dry, & intact (21)  Skin Protection for O2 Device: No (21)  Orientation: Bilateral (21)  Location: Cheek (21)  Interventions: Mouth Care; Reposition Device (21)  O2 Flow Rate (L/min): 1 l/min (21)  FIO2 (%): 50 % (2129)    CXR:   CXR Results  (Last 48 hours)               21 05  XR CHEST PORT Final result    Impression:  No significant change in mild pulmonary edema and small pleural effusions. Narrative:  INDICATION: post op heart       EXAM:  AP CHEST RADIOGRAPH       COMPARISON: 2021       FINDINGS:       AP portable view of the chest demonstrates prior median sternotomy and   cardiomegaly. Mild pulmonary edema, bilateral pleural effusions, and bibasilar   atelectasis. No pneumothorax. The osseous structures are unremarkable. 21  XR CHEST PORT Final result    Impression:   impression: Improvement in congestive changes. Narrative:  Clinical indication: Postop heart. Portable AP erect upright view of the chest obtained, comparison . The heart remains prominent. Mild vascular congestion is improved. Bilateral   pleural reaction larger on the left. Admission Weight: Last Weight   Weight: 184 lb (83.5 kg) Weight: 207 lb 1.6 oz (93.9 kg)     Intake / Output / Drain:  Current Shift: No intake/output data recorded. Last 24 hrs.:     Intake/Output Summary (Last 24 hours) at 2021 0831  Last data filed at 2021 0507  Gross per 24 hour   Intake 1000 ml   Output 800 ml   Net 200 ml       EXAM:  General:     NAD, pleasant mood                                                      Lungs:   Clear to auscultation bilaterally. Incision:  FRANCHESKA. No erythema, swelling, or drainage noted. Gauze placed in breast fold    Heart:  Regular rate and rhythm, S1, S2 normal, no murmur, click, rub or gallop. Abdomen:   Soft, non-tender. Bowel sounds hypoactive. No masses,  No organomegaly. + BM    Extremities:  2+ LE edema. PPP. Neurologic:  Gross motor and sensory apparatus intact. Labs:   Recent Labs     21  0735 21  0312 21  2119   WBC  --  13.3*  --    HGB  --  8.2*  --    HCT  --  26.8*  --    PLT  --  555*  --    NA  --  130*  --    K  --  5.7*  --    BUN  --  64*  --    CREA  --  1.93*  --    GLU  --  109*  --    GLUCPOC 120*  --    < >    < > = values in this interval not displayed. Assessment:     Principal Problem:    S/P CABG x 4 (2021)      Overview: x 4, LIMA to LAD, RSVG to Diag, RSVG to OM, RSVG to PDA    Active Problems:    NSTEMI (non-ST elevated myocardial infarction) ([facility] [address] (2021)      CAD (coronary artery disease) (2021)       Plan/Recommendations/Medical Decision Makin. NSTEMI, Mv CAD s/p CABG x4: Cont ASA/statin/BB. 2. Uncontrolled Hypertension: on Coreg, losartan, verapamil PTA. Hold losartan given renal function. Cont Coreg & norvasc. 3. Large hiatal hernia: CT with Large hiatal hernia containing nearly the entire stomach, mesenteric fat, and the splenic flexure of the colon. Needs FU as OP  4.  TEMO on CKD stage 3: Cont to hold diuresis. Monitor - creatinine slightly improved today, trend. 5. Anxiety: PRN xanax  6. Postop shock, vasoplegia vs cardiogenic: Off all vasopressors. Resolved. 7.  Postop pain control: Cont tylenol to 1,000mg Q6hr PRN. Cont PRN Oxycodone and ultram.   8. Atelectasis, apical pneumo: Increase mobility. Hourly I.S. Wean oxygen for O2 sat > 92%. On 1L NC. OOB TID, ambulating BID. CXR not improving, needs intense hourly pulmonary toileting. Daily CXR. 9. Leukocytosis: Increase mobility. Pulm toileting. Daily incisional care to sternal incision and EVH site. Send UA today. 10. DM type II: New diagnosis. A1C 7, started amaryl -increase to BID, cont SSI. Needs education for diabetes. Diabetes management following. Will need prescription for glucometer/testing supplies. 11. Acute DVT: started on Lovenox 11/28. Changed to Eliquis 11/29. Continue. 12. Hyperkalemia: Give lokelma BID today -discussed dose with pharmacy and repeat K tomorrow morning. 13. Urinary retention: Check UA/UC. May need servin reinserted. Dispo: PT/OT. OOB TID, ambulating BID. Case management following to aid in discharge planning -referrals sent to SNF x3 yesterday. 11/24 COVID PCR negative.      Signed By: Yoel Marrero NP

## 2021-11-30 NOTE — PROGRESS NOTES
Transition of Care Plan:     RUR:14%  [facility]  Follow up appointments:PCP and specialists as indicated  DME needed: No DME needed  Transportation at Discharge: [facility] to transport at 1:00 pm on Wed. Dec. 1, 2021  Keys or means to access home: pt has access       IM Medicare Letter:2nd IMM Letter given on 11/30/21  Is patient a BCPI-A Bundle: n/a                   If yes, was Bundle Letter given?:     Caregiver Contact:zay Burciaga [phone]  Discharge Caregiver contacted prior to discharge? Pt has the potential of being d/c tomorrow Wed. Dec. 1, 2021. CM informed pt's dtr that pt has the potential of being d/c tomorrow and that [facility] has accept the pt. CM has arrange transportation with [facility] for tomorrow Dec. 1, 2021 for 1:00 pm. [facility] paperwork is in pt's chart. Medicare pt has received, reviewed, and signed 2nd IM letter informing them of their right to appeal the discharge. Signed copy has been placed on pt bedside chart. CM will continue to follow and assist with d/c planning. Zakiya Vinson.Persons.   Care Manager [facility]  [phone]

## 2021-11-30 NOTE — PROGRESS NOTES
Cardiac Surgery Care Coordinator- Met with Zack Pedro. Reviewed plan of care. Reinforced sternal precautions and encouraged continued use of the incentive spirometer. Began discharge teaching and encouraged her to verbalize. Reviewed goals for the day and discussed the  importance of increased activity and continued activity after discharge. Reviewed importance of wearing the red reminder bracelet and when to call MD. Will continue to follow for educational and emotional needs.  Lele Shepard RN

## 2021-11-30 NOTE — PROGRESS NOTES
End of Shift Note    Bedside shift change report given to Hedy Miles (oncoming nurse) by Eric Villegas, RN (offgoing nurse). Report included the following information SBAR, Kardex, Intake/Output, MAR, Recent Results and Cardiac Rhythm NSR    Shift worked:  7p-7a     Shift summary and any significant changes:    Mild nausea and pain overnight. straight cath at 0500. CHG bath given, sternal wound care completed. Unable to wean from 1L NC, SpO2 90-91%. Concerns for physician to address:  ongoing urinary retention     Zone phone for oncoming shift:   [phone]       Activity:  Activity Level: Bed Rest (LLE DVT)  Number times ambulated in hallways past shift: 0  Number of times OOB to chair past shift: 0    Cardiac:   Cardiac Monitoring: Yes      Cardiac Rhythm: Sinus Rhythm    Access:   Current line(s): PIV     Genitourinary:   Urinary status: straight cath    Respiratory:   O2 Device: Nasal cannula  Chronic home O2 use?: NO  Incentive spirometer at bedside: YES  Actual Volume (ml): 600 ml  GI:  Last Bowel Movement Date: 11/28/21  Current diet:  ADULT ORAL NUTRITION SUPPLEMENT Breakfast, Lunch, Dinner; Low Calorie/High Protein  ADULT DIET Regular; 4 carb choices (60 gm/meal); No Salt Added (3-4 gm)  Passing flatus: YES  Tolerating current diet: YES       Pain Management:   Patient states pain is manageable on current regimen: YES    Skin:  Ilia Score: 15  Interventions: float heels, PT/OT consult and internal/external urinary devices    Patient Safety:  Fall Score:  Total Score: 3  Interventions: bed/chair alarm, assistive device (walker, cane, etc), gripper socks, pt to call before getting OOB and stay with me (per policy)  High Fall Risk: Yes    Length of Stay:  Expected LOS: 8d 14h  Actual LOS: 16      Eric Villegas, RN

## 2021-11-30 NOTE — PROGRESS NOTES
0700- Report given to Dave Richard RN by off going nurse. 1100- Pt up to recliner with PT/OT. 1500- Pt voided 300 cc on BSC. Post void bladder scan 43ml. Pt assisted back to bed. 1900- Report given to oncoming nurse by Dave Richard RN.

## 2021-12-01 ENCOUNTER — APPOINTMENT (OUTPATIENT)
Dept: NON INVASIVE DIAGNOSTICS | Age: 86
DRG: 233 | End: 2021-12-01
Attending: THORACIC SURGERY (CARDIOTHORACIC VASCULAR SURGERY)
Payer: MEDICARE

## 2021-12-01 ENCOUNTER — APPOINTMENT (OUTPATIENT)
Dept: GENERAL RADIOLOGY | Age: 86
DRG: 233 | End: 2021-12-01
Attending: PHYSICIAN ASSISTANT
Payer: MEDICARE

## 2021-12-01 LAB
ANION GAP SERPL CALC-SCNC: 5 MMOL/L (ref 5–15)
BACTERIA SPEC CULT: NORMAL
BASOPHILS # BLD: 0 K/UL (ref 0–0.1)
BASOPHILS NFR BLD: 0 % (ref 0–1)
BUN SERPL-MCNC: 64 MG/DL (ref 6–20)
BUN/CREAT SERPL: 31 (ref 12–20)
CALCIUM SERPL-MCNC: 8.7 MG/DL (ref 8.5–10.1)
CHLORIDE SERPL-SCNC: 99 MMOL/L (ref 97–108)
CO2 SERPL-SCNC: 27 MMOL/L (ref 21–32)
CREAT SERPL-MCNC: 2.04 MG/DL (ref 0.55–1.02)
CREAT UR-MCNC: 105 MG/DL
DIFFERENTIAL METHOD BLD: ABNORMAL
ECHO AO ROOT DIAM: 3.05 CM
ECHO LA MAJOR AXIS: 2.47 CM
ECHO LA MINOR AXIS: 1.24 CM
ECHO LV INTERNAL DIMENSION DIASTOLIC: 3.59 CM (ref 3.9–5.3)
ECHO LV INTERNAL DIMENSION SYSTOLIC: 2.43 CM
ECHO LV IVSD: 1.05 CM (ref 0.6–0.9)
ECHO LV MASS 2D: 113 G (ref 67–162)
ECHO LV MASS INDEX 2D: 56.5 G/M2 (ref 43–95)
ECHO LV POSTERIOR WALL DIASTOLIC: 1.02 CM (ref 0.6–0.9)
ECHO LVOT DIAM: 2.11 CM
ECHO LVOT PEAK GRADIENT: 10.05 MMHG
ECHO LVOT PEAK GRADIENT: 8.99 MMHG
ECHO LVOT PEAK VELOCITY: 148.47 CM/S
ECHO LVOT PEAK VELOCITY: 158.51 CM/S
ECHO LVOT SV: 116.7 ML
ECHO LVOT VTI: 33.28 CM
ECHO TV REGURGITANT MAX VELOCITY: 298.44 CM/S
ECHO TV REGURGITANT PEAK GRADIENT: 35.63 MMHG
EOSINOPHIL # BLD: 0.3 K/UL (ref 0–0.4)
EOSINOPHIL NFR BLD: 2 % (ref 0–7)
ERYTHROCYTE [DISTWIDTH] IN BLOOD BY AUTOMATED COUNT: 14 % (ref 11.5–14.5)
GLUCOSE BLD STRIP.AUTO-MCNC: 150 MG/DL (ref 65–117)
GLUCOSE BLD STRIP.AUTO-MCNC: 79 MG/DL (ref 65–117)
GLUCOSE BLD STRIP.AUTO-MCNC: 86 MG/DL (ref 65–117)
GLUCOSE BLD STRIP.AUTO-MCNC: 92 MG/DL (ref 65–117)
GLUCOSE SERPL-MCNC: 69 MG/DL (ref 65–100)
HCT VFR BLD AUTO: 25.8 % (ref 35–47)
HGB BLD-MCNC: 8.1 G/DL (ref 11.5–16)
IMM GRANULOCYTES # BLD AUTO: 0.1 K/UL (ref 0–0.04)
IMM GRANULOCYTES NFR BLD AUTO: 1 % (ref 0–0.5)
LVOT MG: 4.71 MMHG
LYMPHOCYTES # BLD: 1.9 K/UL (ref 0.8–3.5)
LYMPHOCYTES NFR BLD: 14 % (ref 12–49)
MCH RBC QN AUTO: 30.5 PG (ref 26–34)
MCHC RBC AUTO-ENTMCNC: 31.4 G/DL (ref 30–36.5)
MCV RBC AUTO: 97 FL (ref 80–99)
MONOCYTES # BLD: 1.5 K/UL (ref 0–1)
MONOCYTES NFR BLD: 10 % (ref 5–13)
NEUTS SEG # BLD: 10 K/UL (ref 1.8–8)
NEUTS SEG NFR BLD: 73 % (ref 32–75)
NRBC # BLD: 0 K/UL (ref 0–0.01)
NRBC BLD-RTO: 0 PER 100 WBC
PLATELET # BLD AUTO: 582 K/UL (ref 150–400)
PMV BLD AUTO: 9 FL (ref 8.9–12.9)
POTASSIUM SERPL-SCNC: 5 MMOL/L (ref 3.5–5.1)
RBC # BLD AUTO: 2.66 M/UL (ref 3.8–5.2)
SERVICE CMNT-IMP: ABNORMAL
SERVICE CMNT-IMP: NORMAL
SODIUM SERPL-SCNC: 131 MMOL/L (ref 136–145)
SODIUM UR-SCNC: <5 MMOL/L
WBC # BLD AUTO: 13.9 K/UL (ref 3.6–11)

## 2021-12-01 PROCEDURE — 74011000250 HC RX REV CODE- 250: Performed by: NURSE PRACTITIONER

## 2021-12-01 PROCEDURE — 51798 US URINE CAPACITY MEASURE: CPT | Performed by: THORACIC SURGERY (CARDIOTHORACIC VASCULAR SURGERY)

## 2021-12-01 PROCEDURE — 82962 GLUCOSE BLOOD TEST: CPT

## 2021-12-01 PROCEDURE — 74011250637 HC RX REV CODE- 250/637: Performed by: NURSE PRACTITIONER

## 2021-12-01 PROCEDURE — 82570 ASSAY OF URINE CREATININE: CPT

## 2021-12-01 PROCEDURE — 80048 BASIC METABOLIC PNL TOTAL CA: CPT

## 2021-12-01 PROCEDURE — 97535 SELF CARE MNGMENT TRAINING: CPT

## 2021-12-01 PROCEDURE — 74011250637 HC RX REV CODE- 250/637: Performed by: THORACIC SURGERY (CARDIOTHORACIC VASCULAR SURGERY)

## 2021-12-01 PROCEDURE — 65660000000 HC RM CCU STEPDOWN

## 2021-12-01 PROCEDURE — 77010033678 HC OXYGEN DAILY

## 2021-12-01 PROCEDURE — 74011250637 HC RX REV CODE- 250/637: Performed by: PHYSICIAN ASSISTANT

## 2021-12-01 PROCEDURE — 74011250636 HC RX REV CODE- 250/636: Performed by: NURSE PRACTITIONER

## 2021-12-01 PROCEDURE — 36415 COLL VENOUS BLD VENIPUNCTURE: CPT

## 2021-12-01 PROCEDURE — 84300 ASSAY OF URINE SODIUM: CPT

## 2021-12-01 PROCEDURE — 93308 TTE F-UP OR LMTD: CPT

## 2021-12-01 PROCEDURE — 85025 COMPLETE CBC W/AUTO DIFF WBC: CPT

## 2021-12-01 PROCEDURE — 71045 X-RAY EXAM CHEST 1 VIEW: CPT

## 2021-12-01 PROCEDURE — 74011636637 HC RX REV CODE- 636/637: Performed by: NURSE PRACTITIONER

## 2021-12-01 RX ORDER — LEVOFLOXACIN 250 MG/1
250 TABLET ORAL
Status: DISCONTINUED | OUTPATIENT
Start: 2021-12-01 | End: 2021-12-01

## 2021-12-01 RX ORDER — LEVOFLOXACIN 250 MG/1
250 TABLET ORAL EVERY 24 HOURS
Status: COMPLETED | OUTPATIENT
Start: 2021-12-02 | End: 2021-12-04

## 2021-12-01 RX ORDER — NITROFURANTOIN 25; 75 MG/1; MG/1
100 CAPSULE ORAL 2 TIMES DAILY
Status: DISCONTINUED | OUTPATIENT
Start: 2021-12-01 | End: 2021-12-01

## 2021-12-01 RX ADMIN — PROCHLORPERAZINE EDISYLATE 5 MG: 5 INJECTION INTRAMUSCULAR; INTRAVENOUS at 13:27

## 2021-12-01 RX ADMIN — Medication 1 TABLET: at 08:34

## 2021-12-01 RX ADMIN — DOCUSATE SODIUM 50MG AND SENNOSIDES 8.6MG 1 TABLET: 8.6; 5 TABLET, FILM COATED ORAL at 08:34

## 2021-12-01 RX ADMIN — ATORVASTATIN CALCIUM 80 MG: 40 TABLET, FILM COATED ORAL at 21:28

## 2021-12-01 RX ADMIN — POLYETHYLENE GLYCOL 3350 17 G: 17 POWDER, FOR SOLUTION ORAL at 08:33

## 2021-12-01 RX ADMIN — GLIMEPIRIDE 2 MG: 4 TABLET ORAL at 08:33

## 2021-12-01 RX ADMIN — TRAMADOL HYDROCHLORIDE 50 MG: 50 TABLET, COATED ORAL at 19:19

## 2021-12-01 RX ADMIN — APIXABAN 10 MG: 5 TABLET, FILM COATED ORAL at 08:34

## 2021-12-01 RX ADMIN — TRAMADOL HYDROCHLORIDE 50 MG: 50 TABLET, COATED ORAL at 10:57

## 2021-12-01 RX ADMIN — Medication 10 ML: at 13:29

## 2021-12-01 RX ADMIN — ONDANSETRON 4 MG: 2 INJECTION INTRAMUSCULAR; INTRAVENOUS at 10:33

## 2021-12-01 RX ADMIN — APIXABAN 10 MG: 5 TABLET, FILM COATED ORAL at 21:28

## 2021-12-01 RX ADMIN — AMLODIPINE BESYLATE 10 MG: 5 TABLET ORAL at 08:34

## 2021-12-01 RX ADMIN — GLIMEPIRIDE 2 MG: 4 TABLET ORAL at 17:40

## 2021-12-01 RX ADMIN — INSULIN LISPRO 2 UNITS: 100 INJECTION, SOLUTION INTRAVENOUS; SUBCUTANEOUS at 12:35

## 2021-12-01 RX ADMIN — ASPIRIN 81 MG CHEWABLE TABLET 81 MG: 81 TABLET CHEWABLE at 08:34

## 2021-12-01 RX ADMIN — LEVOFLOXACIN 250 MG: 250 TABLET, FILM COATED ORAL at 08:34

## 2021-12-01 RX ADMIN — Medication 10 ML: at 05:58

## 2021-12-01 RX ADMIN — DOCUSATE SODIUM 50MG AND SENNOSIDES 8.6MG 1 TABLET: 8.6; 5 TABLET, FILM COATED ORAL at 17:30

## 2021-12-01 RX ADMIN — PANTOPRAZOLE SODIUM 40 MG: 40 TABLET, DELAYED RELEASE ORAL at 08:34

## 2021-12-01 RX ADMIN — CARVEDILOL 6.25 MG: 6.25 TABLET, FILM COATED ORAL at 17:30

## 2021-12-01 RX ADMIN — Medication 10 ML: at 22:00

## 2021-12-01 RX ADMIN — CARVEDILOL 6.25 MG: 6.25 TABLET, FILM COATED ORAL at 08:35

## 2021-12-01 NOTE — PROGRESS NOTES
Bedside shift change report given to Cyndi Schilling RN (oncoming nurse) by Catherine Cardenas RN (offgoing nurse). Report included the following information SBAR, Kardex, Intake/Output, MAR and Cardiac Rhythm NSR. End of Shift Note    Bedside shift change report given to Catherine Cardenas RN (oncoming nurse) by Williams Melara RN (offgoing nurse). Report included the following information SBAR, Kardex, Intake/Output, MAR and Cardiac Rhythm NSR    Shift worked:  7a-11pm     Shift summary and any significant changes:     D/C tomorrow. Compazine works better for nausea. Concerns for physician to address:  none     Zone phone for oncoming shift:          Activity:  Activity Level: Up with Assistance  Number times ambulated in hallways past shift: 0  Number of times OOB to chair past shift: 4    Cardiac:   Cardiac Monitoring: Yes      Cardiac Rhythm: Sinus Rhythm    Access:   Current line(s): PIV     Genitourinary:   Urinary status: voiding    Respiratory:   O2 Device: Nasal cannula  Chronic home O2 use?: NO  Incentive spirometer at bedside: YES  Actual Volume (ml): 600 ml  GI:  Last Bowel Movement Date: 11/28/21  Current diet:  ADULT ORAL NUTRITION SUPPLEMENT Breakfast, Lunch, Dinner; Low Calorie/High Protein  ADULT DIET Regular; 4 carb choices (60 gm/meal); No Salt Added (3-4 gm)  Passing flatus: YES  Tolerating current diet: NO       Pain Management:   Patient states pain is manageable on current regimen: YES    Skin:  Ilia Score: 17  Interventions: limit briefs    Patient Safety:  Fall Score:  Total Score: 3  Interventions: pt to call before getting OOB  High Fall Risk: Yes    Length of Stay:  Expected LOS: 8d 14h  Actual LOS: 385 Rasheeda Johnson RN

## 2021-12-01 NOTE — PROGRESS NOTES
Comprehensive Nutrition Assessment    Type and Reason for Visit: Reassess    Nutrition Recommendations/Plan:  Continue current diet  Continue ensure high protein TID     Nutrition Assessment:     Chart reviewed; discharge rescheduled from today to tomorrow per notes. Patient receiving a consistent carb/ANTONETTE diet. BG control improving. Episode of hyperkalemia yesterday; normal today. Will continue ensure high protein for supplementation. Encourage intake of meals as tolerated. Patient Vitals for the past 168 hrs:   % Diet Eaten   11/30/21 1732 0%   11/30/21 1413 1 - 25%   11/30/21 0918 1 - 25%   11/29/21 1746 1 - 25%   11/29/21 1329 1 - 25%   11/29/21 0930 0%   11/28/21 1845 1 - 25%   11/28/21 1200 1 - 25%   11/28/21 1055 1 - 25%   11/27/21 1800 51 - 75%   11/27/21 1230 26 - 50%   11/27/21 0930 1 - 25%   11/26/21 1755 1 - 25%   11/26/21 1312 1 - 25%   11/26/21 0950 1 - 25%   11/25/21 1734 1 - 25%   11/25/21 1304 1 - 25%   11/25/21 0930 26 - 50%   11/24/21 1657 51 - 75%     Patient Vitals for the past 168 hrs:   Supplement intake %   11/30/21 1732 1 - 25%   11/30/21 1413 76 - 100%   11/29/21 1746 1 - 25%   11/29/21 1329 76 - 100%   11/29/21 0930 76 - 100%   11/28/21 1055 76 - 100%     Estimated Daily Nutrient Needs:  Energy (kcal): 1752 kcal (BMR 1348 x 1. 3AF); Weight Used for Energy Requirements: Current  Protein (g): 87g (1.0 g/kg bw); Weight Used for Protein Requirements: Current  Fluid (ml/day): 1750 mL; Method Used for Fluid Requirements: 1 ml/kcal    Nutrition Related Findings:    -57--198, K+ 5.0, Na 131  1-3+ edema  BM 11/28  Norvasc, Atorvastatin, Os-radha, Coreg, Amaryl, humalog, Protonix, Miralax, Pericolace       Wounds:    Surgical incision       Current Nutrition Therapies:  ADULT ORAL NUTRITION SUPPLEMENT Breakfast, Lunch, Dinner; Low Calorie/High Protein  ADULT DIET Regular; 4 carb choices (60 gm/meal);  No Salt Added (3-4 gm)    Anthropometric Measures:  · Height:  5' 7\" (170.2 cm)  · Current Body Wt:  88 kg (194 lb 0.1 oz)     · BMI Category: Overweight (BMI 25.0-29. 9)       Nutrition Diagnosis:   · Inadequate protein-energy intake related to  (decreased appetite, nausea) as evidenced by intake 0-25%, intake 26-50%    Nutrition Interventions:   Food and/or Nutrient Delivery: Continue current diet, Continue oral nutrition supplement  Nutrition Education and Counseling: No recommendations at this time  Coordination of Nutrition Care: No recommendation at this time    Goals:  PO intake at least 50% of meals/supplements next 3-5 days       Nutrition Monitoring and Evaluation:   Behavioral-Environmental Outcomes: None identified  Food/Nutrient Intake Outcomes: Food and nutrient intake, Supplement intake  Physical Signs/Symptoms Outcomes: Biochemical data, Weight, Nausea/vomiting    Discharge Planning:    Continue current diet, Continue oral nutrition supplement     Electronically signed by Jace Ding RD on 12/1/2021 at 4:08 PM    Contact: ext [address]

## 2021-12-01 NOTE — CONSULTS
Consultation Note    NAME: Sari Lakhani   :  1935   MRN:  [id number]     Date/Time:  2021 11:18 AM    I have been asked to see this patient by Dr. Pete Yin  for advice/opinion re: CKD-3b/TEMO. Assessment :    Plan:  CKD-3b  TEMO  Hyponatremia  S/P CABG Her creatinine in 2021 was 1.50. Her creatinine has increased since  from 1.57 to 2.04 today. Urine sediment fairly bland. I will check a bladder scan and FENA. She is on no diuretics and no nephrotoxins. Subjective:   CHIEF COMPLAINT:  \"I'm tired. \"    HISTORY OF PRESENT ILLNESS:     Jose De Jesus Santos is a 81 y.o.   female who has a history of CKD-3 admitted . She had a NSTEMI and had a CABGX4. Her creatinine has fluctuated quite a bit this stay - likely due to volume status. Her creatinine on  was 1.57 and is up to 2.04 today. There has been no hypotension. No IV contrast recently. Past Medical History:   Diagnosis Date    Anxiety     Hypertension       Past Surgical History:   Procedure Laterality Date    HX CORONARY ARTERY BYPASS GRAFT  11/19/2021    x 4, LIMA to LAD, RSVG to Diag, RSVG to OM, RSVG to PDA    HX KNEE REPLACEMENT Right     HX KNEE REPLACEMENT Left      Social History     Tobacco Use    Smoking status: Never Smoker    Smokeless tobacco: Never Used   Substance Use Topics    Alcohol use: Not Currently      History reviewed. No pertinent family history. Allergies   Allergen Reactions    Novocain [Procaine] Anaphylaxis     Throat swelling    Ace Inhibitors Cough    Penicillins Rash      Prior to Admission medications    Medication Sig Start Date End Date Taking? Authorizing Provider   carvediloL (COREG) 3.125 mg tablet Take 3.125 mg by mouth two (2) times daily (with meals). Yes Provider, Historical   omeprazole (PRILOSEC) 20 mg capsule Take 20 mg by mouth daily. Yes Provider, Historical   diclofenac potassium (CATAFLAM) 50 mg tablet Take 50 mg by mouth daily.     Provider, Historical losartan (COZAAR) 50 mg tablet Take 50 mg by mouth daily. Provider, Historical   aspirin delayed-release 81 mg tablet Take 81 mg by mouth daily. Provider, Historical   triamterene-hydrochlorothiazide (MAXZIDE) 37.5-25 mg per tablet take 1 tablet by mouth once daily for fluid retention and blood pressure 8/4/15   Marcello Daily, MD   simvastatin (ZOCOR) 40 mg tablet Take 1 Tab by mouth nightly. For cholesterol 5/19/14   Petros Fernando NP   verapamil SR (CALAN-SR) 240 mg CR tablet take 1 tablet by mouth NIGHTLY 5/5/14   Marcello Daily, MD   ALPRAZolam Karl Files) 0.25 mg tablet take 1 tablet by mouth three times a day if needed for anxiety 2/17/14   Petros Fernando NP   calcium-vitamin, d3, (OS-MARINA 250) 250-125 mg-unit tablet Take 1 Tab by mouth daily. Provider, Historical   MULTIVITS W-FE,OTHER MIN (CENTRUM PO) Take  by mouth. Provider, Historical     REVIEW OF SYSTEMS:     []  Unable to obtain reliable ROS due to  [] mental status  [] sedated   [] intubated   [x] Total of 12 systems reviewed as follows:  Constitutional: negative fever, negative chills, negative weight loss  Eyes:   negative visual changes  ENT:   negative sore throat, tongue or lip swelling  Respiratory:  negative cough, negative dyspnea  Cards:  negative for chest pain, palpitations, lower extremity edema  GI:   negative for nausea, vomiting, diarrhea, and abdominal pain  :  negative for frequency, dysuria  Integument:  negative for rash and pruritus  Heme:  negative for easy bruising and gum/nose bleeding  Musculoskel: negative for myalgias,  back pain and muscle weakness  Neuro:  negative for headaches, dizziness, vertigo  Psych:  negative for feelings of anxiety, depression   Travel?: none    Objective:   VITALS:    Visit Vitals  BP (!) 139/47 (BP 1 Location: Right upper arm, BP Patient Position: Sitting; At rest)   Pulse 74   Temp 97.2 °F (36.2 °C)   Resp 16   Ht 5' 7\" (1.702 m)   Wt 88 kg (194 lb)   SpO2 96%   BMI 30.38 kg/m² PHYSICAL EXAM:  Gen:  []  WD []  WN  [] cachectic []  thin []  obese []  disheveled             []  ill apearing  []   Critical  [x]   Chronic    [x]  No acute distress    HEENT:   [x] NC/AT/PERRL    [x] pink conjunctivae      [] pale conjunctivae                  PERRL  [] yes  [] no      [] moist mucosa    [] dry mucosa    hearing intact to voice [] yes  [] No                 NECK:   supple [x] yes  [] no        masses [] yes  [x] No               thyroid  []  non tender  []  tender    RESP:   [x] CTA bilaterally/no wheezing/rhonchi/rales/crackles    [] rhonchi bilaterally - no dullness  [] wheezing   [] rhonchi   [] crackles     use of accessory muscles [] yes [] no    CARD:   [x]  regular rate and rhythm/No murmurs/rubs/gallops    murmur  [] yes ()  [] no      Rubs  [] yes  [] no       Gallops [] yes  [] no    Rate []  regular  []  irregular        carotid bruits  [] Right  []  Left                 LE edema [x] yes  [] no           JVP  []  yes   []  no    ABD:    [x] soft/non distended/non tender/+bowel sounds/no HSM    []  Rigid    tenderness [] yes [] no   Liver enlargement  []  yes []  no                Spleen enlargement  []  yes []  no     distended []  yes [] no     bowel sound  [] hypoactive   [] hyperactive    LYMPH:    Neck []  yes [x]  no       Axillae []  yes [x]  no    SKIN:   Rashes []  yes   [x]  no    Ulcers []  yes   [x]  no               [] tight to palpitation    skin turgor []  good  [] poor  [] decreased               Cyanosis/clubbing []  yes []  no    NEUR:   [x] cranial nerves II-XII grossly intact       [] Cranial nerves deficit                 []  facial droop    []  slurred speech   [] aphasic     [] Strength normal     []  weakness  []  LUE  []   RUE/ []  LLE  []   RLE    follows commands  [x]  yes []  no           PSYCH:   insight [] poor [x] good   Alert and Oriented to  [x] person  [x] place  [x]  time                    [] depressed [] anxious [] agitated  [] lethargic [] stuporous  [] sedated     LAB DATA REVIEWED:    Recent Labs     12/01/21 0314 11/30/21 0312   WBC 13.9* 13.3*   HGB 8.1* 8.2*   HCT 25.8* 26.8*   * 555*     Recent Labs     12/01/21 0314 11/30/21 0312 11/29/21  0405   * 130* 133*   K 5.0 5.7* 5.3*   CL 99 100 100   CO2 27 26 29   BUN 64* 64* 57*   CREA 2.04* 1.93* 1.96*   GLU 69 109* 113*   CA 8.7 8.9 8.9   MG  --   --  2.5*     No results for input(s): ALT, AP, TBIL, TBILI, ALB, GLOB, GGT, AML, LPSE in the last 72 hours. No lab exists for component: SGOT, GPT, AMYP, HLPSE  No results for input(s): INR, PTP, APTT, INREXT in the last 72 hours. No results for input(s): FE, TIBC, PSAT, FERR in the last 72 hours. No results for input(s): PH, PCO2, PO2 in the last 72 hours. No results for input(s): CPK, CKMB in the last 72 hours.     No lab exists for component: TROPONINI  Lab Results   Component Value Date/Time    Glucose (POC) 150 (H) 12/01/2021 11:08 AM    Glucose (POC) 79 12/01/2021 07:19 AM    Glucose (POC) 170 (H) 11/30/2021 08:33 PM    Glucose (POC) 198 (H) 11/30/2021 04:14 PM    Glucose (POC) 148 (H) 11/30/2021 11:14 AM       Procedures: see electronic medical records for all procedures/Xrays and details which were not copied into this note but were reviewed prior to creation of Plan.    ________________________________________________________________________       ___________________________________________________  Consulting Physician: Jesse Espino MD

## 2021-12-01 NOTE — PROGRESS NOTES
Attempted therapy today and discussed with nursing. Patient sent down for an ECHO. Will continue to follow.

## 2021-12-01 NOTE — PROGRESS NOTES
Problem: Self Care Deficits Care Plan (Adult)  Goal: *Acute Goals and Plan of Care (Insert Text)  Description:   FUNCTIONAL STATUS PRIOR TO ADMISSION:  Pt lives alone, but reports having a supportive family and Jewish family. Pt states that she has been independent in adls and IADLs, including driving, shopping, preparing meals, gardening etc.      HOME SUPPORT: The patient lived alone with family and Jewish family  to provide assistance. Occupational Therapy Goals  Revised 11/29/2021 during weekly re-eval  1. Patient will perform grooming with contact guard assistance within 7 days. 2. Patient will perform upper body dressing with moderate assistance  within 7 days. 3. Patient will perform lower body dressing with max assistance  within 7 days. 4. Patient will tolerate 3 minutes of UE cardiac 6 pack exercises within 7 days. 5. Patient will perform toileting moderate assist within 7 days. 6. Patient will transfer from bedside commode with contact guard assistance using the least restrictive device and appropriate durable medical equipment within 7 days. Re-eval 11/21/2021 s/p CABG    1. Patient will perform grooming with minimal assistance within 7 days. Met, upgraded   2. Patient will perform upper body dressing with moderate assistance  within 7 days. Not met, continue   3. Patient will perform lower body dressing with max assistance  within 7 days. Not met, continue   4. Patient will tolerate 3 minutes of UE cardiac 6 pack exercises within 7 days. Not met, continue   5. Patient will perform toileting max assist within 7 days. Met, upgraded   6. Patient will transfer from bedside commode with minimal assistance using the least restrictive device and appropriate durable medical equipment within 7 days. Met, upgraded     Discontinue all below goals 11/21/2021  Initiated 11/15/2021  1. Patient will perform grooming in [facility] with supervision within 7 day(s).   2.  Patient will perform bathing with supervision within 7 day(s). 3.  Patient will perform upper body dressing and lower body dressing with supervision within 7 day(s). 4.  Patient will perform toilet transfers with supervision within 7 day(s). 5.  Patient will perform all aspects of toileting with independence within 7 day(s). 6.  Patient will participate in upper extremity therapeutic exercise/activities with supervision/set-up for 8 minutes within 7 day(s). 7.  Patient will utilize energy conservation techniques during functional activities with verbal cues within 7 day(s). 8.  Patient will perform standing adls for at least 8 minutes within 7 days. Outcome: Progressing Towards Goal     OCCUPATIONAL THERAPY TREATMENT  Patient: Oleg Aragon (87 y.o. female)  Date: 12/1/2021  Diagnosis: NSTEMI (non-ST elevated myocardial infarction) ([facility] [address] [I21.4]  CAD (coronary artery disease) [I25.10]   S/P CABG x 4  Procedure(s) (LRB):  CORONARY ARTERY BYPASS GRAFT X4 LIMA, LSVH VIA EVH. ECC. EPI AORTIC US BY DR Renetta Velazquez. (N/A) 12 Days Post-Op  Precautions: Sternal (move in the tube)  Chart, occupational therapy assessment, plan of care, and goals were reviewed. ASSESSMENT  Patient continues with skilled OT services and is progressing towards goals. Patient sitting up in recliner. Meal in front of her. Pt attempting to open food container to apple dessert. Pt reporting she \"can't do anything\" because she is \"so nauseated\" and that she \"can't eat anything. \" Pt was CGA for feeding herself 3 bites of her apple dessert & then requested tray be removed. Attempted simple grooming, but pt declined. Current Level of Function Impacting Discharge (ADLs): nausea; requiring significant assist for participation    Other factors to consider for discharge: probably dc today         PLAN :  Patient continues to benefit from skilled intervention to address the above impairments. Continue treatment per established plan of care to address goals.     Recommend with staff: up to chair for all meals; up to [address] next OT session: there ex, grooming    Recommendation for discharge: (in order for the patient to meet his/her long term goals)  Therapy up to 5 days/week in SNF setting    This discharge recommendation:  Has not yet been discussed the attending provider and/or case management    IF patient discharges home will need the following DME: To Be Determined (TBD) at next level of care        SUBJECTIVE:   Patient stated [place].     OBJECTIVE DATA SUMMARY:   Cognitive/Behavioral Status:  Neurologic State: Alert  Orientation Level: Oriented X4  Cognition: Follows commands  Perception: Appears intact  Perseveration: Perseverates during conversation  Safety/Judgement: Awareness of environment (decreased insight into capabilities)    ADL Intervention:  Feeding  Container Management: Total assistance (dependent)  Food to Mouth: Contact guard assistance    Cognitive Retraining  Problem Solving: Identifying the task; Identifying the problem; General alternative solution  Safety/Judgement: Awareness of environment (decreased insight into capabilities)    Therapeutic Exercises:   declined    Pain:  5/10    Activity Tolerance:   requires frequent rest breaks    After treatment patient left in no apparent distress:   Sitting in chair and Caregiver / family present    COMMUNICATION/COLLABORATION:   The patients plan of care was discussed with: Registered nurseOmari Barrios  Time Calculation: 8 mins

## 2021-12-01 NOTE — DISCHARGE INSTRUCTIONS
[facility]     [address]  [address]                                                           [address], [address] [place] [address]  Office- [phone]  Fax- [phone]        Office- [phone]  Fax- [phone]  _________________________________________________________________  Dr. Yeison Fountain, MARCIANO Garcia, [place]  Dr. Neal Mendoza, NP                  Km Fernandez, TRACY Cohen, NP          Pam Villalta, Latosha Nevarez, MARCIANO Urbina, TRACY Cannon, MARCIANO Persaud, MARCIANO                                                            Name:Violeta Lobato     Surgery & Date: Procedure(s):  CORONARY ARTERY BYPASS GRAFT X4 LIMA, [facility] VIA EVH. ECC. EPI AORTIC US BY DR Marylee Postal. Discharge Date: 12/1/21    MEDICATIONS:  Please refer to your After Visit Summary for your medication list. If you do not have a prescription for a new medication, you may purchase the medication over the counter. DO NOT TAKE ANY MEDICATIONS THAT ARE NOT ON THIS LIST    INSTRUCTIONS:  1. NO SMOKING OR TOBACCO PRODUCTS  2. Follow all the instructions in your discharge book  3. Shower daily. Wash all incisions twice daily with Dial soap and water. After each wash with soap and water, wipe incisions with Chlorhexidine wipe and let air dry. Do this for 14 days. No lotions, ointments or powder. 4. Call the office immediately for any redness, swelling, or drainage from your incision.    5. Take your temperature daily and call for a temperature of 101 degrees or higher or for any symptoms that make you think you have and infection. 6. Weigh yourself each morning. Call if you gain more than 5 pounds in 48 hours. 7. Use the incentive spirometer 6-8 times a day-10 breaths each time. Use a pillow or your bear to splint your breastbone when coughing or sneezing. 8. If you feel your breast bone clicking or popping, notify the office immediately. 9. Walk several hundred feet several times daily. DIET  Eat an American Heart Association diet. If you are having trouble with your appetite, eat what you can. Try eating small, frequent meals throughout the day. Diabetic patients should follow an ADA diet. Check your blood sugars  And keep a log of your results. ACTIVITY  1. NO DRIVING--you will be evaluated to drive at your follow up visit. 2. Increase your activity by walking several times a day. Stay out of bed most of the day. 3. When sitting, keep your legs elevated. 4. You may ride in a car, but you must get out every hour and walk around. If you ride in a car with an airbag that can not be switched off, put the seat ALL the way back or ride in the back seat. 5. Any load bearing activity can be performed as long as it can be performed \"in the tube\". You can reach \"out of the tube\" when performing activities that don't require heavy lifting. Let pain be your guide, your pain level will keep you from doing anything extreme. Normal Problems After Discharge:   Some fatigue and difficulty sleeping   Poor appetite initially, with temporary change in taste   Temperature variability; feeling hot and cold   Chest wall soreness and paresthesia (numbness)   Some musculoskeletal pain along joint lines in chest and back.  Tylenol or NSAIDs will help unless contraindicated    Patients with EVH (Endoscopic Vein [place]) will have mild swelling in that leg due to temporary venous insufficiency   Elevation & compression stockings will help to reduce the swelling        FOLLOW UP  1.  Your follow up appointment will be on 12/22/21 at 1:30pm. Our office is located in [address] [place] [address], [address]. Please call our office at [phone] if you are unable to make this appointment. 2. You will be receiving a call before your 5 day appointment to begin cardiac rehab. They are located in the [address] [place] [address], [address]. Their phone number is [phone]. Please call if you have not been contacted 2-3 weeks after discharge from the hospital.  3. Your appointment with Dr. Hattie Winchester will be on 1/10/22 at 9:15am. Office phone is [phone]. 4. Consult you primary care physician regarding your influenza & pneumovax vaccines. 5. Please bring all medications (or a complete list) with you to your appointment. 6. Do not hesitate to contact our office 24/7 with any questions or concerns.  Call the number on your red bracelet ([phone])      Signature:___________________________________________________

## 2021-12-01 NOTE — PROGRESS NOTES
End of Shift Note    Bedside shift change report given to Glenis (oncoming nurse) by Montana Marcelo RN (offgoing nurse). Report included the following information SBAR, Kardex, Intake/Output, MAR, Recent Results and Cardiac Rhythm NSR    Shift worked:  7p-7a     Shift summary and any significant changes:     Rested well, voiding. C/o nausea and has poor appetite. Remains on 1L NC. Concerns for physician to address:  persistent nausea     Zone phone for oncoming shift:   [phone]       Activity:  Activity Level: Up with Assistance  Number times ambulated in hallways past shift: 0  Number of times OOB to chair past shift: 1    Cardiac:   Cardiac Monitoring: Yes      Cardiac Rhythm: Sinus Rhythm    Access:   Current line(s): PIV     Genitourinary:   Urinary status: voiding    Respiratory:   O2 Device: Nasal cannula  Chronic home O2 use?: NO  Incentive spirometer at bedside: YES  Actual Volume (ml): 600 ml  GI:  Last Bowel Movement Date: 11/28/21  Current diet:  ADULT ORAL NUTRITION SUPPLEMENT Breakfast, Lunch, Dinner; Low Calorie/High Protein  ADULT DIET Regular; 4 carb choices (60 gm/meal); No Salt Added (3-4 gm)  Passing flatus: YES  Tolerating current diet: NO       Pain Management:   Patient states pain is manageable on current regimen: YES    Skin:  Ilia Score: 16  Interventions: float heels, increase time out of bed, PT/OT consult and nutritional support     Patient Safety:  Fall Score:  Total Score: 3  Interventions: bed/chair alarm, assistive device (walker, cane, etc), gripper socks, pt to call before getting OOB and stay with me (per policy)  High Fall Risk: Yes    Length of Stay:  Expected LOS: 8d 14h  Actual LOS: 17      Montana Marcelo RN

## 2021-12-01 NOTE — PROGRESS NOTES
Transition of Care Plan:    RUR: 13%  Disposition: SNF- [facility]   Follow up appointments: PCP, Specialist  DME needed: none- provided at facility  Transportation at Discharge: [facility] transportation arranged for 4pm  Keys or means to access home:      Daughter has access   IM Medicare Letter: 2nd IM Letter given  Is patient a BCPI-A Bundle:         n/a   If yes, was Bundle Letter given?:    Is patient a  and connected with the [place]? n/a  If yes, was [employer] transfer form completed and [facility] notified? Caregiver Contact: Ade Rosales Qwmd - [phone]  Discharge Caregiver contacted prior to discharge? CM discussed d/c plan with Daughter        2:30pm  CM received a call from Michael Grace NP to post pone d/c for today and plan for tomorrow 12/2/21, pt not medically stable at this time. CM updated [facility] on d/c plan for 12/2/21. CM updated Nurse to inform pt and CM updated Daughter. 1047  CM discussed d/c plan with pt and Daughter to update on d/c plan and p/u time. Pt is having labs drawn and bladder scan prior to d/c, CM asked to adjust p/u time. Pt cleared for d/c from CM standpoint. Transition of Care Plan to SNF/Rehab    SNF/Rehab Transition:  Patient has been accepted to [facility] and meets criteria for admission. Patient will transported by [facility] and expected to leave at 4:00 pm    Communication to Patient/Family:  Met with patient and Daughter, Maryjane Lewis (identified care giver) and they are agreeable to the transition plan. Communication to SNF/Rehab:  Bedside RN, Jose Diego, has been notified to update the transition plan to the facility and call report (phone number [phone], RM ). Discharge information has been updated on the AVS.     Discharge instructions to be fax'd to facility at [facility] # [phone] or [phone]). Nursing Please include all hard scripts for controlled substances, med rec and dc summary, and AVS in packet.      Reviewed and confirmed with Daniela How can manage the patient care needs for the following:     Yan with (X) only those applicable:    Medication:  [x]  Medications will be available at the facility  []  IV Antibiotics  []  Controlled Substance - hard copy to be sent with patient   []  Weekly Labs   Documents:  [] Hard RX  [] MAR  [] Kardex  [x] AVS  []Transfer Summary  []Discharge   Equipment:  []  CPAP/BiPAP  []  Wound Vacuum  []  Ruiz or Urinary Device  []  PICC/Central Line  []  Nebulizer  []  Ventilator   Treatment:  []Isolation (for MRSA, VRE, etc.)  []Surgical Drain Management  []Tracheostomy Care  []Dressing Changes  []Dialysis with transportation and chair time  []PEG Care  [x]Oxygen- 1L NC  []Daily Weights for Heart Failure   Dietary:  []Any diet limitations  []Tube Feedings   []Total Parenteral Management (TPN)   Eligible for Medicaid Long Term Services and Supports  Yes:  [] Eligible for medical assistance or will become eligible within 180 days and UAI completed. [] Provider/Patient and/or support system has requested screening. [] UAI copy provided to patient or responsible party,   [] UAI unavailable at discharge will send once processed to SNF provider. [] UAI unavailable at discharged mailed to patient  No:   [x] Private pay and is not financially eligible for Medicaid within the next 180 days. [] Reside out-of-state.   [] A residents of a state owned/operated facility that is licensed  by [facility] or [facility]  [] Enrollment in [facility] hospice services  [] [address]  [] Patient /Family declines to have screening completed or provide financial information for screening     Financial Resources:  Medicaid    [] Initiated and application pending   [] Full coverage     Advanced Care Plan:  []Surrogate Decision Maker of Care  []POA  [x]Communicated Code Status - Full   Other     Care Management Interventions  PCP Verified by CM:  Yes  Palliative Care Criteria Met (RRAT>21 & CHF Dx)?: No  Mode of Transport at Discharge: BLS ([facility] to provide transportation)  Transition of Care Consult (CM Consult): SNF [facility]  Physical Therapy Consult: Yes  Occupational Therapy Consult: Yes  Speech Therapy Consult: Yes  Support Systems: Child(peggy)  Confirm Follow Up Transport: Family  The Patient and/or Patient Representative was Provided with a Choice of Provider and Agrees with the Discharge Plan?: Yes  Name of the Patient Representative Who was Provided with a Choice of Provider and Agrees with the Discharge Plan: [address] [place] [address] of Choice List was Provided with Basic Dialogue that Supports the Patient's Individualized Plan of Care/Goals, Treatment Preferences and Shares the Quality Data Associated with the Providers?: Yes   Resource Information Provided?: No  Discharge Location  Discharge Placement: Skilled nursing facility      [address] [facility]  Phone: [phone]'

## 2021-12-01 NOTE — PROGRESS NOTES
CSS Floor Progress Note    Admit Date: 2021  POD:  9 Days Post-Op    Procedure:  Procedure(s):  CORONARY ARTERY BYPASS GRAFT X4 LIMA, LSVH VIA EVH. ECC. EPI AORTIC US BY DR Ashu Chavarria. Subjective:   Pt seen with Dr. Prince Villalobos. Pt resting up in bed on 1L NC. Afebrile. Complaining of pain in her left leg. Objective:   Vitals:  Blood pressure (!) 152/48, pulse 74, temperature 97.8 °F (36.6 °C), resp. rate 16, height 5' 7\" (1.702 m), weight 194 lb 0.1 oz (88 kg), SpO2 90 %. Temp (24hrs), Av.1 °F (36.7 °C), Min:97.6 °F (36.4 °C), Max:98.7 °F (37.1 °C)    EKG/Rhythm:  NSR 60-70s    Oxygen Therapy:  Oxygen Therapy  O2 Sat (%): 90 % (21)  Pulse via Oximetry: 66 beats per minute (21 1453)  O2 Device: Nasal cannula (21)  Skin Assessment: Clean, dry, & intact (21)  Skin Protection for O2 Device: No (21)  Orientation: Bilateral (21)  Location: Cheek (21)  Interventions: Mouth Care; Reposition Device (21)  O2 Flow Rate (L/min): 1 l/min (21)  FIO2 (%): 50 % (21 0729)    CXR:   CXR Results  (Last 48 hours)               21 0607  XR CHEST PORT Final result    Impression:  No significant change. Narrative:  INDICATION: post op heart       EXAMINATION:  AP CHEST, PORTABLE       COMPARISON: 2021       FINDINGS: Single AP portable view of the chest demonstrates prior median   sternotomy. Unchanged cardiomegaly. Pulmonary edema, pleural effusions and   bibasilar atelectasis without significant change. No pneumothorax. 21 0549  XR CHEST PORT Final result    Impression:  No significant change in mild pulmonary edema and small pleural effusions. Narrative:  INDICATION: post op heart       EXAM:  AP CHEST RADIOGRAPH       COMPARISON: 2021       FINDINGS:       AP portable view of the chest demonstrates prior median sternotomy and   cardiomegaly.  Mild pulmonary edema, bilateral pleural effusions, and bibasilar   atelectasis. No pneumothorax. The osseous structures are unremarkable. Admission Weight: Last Weight   Weight: 184 lb (83.5 kg) Weight: 194 lb 0.1 oz (88 kg)     Intake / Output / Drain:  Current Shift: No intake/output data recorded. Last 24 hrs.:     Intake/Output Summary (Last 24 hours) at 2021 0786  Last data filed at 2021 0556  Gross per 24 hour   Intake 500 ml   Output 875 ml   Net -375 ml       EXAM:  General:     NAD, pleasant mood                                                      Lungs:   Clear to auscultation bilaterally. Incision:  FRANCHESKA. No erythema, swelling, or drainage noted. Gauze placed in breast fold    Heart:  Regular rate and rhythm, S1, S2 normal, no murmur, click, rub or gallop. Abdomen:   Soft, non-tender. Bowel sounds hypoactive. No masses,  No organomegaly. + BM    Extremities:  2+ LE edema. PPP. Neurologic:  Gross motor and sensory apparatus intact. Labs:   Recent Labs     21  0719 214 21   WBC  --  13.9*  --    HGB  --  8.1*  --    HCT  --  25.8*  --    PLT  --  582*  --    NA  --  131*  --    K  --  5.0  --    BUN  --  64*  --    CREA  --  2.04*  --    GLU  --  69  --    GLUCPOC 79  --    < >    < > = values in this interval not displayed. Assessment:     Principal Problem:    S/P CABG x 4 (2021)      Overview: x 4, LIMA to LAD, RSVG to Diag, RSVG to OM, RSVG to PDA    Active Problems:    NSTEMI (non-ST elevated myocardial infarction) ([facility] [address] (2021)      CAD (coronary artery disease) (2021)       Plan/Recommendations/Medical Decision Makin. NSTEMI, Mv CAD s/p CABG x4: Cont ASA/statin/BB and Amiodarone d/c today d/t nausea. 2. Uncontrolled Hypertension: on Coreg, losartan, verapamil PTA. Hold losartan given renal function. Cont Coreg & norvasc.   3. Large hiatal hernia: CT with Large hiatal hernia containing nearly the entire stomach, mesenteric fat, and the splenic flexure of the colon. Needs FU as OP  4. TEMO on CKD stage 3: Cont to hold diuresis. Monitor - creatinine up today at 2.04. Consult Nephro today. Will obtain ECHO to assess for effusion that could be compressing   5. Anxiety: PRN xanax  6. Postop shock, vasoplegia vs cardiogenic: Off all vasopressors. Resolved. 7.  Postop pain control: Cont tylenol to 1,000mg Q6hr PRN. Cont PRN Oxycodone and ultram.   8. Atelectasis, apical pneumo: Increase mobility. Hourly I.S. Wean oxygen for O2 sat > 92%. Remains on 1L NC. OOB TID, ambulating BID. CXR not improving, needs intense hourly pulmonary toileting. Daily CXR. 9. Leukocytosis: Increase mobility. Pulm toileting. Daily incisional care to sternal incision and EVH site. UA sent - start abx  10. DM type II: New diagnosis. A1C 7, started amaryl -increase to BID, cont SSI. Needs education for diabetes. Diabetes management following. Will need prescription for glucometer/testing supplies. 11. Acute DVT: started on Lovenox 11/28. Changed to Eliquis 11/29. Continue. 12. Hyperkalemia: Given lokelma BID 11/30. K down from 5.7 to 5 this am. Will consult Nephro today  13. Urinary retention: UA with + leuks, + blood, +1 bacteria; also mod epithelial. UC pending. Will start Levoflaxaxin today. Dispo: PT/OT. OOB TID, ambulating BID. Case management following to aid in discharge planning -referrals sent to SNF x3 yesterday. 11/24 COVID PCR negative. Dr. Amina Rai would like to obtain ECHO to assess for pericardial effusion and receive recommendation from Nephrology for rising creatinine prior to discharging the patient to [facility]. If there is timely assess and planning completed, pt may go to SNF this afternoon as scheduled. Update 1120: ECHO completed and negative for pericardial effusion. Dr. Hilary Young has seen pt, labs ordered. Unit called to notify of new orders to try to expedite.      Signed By: Rajeev Renteria NP

## 2021-12-02 ENCOUNTER — APPOINTMENT (OUTPATIENT)
Dept: GENERAL RADIOLOGY | Age: 86
DRG: 233 | End: 2021-12-02
Attending: PHYSICIAN ASSISTANT
Payer: MEDICARE

## 2021-12-02 LAB
ANION GAP SERPL CALC-SCNC: 4 MMOL/L (ref 5–15)
ANION GAP SERPL CALC-SCNC: 7 MMOL/L (ref 5–15)
BASOPHILS # BLD: 0 K/UL (ref 0–0.1)
BASOPHILS NFR BLD: 0 % (ref 0–1)
BUN SERPL-MCNC: 60 MG/DL (ref 6–20)
BUN SERPL-MCNC: 62 MG/DL (ref 6–20)
BUN/CREAT SERPL: 28 (ref 12–20)
BUN/CREAT SERPL: 29 (ref 12–20)
CALCIUM SERPL-MCNC: 8.7 MG/DL (ref 8.5–10.1)
CALCIUM SERPL-MCNC: 8.9 MG/DL (ref 8.5–10.1)
CHLORIDE SERPL-SCNC: 97 MMOL/L (ref 97–108)
CHLORIDE SERPL-SCNC: 98 MMOL/L (ref 97–108)
CO2 SERPL-SCNC: 25 MMOL/L (ref 21–32)
CO2 SERPL-SCNC: 30 MMOL/L (ref 21–32)
CREAT SERPL-MCNC: 2.12 MG/DL (ref 0.55–1.02)
CREAT SERPL-MCNC: 2.14 MG/DL (ref 0.55–1.02)
DIFFERENTIAL METHOD BLD: ABNORMAL
EOSINOPHIL # BLD: 0.1 K/UL (ref 0–0.4)
EOSINOPHIL NFR BLD: 1 % (ref 0–7)
ERYTHROCYTE [DISTWIDTH] IN BLOOD BY AUTOMATED COUNT: 14 % (ref 11.5–14.5)
GLUCOSE BLD STRIP.AUTO-MCNC: 159 MG/DL (ref 65–117)
GLUCOSE BLD STRIP.AUTO-MCNC: 165 MG/DL (ref 65–117)
GLUCOSE BLD STRIP.AUTO-MCNC: 37 MG/DL (ref 65–117)
GLUCOSE BLD STRIP.AUTO-MCNC: 44 MG/DL (ref 65–117)
GLUCOSE BLD STRIP.AUTO-MCNC: 55 MG/DL (ref 65–117)
GLUCOSE BLD STRIP.AUTO-MCNC: 56 MG/DL (ref 65–117)
GLUCOSE BLD STRIP.AUTO-MCNC: 59 MG/DL (ref 65–117)
GLUCOSE BLD STRIP.AUTO-MCNC: 62 MG/DL (ref 65–117)
GLUCOSE BLD STRIP.AUTO-MCNC: 64 MG/DL (ref 65–117)
GLUCOSE BLD STRIP.AUTO-MCNC: 70 MG/DL (ref 65–117)
GLUCOSE BLD STRIP.AUTO-MCNC: 82 MG/DL (ref 65–117)
GLUCOSE BLD STRIP.AUTO-MCNC: 93 MG/DL (ref 65–117)
GLUCOSE SERPL-MCNC: 46 MG/DL (ref 65–100)
GLUCOSE SERPL-MCNC: 66 MG/DL (ref 65–100)
HCT VFR BLD AUTO: 25.3 % (ref 35–47)
HCT VFR BLD AUTO: 26 % (ref 35–47)
HGB BLD-MCNC: 7.7 G/DL (ref 11.5–16)
HGB BLD-MCNC: 8.1 G/DL (ref 11.5–16)
HISTORY CHECKED?,CKHIST: NORMAL
IMM GRANULOCYTES # BLD AUTO: 0.1 K/UL (ref 0–0.04)
IMM GRANULOCYTES NFR BLD AUTO: 1 % (ref 0–0.5)
LYMPHOCYTES # BLD: 1.7 K/UL (ref 0.8–3.5)
LYMPHOCYTES NFR BLD: 11 % (ref 12–49)
MCH RBC QN AUTO: 30.1 PG (ref 26–34)
MCHC RBC AUTO-ENTMCNC: 30.4 G/DL (ref 30–36.5)
MCV RBC AUTO: 98.8 FL (ref 80–99)
MONOCYTES # BLD: 1.5 K/UL (ref 0–1)
MONOCYTES NFR BLD: 9 % (ref 5–13)
NEUTS SEG # BLD: 12.5 K/UL (ref 1.8–8)
NEUTS SEG NFR BLD: 79 % (ref 32–75)
NRBC # BLD: 0 K/UL (ref 0–0.01)
NRBC BLD-RTO: 0 PER 100 WBC
PLATELET # BLD AUTO: 614 K/UL (ref 150–400)
PMV BLD AUTO: 8.6 FL (ref 8.9–12.9)
POTASSIUM SERPL-SCNC: 5.2 MMOL/L (ref 3.5–5.1)
POTASSIUM SERPL-SCNC: 5.5 MMOL/L (ref 3.5–5.1)
RBC # BLD AUTO: 2.56 M/UL (ref 3.8–5.2)
SERVICE CMNT-IMP: ABNORMAL
SERVICE CMNT-IMP: NORMAL
SODIUM SERPL-SCNC: 130 MMOL/L (ref 136–145)
SODIUM SERPL-SCNC: 131 MMOL/L (ref 136–145)
WBC # BLD AUTO: 15.9 K/UL (ref 3.6–11)

## 2021-12-02 PROCEDURE — 74011250637 HC RX REV CODE- 250/637: Performed by: PHYSICIAN ASSISTANT

## 2021-12-02 PROCEDURE — 86900 BLOOD TYPING SEROLOGIC ABO: CPT

## 2021-12-02 PROCEDURE — 71045 X-RAY EXAM CHEST 1 VIEW: CPT

## 2021-12-02 PROCEDURE — 36430 TRANSFUSION BLD/BLD COMPNT: CPT

## 2021-12-02 PROCEDURE — 97110 THERAPEUTIC EXERCISES: CPT

## 2021-12-02 PROCEDURE — 77010033678 HC OXYGEN DAILY

## 2021-12-02 PROCEDURE — 82962 GLUCOSE BLOOD TEST: CPT

## 2021-12-02 PROCEDURE — 74011250637 HC RX REV CODE- 250/637: Performed by: NURSE PRACTITIONER

## 2021-12-02 PROCEDURE — 74011000250 HC RX REV CODE- 250: Performed by: NURSE PRACTITIONER

## 2021-12-02 PROCEDURE — P9045 ALBUMIN (HUMAN), 5%, 250 ML: HCPCS | Performed by: NURSE PRACTITIONER

## 2021-12-02 PROCEDURE — 74011250637 HC RX REV CODE- 250/637: Performed by: THORACIC SURGERY (CARDIOTHORACIC VASCULAR SURGERY)

## 2021-12-02 PROCEDURE — 80048 BASIC METABOLIC PNL TOTAL CA: CPT

## 2021-12-02 PROCEDURE — 86923 COMPATIBILITY TEST ELECTRIC: CPT

## 2021-12-02 PROCEDURE — 74011250636 HC RX REV CODE- 250/636: Performed by: THORACIC SURGERY (CARDIOTHORACIC VASCULAR SURGERY)

## 2021-12-02 PROCEDURE — 74011250636 HC RX REV CODE- 250/636: Performed by: NURSE PRACTITIONER

## 2021-12-02 PROCEDURE — 36415 COLL VENOUS BLD VENIPUNCTURE: CPT

## 2021-12-02 PROCEDURE — 85018 HEMOGLOBIN: CPT

## 2021-12-02 PROCEDURE — 51798 US URINE CAPACITY MEASURE: CPT

## 2021-12-02 PROCEDURE — 97535 SELF CARE MNGMENT TRAINING: CPT

## 2021-12-02 PROCEDURE — 65660000000 HC RM CCU STEPDOWN

## 2021-12-02 PROCEDURE — P9016 RBC LEUKOCYTES REDUCED: HCPCS

## 2021-12-02 PROCEDURE — 85025 COMPLETE CBC W/AUTO DIFF WBC: CPT

## 2021-12-02 RX ORDER — AMOXICILLIN 250 MG
2 CAPSULE ORAL 2 TIMES DAILY
Status: DISCONTINUED | OUTPATIENT
Start: 2021-12-02 | End: 2021-12-06 | Stop reason: HOSPADM

## 2021-12-02 RX ORDER — FACIAL-BODY WIPES
10 EACH TOPICAL DAILY
Status: DISCONTINUED | OUTPATIENT
Start: 2021-12-03 | End: 2021-12-06 | Stop reason: HOSPADM

## 2021-12-02 RX ORDER — FUROSEMIDE 10 MG/ML
40 INJECTION INTRAMUSCULAR; INTRAVENOUS ONCE
Status: COMPLETED | OUTPATIENT
Start: 2021-12-02 | End: 2021-12-02

## 2021-12-02 RX ORDER — GLIMEPIRIDE 4 MG/1
2 TABLET ORAL DAILY
Status: DISCONTINUED | OUTPATIENT
Start: 2021-12-02 | End: 2021-12-02

## 2021-12-02 RX ORDER — LIDOCAINE 4 G/100G
1 PATCH TOPICAL EVERY 24 HOURS
Status: DISCONTINUED | OUTPATIENT
Start: 2021-12-02 | End: 2021-12-06 | Stop reason: HOSPADM

## 2021-12-02 RX ORDER — METOCLOPRAMIDE HYDROCHLORIDE 5 MG/ML
5 INJECTION INTRAMUSCULAR; INTRAVENOUS ONCE
Status: COMPLETED | OUTPATIENT
Start: 2021-12-02 | End: 2021-12-02

## 2021-12-02 RX ORDER — SODIUM CHLORIDE 9 MG/ML
250 INJECTION, SOLUTION INTRAVENOUS AS NEEDED
Status: DISCONTINUED | OUTPATIENT
Start: 2021-12-02 | End: 2021-12-06 | Stop reason: ALTCHOICE

## 2021-12-02 RX ORDER — DEXTROMETHORPHAN POLISTIREX 30 MG/5 ML
SUSPENSION, EXTENDED RELEASE 12 HR ORAL AS NEEDED
Status: DISCONTINUED | OUTPATIENT
Start: 2021-12-02 | End: 2021-12-06 | Stop reason: HOSPADM

## 2021-12-02 RX ORDER — ALBUMIN HUMAN 50 G/1000ML
12.5 SOLUTION INTRAVENOUS ONCE
Status: COMPLETED | OUTPATIENT
Start: 2021-12-02 | End: 2021-12-02

## 2021-12-02 RX ADMIN — MELATONIN 3 MG: at 21:49

## 2021-12-02 RX ADMIN — CARVEDILOL 6.25 MG: 6.25 TABLET, FILM COATED ORAL at 08:40

## 2021-12-02 RX ADMIN — FUROSEMIDE 40 MG: 10 INJECTION, SOLUTION INTRAMUSCULAR; INTRAVENOUS at 09:54

## 2021-12-02 RX ADMIN — PSYLLIUM HUSK 1 PACKET: 3.4 POWDER ORAL at 18:05

## 2021-12-02 RX ADMIN — METOCLOPRAMIDE 5 MG: 5 INJECTION, SOLUTION INTRAMUSCULAR; INTRAVENOUS at 14:47

## 2021-12-02 RX ADMIN — CARVEDILOL 6.25 MG: 6.25 TABLET, FILM COATED ORAL at 18:06

## 2021-12-02 RX ADMIN — Medication 16 G: at 06:49

## 2021-12-02 RX ADMIN — ONDANSETRON 4 MG: 2 INJECTION INTRAMUSCULAR; INTRAVENOUS at 20:49

## 2021-12-02 RX ADMIN — ONDANSETRON 4 MG: 2 INJECTION INTRAMUSCULAR; INTRAVENOUS at 11:02

## 2021-12-02 RX ADMIN — APIXABAN 10 MG: 5 TABLET, FILM COATED ORAL at 08:39

## 2021-12-02 RX ADMIN — TRAMADOL HYDROCHLORIDE 50 MG: 50 TABLET, COATED ORAL at 19:04

## 2021-12-02 RX ADMIN — TRAMADOL HYDROCHLORIDE 50 MG: 50 TABLET, COATED ORAL at 08:39

## 2021-12-02 RX ADMIN — PANTOPRAZOLE SODIUM 40 MG: 40 TABLET, DELAYED RELEASE ORAL at 08:40

## 2021-12-02 RX ADMIN — LEVOFLOXACIN 250 MG: 250 TABLET, FILM COATED ORAL at 08:39

## 2021-12-02 RX ADMIN — ASPIRIN 81 MG CHEWABLE TABLET 81 MG: 81 TABLET CHEWABLE at 08:43

## 2021-12-02 RX ADMIN — DIPHENHYDRAMINE HYDROCHLORIDE 25 MG: 25 CAPSULE ORAL at 00:22

## 2021-12-02 RX ADMIN — Medication 10 ML: at 14:47

## 2021-12-02 RX ADMIN — DEXTROSE MONOHYDRATE 25 G: 25 INJECTION, SOLUTION INTRAVENOUS at 21:49

## 2021-12-02 RX ADMIN — ACETAMINOPHEN 1000 MG: 500 TABLET ORAL at 21:49

## 2021-12-02 RX ADMIN — POLYETHYLENE GLYCOL 3350 17 G: 17 POWDER, FOR SOLUTION ORAL at 10:12

## 2021-12-02 RX ADMIN — ATORVASTATIN CALCIUM 80 MG: 40 TABLET, FILM COATED ORAL at 21:49

## 2021-12-02 RX ADMIN — TRAMADOL HYDROCHLORIDE 50 MG: 50 TABLET, COATED ORAL at 01:06

## 2021-12-02 RX ADMIN — AMLODIPINE BESYLATE 10 MG: 5 TABLET ORAL at 08:39

## 2021-12-02 RX ADMIN — DOCUSATE SODIUM 50MG AND SENNOSIDES 8.6MG 1 TABLET: 8.6; 5 TABLET, FILM COATED ORAL at 08:40

## 2021-12-02 RX ADMIN — Medication 1 TABLET: at 08:40

## 2021-12-02 RX ADMIN — ALPRAZOLAM 0.5 MG: 0.5 TABLET ORAL at 21:49

## 2021-12-02 RX ADMIN — PATIROMER 8.4 G: 8.4 POWDER, FOR SUSPENSION ORAL at 18:04

## 2021-12-02 RX ADMIN — Medication 10 ML: at 21:50

## 2021-12-02 RX ADMIN — BISACODYL 10 MG: 10 SUPPOSITORY RECTAL at 12:42

## 2021-12-02 RX ADMIN — APIXABAN 5 MG: 5 TABLET, FILM COATED ORAL at 21:49

## 2021-12-02 RX ADMIN — ALBUMIN (HUMAN) 12.5 G: 12.5 INJECTION, SOLUTION INTRAVENOUS at 18:07

## 2021-12-02 RX ADMIN — DOCUSATE SODIUM 50MG AND SENNOSIDES 8.6MG 2 TABLET: 8.6; 5 TABLET, FILM COATED ORAL at 18:06

## 2021-12-02 NOTE — PROGRESS NOTES
End of Shift Note    Bedside shift change report given to Rema JOHNSON (oncoming nurse) by Colby Shultz RN (offgoing nurse). Report included the following information SBAR, Kardex, Intake/Output, MAR, Recent Results and Cardiac Rhythm NSR    Shift worked:  7p-7a     Shift summary and any significant changes:     hypoglycemic this AM. CHG bath given, wound care completed. SpO2 88% on RA, remains on 1L NC. Concerns for physician to address:  Creat worsening, WBC increased. Nausea persists     Zone phone for oncoming shift:   [phone]       Activity:  Activity Level: Up with Assistance  Number times ambulated in hallways past shift: 0  Number of times OOB to chair past shift: 1    Cardiac:   Cardiac Monitoring: Yes      Cardiac Rhythm: Sinus Rhythm    Access:   Current line(s): PIV     Genitourinary:   Urinary status: voiding    Respiratory:   O2 Device: Nasal cannula  Chronic home O2 use?: NO  Incentive spirometer at bedside: YES  Actual Volume (ml): 600 ml  GI:  Last Bowel Movement Date: 11/28/21  Current diet:  ADULT ORAL NUTRITION SUPPLEMENT Breakfast, Lunch, Dinner; Low Calorie/High Protein  ADULT DIET Regular; 4 carb choices (60 gm/meal); No Salt Added (3-4 gm)  Passing flatus: YES  Tolerating current diet: NO       Pain Management:   Patient states pain is manageable on current regimen: YES    Skin:  Ilia Score: 17  Interventions: increase time out of bed, PT/OT consult and nutritional support     Patient Safety:  Fall Score:  Total Score: 3  Interventions: bed/chair alarm, assistive device (walker, cane, etc), gripper socks, pt to call before getting OOB and stay with me (per policy)  High Fall Risk: Yes    Length of Stay:  Expected LOS: 8d 14h  Actual LOS: 18      Colby Shultz RN

## 2021-12-02 NOTE — PROGRESS NOTES
Bedside shift change report given to Jazmine Levy RN (oncoming nurse) by Pamela Mane RN (offgoing nurse). Report included the following information SBAR, Kardex, Intake/Output, MAR and Cardiac Rhythm NSR. End of Shift Note    Bedside shift change report given to  (oncoming nurse) by Virginia Mcwilliams RN (offgoing nurse). Report included the following information SBAR, Kardex, Intake/Output, MAR and Cardiac Rhythm NSR    Shift worked:  11a-7p     Shift summary and any significant changes:     Patient states she feels like she needs to have a bowel movement but it won't come out. Suppository administered, patient had small bowel movement, notified NP. Patient remains nauseous, informed NP yesterday patient had relief with compazine. Reglan ordered. Patient voided 500 mL bladder scan showed patient retaining 520 urine, straight cath performed 550 drained. Order for Stat BMP K 5.5 called NP- NP states she will put in orders. Fleet enema administered. Concerns for physician to address:  none     Zone phone for oncoming shift:          Activity:  Activity Level: Up with Assistance  Number times ambulated in hallways past shift: 0  Number of times OOB to chair past shift: 1    Cardiac:   Cardiac Monitoring: Yes      Cardiac Rhythm: Sinus Rhythm    Access:   Current line(s): PIV     Genitourinary:   Urinary status: voiding and straight cath    Respiratory:   O2 Device: Nasal cannula  Chronic home O2 use?: NO  Incentive spirometer at bedside: YES  Actual Volume (ml): 600 ml  GI:  Last Bowel Movement Date: 12/02/21  Current diet:  ADULT ORAL NUTRITION SUPPLEMENT Breakfast, Lunch, Dinner; Low Calorie/High Protein  ADULT DIET Regular; 4 carb choices (60 gm/meal); No Salt Added (3-4 gm); Low Potassium (Less than 3000 mg/day);  No Concentrated Sweets  Passing flatus: YES  Tolerating current diet: NO       Pain Management:   Patient states pain is manageable on current regimen: YES    Skin:  Ilia Score: 17  Interventions: limit briefs    Patient Safety:  Fall Score:  Total Score: 3  Interventions: pt to call before getting OOB  High Fall Risk: Yes    Length of Stay:  Expected LOS: 8d 14h  Actual LOS: [address], RN                            2

## 2021-12-02 NOTE — PROGRESS NOTES
Transition of Care Plan:     RUR: 13%  Disposition: SNF- [facility]   YUNG: 12/6/21. Pt will need COVID 48 hours test prior to DC. CM talked to [facility] team and Pt is not medically stable     Sent update to [facility] via [facility] to notify them of plan. Follow up appointments: PCP, Specialist  DME needed: none- provided at facility  Transportation at Discharge: [facility] transportation is on Will Call for Monday, 12/6/21. Keys or means to access home:      Daughter has access   IM Medicare Letter: 2nd IM Letter given on 11/30/21. Will need to be given again 48 hours prior to DC. Is patient a BCPI-A Bundle:         n/a              If yes, was Bundle Letter given?:    Is patient a  and connected with the [place]? n/a  If yes, was [employer] transfer form completed and [facility] notified? Caregiver Contact: Ade Espinozazoya - [phone]  Discharge Caregiver contacted prior to discharge? 3:47 PM CM received a call from Leora Rodriguez and she was not told that Pt was not coming today. --CM called CTS team and asked them to call and give an update as to why DC was delayed. -CM had sent update to SNF: [facility] through [facility] but they missed the message. CM tried to call SNF at [phone] and had to leave VM    CM will continue to monitor discharge plan.      Citlali Kline, [address] [facility]

## 2021-12-02 NOTE — PROGRESS NOTES
.                                                                                Rachel Card        :  1935        MRN:  [id number]                  Assessment   :                                               Plan:  CKD-3b  TEMO  Hyponatremia  S/P CABG    hyperkalemia Her creatinine in 2021 was 1.50. Her creatinine has increased since  from 1.57 to 2.12 today. Urine sediment fairly bland. Urine sodium unmeasurable. I suspect intravascular volume depletion. I will leave fluid management to cardiac surgery. Potassium very mildly elevated. Will continue low potassium diet for now.            Subjective:     Chief Complaint:  \" My legs are very heavy. \" No dyspnea. No pain. No CP. No N/V. Review of Systems:    Symptom Y/N Comments  Symptom Y/N Comments   Fever/Chills    Chest Pain     Poor Appetite    Edema     Cough    Abdominal Pain     Sputum    Joint Pain     SOB/TIDWELL    Pruritis/Rash     Nausea/vomit    Tolerating PT/OT     Diarrhea    Tolerating Diet     Constipation    Other       Could not obtain due to:      Objective:     VITALS:   Last 24hrs VS reviewed since prior progress note.  Most recent are:  Visit Vitals  BP (!) 148/49 (BP 1 Location: Right upper arm, BP Patient Position: At rest)   Pulse 67   Temp 98.1 °F (36.7 °C)   Resp 16   Ht 5' 7\" (1.702 m)   Wt 88.9 kg (195 lb 15.8 oz)   SpO2 95%   BMI 30.70 kg/m²       Intake/Output Summary (Last 24 hours) at 2021 2166  Last data filed at 2021 0400  Gross per 24 hour   Intake 100 ml   Output 850 ml   Net -750 ml      Telemetry Reviewed:     PHYSICAL EXAM:  General: NAD  + edema      Lab Data Reviewed: (see below)    Medications Reviewed: (see below)    PMH/SH reviewed - no change compared to H&P  ________________________________________________________________________  Care Plan discussed with:  Patient     Family      RN     Care Manager                    Consultant:          Comments   >50% of visit spent in counseling and coordination of care       ________________________________________________________________________  Tha Baird MD     Procedures: see electronic medical records for all procedures/Xrays and details which  were not copied into this note but were reviewed prior to creation of Plan. LABS:  Recent Labs     12/02/21 0527 12/01/21 0314   WBC 15.9* 13.9*   HGB 7.7* 8.1*   HCT 25.3* 25.8*   * 582*     Recent Labs     12/02/21 0527 12/01/21 0314 11/30/21 0312   * 131* 130*   K 5.2* 5.0 5.7*   CL 98 99 100   CO2 25 27 26   BUN 62* 64* 64*   CREA 2.12* 2.04* 1.93*   GLU 46* 69 109*   CA 8.7 8.7 8.9     No results for input(s): AP, TBIL, TP, ALB, GLOB, GGT, AML, LPSE in the last 72 hours. No lab exists for component: SGOT, GPT, AMYP, HLPSE  No results for input(s): INR, PTP, APTT, INREXT in the last 72 hours. No results for input(s): FE, TIBC, PSAT, FERR in the last 72 hours. No results found for: FOL, RBCF   No results for input(s): PH, PCO2, PO2 in the last 72 hours. No results for input(s): CPK, CKMB in the last 72 hours.     No lab exists for component: TROPONINI  No components found for: [place]  Lab Results   Component Value Date/Time    Color YELLOW/STRAW 11/30/2021 03:08 PM    Appearance CLOUDY (A) 11/30/2021 03:08 PM    Specific gravity 1.016 11/30/2021 03:08 PM    pH (UA) 5.0 11/30/2021 03:08 PM    Protein Negative 11/30/2021 03:08 PM    Glucose Negative 11/30/2021 03:08 PM    Ketone Negative 11/30/2021 03:08 PM    Bilirubin Negative 11/30/2021 03:08 PM    Urobilinogen 0.2 11/30/2021 03:08 PM    Nitrites Negative 11/30/2021 03:08 PM    Leukocyte Esterase MODERATE (A) 11/30/2021 03:08 PM    Epithelial cells MODERATE (A) 11/30/2021 03:08 PM    Bacteria 1+ (A) 11/30/2021 03:08 PM    WBC 20-50 11/30/2021 03:08 PM    RBC 5-10 11/30/2021 03:08 PM       MEDICATIONS:  Current Facility-Administered Medications   Medication Dose Route Frequency    lidocaine 4 % patch 1 Patch  1 Patch TransDERmal Q24H    furosemide (LASIX) injection 40 mg  40 mg IntraVENous ONCE    apixaban (ELIQUIS) tablet 5 mg  5 mg Oral Q12H    levoFLOXacin (LEVAQUIN) tablet 250 mg  250 mg Oral Q24H    acetaminophen (TYLENOL) tablet 1,000 mg  1,000 mg Oral Q6H PRN    apixaban (ELIQUIS) tablet 10 mg  10 mg Oral BID    Followed by   Humera Maria ON 12/6/2021] apixaban (ELIQUIS) tablet 5 mg  5 mg Oral BID    amLODIPine (NORVASC) tablet 10 mg  10 mg Oral DAILY    sodium chloride (NS) flush 5-40 mL  5-40 mL IntraVENous Q8H    sodium chloride (NS) flush 5-40 mL  5-40 mL IntraVENous PRN    carvediloL (COREG) tablet 6.25 mg  6.25 mg Oral BID WITH MEALS    hydrALAZINE (APRESOLINE) 20 mg/mL injection 10-20 mg  10-20 mg IntraVENous Q6H PRN    traMADoL (ULTRAM) tablet 50 mg  50 mg Oral Q6H PRN    atorvastatin (LIPITOR) tablet 80 mg  80 mg Oral QHS    bacitracin 500 unit/gram packet 1 Packet  1 Packet Topical PRN    oxyCODONE IR (ROXICODONE) tablet 10 mg  10 mg Oral Q4H PRN    naloxone (NARCAN) injection 0.4 mg  0.4 mg IntraVENous PRN    ondansetron (ZOFRAN) injection 4 mg  4 mg IntraVENous Q4H PRN    albuterol (PROVENTIL VENTOLIN) nebulizer solution 2.5 mg  2.5 mg Nebulization Q4H PRN    aspirin chewable tablet 81 mg  81 mg Oral DAILY    [Held by provider] magnesium oxide (MAG-OX) tablet 400 mg  400 mg Oral BID    bisacodyL (DULCOLAX) suppository 10 mg  10 mg Rectal DAILY PRN    senna-docusate (PERICOLACE) 8.6-50 mg per tablet 1 Tablet  1 Tablet Oral BID    polyethylene glycol (MIRALAX) packet 17 g  17 g Oral DAILY    glucose chewable tablet 16 g  4 Tablet Oral PRN    dextrose (D50W) injection syrg 12.5-25 g  12.5-25 g IntraVENous PRN    glucagon (GLUCAGEN) injection 1 mg  1 mg IntraMUSCular PRN    diphenhydrAMINE (BENADRYL) capsule 25 mg  25 mg Oral Q6H PRN    diphenhydrAMINE (BENADRYL) injection 25 mg  25 mg IntraVENous Q6H PRN    melatonin tablet 3 mg  3 mg Oral QHS PRN    pantoprazole (PROTONIX) tablet 40 mg  40 mg Oral ACB    heparin (porcine) 1,000 unit/mL injection    PRN    ALPRAZolam (XANAX) tablet 0.5 mg  0.5 mg Oral PRN    calcium-vitamin D (OS-MARINA +D3) 500 mg-200 unit per tablet 1 Tablet  1 Tablet Oral DAILY

## 2021-12-02 NOTE — PROGRESS NOTES
Problem: Mobility Impaired (Adult and Pediatric)  Goal: *Acute Goals and Plan of Care (Insert Text)  Description: FUNCTIONAL STATUS PRIOR TO ADMISSION: Patient was independent and active without use of DME however began using rolling walker while in hospital prior to cardiac surgery. Reports that she has been having difficulty with endurance and can only do a little of activity before needing to take a break. Reports \"nighttime weakness\" but no falls at home. HOME SUPPORT PRIOR TO ADMISSION: The patient lived alone with son in the area to provide assistance. reviewed 11/26/2021  1. Patient will move from supine to sit and sit to supine , scoot up and down, and roll side to side in bed with min A within 5 days. 2.  Patient will perform sit to/from stand with contact guard assist within 5 days. 3.  Patient will ambulate 50 feet with least restrictive assistive device and contact guard assist within 5 days. 4.  Patient will perform cardiac exercises per protocol with supervision/set-up within 5 days. 5.  Patient will verbally and functionally demonstrate 3/3 sternal precautions without cues within 5 days. Physical Therapy Goals  Initiated 11/21/2021  1. Patient will move from supine to sit and sit to supine , scoot up and down, and roll side to side in bed with contact guard assist within 5 days. 2.  Patient will perform sit to/from stand with contact guard assist within 5 days. 3.  Patient will ambulate 100 feet with least restrictive assistive device and contact guard assist within 5 days. 4.  Patient will ascend/descend 3 stairs with 1 handrail(s) with contact guard assistance within 7 day(s). 5.  Patient will perform cardiac exercises per protocol with supervision/set-up within 5 days. 6.  Patient will verbally and functionally demonstrate 3/3 sternal precautions without cues within 5 days. Initiated 11/15/2021  1.   Patient will move from supine to sit and sit to supine  in bed with modified independence within 7 day(s). 2.  Patient will transfer from bed to chair and chair to bed with modified independence using the least restrictive device within 7 day(s). 3.  Patient will perform sit to stand with modified independence within 7 day(s). 4.  Patient will ambulate with modified independence for 250 feet with the least restrictive device within 7 day(s). 5.  Patient will ascend/descend 3 stairs with 1 handrail(s) with modified independence within 7 day(s). Outcome: Not Progressing Towards Goal   PHYSICAL THERAPY TREATMENT  Patient: Ana Corrales (45 y.o. female)  Date: 12/2/2021  Diagnosis: NSTEMI (non-ST elevated myocardial infarction) ([facility] [address] [I21.4]  CAD (coronary artery disease) [I25.10]   S/P CABG x 4  Procedure(s) (LRB):  CORONARY ARTERY BYPASS GRAFT X4 LIMA, [facility] VIA [facility]. ECC. EPI AORTIC US BY DR Suhail Gonzales. (N/A) 13 Days Post-Op  Precautions: Sternal (move in the tube)  Chart, physical therapy assessment, plan of care and goals were reviewed. ASSESSMENT  Patient continues with skilled PT services and is not progressing towards goals. Patient was cleared by nursing for therapy and received seated in recliner and session started with cardiac exercises. Pt slow with exercises fatiguing with reports of SOB and O2 sats dropping to 90% with sats elevating to 94% with PLB. Therapy session limited due to nausea and dry heaving with patient only to tolerate cardiac exercises. Pt declining ambulation this visit due to nausea and dry heaving. Nursing notified of nausea and patient left in no distress with emesis bag and vitals stable at end of treatment. Patient is demonstrating understanding of mindful-based movements (\"move in the tube\") principles of keeping UEs proximal to ribcage to prevent lateral pull on the sternum during load-bearing activities with verbal cues required for compliance.     Current Level of Function Impacting Discharge (mobility/balance): Transfers and gait deferred due to nausea and dry heaving    Other factors to consider for discharge: Low endurance, nauseous with exercises         PLAN :  Patient continues to benefit from skilled intervention to address the above impairments. Continue treatment per established plan of care. to address goals. Recommendation for discharge: (in order for the patient to meet his/her long term goals)  Therapy up to 5 days/week in SNF setting    This discharge recommendation:  Has not yet been discussed the attending provider and/or case management    IF patient discharges home will need the following DME: to be determined (TBD)       SUBJECTIVE:   Patient stated i'm feeling nauseous.     OBJECTIVE DATA SUMMARY:   Patient mobilized on continuous portable monitor/telemetry. Critical Behavior:  Neurologic State: Alert, Appropriate for age  Orientation Level: Oriented X4  Cognition: Appropriate for age attention/concentration, Follows commands  Safety/Judgement: Awareness of environment (decreased insight into capabilities)    Functional Mobility Training:  Bed Mobility:       Patient received sitting upright in recliner      Transfers:     DId not occur due to patient feeling nauseous and dry heaving          Balance:  Sitting: Intact; With support  Sitting - Static: Good (unsupported)    Ambulation/Gait Training:      Did not occur due to patient feeling nauseous and dry heaving            Cardiac diagnosis intervention:  Patient instructed and educated on mindful movement principles based on Move in The Tube concept to include maintaining bilateral elbows close to rib cage when performing any load-bearing activity such as getting in/out of bed, pushing up from a chair, opening a door, or lifting a box. Patient was given a handout with diagrams of each correct/incorrect method of performing each of the above tasks.     Therapeutic Exercises:   Patient instructed on the benefits and demonstrated cardiac exercises while seated in recliner with Minimum assistance and Assist x1. Instructed and indicated understanding on how to progress reps, sets against gravity, pacing through progressive muscle strengthening standing based on surgeon clearance for more weight in prep for functional activity. Instruction on the use of household items in place of weights as needed.      CARDIAC  EXERCISE   Sets   Reps   Active Active Assist   Passive Self ROM   Comments   Shoulder flexion 1 10 [x]                                            []                                            []                                            []                                            Susana due to lack of ROM   Shoulder abduction 0      0 []                                            []                                            []                                            []                                            DId not occur due to patient feeling nauseous and dry heaving   Scapular elevation 1 10 [x]                                            []                                            []                                            []                                               Scapular retraction 1 10 [x]                                            []                                            []                                            []                                               Trunk rotation 1 10 [x]                                            []                                            []                                            [x]                                               Trunk sidebending 1 10 [x]                                            []                                            []                                            []                                                  []                                            []                                            []                                            [] Activity Tolerance:   Poor    After treatment patient left in no apparent distress:   Sitting in chair and Call bell within reach    COMMUNICATION/COLLABORATION:   The patients plan of care was discussed with: Occupational therapy assistant and Registered nurse.      Nayana Jackson PTA   Time Calculation: 19 mins

## 2021-12-02 NOTE — PROGRESS NOTES
OT note:  OT revieved chart and talked with pt about UB and LB dressing and pt stated that she did not have any questions and will be going to rehab. She had her gown on and OT asked if she wanted to get shirt on and pt stated that she had shirt on and it was rubbing her incision. Pt is doing well and will be d/c from hospital to rehab today.

## 2021-12-02 NOTE — PROGRESS NOTES
HYPOGLYCEMIC EPISODE DOCUMENTATION    Patient with hypoglycemic episode(s) at 0631(time) on 12/2/21(date). BG value(s) pre-treatment 54    Was patient symptomatic? [] yes, [x] no  Patient was treated with the following rescue medications/treatments: [] D50                [] Glucose tablets                [] Glucagon                [x] 4oz juice                [] 6oz reg soda                [] 8oz low fat milk  BG value post-treatment: 59 then 82  Glucose tablets given.     Once BG treated and value greater than 80mg/dl, pt was provided with the following:    [x] snack  [] meal

## 2021-12-02 NOTE — PROGRESS NOTES
Problem: Self Care Deficits Care Plan (Adult)  Goal: *Acute Goals and Plan of Care (Insert Text)  Description:   FUNCTIONAL STATUS PRIOR TO ADMISSION:  Pt lives alone, but reports having a supportive family and [employer] family. Pt states that she has been independent in adls and IADLs, including driving, shopping, preparing meals, gardening etc.      HOME SUPPORT: The patient lived alone with family and [employer] family  to provide assistance. Occupational Therapy Goals  Revised 11/29/2021 during weekly re-eval  1. Patient will perform grooming with contact guard assistance within 7 days. 2. Patient will perform upper body dressing with moderate assistance  within 7 days. 3. Patient will perform lower body dressing with max assistance  within 7 days. 4. Patient will tolerate 3 minutes of UE cardiac 6 pack exercises within 7 days. 5. Patient will perform toileting moderate assist within 7 days. 6. Patient will transfer from bedside commode with contact guard assistance using the least restrictive device and appropriate durable medical equipment within 7 days. Re-eval 11/21/2021 s/p CABG    1. Patient will perform grooming with minimal assistance within 7 days. Met, upgraded   2. Patient will perform upper body dressing with moderate assistance  within 7 days. Not met, continue   3. Patient will perform lower body dressing with max assistance  within 7 days. Not met, continue   4. Patient will tolerate 3 minutes of UE cardiac 6 pack exercises within 7 days. Not met, continue   5. Patient will perform toileting max assist within 7 days. Met, upgraded   6. Patient will transfer from bedside commode with minimal assistance using the least restrictive device and appropriate durable medical equipment within 7 days. Met, upgraded     Discontinue all below goals 11/21/2021  Initiated 11/15/2021  1. Patient will perform grooming in [facility] with supervision within 7 day(s).   2.  Patient will perform bathing with supervision within 7 day(s). 3.  Patient will perform upper body dressing and lower body dressing with supervision within 7 day(s). 4.  Patient will perform toilet transfers with supervision within 7 day(s). 5.  Patient will perform all aspects of toileting with independence within 7 day(s). 6.  Patient will participate in upper extremity therapeutic exercise/activities with supervision/set-up for 8 minutes within 7 day(s). 7.  Patient will utilize energy conservation techniques during functional activities with verbal cues within 7 day(s). 8.  Patient will perform standing adls for at least 8 minutes within 7 days. Outcome: Progressing Towards Goal   OCCUPATIONAL THERAPY TREATMENT  Patient: Jose Porter (89 y.o. female)  Date: 12/2/2021  Diagnosis: NSTEMI (non-ST elevated myocardial infarction) ([facility] [address] [I21.4]  CAD (coronary artery disease) [I25.10]   S/P CABG x 4  Procedure(s) (LRB):  CORONARY ARTERY BYPASS GRAFT X4 LIMA, [facility] VIA EVH. ECC. EPI AORTIC US BY DR Maribell Meyers. (N/A) 13 Days Post-Op  Precautions: Sternal (move in the tube)  Chart, occupational therapy assessment, plan of care, and goals were reviewed. ASSESSMENT  Patient continues with skilled OT services and is  slowlyprogressing towards goals. Pt was sitting up in recliner and trying to eat lunch and she was having dry heaves. Worked with pt on  Deep breathing, and relaxing and her nausea was much better. She was min of 1 to stand from recliner and min of 1 for transfer to [facility]. Pt was max for cleaning and stated she had not had a BM for a week. Worked with pt on trying to clean  Buttocks and reminded her it was like her cardiac ex. Pt stated that she had done her ex earlier this am and did not feel well enough to work on ex again. Notified nursing and pt was asking for suppository. Pt was min to stand from [facility] and with RW, walked to bed and worked on sit to supine, and pt was min to mod with legs.   She was able to move in bed and repositioned her self in  Bed. Left with HOB elevated and call bell with in reach. Continues to need VC to follow her sternal precautions. Patient is not verbalizing and is not demonstrating understanding of mindful-based movements (\"move in the tube\") principles of keeping UEs proximal to ribcage to prevent lateral pull on the sternum during load-bearing activities with verbal cues required for compliance. Current Level of Function Impacting Discharge (ADLs): max for LB ADLs and min for UB ADLs             PLAN :  Patient continues to benefit from skilled intervention to address the above impairments. Continue treatment per established plan of care to address goals. Recommend with staff: Layla Miller for meals and walk to [facility]    Recommend next OT session: work on standing endurance with ADLs    Recommendation for discharge: (in order for the patient to meet his/her long term goals)  Therapy up to 5 days/week in SNF setting    This discharge recommendation:  Has been made in collaboration with the attending provider and/or case management    IF patient discharges home will need the following DME: tbd       SUBJECTIVE:   Patient stated I only ate one strawberry and I got sick .     OBJECTIVE DATA SUMMARY:   Cognitive/Behavioral Status:  Neurologic State: Alert; Appropriate for age  Orientation Level: Oriented X4                Functional Mobility and Transfers for ADLs:  Bed Mobility:       Transfers:  Sit to Stand:  (No tested due to nausea and dry heaving)          Balance:  Sitting: Intact;  With support  Sitting - Static: Good (unsupported)    ADL Intervention:     Set up for grooming, and min to setup for UB ADLs and max for LB ADLs               Patient instructed and educated on mindful movement principles based on Move in The Tube concept to include maintaining bilateral elbows close to rib cage when performing any load-bearing activity such as getting in/out of bed, pushing up from a chair, opening a door, or lifting a box. Patient was given a handout with diagrams of each correct/incorrect method of performing each of the above tasks. Patient instructed on the ability to utilize upper extremities outside the tube when doing any non-load bearing activity such as washing hair/body, brushing teeth, retrieving clothing items, or scratching your back. Patient encouraged to also perform upper extremity exercises \"outside of the tube\" to prevent scar tissue formation around sternal incision site. Patient instructed in detail about activities to heed with caution, allowing pain to be the guide. These activities include but are not limited to: mowing the lawn, riding a bike, walking a dog, lifting a child, workshop hobbies, golfing, sexual activity, vacuuming, fishing, scrubbing the floors, and moving furniture. Patient was given the [address] in the [place] handout to describe each of these activities in detail. Patient instructed no asymmetrical reaching over head to ensure B UEs when shoulders >90* i.e. reaching in cabinets and dressing. Instruction on upper body dressing techniques of over head, then arms through to decrease pain and unilateral shoulder flexion >90*. Instruction on the benefits of utilizing B UEs during functional tasks i.e. opening the fridge, stepping into the tub. Instruction if continued pain at home with shoulder IR for BM hygiene can use wet wipes and toilet tongs PRN. Avoid valsalva maneuvers. Pain:  none    Activity Tolerance:   Fair    After treatment patient left in no apparent distress:   Supine in bed and Call bell within reach    COMMUNICATION/COLLABORATION:   The patients plan of care was discussed with: Physical therapist and Registered nurse.      Allie Orlando OT  Time Calculation: 24 mins

## 2021-12-02 NOTE — PROGRESS NOTES
CSS Floor Progress Note    Admit Date: 2021  POD:  10 Days Post-Op    Procedure:  Procedure(s):  CORONARY ARTERY BYPASS GRAFT X4 LIMA, [facility] VIA EVH. ECC. EPI AORTIC US BY DR Dinorah Akers. Subjective:   Pt seen with Dr. Isaiah Esteban. Pt resting up in bed on 1L NC. Afebrile. Pt worsening SOB, labored breathing. Objective:   Vitals:  Blood pressure (!) 148/49, pulse 67, temperature 98.1 °F (36.7 °C), resp. rate 16, height 5' 7\" (1.702 m), weight 195 lb 15.8 oz (88.9 kg), SpO2 95 %. Temp (24hrs), Av.9 °F (36.6 °C), Min:97.2 °F (36.2 °C), Max:98.9 °F (37.2 °C)    EKG/Rhythm:  NSR 60-70s    Oxygen Therapy:  Oxygen Therapy  O2 Sat (%): 95 % (21)  Pulse via Oximetry: 70 beats per minute (21)  O2 Device: Nasal cannula (21)  Skin Assessment: Clean, dry, & intact (21)  Skin Protection for O2 Device: No (21)  Orientation: Bilateral (21)  Location: Cheek (21)  Interventions: Mouth Care; Reposition Device (21)  O2 Flow Rate (L/min): 1 l/min (21)  FIO2 (%): 50 % (21)    CXR:   CXR Results  (Last 48 hours)               21 0632  XR CHEST PORT Final result    Impression:  Improvement in congestive changes. Narrative:  Clinical indication: Postop heart. Portable AP upright view of the chest obtained and compared to 2021. Enlargement cardiomediastinal silhouette once again seen, mild congestive   changes have improved since the prior examination. 21 0607  XR CHEST PORT Final result    Impression:  No significant change. Narrative:  INDICATION: post op heart       EXAMINATION:  AP CHEST, PORTABLE       COMPARISON: 2021       FINDINGS: Single AP portable view of the chest demonstrates prior median   sternotomy. Unchanged cardiomegaly. Pulmonary edema, pleural effusions and   bibasilar atelectasis without significant change. No pneumothorax. Admission Weight: Last Weight   Weight: 184 lb (83.5 kg) Weight: 195 lb 15.8 oz (88.9 kg)     Intake / Output / Drain:  Current Shift: No intake/output data recorded. Last 24 hrs.:     Intake/Output Summary (Last 24 hours) at 2021 1000  Last data filed at 2021 0400  Gross per 24 hour   Intake 100 ml   Output 850 ml   Net -750 ml       EXAM:  General:     NAD, pleasant mood                                                      Lungs:   Coarse crackles to auscultation bilaterally. Incision:  FRANCHESKA. No erythema, swelling, or drainage noted. Heart:  Regular rate and rhythm, S1, S2 normal, no murmur, click, rub or gallop. Abdomen:   Soft, non-tender. Bowel sounds hypoactive. No masses,  No organomegaly. + BM    Extremities:  2+ LE edema. PPP. Neurologic:  Gross motor and sensory apparatus intact. Labs:   Recent Labs     21  0734 21  0631 21  0527   WBC  --   --  15.9*   HGB  --   --  7.7*   HCT  --   --  25.3*   PLT  --   --  614*   NA  --   --  130*   K  --   --  5.2*   BUN  --   --  62*   CREA  --   --  2.12*   GLU  --   --  46*   GLUCPOC 93   < >  --     < > = values in this interval not displayed. Assessment:     Principal Problem:    S/P CABG x 4 (2021)      Overview: x 4, LIMA to LAD, RSVG to Diag, RSVG to OM, RSVG to PDA    Active Problems:    NSTEMI (non-ST elevated myocardial infarction) ([facility] [address] (2021)      CAD (coronary artery disease) (2021)       Plan/Recommendations/Medical Decision Makin. NSTEMI, Mv CAD s/p CABG x4: Cont ASA/statin/BB and Amiodarone d/c today d/t nausea. ECHO completed   2. Uncontrolled Hypertension: on Coreg, losartan, verapamil PTA. Hold losartan given renal function. Cont Coreg & norvasc. 3. Large hiatal hernia: CT with Large hiatal hernia containing nearly the entire stomach, mesenteric fat, and the splenic flexure of the colon. Needs FU as OP  4.  TEMO on CKD stage 3: Monitor - creatinine up today at 2.12. Nephro following. 5. Anxiety: PRN xanax  6. Postop shock, vasoplegia vs cardiogenic: Off all vasopressors. Resolved. 7.  Postop pain control: Cont tylenol to 1,000mg Q6hr PRN. Cont PRN Oxycodone and ultram. Add lidocaine patches  8. Atelectasis, apical pneumo: Increase mobility. Hourly I.S. Wean oxygen for O2 sat > 92%. Remains on 1L NC. OOB TID, ambulating BID. CXR not improving, needs intense hourly pulmonary toileting. Daily CXR. Will add IV diuresis today. 9. Leukocytosis: Increase mobility. Pulm toileting. Daily incisional care to sternal incision and EVH site. UA sent - cont abx  10. DM type II: New diagnosis. A1C 7, started amaryl - stopped today for Hypoglycemia overnight. Stopped SSI. Needs education for diabetes. Diabetes management following. Will need prescription for glucometer/testing supplies. 11. Acute DVT: started on Lovenox 11/28. Changed to Eliquis 11/29. Continue. Reduce Eliquis to 5mg BID   12. Hyperkalemia: Given lokelma BID 11/30. K up slightly to 5.2 this am. Nephro following  13. Urinary retention: UA with + leuks, + blood, +1 bacteria; also mod epithelial. UC pending. Cont Levoflaxaxin     Dispo: PT/OT. OOB TID, ambulating BID. Case management following to aid in discharge planning -referrals sent to SNF x3 yesterday. 11/24 COVID PCR negative. Worsening kidney function and labored breathing today - will hold off on any transfer at this time. Not medically ready to leave for SNF. Update 1415: No I/O charted. RN unsure if pt has voided post Lasix dose. Orders to get pt up to void and complete post void bladder scan. STAT BMP ordered for 3pm. Pt is also nausea, has not had a BM, addititional bowel rx ordered. Update 1558 - pt K 5.5, called pharmacy and will give 1 dose of valtessa now, may repeat tomorrow if necessary.      Signed By: Zuhair Santacruz NP

## 2021-12-02 NOTE — PROGRESS NOTES
2300 Bedside and Verbal shift change report given to Niki Brown RN and Deanne Bosworth RN (oncoming nurse) by Dawson Peace RN (offgoing nurse). Report included the following information SBAR, Kardex, ED Summary, OR Summary, MAR and Cardiac Rhythm NSR.     0022 Patient requests nightly Benadryl for insomnia, admin for patient comfort.     0106 Patient reports pain in back 6/10, admin Tramadol for patient discomfort.     0700 Bedside shift change report given to Rema RN and Tin Medina RN (oncoming nurse) by Niki Brown RN (offgoing nurse). Report included the following information SBAR, Kardex, ED Summary, OR Summary, MAR, Recent Results and Cardiac Rhythm NSR    Shift worked:  [id number]     Shift summary and any significant changes:     episode of hypoglycemia     Concerns for physician to address:  none     Zone phone for oncoming shift:          Activity:  Activity Level: Up with Assistance  Number times ambulated in hallways past shift: 0  Number of times OOB to chair past shift: 1    Cardiac:   Cardiac Monitoring: Yes      Cardiac Rhythm: Sinus Rhythm    Access:   Current line(s): PIV     Genitourinary:   Urinary status: voiding    Respiratory:   O2 Device: Nasal cannula  Chronic home O2 use?: NO  Incentive spirometer at bedside: YES  Actual Volume (ml): 600 ml  GI:  Last Bowel Movement Date: 11/28/21  Current diet:  ADULT ORAL NUTRITION SUPPLEMENT Breakfast, Lunch, Dinner; Low Calorie/High Protein  ADULT DIET Regular; 4 carb choices (60 gm/meal); No Salt Added (3-4 gm)  Passing flatus: YES  Tolerating current diet: YES       Pain Management:   Patient states pain is manageable on current regimen: YES    Skin:  Ilia Score: 17  Interventions: float heels and increase time out of bed    Patient Safety:  Fall Score:  Total Score: 3  Interventions: bed/chair alarm and gripper socks  High Fall Risk: Yes    Length of Stay:  Expected LOS: 8d 14h  Actual LOS: 18      Niki Brown RN

## 2021-12-02 NOTE — PROGRESS NOTES
Bedside shift change report given to Cat Hilliard RN (oncoming nurse) by Lethaniel Kayser (offgoing nurse). Report included the following information SBAR, Kardex, Intake/Output, MAR, Recent Results and Cardiac Rhythm NSR.

## 2021-12-03 ENCOUNTER — APPOINTMENT (OUTPATIENT)
Dept: GENERAL RADIOLOGY | Age: 86
DRG: 233 | End: 2021-12-03
Attending: PHYSICIAN ASSISTANT
Payer: MEDICARE

## 2021-12-03 LAB
ABO + RH BLD: NORMAL
ANION GAP SERPL CALC-SCNC: 5 MMOL/L (ref 5–15)
BASOPHILS # BLD: 0 K/UL (ref 0–0.1)
BASOPHILS NFR BLD: 0 % (ref 0–1)
BLD PROD TYP BPU: NORMAL
BLOOD GROUP ANTIBODIES SERPL: NORMAL
BPU ID: NORMAL
BUN SERPL-MCNC: 54 MG/DL (ref 6–20)
BUN/CREAT SERPL: 26 (ref 12–20)
CALCIUM SERPL-MCNC: 8.5 MG/DL (ref 8.5–10.1)
CHLORIDE SERPL-SCNC: 96 MMOL/L (ref 97–108)
CO2 SERPL-SCNC: 27 MMOL/L (ref 21–32)
CREAT SERPL-MCNC: 2.06 MG/DL (ref 0.55–1.02)
CROSSMATCH RESULT,%XM: NORMAL
DIFFERENTIAL METHOD BLD: ABNORMAL
EOSINOPHIL # BLD: 0 K/UL (ref 0–0.4)
EOSINOPHIL NFR BLD: 0 % (ref 0–7)
ERYTHROCYTE [DISTWIDTH] IN BLOOD BY AUTOMATED COUNT: 15.8 % (ref 11.5–14.5)
GLUCOSE BLD STRIP.AUTO-MCNC: 148 MG/DL (ref 65–117)
GLUCOSE BLD STRIP.AUTO-MCNC: 149 MG/DL (ref 65–117)
GLUCOSE BLD STRIP.AUTO-MCNC: 167 MG/DL (ref 65–117)
GLUCOSE BLD STRIP.AUTO-MCNC: 204 MG/DL (ref 65–117)
GLUCOSE BLD STRIP.AUTO-MCNC: 42 MG/DL (ref 65–117)
GLUCOSE BLD STRIP.AUTO-MCNC: 45 MG/DL (ref 65–117)
GLUCOSE BLD STRIP.AUTO-MCNC: 55 MG/DL (ref 65–117)
GLUCOSE BLD STRIP.AUTO-MCNC: 60 MG/DL (ref 65–117)
GLUCOSE BLD STRIP.AUTO-MCNC: 61 MG/DL (ref 65–117)
GLUCOSE SERPL-MCNC: 42 MG/DL (ref 65–100)
HCT VFR BLD AUTO: 28.6 % (ref 35–47)
HGB BLD-MCNC: 9.1 G/DL (ref 11.5–16)
IMM GRANULOCYTES # BLD AUTO: 0.1 K/UL (ref 0–0.04)
IMM GRANULOCYTES NFR BLD AUTO: 1 % (ref 0–0.5)
LYMPHOCYTES # BLD: 1.3 K/UL (ref 0.8–3.5)
LYMPHOCYTES NFR BLD: 9 % (ref 12–49)
MCH RBC QN AUTO: 29.7 PG (ref 26–34)
MCHC RBC AUTO-ENTMCNC: 31.8 G/DL (ref 30–36.5)
MCV RBC AUTO: 93.5 FL (ref 80–99)
MONOCYTES # BLD: 1.6 K/UL (ref 0–1)
MONOCYTES NFR BLD: 11 % (ref 5–13)
NEUTS SEG # BLD: 11.2 K/UL (ref 1.8–8)
NEUTS SEG NFR BLD: 79 % (ref 32–75)
NRBC # BLD: 0 K/UL (ref 0–0.01)
NRBC BLD-RTO: 0 PER 100 WBC
PLATELET # BLD AUTO: 609 K/UL (ref 150–400)
PMV BLD AUTO: 8.9 FL (ref 8.9–12.9)
POTASSIUM SERPL-SCNC: 4.8 MMOL/L (ref 3.5–5.1)
RBC # BLD AUTO: 3.06 M/UL (ref 3.8–5.2)
RBC MORPH BLD: ABNORMAL
SERVICE CMNT-IMP: ABNORMAL
SODIUM SERPL-SCNC: 128 MMOL/L (ref 136–145)
SPECIMEN EXP DATE BLD: NORMAL
STATUS OF UNIT,%ST: NORMAL
UNIT DIVISION, %UDIV: 0
WBC # BLD AUTO: 14.2 K/UL (ref 3.6–11)

## 2021-12-03 PROCEDURE — 74011000250 HC RX REV CODE- 250: Performed by: NURSE PRACTITIONER

## 2021-12-03 PROCEDURE — 36415 COLL VENOUS BLD VENIPUNCTURE: CPT

## 2021-12-03 PROCEDURE — 74011250636 HC RX REV CODE- 250/636: Performed by: NURSE PRACTITIONER

## 2021-12-03 PROCEDURE — 74011250637 HC RX REV CODE- 250/637: Performed by: THORACIC SURGERY (CARDIOTHORACIC VASCULAR SURGERY)

## 2021-12-03 PROCEDURE — 65660000000 HC RM CCU STEPDOWN

## 2021-12-03 PROCEDURE — 51798 US URINE CAPACITY MEASURE: CPT

## 2021-12-03 PROCEDURE — 74011250637 HC RX REV CODE- 250/637: Performed by: NURSE PRACTITIONER

## 2021-12-03 PROCEDURE — 82962 GLUCOSE BLOOD TEST: CPT

## 2021-12-03 PROCEDURE — 74011250637 HC RX REV CODE- 250/637: Performed by: PHYSICIAN ASSISTANT

## 2021-12-03 PROCEDURE — 80048 BASIC METABOLIC PNL TOTAL CA: CPT

## 2021-12-03 PROCEDURE — 85025 COMPLETE CBC W/AUTO DIFF WBC: CPT

## 2021-12-03 PROCEDURE — 77010033678 HC OXYGEN DAILY

## 2021-12-03 PROCEDURE — 71045 X-RAY EXAM CHEST 1 VIEW: CPT

## 2021-12-03 RX ORDER — DIPHENHYDRAMINE HCL 25 MG
25 CAPSULE ORAL
Status: DISCONTINUED | OUTPATIENT
Start: 2021-12-03 | End: 2021-12-06 | Stop reason: HOSPADM

## 2021-12-03 RX ORDER — OXYCODONE HYDROCHLORIDE 5 MG/1
5 TABLET ORAL
Status: DISCONTINUED | OUTPATIENT
Start: 2021-12-03 | End: 2021-12-06 | Stop reason: HOSPADM

## 2021-12-03 RX ORDER — TRAMADOL HYDROCHLORIDE 50 MG/1
25 TABLET ORAL
Status: DISCONTINUED | OUTPATIENT
Start: 2021-12-03 | End: 2021-12-06 | Stop reason: HOSPADM

## 2021-12-03 RX ORDER — LANOLIN ALCOHOL/MO/W.PET/CERES
3-6 CREAM (GRAM) TOPICAL
Status: DISCONTINUED | OUTPATIENT
Start: 2021-12-03 | End: 2021-12-06 | Stop reason: HOSPADM

## 2021-12-03 RX ADMIN — Medication 16 G: at 05:14

## 2021-12-03 RX ADMIN — PSYLLIUM HUSK 1 PACKET: 3.4 POWDER ORAL at 10:16

## 2021-12-03 RX ADMIN — POLYETHYLENE GLYCOL 3350 17 G: 17 POWDER, FOR SOLUTION ORAL at 10:09

## 2021-12-03 RX ADMIN — AMLODIPINE BESYLATE 10 MG: 5 TABLET ORAL at 10:11

## 2021-12-03 RX ADMIN — ALPRAZOLAM 0.5 MG: 0.5 TABLET ORAL at 22:03

## 2021-12-03 RX ADMIN — PANTOPRAZOLE SODIUM 40 MG: 40 TABLET, DELAYED RELEASE ORAL at 07:56

## 2021-12-03 RX ADMIN — TRAMADOL HYDROCHLORIDE 50 MG: 50 TABLET, COATED ORAL at 03:47

## 2021-12-03 RX ADMIN — Medication 10 ML: at 22:03

## 2021-12-03 RX ADMIN — CARVEDILOL 6.25 MG: 6.25 TABLET, FILM COATED ORAL at 07:56

## 2021-12-03 RX ADMIN — ACETAMINOPHEN 1000 MG: 500 TABLET ORAL at 22:02

## 2021-12-03 RX ADMIN — ASPIRIN 81 MG CHEWABLE TABLET 81 MG: 81 TABLET CHEWABLE at 10:09

## 2021-12-03 RX ADMIN — Medication 10 ML: at 17:15

## 2021-12-03 RX ADMIN — APIXABAN 5 MG: 5 TABLET, FILM COATED ORAL at 10:06

## 2021-12-03 RX ADMIN — ATORVASTATIN CALCIUM 80 MG: 40 TABLET, FILM COATED ORAL at 22:03

## 2021-12-03 RX ADMIN — APIXABAN 5 MG: 5 TABLET, FILM COATED ORAL at 22:03

## 2021-12-03 RX ADMIN — LEVOFLOXACIN 250 MG: 250 TABLET, FILM COATED ORAL at 10:11

## 2021-12-03 RX ADMIN — Medication 1 CAPSULE: at 10:06

## 2021-12-03 RX ADMIN — DOCUSATE SODIUM 50MG AND SENNOSIDES 8.6MG 2 TABLET: 8.6; 5 TABLET, FILM COATED ORAL at 10:12

## 2021-12-03 RX ADMIN — DEXTROSE MONOHYDRATE 12.5 G: 25 INJECTION, SOLUTION INTRAVENOUS at 05:32

## 2021-12-03 RX ADMIN — ONDANSETRON 4 MG: 2 INJECTION INTRAMUSCULAR; INTRAVENOUS at 07:56

## 2021-12-03 RX ADMIN — SODIUM CHLORIDE 5 MG: 9 INJECTION INTRAMUSCULAR; INTRAVENOUS; SUBCUTANEOUS at 17:16

## 2021-12-03 RX ADMIN — CARVEDILOL 6.25 MG: 6.25 TABLET, FILM COATED ORAL at 17:16

## 2021-12-03 RX ADMIN — SODIUM CHLORIDE 5 MG: 9 INJECTION INTRAMUSCULAR; INTRAVENOUS; SUBCUTANEOUS at 10:21

## 2021-12-03 RX ADMIN — Medication 1 TABLET: at 10:08

## 2021-12-03 RX ADMIN — DOCUSATE SODIUM 50MG AND SENNOSIDES 8.6MG 2 TABLET: 8.6; 5 TABLET, FILM COATED ORAL at 17:16

## 2021-12-03 RX ADMIN — PSYLLIUM HUSK 1 PACKET: 3.4 POWDER ORAL at 18:00

## 2021-12-03 RX ADMIN — MELATONIN 6 MG: at 22:03

## 2021-12-03 NOTE — PROGRESS NOTES
Omari Albarado        :  1935        MRN:  [id number]                  Assessment   :                                               Plan:  CKD-3b  TEMO  Hyponatremia  S/P CABG    hyperkalemia Her creatinine in 2021 was 1.50. Urine sediment fairly bland. Her creatinine is a little better. Sodium is low. Add fluid restriction. Potassium better today.              Subjective:     Chief Complaint:  \" I'm the same. \" No dyspnea. No pain. No CP. No N/V. Review of Systems:    Symptom Y/N Comments  Symptom Y/N Comments   Fever/Chills    Chest Pain     Poor Appetite    Edema     Cough    Abdominal Pain     Sputum    Joint Pain     SOB/TIDWELL    Pruritis/Rash     Nausea/vomit    Tolerating PT/OT     Diarrhea    Tolerating Diet     Constipation    Other       Could not obtain due to:      Objective:     VITALS:   Last 24hrs VS reviewed since prior progress note.  Most recent are:  Visit Vitals  BP (!) 145/45   Pulse 75   Temp 98.6 °F (37 °C)   Resp 18   Ht 5' 7\" (1.702 m)   Wt 88.6 kg (195 lb 5.2 oz)   SpO2 (!) 75%   BMI 30.59 kg/m²       Intake/Output Summary (Last 24 hours) at 12/3/2021 1123  Last data filed at 12/3/2021 7255  Gross per 24 hour   Intake 1410.6 ml   Output 1351 ml   Net 59.6 ml      Telemetry Reviewed:     PHYSICAL EXAM:  General: NAD  + edema      Lab Data Reviewed: (see below)    Medications Reviewed: (see below)    PMH/SH reviewed - no change compared to H&P  ________________________________________________________________________  Care Plan discussed with:  Patient     Family      RN     Care Manager                    Consultant:          Comments   >50% of visit spent in counseling and coordination of care       ________________________________________________________________________  Florentin Read MD     Procedures: see electronic medical records for all procedures/Xrays and details which  were not copied into this note but were reviewed prior to creation of Plan. LABS:  Recent Labs     12/03/21  0352 12/02/21  1621 12/02/21 0527 12/02/21 0527   WBC 14.2*  --   --  15.9*   HGB 9.1* 8.1*   < > 7.7*   HCT 28.6* 26.0*   < > 25.3*   *  --   --  614*    < > = values in this interval not displayed. Recent Labs     12/03/21  0352 12/02/21  1425 12/02/21 0527   * 131* 130*   K 4.8 5.5* 5.2*   CL 96* 97 98   CO2 27 30 25   BUN 54* 60* 62*   CREA 2.06* 2.14* 2.12*   GLU 42* 66 46*   CA 8.5 8.9 8.7     No results for input(s): AP, TBIL, TP, ALB, GLOB, GGT, AML, LPSE in the last 72 hours. No lab exists for component: SGOT, GPT, AMYP, HLPSE  No results for input(s): INR, PTP, APTT, INREXT, INREXT in the last 72 hours. No results for input(s): FE, TIBC, PSAT, FERR in the last 72 hours. No results found for: FOL, RBCF   No results for input(s): PH, PCO2, PO2 in the last 72 hours. No results for input(s): CPK, CKMB in the last 72 hours.     No lab exists for component: TROPONINI  No components found for: [place]  Lab Results   Component Value Date/Time    Color YELLOW/STRAW 11/30/2021 03:08 PM    Appearance CLOUDY (A) 11/30/2021 03:08 PM    Specific gravity 1.016 11/30/2021 03:08 PM    pH (UA) 5.0 11/30/2021 03:08 PM    Protein Negative 11/30/2021 03:08 PM    Glucose Negative 11/30/2021 03:08 PM    Ketone Negative 11/30/2021 03:08 PM    Bilirubin Negative 11/30/2021 03:08 PM    Urobilinogen 0.2 11/30/2021 03:08 PM    Nitrites Negative 11/30/2021 03:08 PM    Leukocyte Esterase MODERATE (A) 11/30/2021 03:08 PM    Epithelial cells MODERATE (A) 11/30/2021 03:08 PM    Bacteria 1+ (A) 11/30/2021 03:08 PM    WBC 20-50 11/30/2021 03:08 PM    RBC 5-10 11/30/2021 03:08 PM       MEDICATIONS:  Current Facility-Administered Medications   Medication Dose Route Frequency    prochlorperazine (COMPAZINE) with saline injection 5 mg  5 mg IntraVENous Q4H PRN    oxyCODONE IR (ROXICODONE) tablet 5 mg  5 mg Oral Q4H PRN    traMADoL (ULTRAM) tablet 25 mg  25 mg Oral Q6H PRN    melatonin tablet 3-6 mg  3-6 mg Oral QHS PRN    diphenhydrAMINE (BENADRYL) capsule 25 mg  25 mg Oral Q6H PRN    lidocaine 4 % patch 1 Patch  1 Patch TransDERmal Q24H    apixaban (ELIQUIS) tablet 5 mg  5 mg Oral Q12H    psyllium husk-aspartame (METAMUCIL FIBER) packet 1 Packet  1 Packet Oral BID    mineral oil (FLEET) enema   Rectal PRN    L.acidophilus-paracasei-S.thermophil-bifidobacter (RISAQUAD) 8 billion cell capsule  1 Capsule Oral DAILY    senna-docusate (PERICOLACE) 8.6-50 mg per tablet 2 Tablet  2 Tablet Oral BID    bisacodyL (DULCOLAX) suppository 10 mg  10 mg Rectal DAILY    0.9% sodium chloride infusion 250 mL  250 mL IntraVENous PRN    levoFLOXacin (LEVAQUIN) tablet 250 mg  250 mg Oral Q24H    acetaminophen (TYLENOL) tablet 1,000 mg  1,000 mg Oral Q6H PRN    amLODIPine (NORVASC) tablet 10 mg  10 mg Oral DAILY    sodium chloride (NS) flush 5-40 mL  5-40 mL IntraVENous Q8H    sodium chloride (NS) flush 5-40 mL  5-40 mL IntraVENous PRN    carvediloL (COREG) tablet 6.25 mg  6.25 mg Oral BID WITH MEALS    hydrALAZINE (APRESOLINE) 20 mg/mL injection 10-20 mg  10-20 mg IntraVENous Q6H PRN    atorvastatin (LIPITOR) tablet 80 mg  80 mg Oral QHS    bacitracin 500 unit/gram packet 1 Packet  1 Packet Topical PRN    naloxone (NARCAN) injection 0.4 mg  0.4 mg IntraVENous PRN    ondansetron (ZOFRAN) injection 4 mg  4 mg IntraVENous Q4H PRN    albuterol (PROVENTIL VENTOLIN) nebulizer solution 2.5 mg  2.5 mg Nebulization Q4H PRN    aspirin chewable tablet 81 mg  81 mg Oral DAILY    [Held by provider] magnesium oxide (MAG-OX) tablet 400 mg  400 mg Oral BID    polyethylene glycol (MIRALAX) packet 17 g  17 g Oral DAILY    glucose chewable tablet 16 g  4 Tablet Oral PRN    dextrose (D50W) injection syrg 12.5-25 g  12.5-25 g IntraVENous PRN    glucagon (GLUCAGEN) injection 1 mg  1 mg IntraMUSCular PRN    diphenhydrAMINE (BENADRYL) injection 25 mg  25 mg IntraVENous Q6H PRN    pantoprazole (PROTONIX) tablet 40 mg  40 mg Oral ACB    heparin (porcine) 1,000 unit/mL injection    PRN    ALPRAZolam (XANAX) tablet 0.5 mg  0.5 mg Oral PRN    calcium-vitamin D (OS-MARINA +D3) 500 mg-200 unit per tablet 1 Tablet  1 Tablet Oral DAILY

## 2021-12-03 NOTE — PROGRESS NOTES
First attempt and pt had just returned to bedside chair from being on [facility], second attempt was vomiting, third attempt pt had just finished walking with nursing and was fatigued. Will defer PT at this time but continue to follow.    Lorena Cordova, PT, DPT

## 2021-12-03 NOTE — PROGRESS NOTES
CSS Floor Progress Note    Admit Date: 2021  POD:  11 Days Post-Op    Procedure:  Procedure(s):  CORONARY ARTERY BYPASS GRAFT X4 LIMA, [facility] VIA EVH. ECC. EPI AORTIC US BY DR Dinorah Akers. Subjective:   Pt seen with Dr. Isaiah Esteban. Pt resting up in bed on 1L NC. Afebrile. Objective:   Vitals:  Blood pressure (!) 137/44, pulse 72, temperature 97.8 °F (36.6 °C), resp. rate 20, height 5' 7\" (1.702 m), weight 195 lb 5.2 oz (88.6 kg), SpO2 (!) 72 %. Temp (24hrs), Av.1 °F (36.7 °C), Min:97.7 °F (36.5 °C), Max:98.4 °F (36.9 °C)    EKG/Rhythm:  NSR 60-70s    Oxygen Therapy:  Oxygen Therapy  O2 Sat (%): (!) 72 % (21)  Pulse via Oximetry: 73 beats per minute (21)  O2 Device: Nasal cannula (21)  Skin Assessment: Clean, dry, & intact (21 1025)  Skin Protection for O2 Device: No (21)  Orientation: Bilateral (21)  Location: Cheek (21)  Interventions: Mouth Care; Reposition Device (21)  O2 Flow Rate (L/min): 1 l/min (21)  FIO2 (%): 50 % (21)    CXR:   CXR Results  (Last 48 hours)               21 0551  XR CHEST PORT Final result    Impression:  No significant change. Narrative:  INDICATION:  post op heart       EXAM: CXR Portable. FINDINGS: Portable chest shows no significant change since yesterday. There is   no apparent pneumothorax. Lungs show opacified bases. Heart size is obscured. There is no overt pulmonary edema. 21 0632  XR CHEST PORT Final result    Impression:  Improvement in congestive changes. Narrative:  Clinical indication: Postop heart. Portable AP upright view of the chest obtained and compared to 2021. Enlargement cardiomediastinal silhouette once again seen, mild congestive   changes have improved since the prior examination.                   Admission Weight: Last Weight   Weight: 184 lb (83.5 kg) Weight: 195 lb 5.2 oz (88.6 kg) Intake / Output / Drain:  Current Shift: No intake/output data recorded. Last 24 hrs.:     Intake/Output Summary (Last 24 hours) at 12/3/2021 0857  Last data filed at 12/3/2021 6560  Gross per 24 hour   Intake 1410.6 ml   Output 1351 ml   Net 59.6 ml       EXAM:  General:     NAD, pleasant mood                                                      Lungs:   Coarse crackles to auscultation bilaterally. Incision:  FRANCHESKA. No erythema, swelling, or drainage noted. Heart:  Regular rate and rhythm, S1, S2 normal, no murmur, click, rub or gallop. Abdomen:   Soft, non-tender. Bowel sounds hypoactive. No masses,  No organomegaly. + BM    Extremities:  2+ LE edema. PPP. Neurologic:  Gross motor and sensory apparatus intact. Labs:   Recent Labs     21  0557 21  0453 21  0352   WBC  --   --  14.2*   HGB  --   --  9.1*   HCT  --   --  28.6*   PLT  --   --  609*   NA  --   --  128*   K  --   --  4.8   BUN  --   --  54*   CREA  --   --  2.06*   GLU  --   --  42*   GLUCPOC 148*   < >  --     < > = values in this interval not displayed. Assessment:     Principal Problem:    S/P CABG x 4 (2021)      Overview: x 4, LIMA to LAD, RSVG to Diag, RSVG to OM, RSVG to PDA    Active Problems:    NSTEMI (non-ST elevated myocardial infarction) ([facility] [address] (2021)      CAD (coronary artery disease) (2021)       Plan/Recommendations/Medical Decision Makin. NSTEMI, Mv CAD s/p CABG x4: Cont ASA/statin/BB and Amiodarone d/c today d/t nausea. ECHO completed   2. Uncontrolled Hypertension: on Coreg, losartan, verapamil PTA. Hold losartan given renal function. Cont Coreg & norvasc. 3. Large hiatal hernia: CT with Large hiatal hernia containing nearly the entire stomach, mesenteric fat, and the splenic flexure of the colon. Needs FU as OP  4. TEMO on CKD stage 3: Monitor - creatinine down to 2.06. Nephro following. Given 40 lasix, albumin, and 1 PRBC /  5. Anxiety: PRN xanax  6.   Postop shock, vasoplegia vs cardiogenic: Off all vasopressors. Resolved. 7.  Postop pain control: Cont tylenol to 1,000mg Q6hr PRN. Cont PRN Oxycodone and ultram. Cont lidocaine patches - place on shoulder  8. Atelectasis, apical pneumo: Increase mobility. Hourly I.S. Wean oxygen for O2 sat > 92%. Remains on 1L NC. OOB TID, ambulating BID. CXR not improving, needs intense hourly pulmonary toileting. Daily CXR. Iv diuresis 12/2  9. Leukocytosis: Increase mobility. Pulm toileting. Daily incisional care to sternal incision and EVH site. UA sent - cont abx  10. DM type II: New diagnosis. A1C 7, started amaryl - stopped 12/2 for Hypoglycemia overnight. Stopped SSI. Needs education for diabetes. Diabetes management following. Will need prescription for glucometer/testing supplies. Hypoglycemia again - add bedtime supplement. Likely residual amaryl which hasnt cleared   11. Acute DVT: started on Lovenox 11/28. Changed to Eliquis 11/29. Continue. Reduced Eliquis to 5mg BID d/t crcl  12. Hyperkalemia: Given lokelma BID 11/30. Valtessa given 12/2 - K down to 4.8 this am. Nephro following  13. Urinary retention: UA with + leuks, + blood, +1 bacteria; also mod epithelial. UC pending. Complete Levoflaxaxin. Straight cath last night. Must Complete Post void bladder scans. Strict I/Os. 14. Nausea: cont increased bowel regimen. Add compazine PRN. AMBULATE TID. Limit narcotics, utilize tylenol. Dispo: PT/OT. OOB TID, ambulating BID. Case management following to aid in discharge planning -referrals sent to SNF x3 yesterday. 11/24 COVID PCR negative. Worsening kidney function and labored breathing today - will hold off on any transfer at this time. Not medically ready to leave for SNF.      Signed By: Lashawn Wells NP

## 2021-12-03 NOTE — PROGRESS NOTES
1900: Bedside shift change report given to Yeison Valladares (oncoming nurse) by Nany Hooker RN  (offgoing nurse). Report included the following information SBAR, Kardex, Procedure Summary, Intake/Output, MAR, Recent Results and Cardiac Rhythm NSR.     2049: Patient is complaining of having nausea. Ondansetron given. 2108: FSBG 62. Estrellita Collazo RN gave patient [place] juice. 2135: Patient barely touched orange juice, with 1/4 cup left. Re-check FSBG 37.     2136: Re-check FSBG on other hand 44. Patient states unable to tolerate PO due to nausea. 2149: Dextrose 25g IV push given to patient. Gave sal crackers and ginger ale at bedside. Encouraged patient to eat and drink when tolerable. 2213: Re-check FSBG 159.     2300: Called on-call for Dr. Rosalina cueva. 2350: Blood Transfusion started at 75ml/hr. 0142: On-call PA from Dr. Rosalina cueva called back, no other interventions concerning patient's hypoglycemic episode. Will re-check FSBG in the morning.     0321: Blood transfusion completed. No complained of transfusion reactions. 2339: Lab called for a critical glucose of 42. 0453: FSBG 42.     0455: Re-check on the other hand FSBG 45.     0458: Gave patient orange juice and sal crackers and encouraged patient to eat. 4516: Patient finished orange juice 1 cup and half of sal crackers. Re-check FSBG 60.    0514: Gave glucose chewable tablet 16g since patient is tolerating PO intake. 7573: Patient finished orange juice and glucose tablets. Re-check FSBG 61.     0532: Gave 12.5g D50W per order. 2734: Re-check FSBG 148. End of Shift Note    Bedside shift change report given to Glenis (oncoming nurse) by Ne Mccauley RN (offgoing nurse).   Report included the following information SBAR, Kardex, Procedure Summary, Intake/Output, MAR, Recent Results and Cardiac Rhythm NSR    Shift worked:  [id number]     Shift summary and any significant changes:     SEE NOTES ABOVE     Concerns for physician to address:  hypoglycemic episodes; patient complaining of left arm pain. Zone phone for oncoming shift:          Activity:  Activity Level: Up with Assistance  Number times ambulated in hallways past shift: 0  Number of times OOB to chair past shift: 1    Cardiac:   Cardiac Monitoring: Yes      Cardiac Rhythm: Sinus Rhythm    Access:   Current line(s): PIV     Genitourinary:   Urinary status: voiding    Respiratory:   O2 Device: Nasal cannula  Chronic home O2 use?: NO  Incentive spirometer at bedside: YES  Actual Volume (ml): 600 ml  GI:  Last Bowel Movement Date: 12/02/21  Current diet:  ADULT ORAL NUTRITION SUPPLEMENT Breakfast, Lunch, Dinner; Low Calorie/High Protein  ADULT DIET Regular; 4 carb choices (60 gm/meal); No Salt Added (3-4 gm); Low Potassium (Less than 3000 mg/day); No Concentrated Sweets  Passing flatus: YES  Tolerating current diet: YES       Pain Management:   Patient states pain is manageable on current regimen: YES    Skin:  Ilia Score: 18  Interventions: float heels, increase time out of bed and PT/OT consult    Patient Safety:  Fall Score:  Total Score: 3  Interventions: bed/chair alarm, assistive device (walker, cane, etc), gripper socks and pt to call before getting OOB  High Fall Risk: Yes    Length of Stay:  Expected LOS: 8d 14h  Actual LOS: Arvind Hilton RN

## 2021-12-03 NOTE — PROGRESS NOTES
Bedside shift change report given to Brian Guillen RN (oncoming nurse) by Omar Hatch RN (offgoing nurse). Report included the following information SBAR, Kardex, Intake/Output, MAR and Cardiac Rhythm NSR. Scanned patient's bladder post void. Bladder scan reads 220. Order to called MD for post bladder scan greater than 200. Paged Dr. Kitty Tavera group, Dr. Marco Okeefe is physician on call. Informed Dr. Marco Okeefe of post void scan \"states just put servin back in and leave it in\". Bedside shift change report given to Omar Hatch RN (oncoming nurse) by Brian Guillen RN (offgoing nurse). Report included the following information SBAR, Kardex, Intake/Output, MAR and Cardiac Rhythm NSR.

## 2021-12-03 NOTE — PROGRESS NOTES
First attempt and pt had just returned to bedside chair from being on [facility], second attempt was vomiting, third attempt pt had just finished walking with nursing and was fatigued. Located pts personal walker and place it in her room. Will defer OT at this time but continue to follow.

## 2021-12-04 ENCOUNTER — APPOINTMENT (OUTPATIENT)
Dept: GENERAL RADIOLOGY | Age: 86
DRG: 233 | End: 2021-12-04
Attending: PHYSICIAN ASSISTANT
Payer: MEDICARE

## 2021-12-04 LAB
ANION GAP SERPL CALC-SCNC: 6 MMOL/L (ref 5–15)
BASOPHILS # BLD: 0 K/UL (ref 0–0.1)
BASOPHILS NFR BLD: 0 % (ref 0–1)
BUN SERPL-MCNC: 55 MG/DL (ref 6–20)
BUN/CREAT SERPL: 26 (ref 12–20)
CALCIUM SERPL-MCNC: 8.8 MG/DL (ref 8.5–10.1)
CHLORIDE SERPL-SCNC: 95 MMOL/L (ref 97–108)
CO2 SERPL-SCNC: 28 MMOL/L (ref 21–32)
CREAT SERPL-MCNC: 2.08 MG/DL (ref 0.55–1.02)
DIFFERENTIAL METHOD BLD: ABNORMAL
EOSINOPHIL # BLD: 0.3 K/UL (ref 0–0.4)
EOSINOPHIL NFR BLD: 2 % (ref 0–7)
ERYTHROCYTE [DISTWIDTH] IN BLOOD BY AUTOMATED COUNT: 15.8 % (ref 11.5–14.5)
GLUCOSE BLD STRIP.AUTO-MCNC: 139 MG/DL (ref 65–117)
GLUCOSE BLD STRIP.AUTO-MCNC: 173 MG/DL (ref 65–117)
GLUCOSE BLD STRIP.AUTO-MCNC: 178 MG/DL (ref 65–117)
GLUCOSE BLD STRIP.AUTO-MCNC: 306 MG/DL (ref 65–117)
GLUCOSE SERPL-MCNC: 97 MG/DL (ref 65–100)
HCT VFR BLD AUTO: 27.7 % (ref 35–47)
HGB BLD-MCNC: 8.7 G/DL (ref 11.5–16)
IMM GRANULOCYTES # BLD AUTO: 0.2 K/UL (ref 0–0.04)
IMM GRANULOCYTES NFR BLD AUTO: 1 % (ref 0–0.5)
LYMPHOCYTES # BLD: 1.3 K/UL (ref 0.8–3.5)
LYMPHOCYTES NFR BLD: 9 % (ref 12–49)
MCH RBC QN AUTO: 29.3 PG (ref 26–34)
MCHC RBC AUTO-ENTMCNC: 31.4 G/DL (ref 30–36.5)
MCV RBC AUTO: 93.3 FL (ref 80–99)
MONOCYTES # BLD: 1.5 K/UL (ref 0–1)
MONOCYTES NFR BLD: 11 % (ref 5–13)
NEUTS SEG # BLD: 10.4 K/UL (ref 1.8–8)
NEUTS SEG NFR BLD: 76 % (ref 32–75)
NRBC # BLD: 0 K/UL (ref 0–0.01)
NRBC BLD-RTO: 0 PER 100 WBC
PLATELET # BLD AUTO: 623 K/UL (ref 150–400)
PMV BLD AUTO: 8.6 FL (ref 8.9–12.9)
POTASSIUM SERPL-SCNC: 5.2 MMOL/L (ref 3.5–5.1)
RBC # BLD AUTO: 2.97 M/UL (ref 3.8–5.2)
SERVICE CMNT-IMP: ABNORMAL
SODIUM SERPL-SCNC: 129 MMOL/L (ref 136–145)
WBC # BLD AUTO: 13.7 K/UL (ref 3.6–11)

## 2021-12-04 PROCEDURE — 74011000250 HC RX REV CODE- 250: Performed by: NURSE PRACTITIONER

## 2021-12-04 PROCEDURE — 74011250637 HC RX REV CODE- 250/637: Performed by: INTERNAL MEDICINE

## 2021-12-04 PROCEDURE — 74011250637 HC RX REV CODE- 250/637: Performed by: THORACIC SURGERY (CARDIOTHORACIC VASCULAR SURGERY)

## 2021-12-04 PROCEDURE — 74011250637 HC RX REV CODE- 250/637: Performed by: NURSE PRACTITIONER

## 2021-12-04 PROCEDURE — 82962 GLUCOSE BLOOD TEST: CPT

## 2021-12-04 PROCEDURE — 77010033678 HC OXYGEN DAILY

## 2021-12-04 PROCEDURE — 80048 BASIC METABOLIC PNL TOTAL CA: CPT

## 2021-12-04 PROCEDURE — 71045 X-RAY EXAM CHEST 1 VIEW: CPT

## 2021-12-04 PROCEDURE — 74011250637 HC RX REV CODE- 250/637: Performed by: PHYSICIAN ASSISTANT

## 2021-12-04 PROCEDURE — 36415 COLL VENOUS BLD VENIPUNCTURE: CPT

## 2021-12-04 PROCEDURE — 97110 THERAPEUTIC EXERCISES: CPT

## 2021-12-04 PROCEDURE — 85025 COMPLETE CBC W/AUTO DIFF WBC: CPT

## 2021-12-04 PROCEDURE — 74011250636 HC RX REV CODE- 250/636: Performed by: NURSE PRACTITIONER

## 2021-12-04 PROCEDURE — 65660000000 HC RM CCU STEPDOWN

## 2021-12-04 PROCEDURE — 94760 N-INVAS EAR/PLS OXIMETRY 1: CPT

## 2021-12-04 PROCEDURE — 97116 GAIT TRAINING THERAPY: CPT

## 2021-12-04 RX ADMIN — Medication 10 ML: at 21:13

## 2021-12-04 RX ADMIN — CARVEDILOL 6.25 MG: 6.25 TABLET, FILM COATED ORAL at 16:58

## 2021-12-04 RX ADMIN — ATORVASTATIN CALCIUM 80 MG: 40 TABLET, FILM COATED ORAL at 21:12

## 2021-12-04 RX ADMIN — SODIUM CHLORIDE 5 MG: 9 INJECTION INTRAMUSCULAR; INTRAVENOUS; SUBCUTANEOUS at 14:05

## 2021-12-04 RX ADMIN — POLYETHYLENE GLYCOL 3350 17 G: 17 POWDER, FOR SOLUTION ORAL at 08:58

## 2021-12-04 RX ADMIN — APIXABAN 5 MG: 5 TABLET, FILM COATED ORAL at 08:58

## 2021-12-04 RX ADMIN — MELATONIN 6 MG: at 21:12

## 2021-12-04 RX ADMIN — ACETAMINOPHEN 1000 MG: 500 TABLET ORAL at 09:05

## 2021-12-04 RX ADMIN — ACETAMINOPHEN 1000 MG: 500 TABLET ORAL at 21:12

## 2021-12-04 RX ADMIN — DOCUSATE SODIUM 50MG AND SENNOSIDES 8.6MG 2 TABLET: 8.6; 5 TABLET, FILM COATED ORAL at 08:59

## 2021-12-04 RX ADMIN — PSYLLIUM HUSK 1 PACKET: 3.4 POWDER ORAL at 10:25

## 2021-12-04 RX ADMIN — ALPRAZOLAM 0.5 MG: 0.5 TABLET ORAL at 21:12

## 2021-12-04 RX ADMIN — AMLODIPINE BESYLATE 10 MG: 5 TABLET ORAL at 08:58

## 2021-12-04 RX ADMIN — PANTOPRAZOLE SODIUM 40 MG: 40 TABLET, DELAYED RELEASE ORAL at 05:23

## 2021-12-04 RX ADMIN — SODIUM ZIRCONIUM CYCLOSILICATE 10 G: 10 POWDER, FOR SUSPENSION ORAL at 12:00

## 2021-12-04 RX ADMIN — CARVEDILOL 6.25 MG: 6.25 TABLET, FILM COATED ORAL at 08:58

## 2021-12-04 RX ADMIN — BISACODYL 10 MG: 10 SUPPOSITORY RECTAL at 08:59

## 2021-12-04 RX ADMIN — Medication 10 ML: at 05:23

## 2021-12-04 RX ADMIN — Medication 10 ML: at 14:07

## 2021-12-04 RX ADMIN — Medication 1 CAPSULE: at 08:58

## 2021-12-04 RX ADMIN — LEVOFLOXACIN 250 MG: 250 TABLET, FILM COATED ORAL at 08:59

## 2021-12-04 RX ADMIN — APIXABAN 5 MG: 5 TABLET, FILM COATED ORAL at 21:12

## 2021-12-04 RX ADMIN — Medication 1 TABLET: at 08:58

## 2021-12-04 RX ADMIN — ASPIRIN 81 MG CHEWABLE TABLET 81 MG: 81 TABLET CHEWABLE at 08:58

## 2021-12-04 NOTE — PROGRESS NOTES
Problem: Mobility Impaired (Adult and Pediatric)  Goal: *Acute Goals and Plan of Care (Insert Text)  Description: FUNCTIONAL STATUS PRIOR TO ADMISSION: Patient was independent and active without use of DME however began using rolling walker while in hospital prior to cardiac surgery. Reports that she has been having difficulty with endurance and can only do a little of activity before needing to take a break. Reports \"nighttime weakness\" but no falls at home. HOME SUPPORT PRIOR TO ADMISSION: The patient lived alone with son in the area to provide assistance. reviewed 11/26/2021  1. Patient will move from supine to sit and sit to supine , scoot up and down, and roll side to side in bed with min A within 5 days. 2.  Patient will perform sit to/from stand with contact guard assist within 5 days. 3.  Patient will ambulate 50 feet with least restrictive assistive device and contact guard assist within 5 days. 4.  Patient will perform cardiac exercises per protocol with supervision/set-up within 5 days. 5.  Patient will verbally and functionally demonstrate 3/3 sternal precautions without cues within 5 days. Physical Therapy Goals  Initiated 11/21/2021  1. Patient will move from supine to sit and sit to supine , scoot up and down, and roll side to side in bed with contact guard assist within 5 days. 2.  Patient will perform sit to/from stand with contact guard assist within 5 days. 3.  Patient will ambulate 100 feet with least restrictive assistive device and contact guard assist within 5 days. 4.  Patient will ascend/descend 3 stairs with 1 handrail(s) with contact guard assistance within 7 day(s). 5.  Patient will perform cardiac exercises per protocol with supervision/set-up within 5 days. 6.  Patient will verbally and functionally demonstrate 3/3 sternal precautions without cues within 5 days. Initiated 11/15/2021  1.   Patient will move from supine to sit and sit to supine  in bed with modified independence within 7 day(s). 2.  Patient will transfer from bed to chair and chair to bed with modified independence using the least restrictive device within 7 day(s). 3.  Patient will perform sit to stand with modified independence within 7 day(s). 4.  Patient will ambulate with modified independence for 250 feet with the least restrictive device within 7 day(s). 5.  Patient will ascend/descend 3 stairs with 1 handrail(s) with modified independence within 7 day(s). Outcome: Not Met   PHYSICAL THERAPY TREATMENT  Patient: Bony Lang (72 y.o. female)  Date: 12/4/2021  Diagnosis: NSTEMI (non-ST elevated myocardial infarction) ([facility] [address] [I21.4]  CAD (coronary artery disease) [I25.10]   S/P CABG x 4  Procedure(s) (LRB):  CORONARY ARTERY BYPASS GRAFT X4 LIMA, [facility] VIA EVH. ECC. EPI AORTIC US BY DR Dinorah Akers. (N/A) 15 Days Post-Op  Precautions: Sternal (move in the tube)  Chart, physical therapy assessment, plan of care and goals were reviewed. ASSESSMENT  Patient continues with skilled PT services and is progressing towards goals. Pt presents with decreased strength and endurance. Pt received on 2LPM. Pt performed sit to stand transfer at mod A /min A x2 with rocking for momentum. Pt ambulated 3ft and 25ft with RW at [facility]. Pt with improved foot clearance with time. Pt very fatigued after short ambulation bout. Pt performed seated cardiac exercises with assistance. Of LUE due to sore shoulder. Patient is demonstrating understanding of mindful-based movements (\"move in the tube\") principles of keeping UEs proximal to ribcage to prevent lateral pull on the sternum during load-bearing activities with verbal cues required for compliance. Current Level of Function Impacting Discharge (mobility/balance): sit to stand transfer at mod A/min A x2, ambulation at [facility] with RW    Other factors to consider for discharge: decreased strength and endurance.           PLAN :  Patient continues to benefit from skilled intervention to address the above impairments. Continue treatment per established plan of care. to address goals. Recommendation for discharge: (in order for the patient to meet his/her long term goals)  Therapy up to 5 days/week in SNF setting    This discharge recommendation:  Has been made in collaboration with the attending provider and/or case management    IF patient discharges home will need the following DME: to be determined (TBD)       SUBJECTIVE:   Patient stated  This is the hardest thing I've had to do in my life.     OBJECTIVE DATA SUMMARY:   Patient mobilized on continuous portable monitor/telemetry. Critical Behavior:  Neurologic State: Alert  Orientation Level: Oriented X4  Cognition: Follows commands, Appropriate decision making, Appropriate safety awareness, Appropriate for age attention/concentration  Safety/Judgement: Awareness of environment (decreased insight into capabilities)    Functional Mobility Training:  Bed Mobility:                      Transfers:  Sit to Stand:  Moderate assistance; Minimum assistance  Stand to Sit: Contact guard assistance                               Balance:  Sitting: Intact  Sitting - Static: Good (unsupported)  Sitting - Dynamic: Fair (occasional)  Standing: Impaired  Standing - Static: Fair; Constant support  Standing - Dynamic : Fair; Constant support    Ambulation/Gait Training:  Distance (ft): 25 Feet (ft)  Assistive Device: Gait belt; Walker, rolling  Ambulation - Level of Assistance: Contact guard assistance        Gait Abnormalities: Decreased step clearance        Base of Support: Widened     Speed/Jes: Pace decreased (<100 feet/min)  Step Length: Left shortened; Right shortened           Cardiac diagnosis intervention:  Patient instructed and educated on mindful movement principles based on Move in The Tube concept to include maintaining bilateral elbows close to rib cage when performing any load-bearing activity such as getting in/out of bed, pushing up from a chair, opening a door, or lifting a box. Patient was given a handout with diagrams of each correct/incorrect method of performing each of the above tasks. Therapeutic Exercises:   Patient instructed on the benefits and demonstrated cardiac exercises while sitting with Minimum assistance. Instructed and indicated understanding on how to progress reps, sets against gravity, pacing through progressive muscle strengthening standing based on surgeon clearance for more weight in prep for functional activity. Instruction on the use of household items in place of weights as needed.      CARDIAC  EXERCISE   Sets   Reps   Active Active Assist   Passive Self ROM   Comments   Shoulder flexion 1 5 [x]                                            []                                            []                                            []                                               Shoulder abduction 1      5 [x]                                            []                                            []                                            []                                               Scapular elevation 1 5 [x]                                            []                                            []                                            []                                               Scapular retraction 1 5 [x]                                            []                                            []                                            []                                               Trunk rotation 1 5 [x]                                            []                                            []                                            []                                               Trunk sidebending 1 5 [x]                                            []                                            []                                            [] []                                            []                                            []                                            []                                                   Pain Rating:  Pt with no complaints of pain just stomach cramping    Activity Tolerance:   Fair, desaturates with exertion and requires oxygen, and requires rest breaks    After treatment patient left in no apparent distress:   Sitting in chair and Call bell within reach    COMMUNICATION/COLLABORATION:   The patients plan of care was discussed with: Registered nurse.      Evelyn Clonts, PTA   Time Calculation: 24 mins

## 2021-12-04 NOTE — PROGRESS NOTES
.                                                                                Miley Machado        :  1935        MRN:  [id number]                  Assessment   :                                               Plan:  CKD-3b  TEMO  Hyponatremia  S/P CABG    hyperkalemia Her creatinine in 2021 was 1.50. Urine sediment fairly bland. Her creatinine is about the same    Sodium is low. Continue fluid restriction. Potassium up. [place] today.              Subjective:     Chief Complaint:  \" I'm a little better. They put that catheter in my last night. \" No dyspnea. No pain. No CP. No N/V. Review of Systems:    Symptom Y/N Comments  Symptom Y/N Comments   Fever/Chills    Chest Pain     Poor Appetite    Edema     Cough    Abdominal Pain     Sputum    Joint Pain     SOB/TIDWELL    Pruritis/Rash     Nausea/vomit    Tolerating PT/OT     Diarrhea    Tolerating Diet     Constipation    Other       Could not obtain due to:      Objective:     VITALS:   Last 24hrs VS reviewed since prior progress note.  Most recent are:  Visit Vitals  BP (!) 126/54 (BP 1 Location: Right upper arm, BP Patient Position: At rest)   Pulse 70   Temp 98.4 °F (36.9 °C)   Resp 18   Ht 5' 7\" (1.702 m)   Wt 89.7 kg (197 lb 12 oz)   SpO2 94%   BMI 30.97 kg/m²       Intake/Output Summary (Last 24 hours) at 2021 1823  Last data filed at 2021 0525  Gross per 24 hour   Intake --   Output 900 ml   Net -900 ml      Telemetry Reviewed:     PHYSICAL EXAM:  General: NAD  + edema      Lab Data Reviewed: (see below)    Medications Reviewed: (see below)    PMH/SH reviewed - no change compared to H&P  ________________________________________________________________________  Care Plan discussed with:  Patient     Family      RN     Care Manager                    Consultant:          Comments   >50% of visit spent in counseling and coordination of care ________________________________________________________________________  Sarah Syed MD     Procedures: see electronic medical records for all procedures/Xrays and details which  were not copied into this note but were reviewed prior to creation of Plan. LABS:  Recent Labs     12/04/21 0544 12/03/21 0352   WBC 13.7* 14.2*   HGB 8.7* 9.1*   HCT 27.7* 28.6*   * 609*     Recent Labs     12/04/21 0544 12/03/21 0352 12/02/21  1425   * 128* 131*   K 5.2* 4.8 5.5*   CL 95* 96* 97   CO2 28 27 30   BUN 55* 54* 60*   CREA 2.08* 2.06* 2.14*   GLU 97 42* 66   CA 8.8 8.5 8.9     No results for input(s): AP, TBIL, TP, ALB, GLOB, GGT, AML, LPSE in the last 72 hours. No lab exists for component: SGOT, GPT, AMYP, HLPSE  No results for input(s): INR, PTP, APTT, INREXT, INREXT in the last 72 hours. No results for input(s): FE, TIBC, PSAT, FERR in the last 72 hours. No results found for: FOL, RBCF   No results for input(s): PH, PCO2, PO2 in the last 72 hours. No results for input(s): CPK, CKMB in the last 72 hours.     No lab exists for component: TROPONINI  No components found for: [place]  Lab Results   Component Value Date/Time    Color YELLOW/STRAW 11/30/2021 03:08 PM    Appearance CLOUDY (A) 11/30/2021 03:08 PM    Specific gravity 1.016 11/30/2021 03:08 PM    pH (UA) 5.0 11/30/2021 03:08 PM    Protein Negative 11/30/2021 03:08 PM    Glucose Negative 11/30/2021 03:08 PM    Ketone Negative 11/30/2021 03:08 PM    Bilirubin Negative 11/30/2021 03:08 PM    Urobilinogen 0.2 11/30/2021 03:08 PM    Nitrites Negative 11/30/2021 03:08 PM    Leukocyte Esterase MODERATE (A) 11/30/2021 03:08 PM    Epithelial cells MODERATE (A) 11/30/2021 03:08 PM    Bacteria 1+ (A) 11/30/2021 03:08 PM    WBC 20-50 11/30/2021 03:08 PM    RBC 5-10 11/30/2021 03:08 PM       MEDICATIONS:  Current Facility-Administered Medications   Medication Dose Route Frequency    sodium zirconium cyclosilicate (LOKELMA) powder packet 10 g  10 g Oral NOW    prochlorperazine (COMPAZINE) with saline injection 5 mg  5 mg IntraVENous Q4H PRN    oxyCODONE IR (ROXICODONE) tablet 5 mg  5 mg Oral Q4H PRN    traMADoL (ULTRAM) tablet 25 mg  25 mg Oral Q6H PRN    melatonin tablet 3-6 mg  3-6 mg Oral QHS PRN    diphenhydrAMINE (BENADRYL) capsule 25 mg  25 mg Oral Q6H PRN    lidocaine 4 % patch 1 Patch  1 Patch TransDERmal Q24H    apixaban (ELIQUIS) tablet 5 mg  5 mg Oral Q12H    psyllium husk-aspartame (METAMUCIL FIBER) packet 1 Packet  1 Packet Oral BID    mineral oil (FLEET) enema   Rectal PRN    L.acidophilus-paracasei-S.thermophil-bifidobacter (RISAQUAD) 8 billion cell capsule  1 Capsule Oral DAILY    senna-docusate (PERICOLACE) 8.6-50 mg per tablet 2 Tablet  2 Tablet Oral BID    bisacodyL (DULCOLAX) suppository 10 mg  10 mg Rectal DAILY    0.9% sodium chloride infusion 250 mL  250 mL IntraVENous PRN    acetaminophen (TYLENOL) tablet 1,000 mg  1,000 mg Oral Q6H PRN    amLODIPine (NORVASC) tablet 10 mg  10 mg Oral DAILY    sodium chloride (NS) flush 5-40 mL  5-40 mL IntraVENous Q8H    sodium chloride (NS) flush 5-40 mL  5-40 mL IntraVENous PRN    carvediloL (COREG) tablet 6.25 mg  6.25 mg Oral BID WITH MEALS    hydrALAZINE (APRESOLINE) 20 mg/mL injection 10-20 mg  10-20 mg IntraVENous Q6H PRN    atorvastatin (LIPITOR) tablet 80 mg  80 mg Oral QHS    bacitracin 500 unit/gram packet 1 Packet  1 Packet Topical PRN    naloxone (NARCAN) injection 0.4 mg  0.4 mg IntraVENous PRN    ondansetron (ZOFRAN) injection 4 mg  4 mg IntraVENous Q4H PRN    albuterol (PROVENTIL VENTOLIN) nebulizer solution 2.5 mg  2.5 mg Nebulization Q4H PRN    aspirin chewable tablet 81 mg  81 mg Oral DAILY    [Held by provider] magnesium oxide (MAG-OX) tablet 400 mg  400 mg Oral BID    polyethylene glycol (MIRALAX) packet 17 g  17 g Oral DAILY    glucose chewable tablet 16 g  4 Tablet Oral PRN    dextrose (D50W) injection syrg 12.5-25 g  12.5-25 g IntraVENous PRN    glucagon (GLUCAGEN) injection 1 mg  1 mg IntraMUSCular PRN    diphenhydrAMINE (BENADRYL) injection 25 mg  25 mg IntraVENous Q6H PRN    pantoprazole (PROTONIX) tablet 40 mg  40 mg Oral ACB    heparin (porcine) 1,000 unit/mL injection    PRN    ALPRAZolam (XANAX) tablet 0.5 mg  0.5 mg Oral PRN    calcium-vitamin D (OS-MARINA +D3) 500 mg-200 unit per tablet 1 Tablet  1 Tablet Oral DAILY

## 2021-12-04 NOTE — PROGRESS NOTES
1900: Bedside shift change report given to Yeison Valladares (oncoming nurse) by Frank Wong RN (offgoing nurse). Report included the following information SBAR, Kardex, Procedure Summary, Intake/Output, MAR, Recent Results and Cardiac Rhythm NSR. End of Shift Note    Bedside shift change report given to Nicolette Gillespie  (oncoming nurse) by Shawna Mosqueda RN (offgoing nurse). Report included the following information SBAR, Kardex, Procedure Summary, Intake/Output, MAR, Recent Results and Cardiac Rhythm NSR    Shift worked:  [id number]     Shift summary and any significant changes:    Patient complaining of abdominal cramps on the servin    Concerns for physician to address:  none at this time      [facility]          Activity:  Activity Level: Up with Assistance  Number times ambulated in hallways past shift: 0  Number of times OOB to chair past shift: 1    Cardiac:   Cardiac Monitoring: Yes      Cardiac Rhythm: Sinus Rhythm    Access:   Current line(s): PIV     Genitourinary:   Urinary status: servin    Respiratory:   O2 Device: Nasal cannula  Chronic home O2 use?: NO  Incentive spirometer at bedside: YES  Actual Volume (ml): 600 ml  GI:  Last Bowel Movement Date: 12/03/21  Current diet:  ADULT ORAL NUTRITION SUPPLEMENT HS Snack; Standard High Calorie/High Protein  ADULT ORAL NUTRITION SUPPLEMENT Breakfast, Lunch, Dinner, AM Snack, PM Snack; Low Calorie/High Protein  ADULT DIET Regular; 4 carb choices (60 gm/meal); No Salt Added (3-4 gm); Low Potassium (Less than 3000 mg/day); 1200 ml  Passing flatus: YES  Tolerating current diet: YES       Pain Management:   Patient states pain is manageable on current regimen: YES    Skin:  Ilia Score: 17  Interventions: turn team, float heels, increase time out of bed, PT/OT consult and internal/external urinary devices    Patient Safety:  Fall Score:  Total Score: 3  Interventions: bed/chair alarm, assistive device (walker, cane, etc), gripper socks, pt to call before getting OOB and stay with me (per policy)  High Fall Risk: Yes    Length of Stay:  Expected LOS: 8d 14h  Actual LOS: Arvind Hilton RN

## 2021-12-05 ENCOUNTER — APPOINTMENT (OUTPATIENT)
Dept: GENERAL RADIOLOGY | Age: 86
DRG: 233 | End: 2021-12-05
Attending: PHYSICIAN ASSISTANT
Payer: MEDICARE

## 2021-12-05 LAB
ANION GAP SERPL CALC-SCNC: 6 MMOL/L (ref 5–15)
BASOPHILS # BLD: 0 K/UL (ref 0–0.1)
BASOPHILS NFR BLD: 0 % (ref 0–1)
BUN SERPL-MCNC: 51 MG/DL (ref 6–20)
BUN/CREAT SERPL: 25 (ref 12–20)
CALCIUM SERPL-MCNC: 8.5 MG/DL (ref 8.5–10.1)
CHLORIDE SERPL-SCNC: 96 MMOL/L (ref 97–108)
CO2 SERPL-SCNC: 28 MMOL/L (ref 21–32)
CREAT SERPL-MCNC: 2.01 MG/DL (ref 0.55–1.02)
DIFFERENTIAL METHOD BLD: ABNORMAL
EOSINOPHIL # BLD: 0.2 K/UL (ref 0–0.4)
EOSINOPHIL NFR BLD: 1 % (ref 0–7)
ERYTHROCYTE [DISTWIDTH] IN BLOOD BY AUTOMATED COUNT: 15 % (ref 11.5–14.5)
GLUCOSE BLD STRIP.AUTO-MCNC: 151 MG/DL (ref 65–117)
GLUCOSE BLD STRIP.AUTO-MCNC: 179 MG/DL (ref 65–117)
GLUCOSE BLD STRIP.AUTO-MCNC: 216 MG/DL (ref 65–117)
GLUCOSE SERPL-MCNC: 143 MG/DL (ref 65–100)
HCT VFR BLD AUTO: 26.3 % (ref 35–47)
HGB BLD-MCNC: 8.3 G/DL (ref 11.5–16)
IMM GRANULOCYTES # BLD AUTO: 0.1 K/UL (ref 0–0.04)
IMM GRANULOCYTES NFR BLD AUTO: 1 % (ref 0–0.5)
LYMPHOCYTES # BLD: 1.3 K/UL (ref 0.8–3.5)
LYMPHOCYTES NFR BLD: 12 % (ref 12–49)
MCH RBC QN AUTO: 29.3 PG (ref 26–34)
MCHC RBC AUTO-ENTMCNC: 31.6 G/DL (ref 30–36.5)
MCV RBC AUTO: 92.9 FL (ref 80–99)
MONOCYTES # BLD: 1.2 K/UL (ref 0–1)
MONOCYTES NFR BLD: 11 % (ref 5–13)
NEUTS SEG # BLD: 8.3 K/UL (ref 1.8–8)
NEUTS SEG NFR BLD: 75 % (ref 32–75)
NRBC # BLD: 0 K/UL (ref 0–0.01)
NRBC BLD-RTO: 0 PER 100 WBC
PLATELET # BLD AUTO: 661 K/UL (ref 150–400)
PMV BLD AUTO: 8.6 FL (ref 8.9–12.9)
POTASSIUM SERPL-SCNC: 4.9 MMOL/L (ref 3.5–5.1)
RBC # BLD AUTO: 2.83 M/UL (ref 3.8–5.2)
SERVICE CMNT-IMP: ABNORMAL
SODIUM SERPL-SCNC: 130 MMOL/L (ref 136–145)
WBC # BLD AUTO: 11.1 K/UL (ref 3.6–11)

## 2021-12-05 PROCEDURE — 74011250637 HC RX REV CODE- 250/637: Performed by: NURSE PRACTITIONER

## 2021-12-05 PROCEDURE — 80048 BASIC METABOLIC PNL TOTAL CA: CPT

## 2021-12-05 PROCEDURE — 94760 N-INVAS EAR/PLS OXIMETRY 1: CPT

## 2021-12-05 PROCEDURE — 65660000000 HC RM CCU STEPDOWN

## 2021-12-05 PROCEDURE — 74011000250 HC RX REV CODE- 250: Performed by: NURSE PRACTITIONER

## 2021-12-05 PROCEDURE — 97110 THERAPEUTIC EXERCISES: CPT

## 2021-12-05 PROCEDURE — 85025 COMPLETE CBC W/AUTO DIFF WBC: CPT

## 2021-12-05 PROCEDURE — 71045 X-RAY EXAM CHEST 1 VIEW: CPT

## 2021-12-05 PROCEDURE — 97116 GAIT TRAINING THERAPY: CPT

## 2021-12-05 PROCEDURE — 74011250637 HC RX REV CODE- 250/637: Performed by: THORACIC SURGERY (CARDIOTHORACIC VASCULAR SURGERY)

## 2021-12-05 PROCEDURE — 82962 GLUCOSE BLOOD TEST: CPT

## 2021-12-05 PROCEDURE — 74011250637 HC RX REV CODE- 250/637: Performed by: PHYSICIAN ASSISTANT

## 2021-12-05 PROCEDURE — 36415 COLL VENOUS BLD VENIPUNCTURE: CPT

## 2021-12-05 PROCEDURE — 77010033678 HC OXYGEN DAILY

## 2021-12-05 RX ADMIN — Medication 1 TABLET: at 09:08

## 2021-12-05 RX ADMIN — DOCUSATE SODIUM 50MG AND SENNOSIDES 8.6MG 2 TABLET: 8.6; 5 TABLET, FILM COATED ORAL at 09:08

## 2021-12-05 RX ADMIN — PANTOPRAZOLE SODIUM 40 MG: 40 TABLET, DELAYED RELEASE ORAL at 06:09

## 2021-12-05 RX ADMIN — ACETAMINOPHEN 1000 MG: 500 TABLET ORAL at 06:10

## 2021-12-05 RX ADMIN — Medication 1 CAPSULE: at 09:08

## 2021-12-05 RX ADMIN — APIXABAN 5 MG: 5 TABLET, FILM COATED ORAL at 09:08

## 2021-12-05 RX ADMIN — DIPHENHYDRAMINE HYDROCHLORIDE 25 MG: 25 CAPSULE ORAL at 23:10

## 2021-12-05 RX ADMIN — ACETAMINOPHEN 1000 MG: 500 TABLET ORAL at 17:40

## 2021-12-05 RX ADMIN — CARVEDILOL 6.25 MG: 6.25 TABLET, FILM COATED ORAL at 17:40

## 2021-12-05 RX ADMIN — AMLODIPINE BESYLATE 10 MG: 5 TABLET ORAL at 09:08

## 2021-12-05 RX ADMIN — CARVEDILOL 6.25 MG: 6.25 TABLET, FILM COATED ORAL at 09:08

## 2021-12-05 RX ADMIN — ASPIRIN 81 MG CHEWABLE TABLET 81 MG: 81 TABLET CHEWABLE at 09:08

## 2021-12-05 RX ADMIN — ALPRAZOLAM 0.5 MG: 0.5 TABLET ORAL at 21:16

## 2021-12-05 RX ADMIN — Medication 10 ML: at 21:17

## 2021-12-05 RX ADMIN — APIXABAN 5 MG: 5 TABLET, FILM COATED ORAL at 21:16

## 2021-12-05 RX ADMIN — Medication 10 ML: at 06:10

## 2021-12-05 RX ADMIN — DOCUSATE SODIUM 50MG AND SENNOSIDES 8.6MG 2 TABLET: 8.6; 5 TABLET, FILM COATED ORAL at 17:40

## 2021-12-05 RX ADMIN — ATORVASTATIN CALCIUM 80 MG: 40 TABLET, FILM COATED ORAL at 21:16

## 2021-12-05 RX ADMIN — Medication 5 ML: at 14:00

## 2021-12-05 NOTE — PROGRESS NOTES
End of Shift Note    Bedside shift change report given to Yeison Valladares (oncoming nurse) by Darby Beckett RN (offgoing nurse). Report included the following information SBAR, Kardex, Intake/Output, MAR and Recent Results    Shift worked:  7a-7p     Shift summary and any significant changes:    Sat up in chair majority of shift. Worked with PT. 3 Large BMs today. Did have some nausea, resolved with compazine, appetite improving. Still complaining of intermittent abdominal cramping but improved. Concerns for physician to address:       Zone phone for oncoming shift:          Activity:  Activity Level: Up with Assistance  Number times ambulated in hallways past shift: 0  Number of times OOB to chair past shift: 3    Cardiac:   Cardiac Monitoring: Yes      Cardiac Rhythm: Sinus Rhythm    Access:   Current line(s): PIV     Genitourinary:   Urinary status: servin    Respiratory:   O2 Device: Nasal cannula  Chronic home O2 use?: NO  Incentive spirometer at bedside: YES  Actual Volume (ml): 600 ml  GI:  Last Bowel Movement Date: 12/04/21  Current diet:  ADULT ORAL NUTRITION SUPPLEMENT HS Snack; Standard High Calorie/High Protein  ADULT ORAL NUTRITION SUPPLEMENT Breakfast, Lunch, Dinner, AM Snack, PM Snack; Low Calorie/High Protein  ADULT DIET Regular; 4 carb choices (60 gm/meal); No Salt Added (3-4 gm); Low Potassium (Less than 3000 mg/day); 1200 ml  DIET ONE TIME MESSAGE  Passing flatus: YES  Tolerating current diet: YES       Pain Management:   Patient states pain is manageable on current regimen: YES    Skin:  Ilia Score: 19  Interventions: increase time out of bed    Patient Safety:  Fall Score:  Total Score: 3  Interventions: assistive device (walker, cane, etc), gripper socks and stay with me (per policy)  High Fall Risk: Yes    Length of Stay:  Expected LOS: 8d 14h  Actual LOS: Darryn Gr, RN

## 2021-12-05 NOTE — PROGRESS NOTES
End of Shift Note    Bedside shift change report given to Gaby De La Rosa (oncoming nurse) by Pippa Blackwell RN (offgoing nurse). Report included the following information SBAR, Kardex, Intake/Output and MAR    Shift worked:  7am-7pm     Shift summary and any significant changes:    Patient ambulated to the bathroom and door with the walker, well tolerated. Plan to go to rehab tomorrow. Concerns for physician to address: No   Zone phone for oncoming shift:          Activity:  Activity Level: Up with Assistance  Number times ambulated in hallways past shift: 0    Number of times OOB to chair past shift: 1    Cardiac:   Cardiac Monitoring: Yes      Cardiac Rhythm: Sinus Rhythm    Access:   Current line(s): PIV     Genitourinary:   Urinary status: servin    Respiratory:   O2 Device: Nasal cannula  Chronic home O2 use?: NO  Incentive spirometer at bedside: YES  Actual Volume (ml): 750 ml  GI:  Last Bowel Movement Date: 12/05/21  Current diet:  ADULT ORAL NUTRITION SUPPLEMENT HS Snack; Standard High Calorie/High Protein  ADULT ORAL NUTRITION SUPPLEMENT Breakfast, Lunch, Dinner, AM Snack, PM Snack; Low Calorie/High Protein  ADULT DIET Regular; 4 carb choices (60 gm/meal); No Salt Added (3-4 gm); Low Potassium (Less than 3000 mg/day); 1200 ml  DIET ONE TIME MESSAGE  Passing flatus: YES  Tolerating current diet: YES       Pain Management:   Patient states pain is manageable on current regimen: YES    Skin:  Ilia Score: 19  Interventions: speciality bed, increase time out of bed, PT/OT consult and nutritional support     Patient Safety:  Fall Score:  Total Score: 3  Interventions: assistive device (walker, cane, etc), gripper socks and pt to call before getting OOB  High Fall Risk: Yes      Pippa Blackwell, RN

## 2021-12-05 NOTE — PROGRESS NOTES
1900: Bedside shift change report given to Yeison Valladares (oncoming nurse) by Kelli Johnson (offgoing nurse). Report included the following information SBAR, Kardex, Procedure Summary, Intake/Output, MAR, Recent Results and Cardiac Rhythm NSR.

## 2021-12-05 NOTE — PROGRESS NOTES
.                                                                                NAMEPrenthony Yen        :  1935        MRN:  [id number]                  Assessment   :                                               Plan:  CKD-3b  TEMO  Hyponatremia  S/P CABG    hyperkalemia Her creatinine in 2021 was 1.50. Urine sediment fairly bland. Her creatinine is about the same    Sodium is a little better. Continue fluid restriction. Potassium up. Hold lokelma today.              Subjective:     Chief Complaint:  \" I feel better today. \" No dyspnea. No pain. No CP. No N/V. Review of Systems:    Symptom Y/N Comments  Symptom Y/N Comments   Fever/Chills    Chest Pain     Poor Appetite    Edema     Cough    Abdominal Pain     Sputum    Joint Pain     SOB/TIDWELL    Pruritis/Rash     Nausea/vomit    Tolerating PT/OT     Diarrhea    Tolerating Diet     Constipation    Other       Could not obtain due to:      Objective:     VITALS:   Last 24hrs VS reviewed since prior progress note.  Most recent are:  Visit Vitals  BP (!) 129/48 (BP 1 Location: Right upper arm, BP Patient Position: Sitting;Reclining)   Pulse 76   Temp 98.2 °F (36.8 °C)   Resp 20   Ht 5' 7\" (1.702 m)   Wt 89.8 kg (197 lb 15.6 oz)   SpO2 95%   BMI 31.01 kg/m²       Intake/Output Summary (Last 24 hours) at 2021 0912  Last data filed at 2021 0550  Gross per 24 hour   Intake 240 ml   Output 1200 ml   Net -960 ml      Telemetry Reviewed:     PHYSICAL EXAM:  General: NAD  + edema      Lab Data Reviewed: (see below)    Medications Reviewed: (see below)    PMH/SH reviewed - no change compared to H&P  ________________________________________________________________________  Care Plan discussed with:  Patient     Family      RN     Care Manager                    Consultant:          Comments   >50% of visit spent in counseling and coordination of care       ________________________________________________________________________  Rosaline Saleh Becka Garnett MD     Procedures: see electronic medical records for all procedures/Xrays and details which  were not copied into this note but were reviewed prior to creation of Plan. LABS:  Recent Labs     12/05/21  0507 12/04/21 0544   WBC 11.1* 13.7*   HGB 8.3* 8.7*   HCT 26.3* 27.7*   * 623*     Recent Labs     12/05/21  0507 12/04/21 0544 12/03/21  0352   * 129* 128*   K 4.9 5.2* 4.8   CL 96* 95* 96*   CO2 28 28 27   BUN 51* 55* 54*   CREA 2.01* 2.08* 2.06*   * 97 42*   CA 8.5 8.8 8.5     No results for input(s): AP, TBIL, TP, ALB, GLOB, GGT, AML, LPSE in the last 72 hours. No lab exists for component: SGOT, GPT, AMYP, HLPSE  No results for input(s): INR, PTP, APTT, INREXT, INREXT in the last 72 hours. No results for input(s): FE, TIBC, PSAT, FERR in the last 72 hours. No results found for: FOL, RBCF   No results for input(s): PH, PCO2, PO2 in the last 72 hours. No results for input(s): CPK, CKMB in the last 72 hours.     No lab exists for component: TROPONINI  No components found for: [place]  Lab Results   Component Value Date/Time    Color YELLOW/STRAW 11/30/2021 03:08 PM    Appearance CLOUDY (A) 11/30/2021 03:08 PM    Specific gravity 1.016 11/30/2021 03:08 PM    pH (UA) 5.0 11/30/2021 03:08 PM    Protein Negative 11/30/2021 03:08 PM    Glucose Negative 11/30/2021 03:08 PM    Ketone Negative 11/30/2021 03:08 PM    Bilirubin Negative 11/30/2021 03:08 PM    Urobilinogen 0.2 11/30/2021 03:08 PM    Nitrites Negative 11/30/2021 03:08 PM    Leukocyte Esterase MODERATE (A) 11/30/2021 03:08 PM    Epithelial cells MODERATE (A) 11/30/2021 03:08 PM    Bacteria 1+ (A) 11/30/2021 03:08 PM    WBC 20-50 11/30/2021 03:08 PM    RBC 5-10 11/30/2021 03:08 PM       MEDICATIONS:  Current Facility-Administered Medications   Medication Dose Route Frequency    prochlorperazine (COMPAZINE) with saline injection 5 mg  5 mg IntraVENous Q4H PRN    oxyCODONE IR (ROXICODONE) tablet 5 mg  5 mg Oral Q4H PRN    traMADoL (ULTRAM) tablet 25 mg  25 mg Oral Q6H PRN    melatonin tablet 3-6 mg  3-6 mg Oral QHS PRN    diphenhydrAMINE (BENADRYL) capsule 25 mg  25 mg Oral Q6H PRN    lidocaine 4 % patch 1 Patch  1 Patch TransDERmal Q24H    apixaban (ELIQUIS) tablet 5 mg  5 mg Oral Q12H    psyllium husk-aspartame (METAMUCIL FIBER) packet 1 Packet  1 Packet Oral BID    mineral oil (FLEET) enema   Rectal PRN    L.acidophilus-paracasei-S.thermophil-bifidobacter (RISAQUAD) 8 billion cell capsule  1 Capsule Oral DAILY    senna-docusate (PERICOLACE) 8.6-50 mg per tablet 2 Tablet  2 Tablet Oral BID    bisacodyL (DULCOLAX) suppository 10 mg  10 mg Rectal DAILY    0.9% sodium chloride infusion 250 mL  250 mL IntraVENous PRN    acetaminophen (TYLENOL) tablet 1,000 mg  1,000 mg Oral Q6H PRN    amLODIPine (NORVASC) tablet 10 mg  10 mg Oral DAILY    sodium chloride (NS) flush 5-40 mL  5-40 mL IntraVENous Q8H    sodium chloride (NS) flush 5-40 mL  5-40 mL IntraVENous PRN    carvediloL (COREG) tablet 6.25 mg  6.25 mg Oral BID WITH MEALS    hydrALAZINE (APRESOLINE) 20 mg/mL injection 10-20 mg  10-20 mg IntraVENous Q6H PRN    atorvastatin (LIPITOR) tablet 80 mg  80 mg Oral QHS    bacitracin 500 unit/gram packet 1 Packet  1 Packet Topical PRN    naloxone (NARCAN) injection 0.4 mg  0.4 mg IntraVENous PRN    ondansetron (ZOFRAN) injection 4 mg  4 mg IntraVENous Q4H PRN    albuterol (PROVENTIL VENTOLIN) nebulizer solution 2.5 mg  2.5 mg Nebulization Q4H PRN    aspirin chewable tablet 81 mg  81 mg Oral DAILY    [Held by provider] magnesium oxide (MAG-OX) tablet 400 mg  400 mg Oral BID    polyethylene glycol (MIRALAX) packet 17 g  17 g Oral DAILY    glucose chewable tablet 16 g  4 Tablet Oral PRN    dextrose (D50W) injection syrg 12.5-25 g  12.5-25 g IntraVENous PRN    glucagon (GLUCAGEN) injection 1 mg  1 mg IntraMUSCular PRN    diphenhydrAMINE (BENADRYL) injection 25 mg  25 mg IntraVENous Q6H PRN    pantoprazole (PROTONIX) tablet 40 mg  40 mg Oral ACB    heparin (porcine) 1,000 unit/mL injection    PRN    ALPRAZolam (XANAX) tablet 0.5 mg  0.5 mg Oral PRN    calcium-vitamin D (OS-MARINA +D3) 500 mg-200 unit per tablet 1 Tablet  1 Tablet Oral DAILY

## 2021-12-05 NOTE — PROGRESS NOTES
Problem: Mobility Impaired (Adult and Pediatric)  Goal: *Acute Goals and Plan of Care (Insert Text)  Description: FUNCTIONAL STATUS PRIOR TO ADMISSION: Patient was independent and active without use of DME however began using rolling walker while in hospital prior to cardiac surgery. Reports that she has been having difficulty with endurance and can only do a little of activity before needing to take a break. Reports \"nighttime weakness\" but no falls at home. HOME SUPPORT PRIOR TO ADMISSION: The patient lived alone with son in the area to provide assistance. Physical Therapy Goals  Revised 12/5/2021  1. Patient will move from supine to sit and sit to supine , scoot up and down, and roll side to side in bed with minimal assistance/contact guard assist within 7 day(s). 2.  Patient will transfer from bed to chair and chair to bed with supervision/set-up using the least restrictive device within 7 day(s). 3.  Patient will perform sit to stand with minimal assistance/contact guard assist within 7 day(s). 4.  Patient will ambulate with supervision/set-up for 75 feet with the least restrictive device within 7 day(s). Revised 11/26/2021  1. Patient will move from supine to sit and sit to supine , scoot up and down, and roll side to side in bed with min A within 5 days. 2.  Patient will perform sit to/from stand with contact guard assist within 5 days. 3.  Patient will ambulate 50 feet with least restrictive assistive device and contact guard assist within 5 days. 4.  Patient will perform cardiac exercises per protocol with supervision/set-up within 5 days. 5.  Patient will verbally and functionally demonstrate 3/3 sternal precautions without cues within 5 days. Revised 11/21/2021  1. Patient will move from supine to sit and sit to supine , scoot up and down, and roll side to side in bed with contact guard assist within 5 days.     2.  Patient will perform sit to/from stand with contact guard assist within 5 days. 3.  Patient will ambulate 100 feet with least restrictive assistive device and contact guard assist within 5 days. 4.  Patient will ascend/descend 3 stairs with 1 handrail(s) with contact guard assistance within 7 day(s). 5.  Patient will perform cardiac exercises per protocol with supervision/set-up within 5 days. 6.  Patient will verbally and functionally demonstrate 3/3 sternal precautions without cues within 5 days. Initiated 11/15/2021  1. Patient will move from supine to sit and sit to supine  in bed with modified independence within 7 day(s). 2.  Patient will transfer from bed to chair and chair to bed with modified independence using the least restrictive device within 7 day(s). 3.  Patient will perform sit to stand with modified independence within 7 day(s). 4.  Patient will ambulate with modified independence for 250 feet with the least restrictive device within 7 day(s). 5.  Patient will ascend/descend 3 stairs with 1 handrail(s) with modified independence within 7 day(s). Outcome: Progressing Towards Goal   PHYSICAL THERAPY TREATMENT/WEEKLY RE-ASSESSMENT  Patient: Angelita Whipple (32 y.o. female)  Date: 12/5/2021  Diagnosis: NSTEMI (non-ST elevated myocardial infarction) ([facility] [address] [I21.4]  CAD (coronary artery disease) [I25.10]   S/P CABG x 4  Procedure(s) (LRB):  CORONARY ARTERY BYPASS GRAFT X4 LIMA, [facility] VIA EVH. ECC. EPI AORTIC US BY DR ANDUJAR. (N/A) 16 Days Post-Op  Precautions: Sternal (move in the tube)  Chart, physical therapy assessment, plan of care and goals were reviewed. ASSESSMENT  Patient continues with skilled PT services and is slowly progressing towards goals, demonstrating minor improvement in strength, balance, and activity tolerance this date. Pt able to progress ambulation trial to a distance of 25ft x2 w/ RW and CGAx1. Pt continues to fatigue quickly as well as demonstrate TIDWELL, requiring seated rest break b/t ambulation trials.  Pt requires modAx1 in order to assume standing position from bedside chair however remaining mobility occurred with CGAx1. O2 sats decreased to 88% on 1L O2 during ambulation however quickly recovered >90% with seated rest. Pt remains well below her baseline independent/modified independent level and most appropriate for additional rehab at discharge. Patient is verbalizing and is demonstrating understanding of mindful-based movements (\"move in the tube\") principles of keeping UEs proximal to ribcage to prevent lateral pull on the sternum during load-bearing activities with verbal cues required for compliance. Current Level of Function Impacting Discharge (mobility/balance): modAx1 during sit<>stand, CGAx1 during ambulation with RW support    Other factors to consider for discharge: slow progress with therapy following CABG, nausea, impaired activity tolerance, TIDWELL, falls risk         PLAN :  Patient continues to benefit from skilled intervention to address the above impairments. Continue treatment per established plan of care. to address goals. Recommendation for discharge: (in order for the patient to meet his/her long term goals)  Therapy up to 5 days/week in SNF setting    This discharge recommendation:  Has been made in collaboration with the attending provider and/or case management    IF patient discharges home will need the following DME: to be determined (TBD)       SUBJECTIVE:   Patient stated Did I do okay today? Sanjuana Londono    OBJECTIVE DATA SUMMARY:   Patient mobilized on continuous portable monitor/telemetry. Critical Behavior:  Neurologic State: Alert  Orientation Level: Oriented X4  Cognition: Appropriate decision making, Follows commands  Safety/Judgement: Awareness of environment (decreased insight into capabilities)    Functional Mobility Training:  Bed Mobility:            Seated in recliner upon arrival          Transfers:  Sit to Stand:  Moderate assistance; Assist x1  Stand to Sit: Contact guard assistance                               Balance:  Sitting: Intact  Standing: Impaired; With support (RW)  Standing - Static: Good; Constant support  Standing - Dynamic : Fair; Constant support    Ambulation/Gait Training:  Distance (ft): 50 Feet (ft) (25ft x2 w/ seated rest break b/t )  Assistive Device: Walker, rolling; Gait belt  Ambulation - Level of Assistance: Contact guard assistance        Gait Abnormalities: Decreased step clearance; Shuffling gait (trunk flexion)              Speed/Jes: Pace decreased (<100 feet/min)  Step Length: Left shortened; Right shortened                        Cardiac diagnosis intervention:  Patient instructed and educated on mindful movement principles based on Move in The Tube concept to include maintaining bilateral elbows close to rib cage when performing any load-bearing activity such as getting in/out of bed, pushing up from a chair, opening a door, or lifting a box. Patient was given a handout with diagrams of each correct/incorrect method of performing each of the above tasks. Therapeutic Exercises:   Patient instructed on the benefits and demonstrated cardiac exercises while seated in recliner chair with Minimum assistance. Instructed and indicated understanding on how to progress reps, sets against gravity, pacing through progressive muscle strengthening standing based on surgeon clearance for more weight in prep for functional activity. Instruction on the use of household items in place of weights as needed.      CARDIAC  EXERCISE   Sets   Reps   Active Active Assist   Passive Self ROM   Comments   Shoulder flexion 1 5 [x]                                            []                                            []                                            []                                            Required Pat LUE due to bursitis    Shoulder abduction 1      5 [x]                                            []                                            [] []                                            Required Pat LUE due to bursitis    Scapular elevation 1 5 [x]                                            []                                            []                                            []                                               Scapular retraction 1 5 [x]                                            []                                            []                                            []                                               Trunk rotation 1 5 [x]                                            []                                            []                                            [x]                                               Trunk sidebending 1 5 [x]                                            []                                            []                                            []                                                  []                                            []                                            []                                            []                                                   Pain Rating:  Denied c/o pain    Activity Tolerance:   Fair, Improving, O2 sats 88% post ambulation on 1L O2, recovers to 94% with seated rest    After treatment patient left in no apparent distress:   Sitting in chair, Heels elevated for pressure relief, and Call bell within reach    COMMUNICATION/COLLABORATION:   The patients plan of care was discussed with: Registered nurse.      Rosalind Regalado, PT, DPT   Time Calculation: 23 mins

## 2021-12-06 ENCOUNTER — APPOINTMENT (OUTPATIENT)
Dept: GENERAL RADIOLOGY | Age: 86
DRG: 233 | End: 2021-12-06
Attending: PHYSICIAN ASSISTANT
Payer: MEDICARE

## 2021-12-06 VITALS
SYSTOLIC BLOOD PRESSURE: 138 MMHG | RESPIRATION RATE: 19 BRPM | HEART RATE: 76 BPM | DIASTOLIC BLOOD PRESSURE: 69 MMHG | TEMPERATURE: 98.2 F | WEIGHT: 197.09 LBS | BODY MASS INDEX: 30.93 KG/M2 | HEIGHT: 67 IN | OXYGEN SATURATION: 92 %

## 2021-12-06 LAB
ANION GAP SERPL CALC-SCNC: 7 MMOL/L (ref 5–15)
BASOPHILS # BLD: 0 K/UL (ref 0–0.1)
BASOPHILS NFR BLD: 0 % (ref 0–1)
BUN SERPL-MCNC: 49 MG/DL (ref 6–20)
BUN/CREAT SERPL: 25 (ref 12–20)
CALCIUM SERPL-MCNC: 8.8 MG/DL (ref 8.5–10.1)
CHLORIDE SERPL-SCNC: 98 MMOL/L (ref 97–108)
CO2 SERPL-SCNC: 27 MMOL/L (ref 21–32)
COVID-19 RAPID TEST, COVR: NOT DETECTED
CREAT SERPL-MCNC: 1.97 MG/DL (ref 0.55–1.02)
DIFFERENTIAL METHOD BLD: ABNORMAL
EOSINOPHIL # BLD: 0.2 K/UL (ref 0–0.4)
EOSINOPHIL NFR BLD: 2 % (ref 0–7)
ERYTHROCYTE [DISTWIDTH] IN BLOOD BY AUTOMATED COUNT: 15.1 % (ref 11.5–14.5)
GLUCOSE BLD STRIP.AUTO-MCNC: 169 MG/DL (ref 65–117)
GLUCOSE BLD STRIP.AUTO-MCNC: 199 MG/DL (ref 65–117)
GLUCOSE SERPL-MCNC: 135 MG/DL (ref 65–100)
HCT VFR BLD AUTO: 29.1 % (ref 35–47)
HGB BLD-MCNC: 9.2 G/DL (ref 11.5–16)
IMM GRANULOCYTES # BLD AUTO: 0.1 K/UL (ref 0–0.04)
IMM GRANULOCYTES NFR BLD AUTO: 1 % (ref 0–0.5)
LYMPHOCYTES # BLD: 1.7 K/UL (ref 0.8–3.5)
LYMPHOCYTES NFR BLD: 16 % (ref 12–49)
MCH RBC QN AUTO: 29.2 PG (ref 26–34)
MCHC RBC AUTO-ENTMCNC: 31.6 G/DL (ref 30–36.5)
MCV RBC AUTO: 92.4 FL (ref 80–99)
MONOCYTES # BLD: 1.2 K/UL (ref 0–1)
MONOCYTES NFR BLD: 11 % (ref 5–13)
NEUTS SEG # BLD: 7.7 K/UL (ref 1.8–8)
NEUTS SEG NFR BLD: 71 % (ref 32–75)
NRBC # BLD: 0 K/UL (ref 0–0.01)
NRBC BLD-RTO: 0 PER 100 WBC
PLATELET # BLD AUTO: 754 K/UL (ref 150–400)
PMV BLD AUTO: 8.5 FL (ref 8.9–12.9)
POTASSIUM SERPL-SCNC: 4.6 MMOL/L (ref 3.5–5.1)
RBC # BLD AUTO: 3.15 M/UL (ref 3.8–5.2)
SERVICE CMNT-IMP: ABNORMAL
SERVICE CMNT-IMP: ABNORMAL
SODIUM SERPL-SCNC: 132 MMOL/L (ref 136–145)
SOURCE, COVRS: NORMAL
WBC # BLD AUTO: 10.9 K/UL (ref 3.6–11)

## 2021-12-06 PROCEDURE — 74011250637 HC RX REV CODE- 250/637: Performed by: NURSE PRACTITIONER

## 2021-12-06 PROCEDURE — 87635 SARS-COV-2 COVID-19 AMP PRB: CPT

## 2021-12-06 PROCEDURE — 80048 BASIC METABOLIC PNL TOTAL CA: CPT

## 2021-12-06 PROCEDURE — 97110 THERAPEUTIC EXERCISES: CPT

## 2021-12-06 PROCEDURE — 94760 N-INVAS EAR/PLS OXIMETRY 1: CPT

## 2021-12-06 PROCEDURE — 82962 GLUCOSE BLOOD TEST: CPT

## 2021-12-06 PROCEDURE — 97530 THERAPEUTIC ACTIVITIES: CPT

## 2021-12-06 PROCEDURE — 74011250637 HC RX REV CODE- 250/637: Performed by: THORACIC SURGERY (CARDIOTHORACIC VASCULAR SURGERY)

## 2021-12-06 PROCEDURE — 36415 COLL VENOUS BLD VENIPUNCTURE: CPT

## 2021-12-06 PROCEDURE — 74011636637 HC RX REV CODE- 636/637: Performed by: NURSE PRACTITIONER

## 2021-12-06 PROCEDURE — 85025 COMPLETE CBC W/AUTO DIFF WBC: CPT

## 2021-12-06 PROCEDURE — 97116 GAIT TRAINING THERAPY: CPT

## 2021-12-06 PROCEDURE — 74011000250 HC RX REV CODE- 250: Performed by: NURSE PRACTITIONER

## 2021-12-06 PROCEDURE — 71045 X-RAY EXAM CHEST 1 VIEW: CPT

## 2021-12-06 PROCEDURE — 51798 US URINE CAPACITY MEASURE: CPT

## 2021-12-06 PROCEDURE — 77010033678 HC OXYGEN DAILY

## 2021-12-06 RX ORDER — POLYETHYLENE GLYCOL 3350 17 G/17G
17 POWDER, FOR SOLUTION ORAL DAILY
Qty: 14 PACKET | Refills: 0 | Status: ON HOLD | OUTPATIENT
Start: 2021-12-07 | End: 2022-08-17

## 2021-12-06 RX ORDER — ACETAMINOPHEN 500 MG
1000 TABLET ORAL
Qty: 30 TABLET | Refills: 0 | Status: SHIPPED
Start: 2021-12-06

## 2021-12-06 RX ORDER — IBUPROFEN 200 MG
CAPSULE ORAL
Qty: 100 STRIP | Refills: 1 | Status: SHIPPED | OUTPATIENT
Start: 2021-12-06

## 2021-12-06 RX ORDER — INSULIN PUMP SYRINGE, 3 ML
EACH MISCELLANEOUS
Qty: 1 KIT | Refills: 0 | Status: SHIPPED | OUTPATIENT
Start: 2021-12-06

## 2021-12-06 RX ORDER — LANOLIN ALCOHOL/MO/W.PET/CERES
3-6 CREAM (GRAM) TOPICAL
Qty: 30 TABLET | Refills: 0 | Status: ON HOLD
Start: 2021-12-06 | End: 2022-08-17

## 2021-12-06 RX ORDER — AMOXICILLIN 250 MG
2 CAPSULE ORAL 2 TIMES DAILY
Qty: 30 TABLET | Refills: 0 | Status: ON HOLD | OUTPATIENT
Start: 2021-12-06 | End: 2022-08-17

## 2021-12-06 RX ORDER — ALOGLIPTIN 25 MG/1
25 TABLET, FILM COATED ORAL DAILY
Qty: 30 TABLET | Refills: 1 | Status: ON HOLD | OUTPATIENT
Start: 2021-12-06 | End: 2022-08-17

## 2021-12-06 RX ORDER — AMLODIPINE BESYLATE 10 MG/1
10 TABLET ORAL DAILY
Qty: 30 TABLET | Refills: 1 | Status: SHIPPED | OUTPATIENT
Start: 2021-12-07

## 2021-12-06 RX ORDER — INSULIN LISPRO 100 [IU]/ML
INJECTION, SOLUTION INTRAVENOUS; SUBCUTANEOUS
Status: DISCONTINUED | OUTPATIENT
Start: 2021-12-06 | End: 2021-12-06 | Stop reason: HOSPADM

## 2021-12-06 RX ORDER — ATORVASTATIN CALCIUM 80 MG/1
80 TABLET, FILM COATED ORAL
Qty: 30 TABLET | Refills: 1 | Status: SHIPPED | OUTPATIENT
Start: 2021-12-06

## 2021-12-06 RX ORDER — LANCETS
EACH MISCELLANEOUS
Qty: 100 EACH | Refills: 1 | Status: SHIPPED | OUTPATIENT
Start: 2021-12-06

## 2021-12-06 RX ORDER — CARVEDILOL 12.5 MG/1
12.5 TABLET ORAL 2 TIMES DAILY WITH MEALS
Qty: 30 TABLET | Refills: 1 | Status: SHIPPED | OUTPATIENT
Start: 2021-12-06

## 2021-12-06 RX ORDER — CARVEDILOL 12.5 MG/1
12.5 TABLET ORAL 2 TIMES DAILY WITH MEALS
Status: DISCONTINUED | OUTPATIENT
Start: 2021-12-06 | End: 2021-12-06 | Stop reason: HOSPADM

## 2021-12-06 RX ADMIN — PANTOPRAZOLE SODIUM 40 MG: 40 TABLET, DELAYED RELEASE ORAL at 06:21

## 2021-12-06 RX ADMIN — ALPRAZOLAM 0.5 MG: 0.5 TABLET ORAL at 14:12

## 2021-12-06 RX ADMIN — Medication 1 TABLET: at 09:28

## 2021-12-06 RX ADMIN — DOCUSATE SODIUM 50MG AND SENNOSIDES 8.6MG 2 TABLET: 8.6; 5 TABLET, FILM COATED ORAL at 09:28

## 2021-12-06 RX ADMIN — Medication 1 CAPSULE: at 09:28

## 2021-12-06 RX ADMIN — AMLODIPINE BESYLATE 10 MG: 5 TABLET ORAL at 09:28

## 2021-12-06 RX ADMIN — ACETAMINOPHEN 1000 MG: 500 TABLET ORAL at 04:22

## 2021-12-06 RX ADMIN — INSULIN LISPRO 2 UNITS: 100 INJECTION, SOLUTION INTRAVENOUS; SUBCUTANEOUS at 12:24

## 2021-12-06 RX ADMIN — Medication 10 ML: at 12:26

## 2021-12-06 RX ADMIN — ASPIRIN 81 MG CHEWABLE TABLET 81 MG: 81 TABLET CHEWABLE at 09:28

## 2021-12-06 RX ADMIN — APIXABAN 5 MG: 5 TABLET, FILM COATED ORAL at 09:28

## 2021-12-06 RX ADMIN — CARVEDILOL 12.5 MG: 12.5 TABLET, FILM COATED ORAL at 09:28

## 2021-12-06 NOTE — DISCHARGE SUMMARY
[facility] Discharge Summary     Patient ID:  Rajeev Li  [id number]  7 y.o.  1935    Admit date: 11/14/2021    Discharge date: 12/6/2021     Admitting Physician: Pastor Xiomara MD     Referring Cardiologist:  Dr. Pearley Boxer    PCP:  Jeb Gibson MD    Admitting Diagnoses: CAD, NSTEMI    Discharge Diagnoses:     Hospital Problems  Date Reviewed: 3/15/2021          Codes Class Noted POA    * (Principal) S/P CABG x 4 ICD-10-CM: Z95.1  ICD-9-CM: V45.81  11/19/2021 Unknown    Overview Signed 11/19/2021  1:29 PM by TRACY Vo     x 4, LIMA to LAD, RSVG to Diag, RSVG to OM, RSVG to PDA             CAD (coronary artery disease) ICD-10-CM: I25.10  ICD-9-CM: 414.00  11/18/2021 Unknown        NSTEMI (non-ST elevated myocardial infarction) [facility]) ICD-10-CM: I21.4  ICD-9-CM: 410.70  11/14/2021 Unknown              Discharged Condition: good and stable    Disposition: transferred to [facility]    Procedures for this admission:  Procedure(s):  CORONARY ARTERY BYPASS GRAFT X4 LIMA, [facility] VIA EVH. ECC. EPI AORTIC US BY DR Mary Martinez. Discharge Medications:      My Medications      START taking these medications      Instructions Each Dose to Equal Morning Noon Evening Bedtime   acetaminophen 500 mg tablet  Commonly known as: TYLENOL    Your last dose was: Your next dose is: Take 2 Tablets by mouth every six (6) hours as needed for Pain.   1,000 mg                 alogliptin 25 mg tablet  Commonly known as: Nesina    Your last dose was: Your next dose is: Take 1 Tablet by mouth daily. 25 mg                 amLODIPine 10 mg tablet  Commonly known as: NORVASC  Start taking on: December 7, 2021    Your last dose was: Your next dose is: Take 1 Tablet by mouth daily. 10 mg                 apixaban 5 mg tablet  Commonly known as: ELIQUIS    Your last dose was: Your next dose is: Take 1 Tablet by mouth every twelve (12) hours.    5 mg                 atorvastatin 80 mg tablet  Commonly known as: LIPITOR  Replaces: simvastatin 40 mg tablet    Your last dose was: Your next dose is: Take 1 Tablet by mouth nightly. 80 mg                 blood glucose test strip  Generic drug: glucose blood VI test strips    Your last dose was: Your next dose is:         E11.9                  Blood-Glucose Meter monitoring kit    Your last dose was: Your next dose is:         E11.9   Check blood sugar prior to meals and at bedtime. lancets Misc    Your last dose was: Your next dose is:         E11.9                  melatonin 3 mg tablet    Your last dose was: Your next dose is: Take 1-2 Tablets by mouth nightly as needed (Sleep). 3-6 mg                 polyethylene glycol 17 gram packet  Commonly known as: MIRALAX  Start taking on: December 7, 2021    Your last dose was: Your next dose is: Take 1 Packet by mouth daily. 17 g                 senna-docusate 8.6-50 mg per tablet  Commonly known as: PERICOLACE    Your last dose was: Your next dose is: Take 2 Tablets by mouth two (2) times a day. 2 Tablet                    CHANGE how you take these medications      Instructions Each Dose to Equal Morning Noon Evening Bedtime   carvediloL 12.5 mg tablet  Commonly known as: COREG  What changed:   · medication strength  · how much to take    Your last dose was: Your next dose is: Take 1 Tablet by mouth two (2) times daily (with meals). 12.5 mg                    CONTINUE taking these medications      Instructions Each Dose to Equal Morning Noon Evening Bedtime   ALPRAZolam 0.25 mg tablet  Commonly known as: XANAX    Your last dose was: Your next dose is:         take 1 tablet by mouth three times a day if needed for anxiety                  aspirin delayed-release 81 mg tablet    Your last dose was: Your next dose is: Take 81 mg by mouth daily.    81 mg                 calcium-vitamin D 250 mg-125 unit per tablet  Commonly known as: OS-MARINA +D3    Your last dose was: Your next dose is: Take 1 Tab by mouth daily. 1 Tablet                 CENTRUM PO    Your last dose was: Your next dose is: Take  by mouth. omeprazole 20 mg capsule  Commonly known as: PRILOSEC    Your last dose was: Your next dose is: Take 20 mg by mouth daily. 20 mg                    STOP taking these medications    diclofenac potassium 50 mg tablet  Commonly known as: CATAFLAM        losartan 50 mg tablet  Commonly known as: COZAAR        simvastatin 40 mg tablet  Commonly known as: ZOCOR  Replaced by: atorvastatin 80 mg tablet        triamterene-hydroCHLOROthiazide 37.5-25 mg per tablet  Commonly known as: MAXZIDE        verapamil  mg CR tablet  Commonly known as: CALAN-SR              Where to Get Your Medications      These medications were sent to [place] [id number] #[id number] [address] AT [address]  [address] [facility] [place], [address] [id number]    Phone: [phone]   · alogliptin 25 mg tablet  · amLODIPine 10 mg tablet  · apixaban 5 mg tablet  · atorvastatin 80 mg tablet  · blood glucose test strip  · carvediloL 12.5 mg tablet  · lancets Misc  · polyethylene glycol 17 gram packet  · senna-docusate 8.6-50 mg per tablet     Information on where to get these meds will be given to you by the nurse or doctor. Ask your nurse or doctor about these medications  · acetaminophen 500 mg tablet  · Blood-Glucose Meter monitoring kit  · melatonin 3 mg tablet         Oxygen: 1L NC    HPI: Silvia Mclean is a 81 y.o. female who was referred for cardiac evaluation for CAD from Dr. Neymar Guzman. Patient was transferred from [address] [place] [address] with chief complaint of nausea and vomiting and hypertensive urgency (/105). Troponin 0.45 in ED. Patient had 2 trips to ED in [address] [place] [address] prior to this with hypertensive urgency without troponin bump. Patient without chest pain, shortness of breath, fever, swelling in expremities and palpitations. EKG without any ST changes in ED. 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 Past medical history significant for HTN, OA with B/L TKR, large hiatal hernia, and anxiety.       Cardiac Testing     Cardiac catheterization:     Diagnostic  Dominance: Right     Left Main   The vessel is large. The vessel is calcified. 75% distal LM stenosis. Left Anterior Descending   The proximal segment of the vessel is large. The middle segment of the vessel is moderate in size. The distal segment of the vessel is small. The vessel is calcified. 60% mid stenosis; 3 small diagonals. Left Circumflex   The proximal segment of the vessel is moderate in size. The middle segment of the vessel is small. The distal segment of the vessel is small. The vessel is calcified. 75% mid-circ plaque; Medium OM; trivial terminal OMs; Left to right collaterals. Right Coronary Artery   The vessel was visualized by non-selective angiography and is large. The vessel is calcified. 80% distal plaque; small PDA/trivial posterolateral vessels (fill from left primarily).      ECHO:  Left Ventricle Normal cavity size and systolic function (ejection fraction normal). Mild concentric hypertrophy.  The estimated EF is 65 - 70%. Left Atrium Normal cavity size. Right Ventricle Normal cavity size and global systolic function. Right Atrium Normal cavity size. Aortic Valve Normal valve structure, no stenosis and no regurgitation. Mitral Valve Normal valve structure and no stenosis. Trace regurgitation. Tricuspid Valve Normal valve structure, no stenosis and no regurgitation. Pulmonic Valve Pulmonic valve not well visualized, but normal doppler findings. Aorta Normal aortic root. Pulmonary Artery Pulmonary arteries not well visualized. Pulmonary hypertension not suggested by Doppler findings.    IVC/Hepatic Veins Normal structure. Normal central venous pressure (3 mmHg); IVC diameter is less than 21 mm and collapses more than 50% with respiration. Pericardium No evidence of pericardial effusion. Hospital Course: Pt underwent CABG x 4, LIMA to LAD, RSVG to Diag, RSVG to OM, RSVG to PDA  Left Greater Saphenous Vein EVH  Placement of Prevena Incision Management System with Dr. Igor Santo on 11/19/21. Pt was transferred to the ICU in stable condition on Amiodarone, Precedex, Insulin, Neosynephrine, and Dobutamine gtts. Her postoperative course was complicated by postoperative shock, large hiatal hernia, hyperkalemia, CKD, and acute DVT. Pt was found to be stable and ready for transfer to SNF today with the following plan:    POD#17:  1. NSTEMI, Mv CAD s/p CABG x4: Cont ASA/statin/BB and Amiodarone d/c today d/t nausea. ECHO completed   2. Uncontrolled Hypertension: on Coreg, losartan, verapamil PTA. Hold losartan given renal function. Cont Coreg & norvasc. Increase coreg today  3. Large hiatal hernia: CT with Large hiatal hernia containing nearly the entire stomach, mesenteric fat, and the splenic flexure of the colon. Needs FU as OP  4. TEMO on CKD stage 3: Monitor - creatinine down to 1.97. Nephro following. Given 40 lasix, albumin, and 1 PRBC 12/2  5. Anxiety: PRN xanax  6. Postop shock, vasoplegia vs cardiogenic: Off all vasopressors. Resolved. 7.  Postop pain control: Cont tylenol to 1,000mg Q6hr PRN. Cont PRN Oxycodone and ultram. Cont lidocaine patches - place on shoulder  8. Atelectasis, apical pneumo: Increase mobility. Hourly I.S. Wean oxygen for O2 sat > 92%. Remains on 1L NC. OOB TID, ambulating BID. CXR not improving, needs intense hourly pulmonary toileting. Daily CXR. Iv diuresis 12/2. Breathing much improved. 9. Leukocytosis: Increase mobility. Pulm toileting. Daily incisional care to sternal incision and EVH site. UA sent - cont abx  10. DM type II: New diagnosis.   A1C 7, started amaryl - stopped 12/2 for Hypoglycemia overnight. Stopped SSI. Needs education for diabetes. Diabetes management following. Hypoglycemia again - add bedtime supplement. Likely residual amaryl which hasnt cleared. hyerglycemic over the weekend. Diabetes recommended NESINA 25mg daily at discharge. All testing supplies ordered at discharge. 11. Acute DVT: started on Lovenox 11/28. Changed to Eliquis 11/29. Continue. Reduced Eliquis to 5mg BID d/t crcl  12. Hyperkalemia: Given lokelma BID 11/30. Valtessa given 12/2 - K down to 4.6 this am. Nephro following  13. Urinary retention: UA with + leuks, + blood, +1 bacteria; also mod epithelial. UC pending. Complete Levoflaxaxin. Ruiz placed Friday evening - remove now. Must Complete Post void bladder scans. Strict I/Os. 14. Nausea: cont increased bowel regimen. Add compazine PRN. AMBULATE TID. Limit narcotics, utilize tylenol.      Referral to outpatient cardiac rehab made. Discharge Vital Signs:   Visit Vitals  BP (!) 144/60 (BP 1 Location: Right upper arm, BP Patient Position: Sitting)   Pulse 83   Temp 98.7 °F (37.1 °C)   Resp 18   Ht 5' 7\" (1.702 m)   Wt 197 lb 1.5 oz (89.4 kg)   SpO2 96%   BMI 30.87 kg/m²       Labs:   Recent Labs     12/06/21  1132 12/06/21  0721 12/06/21  0411   WBC  --   --  10.9   HGB  --   --  9.2*   HCT  --   --  29.1*   PLT  --   --  754*   NA  --   --  132*   K  --   --  4.6   BUN  --   --  49*   CREA  --   --  1.97*   GLU  --   --  135*   GLUCPOC 199*   < >  --     < > = values in this interval not displayed. Diagnostics:   CXR Results  (Last 48 hours)               12/06/21 0448  XR CHEST PORT Final result    Impression:  No change. Narrative:  Clinical indication: Postop heart. Portable AP upright view of the chest obtained, comparison December 5. Bilateral   effusion, mild interstitial edema and cardiomegaly unchanged. Prior sternotomy. 12/05/21 0429  XR CHEST PORT Final result    Impression:  Cardiomegaly. Bibasilar atelectasis. Massive hernia. Narrative:  PORTABLE CHEST RADIOGRAPH/S: 12/5/2021 4:29 AM       INDICATION: Postop heart. COMPARISON: 12/4/2021, 12/3/2021, 12/2/2021, 11/30/2021. TECHNIQUE: Portable frontal semiupright radiograph/s of the chest.       FINDINGS:    The heart is enlarged, even given portable technique. Post median sternotomy. Passive atelectasis is associated with small bilateral pleural effusions. The   central airways are patent. The amilacr is splayed by a massive hiatal hernia,   better seen on prior CT. No pneumothorax. Patient Instructions/Follow Up Care:  Discharge instructions were reviewed with the patient and family present. Questions were also answered at this time. Prescriptions and medications were reviewed. The patient has a follow up appointment with the Nurse Practitioner or Physician's Assistant and with Dr. Ciaran Lucas on 12/22/21 at 1:30pm. The patient was also instructed to follow up with her primary care physician as needed. The patient and family were encouraged to call with any questions or concerns.        Signed:  Idania Armenta NP  12/6/2021  2:28 PM

## 2021-12-06 NOTE — PROGRESS NOTES
End of Shift Note    Bedside shift change report given to [place] (oncoming nurse) by Ana Thakru RN (offgoing nurse). Report included the following information SBAR, Kardex, Intake/Output, MAR, Recent Results and Cardiac Rhythm NSR    Shift worked:  7p-7a     Shift summary and any significant changes:     Wound care completed and CHG bath given. Concerns for physician to address:  Hyperglycemia. Needs diabetes management education. Remove servin/voiding trial?     Zone phone for oncoming shift:   [phone]       Activity:  Activity Level: Up with Assistance  Number times ambulated in hallways past shift: 0  Number of times OOB to chair past shift: 1    Cardiac:   Cardiac Monitoring: Yes      Cardiac Rhythm: Sinus Rhythm    Access:   Current line(s): PIV     Genitourinary:   Urinary status: servin    Respiratory:   O2 Device: Nasal cannula  Chronic home O2 use?: NO  Incentive spirometer at bedside: YES  Actual Volume (ml): 750 ml  GI:  Last Bowel Movement Date: 12/05/21  Current diet:  ADULT ORAL NUTRITION SUPPLEMENT HS Snack; Standard High Calorie/High Protein  ADULT ORAL NUTRITION SUPPLEMENT Breakfast, Lunch, Dinner, AM Snack, PM Snack; Low Calorie/High Protein  ADULT DIET Regular; 4 carb choices (60 gm/meal); No Salt Added (3-4 gm); Low Potassium (Less than 3000 mg/day); 1200 ml  DIET ONE TIME MESSAGE  Passing flatus: YES  Tolerating current diet: YES       Pain Management:   Patient states pain is manageable on current regimen: YES    Skin:  Ilia Score: 18  Interventions: float heels, increase time out of bed, PT/OT consult and nutritional support     Patient Safety:  Fall Score:  Total Score: 3  Interventions: bed/chair alarm, assistive device (walker, cane, etc), gripper socks, pt to call before getting OOB and stay with me (per policy)  High Fall Risk: Yes    Length of Stay:  Expected LOS: 8d 14h  Actual LOS: 22      Ana Thakur RN

## 2021-12-06 NOTE — PROGRESS NOTES
Problem: Mobility Impaired (Adult and Pediatric)  Goal: *Acute Goals and Plan of Care (Insert Text)  Description: FUNCTIONAL STATUS PRIOR TO ADMISSION: Patient was independent and active without use of DME however began using rolling walker while in hospital prior to cardiac surgery. Reports that she has been having difficulty with endurance and can only do a little of activity before needing to take a break. Reports \"nighttime weakness\" but no falls at home. HOME SUPPORT PRIOR TO ADMISSION: The patient lived alone with son in the area to provide assistance. Physical Therapy Goals  Revised 12/5/2021  1. Patient will move from supine to sit and sit to supine , scoot up and down, and roll side to side in bed with minimal assistance/contact guard assist within 7 day(s). 2.  Patient will transfer from bed to chair and chair to bed with supervision/set-up using the least restrictive device within 7 day(s). 3.  Patient will perform sit to stand with minimal assistance/contact guard assist within 7 day(s). 4.  Patient will ambulate with supervision/set-up for 75 feet with the least restrictive device within 7 day(s). Revised 11/26/2021  1. Patient will move from supine to sit and sit to supine , scoot up and down, and roll side to side in bed with min A within 5 days. 2.  Patient will perform sit to/from stand with contact guard assist within 5 days. 3.  Patient will ambulate 50 feet with least restrictive assistive device and contact guard assist within 5 days. 4.  Patient will perform cardiac exercises per protocol with supervision/set-up within 5 days. 5.  Patient will verbally and functionally demonstrate 3/3 sternal precautions without cues within 5 days. Revised 11/21/2021  1. Patient will move from supine to sit and sit to supine , scoot up and down, and roll side to side in bed with contact guard assist within 5 days.     2.  Patient will perform sit to/from stand with contact guard assist within 5 days. 3.  Patient will ambulate 100 feet with least restrictive assistive device and contact guard assist within 5 days. 4.  Patient will ascend/descend 3 stairs with 1 handrail(s) with contact guard assistance within 7 day(s). 5.  Patient will perform cardiac exercises per protocol with supervision/set-up within 5 days. 6.  Patient will verbally and functionally demonstrate 3/3 sternal precautions without cues within 5 days. Initiated 11/15/2021  1. Patient will move from supine to sit and sit to supine  in bed with modified independence within 7 day(s). 2.  Patient will transfer from bed to chair and chair to bed with modified independence using the least restrictive device within 7 day(s). 3.  Patient will perform sit to stand with modified independence within 7 day(s). 4.  Patient will ambulate with modified independence for 250 feet with the least restrictive device within 7 day(s). 5.  Patient will ascend/descend 3 stairs with 1 handrail(s) with modified independence within 7 day(s). Outcome: Progressing Towards Goal   PHYSICAL THERAPY TREATMENT  Patient: Bernard Bailey (61 y.o. female)  Date: 12/6/2021  Diagnosis: NSTEMI (non-ST elevated myocardial infarction) ([facility] [address] [I21.4]  CAD (coronary artery disease) [I25.10]   S/P CABG x 4  Procedure(s) (LRB):  CORONARY ARTERY BYPASS GRAFT X4 LIMA, [facility] VIA EVH. ECC. EPI AORTIC US BY DR Amelia Keyes. (N/A) 17 Days Post-Op  Precautions: Sternal (move in the tube)  Chart, physical therapy assessment, plan of care and goals were reviewed. ASSESSMENT  Patient continues with skilled PT services and is progressing towards goals, and demonstrates improved activity tolerance and balance this date. Pt minor improvement with activity tolerance demonstrating ability to ambulate 110ft CGA RW however required multiple seated rest breaks. Chair follow for safety given continued impairments in  activity tolerance.  Attempted ambulation on 1L of oxygen but desats to 86% with increased activity, Tolerated increased activity and vitals stable on 2L. Pt demonstrates adherence to sternal precautions throughout treatment. Pt remains below her baseline independent/modified independent level and most appropriate for additional rehab at discharge. Patient is demonstrating understanding of mindful-based movements (\"move in the tube\") principles of keeping UEs proximal to ribcage to prevent lateral pull on the sternum during load-bearing activities with verbal cues required for compliance. Current Level of Function Impacting Discharge (mobility/balance): minAx2 sit<>stand, CGA w/RW during gait    Other factors to consider for discharge: needs oxygen, impaired activity tolerance, fall risk         PLAN :  Patient continues to benefit from skilled intervention to address the above impairments. Continue treatment per established plan of care. to address goals. Recommendation for discharge: (in order for the patient to meet his/her long term goals)  Therapy up to 5 days/week in SNF setting    This discharge recommendation:  Has been made in collaboration with the attending provider and/or case management    IF patient discharges home will need the following DME: to be determined (TBD)       SUBJECTIVE:   Patient stated Son Back much will I have to do over at rehab    OBJECTIVE DATA SUMMARY:   Patient mobilized on continuous portable monitor/telemetry.   Critical Behavior:  Neurologic State: Alert  Orientation Level: Oriented X4  Cognition: Appropriate decision making, Appropriate for age attention/concentration, Appropriate safety awareness, Follows commands  Safety/Judgement: Awareness of environment (decreased insight into capabilities)    Functional Mobility Training:  Bed Mobility:      Patient received sitting in recliner upon arrival          Transfers:  Sit to Stand: Minimum assistance; Assist x2  Stand to Sit: Contact guard assistance Balance:  Sitting: Intact  Sitting - Static: Good (unsupported)  Standing: Impaired; With support (rolling walker)  Standing - Static: Good; Constant support  Standing - Dynamic : Fair; Constant support    Ambulation/Gait Training:  Distance (ft): 110 Feet (ft) (10/10/35/55 w/seated rest breaks b/t)  Assistive Device: Gait belt; Walker, rolling (chair follow)  Ambulation - Level of Assistance: Contact guard assistance        Gait Abnormalities: Decreased step clearance; Shuffling gait        Base of Support: Widened     Speed/Jes: Pace decreased (<100 feet/min)  Step Length: Left shortened; Right shortened             Cardiac diagnosis intervention:  Patient instructed and educated on mindful movement principles based on Move in The Tube concept to include maintaining bilateral elbows close to rib cage when performing any load-bearing activity such as getting in/out of bed, pushing up from a chair, opening a door, or lifting a box. Patient was given a handout with diagrams of each correct/incorrect method of performing each of the above tasks. Therapeutic Exercises:   Patient instructed on the benefits and demonstrated cardiac exercises while seated with Minimum assistance and Assist x1. Instructed and indicated understanding on how to progress reps, sets against gravity, pacing through progressive muscle strengthening standing based on surgeon clearance for more weight in prep for functional activity. Instruction on the use of household items in place of weights as needed.      CARDIAC  EXERCISE   Sets   Reps   Active Active Assist   Passive Self ROM   Comments   Shoulder flexion 1 10 [x]                                            []                                            []                                            []                                            Pat RUE due to bursitis   Shoulder abduction 1      10 [x]                                            [] []                                            []                                            Pat RUE due to bursitis   Scapular elevation 1 10 [x]                                            []                                            []                                            []                                               Scapular retraction 1 10 [x]                                            []                                            []                                            []                                               Trunk rotation 1 10 [x]                                            []                                            []                                            [x]                                               Trunk sidebending 1 10 [x]                                            []                                            []                                            []                                                  []                                            []                                            []                                            []                                                   Pain Rating:  Pain rating not received from patient    Activity Tolerance:   Fair and requires rest breaks    After treatment patient left in no apparent distress:   Sitting in chair and Call bell within reach    COMMUNICATION/COLLABORATION:   The patients plan of care was discussed with: Physical therapist, Occupational therapist, and Registered nurse.      Miya Saenz PTA   Time Calculation: 40 mins

## 2021-12-06 NOTE — PROGRESS NOTES
CSS Floor Progress Note    Admit Date: 2021  POD:  17 Days Post-Op    Procedure:  Procedure(s):  CORONARY ARTERY BYPASS GRAFT X4 LIMA, [facility] VIA EVH. ECC. EPI AORTIC US BY DR Tha Marks. Subjective:   Pt seen with Dr. Juanito Dickens. Pt resting up in bed on 1L NC. Afebrile. No complaints. Ruiz in over weekend. Objective:   Vitals:  Blood pressure (!) 158/55, pulse 81, temperature 98.2 °F (36.8 °C), resp. rate 16, height 5' 7\" (1.702 m), weight 197 lb 1.5 oz (89.4 kg), SpO2 95 %. Temp (24hrs), Av.4 °F (36.9 °C), Min:98.2 °F (36.8 °C), Max:98.7 °F (37.1 °C)    EKG/Rhythm:  NSR 70-80s    Oxygen Therapy:  Oxygen Therapy  O2 Sat (%): 95 % (21 07)  Pulse via Oximetry: 82 beats per minute (21 0701)  O2 Device: Nasal cannula (21)  Skin Assessment: Clean, dry, & intact (21)  Skin Protection for O2 Device: No (21)  Orientation: Bilateral (21)  Location: Cheek (21)  Interventions: Mouth Care; Reposition Device (21)  O2 Flow Rate (L/min): 1 l/min (21 0351)  FIO2 (%): 50 % (21 0729)    CXR:   CXR Results  (Last 48 hours)               21  XR CHEST PORT Final result    Impression:  No change. Narrative:  Clinical indication: Postop heart. Portable AP upright view of the chest obtained, comparison . Bilateral   effusion, mild interstitial edema and cardiomegaly unchanged. Prior sternotomy. 21 042  XR CHEST PORT Final result    Impression:  Cardiomegaly. Bibasilar atelectasis. Massive hernia. Narrative:  PORTABLE CHEST RADIOGRAPH/S: 2021 4:29 AM       INDICATION: Postop heart. COMPARISON: 2021, 12/3/2021, 2021, 2021. TECHNIQUE: Portable frontal semiupright radiograph/s of the chest.       FINDINGS:    The heart is enlarged, even given portable technique. Post median sternotomy.    Passive atelectasis is associated with small bilateral pleural effusions. The   central airways are patent. The amilcar is splayed by a massive hiatal hernia,   better seen on prior CT. No pneumothorax. Admission Weight: Last Weight   Weight: 184 lb (83.5 kg) Weight: 197 lb 1.5 oz (89.4 kg)     Intake / Output / Drain:  Current Shift: No intake/output data recorded. Last 24 hrs.:     Intake/Output Summary (Last 24 hours) at 2021 0859  Last data filed at 2021 0417  Gross per 24 hour   Intake 240 ml   Output 1600 ml   Net -1360 ml       EXAM:  General:     NAD, pleasant mood                                                      Lungs:   Coarse to auscultation bilaterally. Incision:  FRANCHESKA. No erythema, swelling, or drainage noted. Heart:  Regular rate and rhythm, S1, S2 normal, no murmur, click, rub or gallop. Abdomen:   Soft, non-tender. Bowel sounds hypoactive. No masses,  No organomegaly. + BM    Extremities:  2+ LE edema. PPP. Neurologic:  Gross motor and sensory apparatus intact. Labs:   Recent Labs     21  0721 21  0411 21  2124   WBC  --  10.9  --    HGB  --  9.2*  --    HCT  --  29.1*  --    PLT  --  754*  --    NA  --  132*  --    K  --  4.6  --    BUN  --  49*  --    CREA  --  1.97*  --    GLU  --  135*  --    GLUCPOC 169*  --    < >    < > = values in this interval not displayed. Assessment:     Principal Problem:    S/P CABG x 4 (2021)      Overview: x 4, LIMA to LAD, RSVG to Diag, RSVG to OM, RSVG to PDA    Active Problems:    NSTEMI (non-ST elevated myocardial infarction) ([facility] [address] (2021)      CAD (coronary artery disease) (2021)       Plan/Recommendations/Medical Decision Makin. NSTEMI, Mv CAD s/p CABG x4: Cont ASA/statin/BB and Amiodarone d/c today d/t nausea. ECHO completed   2. Uncontrolled Hypertension: on Coreg, losartan, verapamil PTA. Hold losartan given renal function. Cont Coreg & norvasc. Increase coreg today  3.  Large hiatal hernia: CT with Large hiatal hernia containing nearly the entire stomach, mesenteric fat, and the splenic flexure of the colon. Needs FU as OP  4. TEMO on CKD stage 3: Monitor - creatinine down to 1.97. Nephro following. Given 40 lasix, albumin, and 1 PRBC 12/2  5. Anxiety: PRN xanax  6. Postop shock, vasoplegia vs cardiogenic: Off all vasopressors. Resolved. 7.  Postop pain control: Cont tylenol to 1,000mg Q6hr PRN. Cont PRN Oxycodone and ultram. Cont lidocaine patches - place on shoulder  8. Atelectasis, apical pneumo: Increase mobility. Hourly I.S. Wean oxygen for O2 sat > 92%. Remains on 1L NC. OOB TID, ambulating BID. CXR not improving, needs intense hourly pulmonary toileting. Daily CXR. Iv diuresis 12/2. Breathing much improved. 9. Leukocytosis: Increase mobility. Pulm toileting. Daily incisional care to sternal incision and EVH site. UA sent - cont abx  10. DM type II: New diagnosis. A1C 7, started amaryl - stopped 12/2 for Hypoglycemia overnight. Stopped SSI. Needs education for diabetes. Diabetes management following. Will need prescription for glucometer/testing supplies. Hypoglycemia again - add bedtime supplement. Likely residual amaryl which hasnt cleared. hyerglycemic over the weekend. Will reconsult diabetes team for PO regimen at discharge   11. Acute DVT: started on Lovenox 11/28. Changed to Eliquis 11/29. Continue. Reduced Eliquis to 5mg BID d/t crcl  12. Hyperkalemia: Given lokelma BID 11/30. Valtessa given 12/2 - K down to 4.6 this am. Nephro following  13. Urinary retention: UA with + leuks, + blood, +1 bacteria; also mod epithelial. UC pending. Complete Levoflaxaxin. Ruiz placed Friday evening - remove now. Must Complete Post void bladder scans. Strict I/Os. 14. Nausea: cont increased bowel regimen. Add compazine PRN. AMBULATE TID. Limit narcotics, utilize tylenol. Dispo: PT/OT. Stepdown. OOB TID, ambulating BID.  Case management following to aid in discharge planning -referrals sent to [facility] x3. 11/24 COVID PCR negative. Now medically stable for discharge to SNF. Bed was supposed to be held for transfer this am, will transfer today if bed available.      Signed By: Darryle Bilis, NP

## 2021-12-06 NOTE — PROGRESS NOTES
Problem: Falls - Risk of  Goal: *Absence of Falls  Description: Document Sanjuana Marking Fall Risk and appropriate interventions in the flowsheet.   Outcome: Progressing Towards Goal  Note: Fall Risk Interventions:  Mobility Interventions: Bed/chair exit alarm, Communicate number of staff needed for ambulation/transfer, OT consult for ADLs, Patient to call before getting OOB, PT Consult for mobility concerns, PT Consult for assist device competence, Strengthening exercises (ROM-active/passive), Utilize walker, cane, or other assistive device, Utilize gait belt for transfers/ambulation    Mentation Interventions: Adequate sleep, hydration, pain control, Bed/chair exit alarm, Toileting rounds    Medication Interventions: Bed/chair exit alarm, Patient to call before getting OOB, Teach patient to arise slowly, Utilize gait belt for transfers/ambulation    Elimination Interventions: Bed/chair exit alarm, Call light in reach, Patient to call for help with toileting needs, Stay With Me (per policy), Toileting schedule/hourly rounds              Problem: Patient Education: Go to Patient Education Activity  Goal: Patient/Family Education  Outcome: Progressing Towards Goal     Problem: Unstable angina/NSTEMI: Day of Admission/Day 1  Goal: Off Pathway (Use only if patient is Off Pathway)  Outcome: Resolved/Met     Problem: Unstable angina/NSTEMI: Day 2  Goal: Off Pathway (Use only if patient is Off Pathway)  Outcome: Resolved/Met     Problem: Unstable Angina/NSTEMI: Discharge Outcomes  Goal: *Hemodynamically stable  Outcome: Progressing Towards Goal  Goal: *Stable cardiac rhythm  Outcome: Progressing Towards Goal  Goal: *Lungs clear or at baseline  Outcome: Progressing Towards Goal  Goal: *Optimal pain control at patient's stated goal  Outcome: Progressing Towards Goal  Goal: *Identifies cardiac risk factors  Outcome: Progressing Towards Goal  Goal: *Verbalizes home exercise program, activity guidelines, cardiac precautions  Outcome: Progressing Towards Goal  Goal: *Verbalizes understanding and describes prescribed diet  Outcome: Progressing Towards Goal  Goal: *Anxiety reduced or absent  Outcome: Progressing Towards Goal  Goal: *Understands and describes signs and symptoms to report to providers(Stroke Metric)  Outcome: Progressing Towards Goal  Goal: *Describes follow-up/return visits to physicians  Outcome: Progressing Towards Goal  Goal: *Describes available resources and support systems  Outcome: Progressing Towards Goal  Goal: *Influenza immunization  Outcome: Progressing Towards Goal  Goal: *Pneumococcal immunization  Outcome: Progressing Towards Goal     Problem: Activity Intolerance  Goal: *Oxygen saturation during activity within specified parameters  Outcome: Progressing Towards Goal  Goal: *Able to remain out of bed as prescribed  Outcome: Progressing Towards Goal     Problem: Patient Education: Go to Patient Education Activity  Goal: Patient/Family Education  Outcome: Progressing Towards Goal     Problem: Diabetes Self-Management  Goal: *Disease process and treatment process  Description: Define diabetes and identify own type of diabetes; list 3 options for treating diabetes. Outcome: Progressing Towards Goal  Goal: *Incorporating nutritional management into lifestyle  Description: Describe effect of type, amount and timing of food on blood glucose; list 3 methods for planning meals. Outcome: Progressing Towards Goal  Goal: *Incorporating physical activity into lifestyle  Description: State effect of exercise on blood glucose levels. Outcome: Progressing Towards Goal  Goal: *Developing strategies to promote health/change behavior  Description: Define the ABC's of diabetes; identify appropriate screenings, schedule and personal plan for screenings.   Outcome: Progressing Towards Goal  Goal: *Using medications safely  Description: State effect of diabetes medications on diabetes; name diabetes medication taking, action and side effects. Outcome: Progressing Towards Goal  Goal: *Monitoring blood glucose, interpreting and using results  Description: Identify recommended blood glucose targets  and personal targets. Outcome: Progressing Towards Goal  Goal: *Prevention, detection, treatment of acute complications  Description: List symptoms of hyper- and hypoglycemia; describe how to treat low blood sugar and actions for lowering  high blood glucose level. Outcome: Progressing Towards Goal  Goal: *Prevention, detection and treatment of chronic complications  Description: Define the natural course of diabetes and describe the relationship of blood glucose levels to long term complications of diabetes.   Outcome: Progressing Towards Goal  Goal: *Developing strategies to address psychosocial issues  Description: Describe feelings about living with diabetes; identify support needed and support network  Outcome: Progressing Towards Goal     Problem: Patient Education: Go to Patient Education Activity  Goal: Patient/Family Education  Outcome: Progressing Towards Goal     Problem: Patient Education: Go to Patient Education Activity  Goal: Patient/Family Education  Outcome: Progressing Towards Goal     Problem: Patient Education: Go to Patient Education Activity  Goal: Patient/Family Education  Outcome: Progressing Towards Goal     Problem: Non-Violent Restraints  Goal: Removal from restraints as soon as assessed to be safe  Outcome: Resolved/Met  Goal: No harm/injury to patient while restraints in use  Outcome: Resolved/Met  Goal: Patient's dignity will be maintained  Outcome: Resolved/Met  Goal: Patient Interventions  Outcome: Resolved/Met     Problem: Ventilator Management  Goal: *Adequate oxygenation and ventilation  Outcome: Resolved/Met  Goal: *Patient maintains clear airway/free of aspiration  Outcome: Resolved/Met  Goal: *Absence of infection signs and symptoms  Outcome: Resolved/Met  Goal: *Normal spontaneous ventilation  Outcome: Resolved/Met     Problem: Pressure Injury - Risk of  Goal: *Prevention of pressure injury  Description: Document Ilia Scale and appropriate interventions in the flowsheet.   Outcome: Progressing Towards Goal  Note: Pressure Injury Interventions:  Sensory Interventions: Discuss PT/OT consult with provider, Float heels, Keep linens dry and wrinkle-free, Maintain/enhance activity level, Minimize linen layers, Pressure redistribution bed/mattress (bed type)    Moisture Interventions: Absorbent underpads, Minimize layers, Moisture barrier    Activity Interventions: Increase time out of bed, Pressure redistribution bed/mattress(bed type), PT/OT evaluation    Mobility Interventions: Float heels, HOB 30 degrees or less, Pressure redistribution bed/mattress (bed type), PT/OT evaluation    Nutrition Interventions: Document food/fluid/supplement intake, Offer support with meals,snacks and hydration    Friction and Shear Interventions: Apply protective barrier, creams and emollients, Foam dressings/transparent film/skin sealants, HOB 30 degrees or less, Lift sheet, Lift team/patient mobility team, Transferring/repositioning devices                Problem: Patient Education: Go to Patient Education Activity  Goal: Patient/Family Education  Outcome: Progressing Towards Goal     Problem: Infection - Risk of, Urinary Catheter-Associated Urinary Tract Infection  Goal: *Absence of infection signs and symptoms  Outcome: Progressing Towards Goal     Problem: Patient Education: Go to Patient Education Activity  Goal: Patient/Family Education  Outcome: Progressing Towards Goal

## 2021-12-06 NOTE — PROGRESS NOTES
Cardiac Surgery Care Coordinator- Met with Favio Mejia. Reviewed plan of care and discussed upcoming discharge date. Reinforced sternal precautions and encouraged continued use of the incentive spirometer. Began discharge teaching and encouraged her to verbalize. Reviewed goals for the day and discussed the  importance of increased activity and continued activity after discharge. Will continue to follow for educational and emotional needs.  Dante Lentz RN

## 2021-12-06 NOTE — PROGRESS NOTES
.                                                                                Billie Marvin        :  1935        MRN:  [id number]                  Assessment   :                                               Plan:  CKD-3b  TEMO    Hyponatremia    S/P CABG    Hyperkalemia    HTN Baseline creatinine ~ 1.50, peaked at 2.1 and is now 2. Urine sediment fairly bland. Sodium jose raul 128, now 132. Continue fluid restriction. Potassium up to 5.5, now 4.6; s/p lokelma.              Subjective:     Chief Complaint:  \" I feel better today. \" No dyspnea. No pain. No CP. No N/V. Review of Systems:    Symptom Y/N Comments  Symptom Y/N Comments   Fever/Chills    Chest Pain     Poor Appetite    Edema     Cough    Abdominal Pain     Sputum    Joint Pain     SOB/TIDWELL    Pruritis/Rash     Nausea/vomit    Tolerating PT/OT     Diarrhea    Tolerating Diet     Constipation    Other       Could not obtain due to:      Objective:     VITALS:   Last 24hrs VS reviewed since prior progress note.  Most recent are:  Visit Vitals  BP (!) 158/55 (BP 1 Location: Left upper arm, BP Patient Position: At rest)   Pulse 81   Temp 98.3 °F (36.8 °C)   Resp 16   Ht 5' 7\" (1.702 m)   Wt 89.4 kg (197 lb 1.5 oz)   SpO2 96%   BMI 30.87 kg/m²       Intake/Output Summary (Last 24 hours) at 2021 0610  Last data filed at 2021 0417  Gross per 24 hour   Intake 240 ml   Output 1600 ml   Net -1360 ml      Telemetry Reviewed:     PHYSICAL EXAM:  General: NAD  + edema      Lab Data Reviewed: (see below)    Medications Reviewed: (see below)    PMH/SH reviewed - no change compared to H&P  ________________________________________________________________________  Care Plan discussed with:  Patient     Family      RN     Care Manager                    Consultant:          Comments   >50% of visit spent in counseling and coordination of care       ________________________________________________________________________  Norman Sequeira MD     Procedures: see electronic medical records for all procedures/Xrays and details which  were not copied into this note but were reviewed prior to creation of Plan. LABS:  Recent Labs     12/06/21 0411 12/05/21  0507   WBC 10.9 11.1*   HGB 9.2* 8.3*   HCT 29.1* 26.3*   * 661*     Recent Labs     12/06/21 0411 12/05/21  0507 12/04/21  0544   * 130* 129*   K 4.6 4.9 5.2*   CL 98 96* 95*   CO2 27 28 28   BUN 49* 51* 55*   CREA 1.97* 2.01* 2.08*   * 143* 97   CA 8.8 8.5 8.8     No results for input(s): AP, TBIL, TP, ALB, GLOB, GGT, AML, LPSE in the last 72 hours. No lab exists for component: SGOT, GPT, AMYP, HLPSE  No results for input(s): INR, PTP, APTT, INREXT, INREXT in the last 72 hours. No results for input(s): FE, TIBC, PSAT, FERR in the last 72 hours. No results found for: FOL, RBCF   No results for input(s): PH, PCO2, PO2 in the last 72 hours. No results for input(s): CPK, CKMB in the last 72 hours.     No lab exists for component: TROPONINI  No components found for: [place]  Lab Results   Component Value Date/Time    Color YELLOW/STRAW 11/30/2021 03:08 PM    Appearance CLOUDY (A) 11/30/2021 03:08 PM    Specific gravity 1.016 11/30/2021 03:08 PM    pH (UA) 5.0 11/30/2021 03:08 PM    Protein Negative 11/30/2021 03:08 PM    Glucose Negative 11/30/2021 03:08 PM    Ketone Negative 11/30/2021 03:08 PM    Bilirubin Negative 11/30/2021 03:08 PM    Urobilinogen 0.2 11/30/2021 03:08 PM    Nitrites Negative 11/30/2021 03:08 PM    Leukocyte Esterase MODERATE (A) 11/30/2021 03:08 PM    Epithelial cells MODERATE (A) 11/30/2021 03:08 PM    Bacteria 1+ (A) 11/30/2021 03:08 PM    WBC 20-50 11/30/2021 03:08 PM    RBC 5-10 11/30/2021 03:08 PM       MEDICATIONS:  Current Facility-Administered Medications   Medication Dose Route Frequency    prochlorperazine (COMPAZINE) with saline injection 5 mg  5 mg IntraVENous Q4H PRN    oxyCODONE IR (ROXICODONE) tablet 5 mg  5 mg Oral Q4H PRN    traMADoL (ULTRAM) tablet 25 mg  25 mg Oral Q6H PRN    melatonin tablet 3-6 mg  3-6 mg Oral QHS PRN    diphenhydrAMINE (BENADRYL) capsule 25 mg  25 mg Oral Q6H PRN    lidocaine 4 % patch 1 Patch  1 Patch TransDERmal Q24H    apixaban (ELIQUIS) tablet 5 mg  5 mg Oral Q12H    psyllium husk-aspartame (METAMUCIL FIBER) packet 1 Packet  1 Packet Oral BID    mineral oil (FLEET) enema   Rectal PRN    L.acidophilus-paracasei-S.thermophil-bifidobacter (RISAQUAD) 8 billion cell capsule  1 Capsule Oral DAILY    senna-docusate (PERICOLACE) 8.6-50 mg per tablet 2 Tablet  2 Tablet Oral BID    bisacodyL (DULCOLAX) suppository 10 mg  10 mg Rectal DAILY    0.9% sodium chloride infusion 250 mL  250 mL IntraVENous PRN    acetaminophen (TYLENOL) tablet 1,000 mg  1,000 mg Oral Q6H PRN    amLODIPine (NORVASC) tablet 10 mg  10 mg Oral DAILY    sodium chloride (NS) flush 5-40 mL  5-40 mL IntraVENous Q8H    sodium chloride (NS) flush 5-40 mL  5-40 mL IntraVENous PRN    carvediloL (COREG) tablet 6.25 mg  6.25 mg Oral BID WITH MEALS    hydrALAZINE (APRESOLINE) 20 mg/mL injection 10-20 mg  10-20 mg IntraVENous Q6H PRN    atorvastatin (LIPITOR) tablet 80 mg  80 mg Oral QHS    bacitracin 500 unit/gram packet 1 Packet  1 Packet Topical PRN    naloxone (NARCAN) injection 0.4 mg  0.4 mg IntraVENous PRN    ondansetron (ZOFRAN) injection 4 mg  4 mg IntraVENous Q4H PRN    albuterol (PROVENTIL VENTOLIN) nebulizer solution 2.5 mg  2.5 mg Nebulization Q4H PRN    aspirin chewable tablet 81 mg  81 mg Oral DAILY    [Held by provider] magnesium oxide (MAG-OX) tablet 400 mg  400 mg Oral BID    polyethylene glycol (MIRALAX) packet 17 g  17 g Oral DAILY    glucose chewable tablet 16 g  4 Tablet Oral PRN    dextrose (D50W) injection syrg 12.5-25 g  12.5-25 g IntraVENous PRN    glucagon (GLUCAGEN) injection 1 mg  1 mg IntraMUSCular PRN    diphenhydrAMINE (BENADRYL) injection 25 mg  25 mg IntraVENous Q6H PRN    pantoprazole (PROTONIX) tablet 40 mg  40 mg Oral ACB    heparin (porcine) 1,000 unit/mL injection    PRN    ALPRAZolam (XANAX) tablet 0.5 mg  0.5 mg Oral PRN    calcium-vitamin D (OS-MARINA +D3) 500 mg-200 unit per tablet 1 Tablet  1 Tablet Oral DAILY

## 2021-12-06 NOTE — PROGRESS NOTES
Transition of Care Plan:     RUR: 13%  Disposition: SNF- [facility]  Room #: 884  Phone # for Report: [phone]  Fax #: [phone]  Follow up appointments: Defer to SNF  DME needed: Defer to SNF   Transportation at [address] scheduled for 3pm   Keys or means to access home:      Daughter has access   IM Medicare Letter: 2nd IM provided   Is patient a BCPI-A Bundle:         n/a              If yes, was Bundle Letter given?:    Is patient a  and connected with the [facility]? n/a  If yes, was [employer] transfer form completed and [facility] notified? Caregiver Contact: Ade Angelo - [phone]  Discharge Caregiver contacted prior to discharge? Yes     Initial Note: Chart reviewed. CM aware of dc order. Pt is medically stable and ready for dc. CM phoned [facility] admission coordinator regarding dc - left voice message. [facility] arranged for 3pm per bedside RN request - pt needs time to void prior to dc. Rapid COVID requested. CM phone dtr Deja Wetzel to update regarding dc plan. No barriers to discharge identified. Family is agreeable to the discharge plan and voiced no further questions/concerns regarding discharge at this time. Medicare pt has received, reviewed, and verbally consented to 2nd IM letter informing them of their right to appeal the discharge. Signed copy has been placed on pt bedside chart. Transition of Care Plan to SNF/Rehab    Communication to Patient/Family:  The Patient and/or patient representative was provided with a choice of provider and agrees  with the discharge plan. Yes [x] No []    A Freedom of choice list was provided with basic dialogue that supports the patient's individualized plan of care/goals and shares the quality data associated with the providers. Yes [x] No []    SNF/Rehab Transition:  Patient has been accepted to [facility] SNF/Rehab and meets criteria for admission.    Patient will transported by [facility] and expected to leave at [facility]. Communication to SNF/Rehab:  Bedside RN has been notified to update the transition plan to the facility and call report (phone number). Discharge information has been updated on the AVS. And communicated to facility via "[employer], or CC link. Discharge instructions to be fax'd to facility at [facility] #. [phone]    Nursing Please include all hard scripts for controlled substances, med rec and dc summary, and AVS in packet. Reviewed and confirmed with facility, [facility], can manage the patient care needs for the following:     Alberto Mason with (X) only those applicable:  Medication:  [x]Medications are available at the facility  []IV Antibiotics    []Controlled Substance - hard copies available sent. []Weekly Labs    Equipment:  []CPAP/BiPAP  []Wound Vacuum  []Ruiz or Urinary Device  []PICC/Central Line  []Nebulizer  []Ventilator    Treatment:  []Isolation (for MRSA, VRE, etc.)  []Surgical Drain Management  []Tracheostomy Care  []Dressing Changes  []Dialysis with transportation  []PEG Care  [x]Oxygen - 1L  []Daily Weights for Heart Failure    Dietary:  []Any diet limitations  []Tube Feedings   []Total Parenteral Management (TPN)    Financial Resources:  []Medicaid Application Completed    []UAI Completed and copy given to pt/family  and copy given to pt/family  []A screening has previously been completed. []Level II Completed    [x] Private pay individual who will not become   financially eligible for Medicaid within 6 months from admission to a [address] [place] [address] facility. [] Individual refused to have screening conducted. []Medicaid Application Completed    []The screening denied because it was determined individual did not need/did not qualify for nursing facility level of care. [] Out of state residents seeking direct admission to a [address] facility.   [] Individuals who are inpatients of an out of state hospital, or in state or out of state veterans/ hospital and seek direct admission to a [address] facility  [] Individuals who are pateints or residents of a state owned/operated facility that is licensed by [address] [facility]) and seek direct admission to [address] facility  [] A screening not required for enrollment in [facility] Hospice services as set out in [facility] 30-  [] [facility] staff shall perform screenings of the [facility] clients. Advanced Care Plan:  []Surrogate Decision Maker of Care  []POA  []Communicated Code Status and copy sent. Other:         Care Management Interventions  PCP Verified by CM:  Yes  Palliative Care Criteria Met (RRAT>21 & CHF Dx)?: No  Mode of Transport at Discharge: [facility]  Transition of Care Consult (CM Consult): SNF  Discharge Durable Medical Equipment: No  Physical Therapy Consult: Yes  Occupational Therapy Consult: Yes  Speech Therapy Consult: No  Support Systems: [place]  Confirm Follow Up Transport: Family  The Patient and/or Patient Representative was Provided with a Choice of Provider and Agrees with the Discharge Plan?: Yes  Name of the Patient Representative Who was Provided with a Choice of Provider and Agrees with the Discharge Plan: [facility] of Choice List was Provided with Basic Dialogue that Supports the Patient's Individualized Plan of Care/Goals, Treatment Preferences and Shares the Quality Data Associated with the Providers?: Yes  [place] Resource Information Provided?: No  Discharge Location  Discharge Placement: Skilled nursing facility    BARON Roberson  Care Manager, [facility]  [phone]

## 2021-12-07 NOTE — PROGRESS NOTES
Pt d/C with [facility]. Report given to [facility] with d/c instructions. D/C instructions given to patient and [facility]. Specifically, I spoke to them about importance of Wound care, sternal precautions, and dial soap/chg x 2 day. I advised them to call the unit if they had any questions about caring for the wound. All questions answered. Pt d/c with all belongings on room air.

## 2021-12-22 ENCOUNTER — OFFICE VISIT (OUTPATIENT)
Dept: CARDIOTHORACIC SURGERY | Age: 86
End: 2021-12-22
Payer: MEDICARE

## 2021-12-22 VITALS
BODY MASS INDEX: 30.07 KG/M2 | RESPIRATION RATE: 18 BRPM | HEART RATE: 88 BPM | HEIGHT: 67 IN | SYSTOLIC BLOOD PRESSURE: 154 MMHG | OXYGEN SATURATION: 95 % | TEMPERATURE: 98.5 F | DIASTOLIC BLOOD PRESSURE: 58 MMHG | WEIGHT: 191.6 LBS

## 2021-12-22 DIAGNOSIS — Z95.1 S/P CABG X 4: Primary | ICD-10-CM

## 2021-12-22 PROCEDURE — 99024 POSTOP FOLLOW-UP VISIT: CPT | Performed by: NURSE PRACTITIONER

## 2021-12-22 RX ORDER — TRAZODONE HYDROCHLORIDE 150 MG/1
150 TABLET ORAL
COMMUNITY

## 2021-12-22 RX ORDER — SPIRONOLACTONE 50 MG/1
50 TABLET, FILM COATED ORAL DAILY
COMMUNITY
End: 2022-08-17

## 2021-12-22 RX ORDER — PROMETHAZINE HYDROCHLORIDE 25 MG/1
25 TABLET ORAL
Status: ON HOLD | COMMUNITY
End: 2022-08-17

## 2021-12-22 RX ORDER — PREDNISONE 50 MG/1
50 TABLET ORAL DAILY
Status: ON HOLD | COMMUNITY
End: 2022-08-17

## 2021-12-22 RX ORDER — FUROSEMIDE 80 MG/1
80 TABLET ORAL DAILY
COMMUNITY

## 2021-12-22 RX ORDER — DIPHENHYDRAMINE HCL 25 MG
25 CAPSULE ORAL
Status: ON HOLD | COMMUNITY
End: 2022-08-17

## 2021-12-22 NOTE — PROGRESS NOTES
Patient: Re Maldonado   Age: 81 y.o. Patient Care Team:  Farhana Gay MD as PCP - General (Family Medicine)  Scot Saint, MD ([address] [place] [address] Vascular Surgery)  Dusty Alfredo MD (Cardio Vascular Surgery)    Diagnosis: The encounter diagnosis was S/P CABG x 4. Problem List:   Patient Active Problem List   Diagnosis Code    Unspecified essential hypertension I10    Other and unspecified hyperlipidemia E78.5    GERD (gastroesophageal reflux disease) K21.9    Type II or unspecified type diabetes mellitus without mention of complication, not stated as uncontrolled E11.9    Arthritis M19.90    NSTEMI (non-ST elevated myocardial infarction) ([facility] I21.4    CAD (coronary artery disease) I25.10    S/P CABG x 4 Z95.1           Date of Surgery: 11/19/21     Surgery: CABG x 4, LIMA to LAD, RSVG to Diag, RSVG to OM, RSVG to PDA  Left Greater Saphenous Vein EVH  Placement of Prevena Incision Management System    HPI:  Pt is here for post op follow up. She arrived in a wheelchair. She is at [employer] She is working with PT BID. She has been increasing her activity. She sits up in her chair and does leg and arm exercises multiple times a day. She has a good appetite. She is eating well. She has hernandez for breakfast. She is complaining of worsening edema. She denies worsening SOB. She is weighed daily and her weight is trending down. She had recent labs her creatinine is 1.52 and glucose was 115. Her diuertic was increased to 80 mg lasix BID. She had an allergic reaction to bumex and is on prednisone for this. Current Medications:   Current Outpatient Medications   Medication Sig Dispense Refill    traZODone (DESYREL) 150 mg tablet Take 150 mg by mouth nightly.  diphenhydrAMINE (BenadryL) 25 mg capsule Take 25 mg by mouth every six (6) hours as needed.  spironolactone (ALDACTONE) 50 mg tablet Take 50 mg by mouth daily.       promethazine (PHENERGAN) 25 mg tablet Take 25 mg by mouth every six (6) hours as needed for Nausea.  furosemide (Lasix) 80 mg tablet Take 80 mg by mouth daily.  predniSONE (DELTASONE) 50 mg tablet Take 50 mg by mouth daily.  carvediloL (COREG) 12.5 mg tablet Take 1 Tablet by mouth two (2) times daily (with meals). 30 Tablet 1    atorvastatin (LIPITOR) 80 mg tablet Take 1 Tablet by mouth nightly. 30 Tablet 1    acetaminophen (TYLENOL) 500 mg tablet Take 2 Tablets by mouth every six (6) hours as needed for Pain. 30 Tablet 0    amLODIPine (NORVASC) 10 mg tablet Take 1 Tablet by mouth daily. 30 Tablet 1    apixaban (ELIQUIS) 5 mg tablet Take 1 Tablet by mouth every twelve (12) hours. 30 Tablet 1    melatonin 3 mg tablet Take 1-2 Tablets by mouth nightly as needed (Sleep). 30 Tablet 0    alogliptin (Nesina) 25 mg tablet Take 1 Tablet by mouth daily. 30 Tablet 1    Blood-Glucose Meter monitoring kit E11.9   Check blood sugar prior to meals and at bedtime. 1 Kit 0    glucose blood VI test strips (blood glucose test) strip E11.9 100 Strip 1    lancets misc E11.9 100 Each 1    omeprazole (PRILOSEC) 20 mg capsule Take 20 mg by mouth daily.  aspirin delayed-release 81 mg tablet Take 81 mg by mouth daily.  ALPRAZolam (XANAX) 0.25 mg tablet take 1 tablet by mouth three times a day if needed for anxiety 90 Tab 3    calcium-vitamin, d3, (OS-MARINA 250) 250-125 mg-unit tablet Take 1 Tab by mouth daily.  MULTIVITS W-FE,OTHER MIN (CENTRUM PO) Take  by mouth.  polyethylene glycol (MIRALAX) 17 gram packet Take 1 Packet by mouth daily. (Patient not taking: Reported on 12/22/2021) 14 Packet 0    senna-docusate (PERICOLACE) 8.6-50 mg per tablet Take 2 Tablets by mouth two (2) times a day. (Patient not taking: Reported on 12/22/2021) 30 Tablet 0       Vitals: Blood pressure (!) 154/58, pulse 88, temperature 98.5 °F (36.9 °C), temperature source Oral, resp. rate 18, height 5' 7\" (1.702 m), weight 191 lb 9.6 oz (86.9 kg), SpO2 95 %.        Allergies: is allergic to novocain [procaine], ace inhibitors, and penicillins. Physical Exam:  Wounds: clean, dry, no drainage, 1 small area on the bottom of the incision that is open - no drainage/redness. Lungs: clear to auscultation bilaterally    Heart: regular rate and rhythm, S1, S2 normal, no murmur, click, rub or gallop    Extremities: normal strength, tone, and muscle mass, 4+ LE edema    Assessment/Plan:   1. NSTEMI, Mv CAD s/p CABG x4: Cont ASA/statin/BB  2. Uncontrolled Hypertension: cont current meds  3. Large hiatal hernia: CT with Large hiatal hernia containing nearly the entire stomach, mesenteric fat, and the splenic flexure of the colon. Needs FU as OP  4. TEMO on CKD stage 3: Monitor - creatinine 1.52 on labs earlier this week, improved  5. Anxiety: PRN xanax  6. DM type II: New diagnosis. A1C 7, started amaryl - stopped 12/2 for Hypoglycemia overnight. On nesina now. Called SNF and discussed need for BS monitoring. 7. Acute DVT: cont eliquis  8. Hyperkalemia: resolved  9. Nausea: resolved  10. Sternal wound: small open area where scab fell off. Dry dressing change frequently to keep dry. 11. Edema: Her lasix was increased yesterday to 80. Continue, daily weights, low salt diet.      Dispo: FU with cardiology    Pt is ready to start cardiac rehab.      Rehab - when pt is home and has completed HH  Walking: y  Glucometer: n  Antibiotic card for valves: n/a

## 2022-02-17 NOTE — PROGRESS NOTES
Hospitalist Progress Note    NAME: Stephanie Sheldon   :  1935   MRN:  [id number]       Assessment / Plan:  Late entry date of service 21    Uncontrolled HTN  ? STEMI  Elevated troponin  Nausea  -no chest pain, no ischemic changes on EKG  -mildly elevated trop  -positive stress test today   -plan for cardiac cath  afternoon if Cr stable noted  -npo tmrw after 10am ()  -recent ED visits w similar presentations  -appreciate cardiology consult  -cont asa  -weaning off clonidine as patient feeling dizzy/lightheaded  -cont increased coreg dose  -cont verapail, holding cozar d/t increased Cr  -felt trop elevation may be 2/2 demand ischemia  -TTE ordered  -elevated BNP 2k noted    Nausea/GERD  -large hiatal hernia  -cont protonix  -SLP consulted, barium swallow planned  -cont protonix  -zofran for refractory nausea    TEMO on CKD 3  -monitor Cr  -baseline Cr 1.4, elevated to 1.83    Anxiety  -has been living alone  - ~a year ago?  -per son/daughter, heaving benzo use recently, ?dependence  -advised to avoid benzos      Body mass index is 28.82 kg/m². Code status: DNR  Prophylaxis: lovenox     Subjective:     Chief Complaint / Reason for Physician Visit  No new complaints. Still feels somewhat anxious. Nausea earlier am, seems better now. Plan for cath tomorrow noted    Discussed with RN events overnight. Review of Systems:  Symptom Y/N Comments  Symptom Y/N Comments   Fever/Chills n   Chest Pain n    Poor Appetite    Edema     Cough n   Abdominal Pain n    Sputum n   Joint Pain     SOB/TIDWELL n   Pruritis/Rash     Nausea/vomit n   Tolerating PT/OT     Diarrhea n   Tolerating Diet y    Constipation n   Other       Could NOT obtain due to:      Objective:     VITALS:   Last 24hrs VS reviewed since prior progress note.  Most recent are:  Patient Vitals for the past 24 hrs:   Temp Pulse Resp BP SpO2   21 2311 98.5 °F (36.9 °C) 68 16 (!) 149/57 --   21 98.8 °F (37.1 °C) 66 19 (!) 165/60 94 %   11/16/21 1445 98.2 °F (36.8 °C) 69 18 (!) 138/55 92 %   11/16/21 1255 98.2 °F (36.8 °C) 87 18 (!) 172/74 92 %   11/16/21 0800 -- 71 -- -- --   11/16/21 0743 98.4 °F (36.9 °C) 64 18 (!) 148/52 92 %   11/16/21 0329 98.2 °F (36.8 °C) 62 18 (!) 142/62 92 %       Intake/Output Summary (Last 24 hours) at 11/17/2021 0301  Last data filed at 11/16/2021 0687  Gross per 24 hour   Intake 120 ml   Output --   Net 120 ml        I had a face to face encounter and independently examined this patient on 11/17/2021, as outlined below:  PHYSICAL EXAM:  General: WD, WN. Alert, cooperative, no acute distress    EENT:  EOMI. Anicteric sclerae. MMM  Resp:  CTA bilaterally, no wheezing or rales. No accessory muscle use  CV:  Regular  rhythm,  No edema  GI:  Soft, Non distended, Non tender. +Bowel sounds  Neurologic:  Alert and oriented X 3, normal speech,   Psych:   Good insight. Not anxious nor agitated  Skin:  No rashes. No jaundice    Reviewed most current lab test results and cultures  YES  Reviewed most current radiology test results   YES  Review and summation of old records today    NO  Reviewed patient's current orders and MAR    YES  PMH/ reviewed - no change compared to H&P  ________________________________________________________________________  Care Plan discussed with:    Comments   Patient x    Family  x son   RN x    Care Manager     Consultant                       x Multidiciplinary team rounds were held today with , nursing, pharmacist and clinical coordinator. Patient's plan of care was discussed; medications were reviewed and discharge planning was addressed.      ________________________________________________________________________  Total NON critical care TIME:  35   Minutes    Total CRITICAL CARE TIME Spent:   Minutes non procedure based      Comments   >50% of visit spent in counseling and coordination of care x ________________________________________________________________________  Nimisha Márquez DO     Procedures: see electronic medical records for all procedures/Xrays and details which were not copied into this note but were reviewed prior to creation of Plan. LABS:  I reviewed today's most current labs and imaging studies.   Pertinent labs include:  Recent Labs     11/16/21  0249 11/15/21  0430 11/14/21  0952   WBC 9.0 10.5 9.4   HGB 11.6 11.6 12.6   HCT 37.2 37.1 39.1    308 351     Recent Labs     11/16/21  0249 11/15/21  0430 11/14/21  0952    134* 133*   K 4.3 4.4 4.4    104 103   CO2 26 25 25   * 142* 147*   BUN 37* 30* 17   CREA 1.76* 1.83* 1.34*   CA 9.1 9.1 9.6       Signed: Nimisha Márquez DO ambulatory

## 2022-02-18 ENCOUNTER — TELEPHONE (OUTPATIENT)
Dept: INTERNAL MEDICINE CLINIC | Age: 87
End: 2022-02-18

## 2022-03-19 PROBLEM — I21.4 NSTEMI (NON-ST ELEVATED MYOCARDIAL INFARCTION) (HCC): Status: ACTIVE | Noted: 2021-11-14

## 2022-03-19 PROBLEM — Z95.1 S/P CABG X 4: Status: ACTIVE | Noted: 2021-11-19

## 2022-03-19 PROBLEM — I25.10 CAD (CORONARY ARTERY DISEASE): Status: ACTIVE | Noted: 2021-11-18

## 2022-04-14 ENCOUNTER — HOSPITAL ENCOUNTER (OUTPATIENT)
Dept: GENERAL RADIOLOGY | Age: 87
Discharge: HOME | End: 2022-04-14
Payer: MEDICARE

## 2022-04-14 ENCOUNTER — TRANSCRIBE ORDER (OUTPATIENT)
Dept: REGISTRATION | Age: 87
End: 2022-04-14

## 2022-04-14 DIAGNOSIS — M25.512 LEFT SHOULDER PAIN: Primary | ICD-10-CM

## 2022-04-14 DIAGNOSIS — M25.512 LEFT SHOULDER PAIN: ICD-10-CM

## 2022-04-14 PROCEDURE — 73030 X-RAY EXAM OF SHOULDER: CPT

## 2022-08-15 ENCOUNTER — HOSPITAL ENCOUNTER (INPATIENT)
Age: 87
LOS: 2 days | Discharge: HOME | DRG: 287 | End: 2022-08-17
Attending: INTERNAL MEDICINE | Admitting: INTERNAL MEDICINE
Payer: MEDICARE

## 2022-08-15 DIAGNOSIS — R07.9 CHEST PAIN, UNSPECIFIED TYPE: ICD-10-CM

## 2022-08-15 DIAGNOSIS — I25.10 CAD (CORONARY ARTERY DISEASE): ICD-10-CM

## 2022-08-15 PROBLEM — N18.4 CKD (CHRONIC KIDNEY DISEASE), STAGE IV (HCC): Status: ACTIVE | Noted: 2022-08-15

## 2022-08-15 LAB
ANION GAP SERPL CALC-SCNC: 7 MMOL/L (ref 5–15)
BUN SERPL-MCNC: 33 MG/DL (ref 6–20)
BUN/CREAT SERPL: 18 (ref 12–20)
CALCIUM SERPL-MCNC: 10.1 MG/DL (ref 8.5–10.1)
CHLORIDE SERPL-SCNC: 104 MMOL/L (ref 97–108)
CO2 SERPL-SCNC: 28 MMOL/L (ref 21–32)
CREAT SERPL-MCNC: 1.86 MG/DL (ref 0.55–1.02)
ERYTHROCYTE [DISTWIDTH] IN BLOOD BY AUTOMATED COUNT: 12.7 % (ref 11.5–14.5)
GLUCOSE SERPL-MCNC: 118 MG/DL (ref 65–100)
HCT VFR BLD AUTO: 36.9 % (ref 35–47)
HGB BLD-MCNC: 12 G/DL (ref 11.5–16)
MCH RBC QN AUTO: 30.8 PG (ref 26–34)
MCHC RBC AUTO-ENTMCNC: 32.5 G/DL (ref 30–36.5)
MCV RBC AUTO: 94.6 FL (ref 80–99)
NRBC # BLD: 0 K/UL (ref 0–0.01)
NRBC BLD-RTO: 0 PER 100 WBC
PLATELET # BLD AUTO: 311 K/UL (ref 150–400)
PMV BLD AUTO: 9.1 FL (ref 8.9–12.9)
POTASSIUM SERPL-SCNC: 3.9 MMOL/L (ref 3.5–5.1)
RBC # BLD AUTO: 3.9 M/UL (ref 3.8–5.2)
SODIUM SERPL-SCNC: 139 MMOL/L (ref 136–145)
WBC # BLD AUTO: 8.2 K/UL (ref 3.6–11)

## 2022-08-15 PROCEDURE — 36415 COLL VENOUS BLD VENIPUNCTURE: CPT

## 2022-08-15 PROCEDURE — 74011250636 HC RX REV CODE- 250/636: Performed by: INTERNAL MEDICINE

## 2022-08-15 PROCEDURE — 76937 US GUIDE VASCULAR ACCESS: CPT

## 2022-08-15 PROCEDURE — 85027 COMPLETE CBC AUTOMATED: CPT

## 2022-08-15 PROCEDURE — 74011250637 HC RX REV CODE- 250/637: Performed by: INTERNAL MEDICINE

## 2022-08-15 PROCEDURE — 80048 BASIC METABOLIC PNL TOTAL CA: CPT

## 2022-08-15 PROCEDURE — 65270000046 HC RM TELEMETRY

## 2022-08-15 RX ORDER — ALPRAZOLAM 0.25 MG/1
0.25 TABLET ORAL
Status: DISCONTINUED | OUTPATIENT
Start: 2022-08-15 | End: 2022-08-17 | Stop reason: HOSPADM

## 2022-08-15 RX ORDER — ACETAMINOPHEN 500 MG
1000 TABLET ORAL
Status: DISCONTINUED | OUTPATIENT
Start: 2022-08-15 | End: 2022-08-15

## 2022-08-15 RX ORDER — ACETAMINOPHEN 500 MG
500 TABLET ORAL
Status: DISCONTINUED | OUTPATIENT
Start: 2022-08-15 | End: 2022-08-17 | Stop reason: HOSPADM

## 2022-08-15 RX ORDER — TRAZODONE HYDROCHLORIDE 50 MG/1
150 TABLET ORAL
Status: DISCONTINUED | OUTPATIENT
Start: 2022-08-15 | End: 2022-08-17 | Stop reason: HOSPADM

## 2022-08-15 RX ORDER — AMLODIPINE BESYLATE 5 MG/1
10 TABLET ORAL DAILY
Status: DISCONTINUED | OUTPATIENT
Start: 2022-08-16 | End: 2022-08-17 | Stop reason: HOSPADM

## 2022-08-15 RX ORDER — ASPIRIN 81 MG/1
81 TABLET ORAL DAILY
Status: DISCONTINUED | OUTPATIENT
Start: 2022-08-16 | End: 2022-08-17 | Stop reason: HOSPADM

## 2022-08-15 RX ORDER — SODIUM CHLORIDE 9 MG/ML
75 INJECTION, SOLUTION INTRAVENOUS CONTINUOUS
Status: DISCONTINUED | OUTPATIENT
Start: 2022-08-15 | End: 2022-08-17

## 2022-08-15 RX ORDER — ATORVASTATIN CALCIUM 40 MG/1
80 TABLET, FILM COATED ORAL
Status: DISCONTINUED | OUTPATIENT
Start: 2022-08-15 | End: 2022-08-17 | Stop reason: HOSPADM

## 2022-08-15 RX ORDER — CARVEDILOL 12.5 MG/1
12.5 TABLET ORAL 2 TIMES DAILY WITH MEALS
Status: DISCONTINUED | OUTPATIENT
Start: 2022-08-15 | End: 2022-08-17 | Stop reason: HOSPADM

## 2022-08-15 RX ORDER — PANTOPRAZOLE SODIUM 40 MG/1
40 TABLET, DELAYED RELEASE ORAL
Status: DISCONTINUED | OUTPATIENT
Start: 2022-08-16 | End: 2022-08-17 | Stop reason: HOSPADM

## 2022-08-15 RX ADMIN — CARVEDILOL 12.5 MG: 12.5 TABLET, FILM COATED ORAL at 16:24

## 2022-08-15 RX ADMIN — SODIUM CHLORIDE 75 ML/HR: 9 INJECTION, SOLUTION INTRAVENOUS at 16:20

## 2022-08-15 RX ADMIN — ACETAMINOPHEN 500 MG: 500 TABLET ORAL at 21:01

## 2022-08-15 RX ADMIN — ACETAMINOPHEN 500 MG: 500 TABLET ORAL at 14:46

## 2022-08-15 RX ADMIN — TRAZODONE HYDROCHLORIDE 150 MG: 50 TABLET ORAL at 21:01

## 2022-08-15 RX ADMIN — ATORVASTATIN CALCIUM 80 MG: 40 TABLET, FILM COATED ORAL at 21:02

## 2022-08-15 NOTE — Clinical Note
TRANSFER - OUT REPORT:     Verbal report given to: Benito Olvera RN. Report consisted of patient's Situation, Background, Assessment and   Recommendations(SBAR). Opportunity for questions and clarification was provided. Patient transported with a Registered Nurse and [address] [place] [address] / [facility]. Patient transported to: [facility].

## 2022-08-15 NOTE — H&P
History and Physical     8/15/2022  Patient: Dyan Barillas 81 y.o. female   Chief Complaint:   [x]   Dyspnea   [x]   CAD/CABG, abnl PET. Needs admission for IV access/IVF pre and post cath. History of Present Illness: (for details see office notes)  Ms Henri Rosa has a h/o:  1) CAD: TIDWELL 12/2019:  3 months w/o change; unremarkable echo at [facility]. (except PAp 45). Neg MPI 1/2020. *Uncontrolled HTN/indet trop/Abnl MPI 11/2021: severe distal LM, mid circ, distal RCA. *CABG 11/2021 (DANYEL-LAD, SVG-D1; SVG-OM1; SVG-PDA). *Unremarkable echo 2/2022 (Nl EF). 2) Palpitations 12/2019:  (Q.day at night early a.m.) unremarkable 48 hour Holter at [address] [place] [address]. (was symptomatic). 3) HTN  4) Dyslipidemia (labs per PCP)    5) DVT post-op 11/2021: on [facility] 1/2022. 6) DM  7) Very large hiatal hernia   8) chronic lumbar back pain  9) CKD 3:  Cr 1.64 10/2019. Cr 1.7/GFR29 12/2019.  10) GERD  11) osteoarthritis  She is a retired ; no nicotine, ethanol, nor added salt. 1 caffeinated beverage per day. 1/2022:   Bypass 11/2021. No chest pain, dyspnea, palpitations, falling, or bleeding. Finishes up @ sniff in 2 weeks which she is getting PT. Still some LE edema, significantly less than previous. Still gets around by wheelchair for distance. She needs a new PCP; her son is with her today. She feels she lives too far for outpt cardiac rehab.    3/2022: No CP; mild variable TIDWELL; using cane instead of wheelchair; Less LE edema. Labs, inc FLP, pending @ PCPs. IMPRESSION AND PLAN  01. Atherosclerotic heart disease of native coronary artery with other forms of angina pectoris (I25.118):  3 v CAD w/ CABG 11/2021. No angina nor TIDWELL; limited mobility. We discussed the signs and symptoms of unstable angina, myocardial infarction and malignant arrhythmia. The patient knows to seek immediate medical attention should they occur. ECG done   02. Presence of aortocoronary bypass graft (Z95.1): This condition is stable. 03. Hyp hrt & chr kdny dis w/o hrt fail, w stg 1-4/unsp chr kdny (I13.10): Stable; continue losartan 25; coreg 6.25 bid; norvasc 10; aldactone 50; when LE resolves, would stop lasix. Labs per PCP; increase losartan to 50 if BPs become elevated. I have made no changes to the present regimen. 04. Mixed hyperlipidemia (E78.2):  Lipid labs drawn by PCP. The patient is tolerating lipid lowering therapy well. Target for patient's LDL is less than 70.   05. Chronic kidney disease, stage 3b (N18.32): Managed by: Other Physician   06. Palpitations (R00.2):  Resolved; Unremarkable holter 12/2019.   07. DVT of leg (I82.409): Managed by: Other Physician  Duration of AC per Dr Gilson Purvis or Dr Charisse Mejia. 08. Long term current use of anticoagulant (Z79.01):  Indicated for deep venous thrombosis. Managed by: Other Physician   09. Body mass index [BMI] 27.0-27.9, adult (U40.32): The patient was instructed on AHA diet and regular exercise. ORDERS:  1 ECG done    2 Return office visit with Nette Matamoros MD in 6 Months. 3 The patient was instructed on AHA diet and regular exercise.         3/7/22 MEDICATION LIST  Medication Sig Desc Non-VCS   alogliptin 25 mg tablet take 1 tablet by oral route  every day N   alprazolam 0.25 mg tablet take 1 tablet by oral route 3 times every day Y   amlodipine 10 mg tablet take 1 tablet by oral route  every day N   Aspirin Low Dose 81 mg tablet,delayed release take 1 tablet by oral route  every day N   atorvastatin 80 mg tablet take 1 tablet by oral route  every day N   Benadryl Allergy 25 mg tablet take 2 tablet by oral route  every 4 - 6 hours as needed N   calcium carbonate 500 mg (1,250 mg)-vitamin D3 125 unit tablet  Y   carvedilol 6.25 mg tablet take 1 tablet by oral route 2 times every day with food N   Centrum Silver Women 8 mg iron-400 mcg-300 mcg tablet take 1 tab by mouth daily N   Eliquis 5 mg tablet take 1 tablet by oral route 2 times every day N   Lasix 40 mg tablet take 1 tablet by oral route  every day N   losartan 25 mg tablet take 1 tablet by oral route  every day N   magnesium 200 mg tablet take 2 tab by mouth daily N   melatonin 3 mg tablet take 1 tab at bedtime N   Miralax 17 gram oral powder packet take 1 packet by oral route  every day mixed with 8 oz. water, juice, soda, coffee or tea N   multivitamin tablet take 1 tablet by oral route  every day with food Y   omeprazole 20 mg capsule,delayed release take 1 capsule by oral route  every day before a meal N   promethazine 25 mg tablet take 1 tablet by oral route  every day at bedtime N   Senna Plus 8.6 mg-50 mg capsule take 2 capsule by oral route  every day N   spironolactone 50 mg tablet take 1 tablet by oral route  every day N   trazodone 150 mg tablet take 1 tablet by oral route  every day at bedtime N   Tylenol Extra Strength 500 mg tablet take 2 tablet by oral route  every 6 hours as needed N         _____________________________________________  CARDIAC HISTORY  RISK FACTORS:  1 Diabetes   2 Hypertension   3 Dyslipidemia   4 Family History of CAD [Less than 61years of age]   5 Tobacco Use: No/never       CARDIOVASCULAR PROCEDURES  Procedure Date Results   Tim MPI 09/13/2011 No Ischemia, Per patient; at [address] [place] [address].  (for atypical chest pain). Arterial Duplex LE 11/18/2021 bilateral great saphenous veins are patent   Cardiac PET 11/16/2021 suspect mild inferolateral ischemia. LV EF measures 78%   Cath 11/17/2021 severe CAD of distal LM, mid-circ and distal RCA; mod prox LAD dz. no AS. EDP 25.   CV Surgery 11/19/2021 LIMA-LAD, VG-D1, VG-OM1, VG-PDA   Echo 02/15/2022 EF 0.65 (65%), Normal RV. Mild LVH. Mild MR. Mild TR. PAp 35. Echo 12/01/2021 EF 0.60 (60%), normal cavity size, wall thickness, and systolic function (EF normal). normal pericardium and no evident of pericardial effusion.    Echo 11/14/2021 EF 0.65 (65%), normal cavity size (EF normal) mild concentric hypertrophy   Echo 11/27/2019 EF 0.65 (65%), Mild LVH, AV Mean 7 mmHg, Est RVSP 44 mmHg, Normal RV. No valvular heart disease. At [address] EKG 2022 Sinus Rhythm, Normal ECG   EVR 2020 sinus rhythm throughout, , ave HR 70.  7 symptomatic events of heart racing showed sinus rhythm from . No AFib. < 1% PACs & PVCs. Holter 2019 48 hr holter; at [facility]: No arrhythmia; HR 50 to 116:  Average 90 ; patient reported symptoms. MPI 2020 EF 0.86 (86%), No ischemia or infarct is seen on perfusion imaging. 2:00. 81% MAPHR. ACTIVE ALLERGIES:  Ingredient Reaction Comment   ACE INHIBITORS     PENICILLINS     PROCAINAMIDE  Procan SR       PROBLEM LIST:  Problem Description Chronic   Angiotensin-converting-enzyme inhibitor allergy N   NSTEMI - Non-ST segment elevation MI Y   CABG (Coronary artery bypass grafting) planned Y       PAST MEDICAL/SURGICAL HISTORY  (Detailed)    Disease/disorder Onset Date Surgical History Date Comments     Appendectomy       CABG       Knee Replacement       Colonoscopy       EGD     Anemia       Anxiety       Arthritis       Blood Clots       Chronic Kidney Disease       Diabetes       Diverticulosis       Elevated lipids       GERD       Hypertension       Mood Disorder       NSTEMI       Overactive Bladder         OBSTETRIC HISTORY:  Not currently pregnant. Family History  (Detailed)  Relationship Family Member Name  Age at Death Condition Onset Age Cause of Death   Father    Heart Attack     Sister    High Blood Pressure       SOCIAL HISTORY  (Detailed)  Tobacco use reviewed. Preferred language is [place]. EDUCATION/EMPLOYMENT/OCCUPATION  Employment History Status Retired Restrictions     retired       Smoking status: Never smoker. ALCOHOL  There is no history of alcohol use. CAFFEINE  The patient uses caffeine. LIFESTYLE  Exercises daily. ----------------------------------------------------------------------------------------------------------------------------------------------------------------------             Past Medical History:   Diagnosis Date    Anxiety     Hypertension        Review of Systems  Except as noted in HPI, patient denies recent fever or chills, nausea, vomiting, diarrhea, hemoptysis, hematemesis, dysuria, myalgias, focal neurologic symptoms, ecchymosis, angioedema, odynophagia, dysphagia, sore throat, earache,rash, melena, hematochezia, depression, GERD, cold intolerance, petechia, bleeding gums, or significant weight loss. History reviewed. No pertinent family history. (see office notes for details)  Social History     Tobacco Use    Smoking status: Never    Smokeless tobacco: Never   Substance Use Topics    Alcohol use: Not Currently   (see office notes for details)    Allergies: Allergies   Allergen Reactions    Novocain [Procaine] Anaphylaxis     Throat swelling    Ace Inhibitors Cough    Penicillins Rash       Prior to Admission Medications (details in office records):  Prior to Admission medications    Medication Sig Start Date End Date Taking? Authorizing Provider   traZODone (DESYREL) 150 mg tablet Take 150 mg by mouth nightly. Yes Provider, Historical   spironolactone (ALDACTONE) 50 mg tablet Take 50 mg by mouth daily. Yes Provider, Historical   furosemide (LASIX) 80 mg tablet Take 80 mg by mouth daily. Yes Provider, Historical   carvediloL (COREG) 12.5 mg tablet Take 1 Tablet by mouth two (2) times daily (with meals). 12/6/21  Yes Allmon, Jorie Castleman, NP   atorvastatin (LIPITOR) 80 mg tablet Take 1 Tablet by mouth nightly. 12/6/21  Yes Allmon, Jorie Castleman, NP   acetaminophen (TYLENOL) 500 mg tablet Take 2 Tablets by mouth every six (6) hours as needed for Pain. 12/6/21  Yes Micaela Steele NP   amLODIPine (NORVASC) 10 mg tablet Take 1 Tablet by mouth daily.  12/7/21  Yes Jose Soares NP omeprazole (PRILOSEC) 20 mg capsule Take 20 mg by mouth daily. Yes Provider, Historical   aspirin delayed-release 81 mg tablet Take 81 mg by mouth daily. Yes Provider, Historical   ALPRAZolam (XANAX) 0.25 mg tablet take 1 tablet by mouth three times a day if needed for anxiety 2/17/14  Yes Gennaro Oropeza NP   calcium-vitamin, d3, (OS-MARINA 250) 250-125 mg-unit tablet Take 1 Tab by mouth daily. Yes Provider, Historical   MULTIVITS W-FE,OTHER MIN (CENTRUM PO) Take  by mouth. Yes Provider, Historical   diphenhydrAMINE (BENADRYL) 25 mg capsule Take 25 mg by mouth every six (6) hours as needed. Patient not taking: Reported on 8/15/2022    Provider, Historical   promethazine (PHENERGAN) 25 mg tablet Take 25 mg by mouth every six (6) hours as needed for Nausea. Patient not taking: Reported on 8/15/2022    Provider, Historical   predniSONE (DELTASONE) 50 mg tablet Take 50 mg by mouth daily. Patient not taking: Reported on 8/15/2022    Provider, Historical   apixaban (ELIQUIS) 5 mg tablet Take 1 Tablet by mouth every twelve (12) hours. 12/6/21   Bhavesh Steele NP   melatonin 3 mg tablet Take 1-2 Tablets by mouth nightly as needed (Sleep). Patient not taking: Reported on 8/15/2022 12/6/21   Bhavesh Steele NP   polyethylene glycol (MIRALAX) 17 gram packet Take 1 Packet by mouth daily. Patient not taking: No sig reported 12/7/21   Micaela Steele NP   senna-docusate (PERICOLACE) 8.6-50 mg per tablet Take 2 Tablets by mouth two (2) times a day. Patient not taking: No sig reported 12/6/21   Bhavesh Steele NP   alogliptin (Nesina) 25 mg tablet Take 1 Tablet by mouth daily. Patient not taking: Reported on 8/15/2022 12/6/21   Bhavesh Steele NP   Blood-Glucose Meter monitoring kit E11.9   Check blood sugar prior to meals and at bedtime.  12/6/21   Bhavesh Steele NP   glucose blood VI test strips (blood glucose test) strip E11.9 12/6/21   Bhavesh Steele NP   lancets misc E11.9 12/6/21 Sally Rodriguez, MARCIANO         Physical Exam:  Overall VSSAF;  Visit Vitals  BP (!) 149/62 (BP 1 Location: Left upper arm, BP Patient Position: At rest)   Pulse 65   Resp 22   Ht 5' 7\" (1.702 m)   Wt 72.1 kg (159 lb)   SpO2 97%   Breastfeeding No   BMI 24.90 kg/m²     No data recorded. General Appearance: Well developed, well nourished, no acute distress. Ears/Nose/Mouth/Throat:   Hearing grossly normal.     JVP: WNL   Resp:   Lungs clear to auscultation bilaterally. Nl resp effort. Cardiovascular:  RRR, S1, S2 normal, no new murmur. No gallop or rub. Abdomen:   Soft, non-tender, bowel sounds are present. Extremities: No edema bilaterally. Skin:  Neuro: Warm and dry. A/O x3, grossly nonfocal     Labs: per outpatient records  CXR: per outpatient records    Plan of Care/Planned Procedure:  Risks, benefits, and alternatives reviewed with patient and she agrees to proceed with the procedure. For other plans, see orders.     Kal Masters MD

## 2022-08-15 NOTE — Clinical Note
Single view of the aortic root obtained using power injection. Total volume = 40 mL. Rate = 15 mL/sec. Pressure = 900 PSI. Rate of rise = 0 sec.

## 2022-08-15 NOTE — Clinical Note
inserted. Catheter used: Pigtail (straight). [Time Spent: ___ minutes] : I have spent [unfilled] minutes of time on the encounter.

## 2022-08-15 NOTE — PROGRESS NOTES
Endurance Catheter insertion note     Inserted 22 G, 6 cm long  Endurance Extended Dwell Peripheral Catheter into left forearm using ultrasound guidance and sterile technique. Positive blood return. Patient tolerated procedure well. Denies questions or concerns at this time. The Endurance catheter is an extended dwell peripheral catheter and may remain in place 29 days. Blood samples can be obtained from this catheter. To obtain labs, a tourniquet may be used, flush with 10ml NS, waste 3ml, draw labs, flush with 20ml NS. Dressings needs to be changed with central line dressing kit using bio patch and stat lock every 7 days by nurse. Primary Nurse,  notified.           Kahlil Thorpe RN BSN, Vascular Access Team. PICC Nurse

## 2022-08-15 NOTE — Clinical Note
Multiple views of the saphenous vein graft obtained using hand injection. Likely flush occluded; Unsure which graft.

## 2022-08-15 NOTE — PROGRESS NOTES
[id number] Assumed care of pt. Plan is to check bmp, IVF overnight, LHC in AM. VSS, c/o headache - prn tylenol given. **assessment completed at 1400, but charted at wrong time (1600) - please see 1600 assessment** Vascular access consult placed - endurance catheter placed in left forearm, bmp sent. NS infusing per order. [Consultation] : a consultation visit

## 2022-08-15 NOTE — PROGRESS NOTES
Cardiac Cath Lab Recovery Arrival Note:      Javon Vargas arrived to Cardiac Cath Lab, Recovery Area. Staff introduced to patient. Patient identifiers verified with NAME and DATE OF BIRTH. Procedure verified with patient. Consent forms reviewed and signed by patient or authorized representative and verified. Allergies verified. Patient and family oriented to department. Patient and family informed of procedure and plan of care. Questions answered with review. Patient prepped for procedure, per orders from physician, prior to arrival.    Patient on cardiac monitor, non-invasive blood pressure, SPO2 monitor. On room air. Patient is A&Ox 4. Patient reports no complaints. Patient in stretcher, in low position, with side rails up, call bell within reach, patient instructed to call if assistance as needed. Patient prep in: [facility] Recovery Area,room 2157. Family in: room. Prep by: Ambika Sanchez RN

## 2022-08-16 LAB
ANION GAP SERPL CALC-SCNC: 5 MMOL/L (ref 5–15)
BUN SERPL-MCNC: 29 MG/DL (ref 6–20)
BUN/CREAT SERPL: 18 (ref 12–20)
CALCIUM SERPL-MCNC: 9.4 MG/DL (ref 8.5–10.1)
CHLORIDE SERPL-SCNC: 106 MMOL/L (ref 97–108)
CO2 SERPL-SCNC: 29 MMOL/L (ref 21–32)
CREAT SERPL-MCNC: 1.6 MG/DL (ref 0.55–1.02)
GLUCOSE SERPL-MCNC: 102 MG/DL (ref 65–100)
POTASSIUM SERPL-SCNC: 3.6 MMOL/L (ref 3.5–5.1)
SODIUM SERPL-SCNC: 140 MMOL/L (ref 136–145)

## 2022-08-16 PROCEDURE — 74011000636 HC RX REV CODE- 636: Performed by: INTERNAL MEDICINE

## 2022-08-16 PROCEDURE — 93459 L HRT ART/GRFT ANGIO: CPT | Performed by: INTERNAL MEDICINE

## 2022-08-16 PROCEDURE — 80048 BASIC METABOLIC PNL TOTAL CA: CPT

## 2022-08-16 PROCEDURE — C1760 CLOSURE DEV, VASC: HCPCS | Performed by: INTERNAL MEDICINE

## 2022-08-16 PROCEDURE — 74011250637 HC RX REV CODE- 250/637: Performed by: INTERNAL MEDICINE

## 2022-08-16 PROCEDURE — 74011000250 HC RX REV CODE- 250: Performed by: INTERNAL MEDICINE

## 2022-08-16 PROCEDURE — C1894 INTRO/SHEATH, NON-LASER: HCPCS | Performed by: INTERNAL MEDICINE

## 2022-08-16 PROCEDURE — 99152 MOD SED SAME PHYS/QHP 5/>YRS: CPT | Performed by: INTERNAL MEDICINE

## 2022-08-16 PROCEDURE — C1769 GUIDE WIRE: HCPCS | Performed by: INTERNAL MEDICINE

## 2022-08-16 PROCEDURE — 77030015766: Performed by: INTERNAL MEDICINE

## 2022-08-16 PROCEDURE — 36415 COLL VENOUS BLD VENIPUNCTURE: CPT

## 2022-08-16 PROCEDURE — 36200 PLACE CATHETER IN AORTA: CPT | Performed by: INTERNAL MEDICINE

## 2022-08-16 PROCEDURE — 74011250636 HC RX REV CODE- 250/636: Performed by: INTERNAL MEDICINE

## 2022-08-16 PROCEDURE — 2709999900 HC NON-CHARGEABLE SUPPLY: Performed by: INTERNAL MEDICINE

## 2022-08-16 PROCEDURE — 77030040934 HC CATH DIAG DXTERITY MEDT -A: Performed by: INTERNAL MEDICINE

## 2022-08-16 PROCEDURE — B2151ZZ FLUOROSCOPY OF LEFT HEART USING LOW OSMOLAR CONTRAST: ICD-10-PCS | Performed by: INTERNAL MEDICINE

## 2022-08-16 PROCEDURE — 99153 MOD SED SAME PHYS/QHP EA: CPT | Performed by: INTERNAL MEDICINE

## 2022-08-16 PROCEDURE — 77030022017 HC DRSG HEMO QCLOT ZMED -A: Performed by: INTERNAL MEDICINE

## 2022-08-16 PROCEDURE — 4A023N7 MEASUREMENT OF CARDIAC SAMPLING AND PRESSURE, LEFT HEART, PERCUTANEOUS APPROACH: ICD-10-PCS | Performed by: INTERNAL MEDICINE

## 2022-08-16 PROCEDURE — B2111ZZ FLUOROSCOPY OF MULTIPLE CORONARY ARTERIES USING LOW OSMOLAR CONTRAST: ICD-10-PCS | Performed by: INTERNAL MEDICINE

## 2022-08-16 PROCEDURE — 77030004549 HC CATH ANGI DX PRF MRTM -A: Performed by: INTERNAL MEDICINE

## 2022-08-16 PROCEDURE — 65270000046 HC RM TELEMETRY

## 2022-08-16 RX ORDER — IODIXANOL 320 MG/ML
INJECTION, SOLUTION INTRAVASCULAR AS NEEDED
Status: DISCONTINUED | OUTPATIENT
Start: 2022-08-16 | End: 2022-08-16 | Stop reason: HOSPADM

## 2022-08-16 RX ORDER — SODIUM CHLORIDE 0.9 % (FLUSH) 0.9 %
5-40 SYRINGE (ML) INJECTION AS NEEDED
Status: DISCONTINUED | OUTPATIENT
Start: 2022-08-16 | End: 2022-08-17 | Stop reason: HOSPADM

## 2022-08-16 RX ORDER — DIPHENHYDRAMINE HYDROCHLORIDE 50 MG/ML
25 INJECTION, SOLUTION INTRAMUSCULAR; INTRAVENOUS
Status: DISCONTINUED | OUTPATIENT
Start: 2022-08-16 | End: 2022-08-17 | Stop reason: HOSPADM

## 2022-08-16 RX ORDER — HEPARIN SODIUM 200 [USP'U]/100ML
INJECTION, SOLUTION INTRAVENOUS
Status: COMPLETED | OUTPATIENT
Start: 2022-08-16 | End: 2022-08-16

## 2022-08-16 RX ORDER — SODIUM CHLORIDE 0.9 % (FLUSH) 0.9 %
5-40 SYRINGE (ML) INJECTION EVERY 8 HOURS
Status: DISCONTINUED | OUTPATIENT
Start: 2022-08-16 | End: 2022-08-17 | Stop reason: HOSPADM

## 2022-08-16 RX ORDER — NALOXONE HYDROCHLORIDE 0.4 MG/ML
0.4 INJECTION, SOLUTION INTRAMUSCULAR; INTRAVENOUS; SUBCUTANEOUS AS NEEDED
Status: DISCONTINUED | OUTPATIENT
Start: 2022-08-16 | End: 2022-08-17 | Stop reason: HOSPADM

## 2022-08-16 RX ORDER — MIDAZOLAM HYDROCHLORIDE 1 MG/ML
INJECTION, SOLUTION INTRAMUSCULAR; INTRAVENOUS AS NEEDED
Status: DISCONTINUED | OUTPATIENT
Start: 2022-08-16 | End: 2022-08-16 | Stop reason: HOSPADM

## 2022-08-16 RX ORDER — LIDOCAINE HYDROCHLORIDE 10 MG/ML
INJECTION INFILTRATION; PERINEURAL AS NEEDED
Status: DISCONTINUED | OUTPATIENT
Start: 2022-08-16 | End: 2022-08-16 | Stop reason: HOSPADM

## 2022-08-16 RX ORDER — FENTANYL CITRATE 50 UG/ML
INJECTION, SOLUTION INTRAMUSCULAR; INTRAVENOUS AS NEEDED
Status: DISCONTINUED | OUTPATIENT
Start: 2022-08-16 | End: 2022-08-16 | Stop reason: HOSPADM

## 2022-08-16 RX ORDER — GUAIFENESIN 100 MG/5ML
LIQUID (ML) ORAL
Status: DISCONTINUED
Start: 2022-08-16 | End: 2022-08-16 | Stop reason: WASHOUT

## 2022-08-16 RX ADMIN — PANTOPRAZOLE SODIUM 40 MG: 40 TABLET, DELAYED RELEASE ORAL at 04:02

## 2022-08-16 RX ADMIN — TRAZODONE HYDROCHLORIDE 150 MG: 50 TABLET ORAL at 20:52

## 2022-08-16 RX ADMIN — CARVEDILOL 12.5 MG: 12.5 TABLET, FILM COATED ORAL at 11:11

## 2022-08-16 RX ADMIN — SODIUM CHLORIDE 75 ML/HR: 9 INJECTION, SOLUTION INTRAVENOUS at 15:21

## 2022-08-16 RX ADMIN — ALPRAZOLAM 0.25 MG: 0.25 TABLET ORAL at 04:02

## 2022-08-16 RX ADMIN — SODIUM CHLORIDE, PRESERVATIVE FREE 10 ML: 5 INJECTION INTRAVENOUS at 20:53

## 2022-08-16 RX ADMIN — ALPRAZOLAM 0.25 MG: 0.25 TABLET ORAL at 20:52

## 2022-08-16 RX ADMIN — AMLODIPINE BESYLATE 10 MG: 5 TABLET ORAL at 11:11

## 2022-08-16 RX ADMIN — SODIUM CHLORIDE 75 ML/HR: 9 INJECTION, SOLUTION INTRAVENOUS at 02:00

## 2022-08-16 RX ADMIN — ATORVASTATIN CALCIUM 80 MG: 40 TABLET, FILM COATED ORAL at 20:52

## 2022-08-16 RX ADMIN — CARVEDILOL 12.5 MG: 12.5 TABLET, FILM COATED ORAL at 20:52

## 2022-08-16 RX ADMIN — ACETAMINOPHEN 500 MG: 500 TABLET ORAL at 15:19

## 2022-08-16 RX ADMIN — ASPIRIN 81 MG: 81 TABLET, COATED ORAL at 06:44

## 2022-08-16 NOTE — CARDIO/PULMONARY
Cardiac Rehab Note: chart review/referral     Cardiac cath without intervention 8/16/22  \"Medical management for now; consider circ  pending clinical course. Images reviewed with Dr Neeraj Nunn. \"    Smoking history assessed. Patient is a non smoker.    Smoking Cessation Program link has not been added to the AVS.

## 2022-08-16 NOTE — PROGRESS NOTES
Bedside shift change report given to Rissa Clinton RN (oncoming nurse) by Cecile Payton RN (offgoing nurse). Report included the following information SBAR, Kardex, Procedure Summary, Intake/Output, MAR, Accordion, Recent Results, Med Rec Status, Cardiac Rhythm NSR, and Alarm Parameters . Pt. Is asymptomatic and stable vital signs.

## 2022-08-16 NOTE — PROGRESS NOTES
Bedside shift change report given to Micheal Leonardo RN (oncoming nurse) by Natan Briceño RN (offgoing nurse). Report included the following information SBAR, Kardex, Procedure Summary, Intake/Output, MAR, Accordion, Recent Results, Med Rec Status, Cardiac Rhythm NSR, and Alarm Parameters . Pt. Is asymptomatic and stable vital signs.

## 2022-08-16 NOTE — PROGRESS NOTES
Comprehensive Nutrition Assessment    Type and Reason for Visit: Initial, RD nutrition re-screen/LOS    Nutrition Recommendations/Plan:   Continue current diet  Added Glucerna TID  Please document % meals and supplements consumed in flowsheet I/O's under intake      Malnutrition Assessment:  Malnutrition Status: Moderate malnutrition (08/16/22 1322)    Context:  Chronic illness     Findings of the 6 clinical characteristics of malnutrition:   Energy Intake:  75% or less est energy requirements for 1 month or longer  Weight Loss:  Greater than 20% over 1 year     Body Fat Loss:  Mild body fat loss, Fat overlying ribs   Muscle Mass Loss:  Mild muscle mass loss, Clavicles (pectoralis &deltoids)  Fluid Accumulation:  Mild, Extremities   Strength:  Not performed     Nutrition Assessment:     Chart reviewed for MST. Pt noted for CAD, HTN, DLD, DM, large hiatal hernia, CKD3, GERD, osteoarthritis, hx DVT. S/p cardiac cath this morning. Pt reports a decreased appetite and poor PO intake for quite a while PTA. Pt reports 30lb unintended weight loss; per EMR she is down up to 42lb since Dec, severe for the time frame. Some weight loss may be d/t fluid, but pt reports her clothes fitting much looser, feeling weaker and her skin hanging looser as well. Malnutrition criteria listed above. She drinks one Glucerna shake daily at home. RD recommended increasing to at least 2 daily to help promote weight maintenance. No GI complaints. Pt anticipating dc tomorrow. Will continue monitoring if she is still here. Wt Readings from Last 10 Encounters:   08/16/22 70.4 kg (155 lb 3.3 oz)   12/22/21 86.9 kg (191 lb 9.6 oz)   12/06/21 89.4 kg (197 lb 1.5 oz)   03/11/21 83.9 kg (184 lb 15.5 oz)   04/14/14 83.7 kg (184 lb 9.6 oz)   12/09/13 82.1 kg (181 lb)   08/07/13 82.1 kg (181 lb)   05/09/13 86 kg (189 lb 8 oz)   08/30/12 83.9 kg (185 lb)   05/30/12 82.1 kg (181 lb)   ]    Nutrition Related Findings:    Labs: Cr 1.60.    Meds: lipitor. Edema: 1+BLE. BM 8/15. Wound Type: None    Current Nutrition Intake & Therapies:  Average Meal Intake: %  Average Supplement Intake: None ordered  ADULT ORAL NUTRITION SUPPLEMENT Breakfast, Lunch, Dinner; Diabetic Supplement  ADULT DIET Regular; Low Fat/Low Chol/High Fiber/2 gm Na; Prefers vanilla Glucerna    Anthropometric Measures:  Height: 5' 7\" (170.2 cm)  Ideal Body Weight (IBW): 135 lbs (61 kg)     Current Body Wt:  70.4 kg (155 lb 3.3 oz), 115 % IBW. Bed scale  Current BMI (kg/m2): 24.3                          BMI Category: Normal weight (BMI 22.0-24.9) age over 72    Estimated Daily Nutrient Needs:  Energy Requirements Based On: Formula  Weight Used for Energy Requirements: Current  Energy (kcal/day): 1537 kcals (BMR x 1. 3AF)  Weight Used for Protein Requirements: Current  Protein (g/day): 70-84g (1.0-1.2g/kg)  Method Used for Fluid Requirements: 1 ml/kcal  Fluid (ml/day): 1530mL    Nutrition Diagnosis:   Inadequate protein-energy intake related to  (decreased appetite, large hiatal hernia) as evidenced by poor intake prior to admission, weight loss, mild muscle loss, mild loss of subcutaneous fat    Nutrition Interventions:   Food and/or Nutrient Delivery: Continue current diet, Start oral nutrition supplement  Nutrition Education/Counseling: No recommendations at this time  Coordination of Nutrition Care: Continue to monitor while inpatient       Goals:     Goals: PO intake 75% or greater, by next RD assessment       Nutrition Monitoring and Evaluation:   Behavioral-Environmental Outcomes: None identified  Food/Nutrient Intake Outcomes: Food and nutrient intake, Supplement intake  Physical Signs/Symptoms Outcomes: Biochemical data, GI status, Weight, Fluid status or edema, Nutrition focused physical findings    Discharge Planning:    Continue current diet, Continue oral nutrition supplement    Taylor Stallworth RD  Contact: [phone]

## 2022-08-16 NOTE — PROGRESS NOTES
Cardiology Progress Note  8/16/2022     Admit Date: 8/15/2022  Admit Diagnosis: Chest pain, unspecified type [R07.9]  CAD (coronary artery disease) [I25.10]  CKD (chronic kidney disease), stage IV ([facility] [N18.4]  CC: none currently  Cardiac Assessment/Plan:     ) CAD: TIDWELL 12/2019:  3 months w/o change; unremarkable echo at [address] [place] [address]. (except PAp 45). Neg MPI 1/2020. *Uncontrolled HTN/indet trop/Abnl MPI 11/2021: severe distal LM, mid circ, distal RCA. *CABG 11/2021 (DANYEL-LAD, SVG-D1; SVG-OM1; SVG-PDA). *Unremarkable echo 2/2022 (Nl EF). Abnl PET 7/2022. 2) Palpitations 12/2019:  (Q.day at night early a.m.) unremarkable 48 hour Holter at [address] [place] [address]. (was symptomatic). 3) HTN  4) Dyslipidemia (labs per PCP)     5) DVT post-op 11/2021: on [facility] 1/2022. 6) DM  7) Very large hiatal hernia  8) chronic lumbar back pain  9) CKD 3:  Cr 1.64 10/2019. Cr 1.7/GFR29 12/2019.  10) GERD  11) osteoarthritis    Needed admission for IVF; cath today; keep for hydration after cath. I have recommended diagnostic cath for definitive evaluation of the patient's coronary anatomy and PCI if appropriate; All risks, benefits, and alternatives were discussed and patient wishes to proceed. For other plans, see orders.   Hospital problem list     Active Hospital Problems    Diagnosis Date Noted    CKD (chronic kidney disease), stage IV ([facility] 08/15/2022    CAD (coronary artery disease) 11/18/2021        Subjective: Claude Bartlett reports       Chest pain X none  consistent with:  Non-cardiac CP         Atypical CP     None now  On going  Anginal CP     Dyspnea X none  at rest  with exertion         improved  unchanged  worse              PND X none  overnight       Orthopnea X none  improved  unchanged  worse   Presyncope X none  improved  unchanged  worse     Ambulated in hallway without symptoms   Yes   Ambulated in room without symptoms  Yes   Objective:     Physical Exam:  Overall VSSAF;  Visit Vitals  BP (!) 146/62 (BP 1 Location: Left upper arm, BP Patient Position: At rest)   Pulse 78   Temp 98.2 °F (36.8 °C)   Resp 15   Ht 5' 7\" (1.702 m)   Wt 70.4 kg (155 lb 3.3 oz)   SpO2 94%   Breastfeeding No   BMI 24.31 kg/m²     Temp (24hrs), Av.7 °F (36.5 °C), Min:97.5 °F (36.4 °C), Max:98.2 °F (36.8 °C)    Patient Vitals for the past 8 hrs:   Pulse   22 0734 78   22 0400 79     Patient Vitals for the past 8 hrs:   Resp   22 0734 15   22 0400 18     Patient Vitals for the past 8 hrs:   BP   22 0734 (!) 146/62   22 0400 (!) 155/89       Intake/Output Summary (Last 24 hours) at 2022 0817  Last data filed at 2022 0400  Gross per 24 hour   Intake 875 ml   Output --   Net 875 ml     General Appearance: Well developed, well nourished, no acute distress. Ears/Nose/Mouth/Throat:   Normal MM; anicteric. JVP: WNL   Resp:   Lungs clear to auscultation bilaterally. Nl resp effort. Cardiovascular:  RRR, S1, S2 normal, no new murmur. No gallop or rub. Abdomen:   Soft, non-tender, bowel sounds are present. Extremities: No edema bilaterally. Skin:  Neuro: Warm and dry. A/O x3, grossly nonfocal       cath site intact w/o hematoma or new bruit; distal pulse unchanged  Yes   Data Review:     Telemetry independently reviewed x sinus  chronic afib  parox afib  NSVT     ECG independently reviewed  NSR  afib  no significant changes  NSST-Tw chgs     x no new ECG provided for review   Lab results reviewed as noted below. Current medications reviewed as noted below. No results for input(s): PH, PCO2, PO2 in the last 72 hours. No results for input(s): CPK, CKMB, CKNDX, TROIQ in the last 72 hours.   Recent Labs     22  0343 08/15/22  1604 08/15/22  1228    139  --    K 3.6 3.9  --     104  --    CO2 29 28  --    BUN 29* 33*  --    CREA 1.60* 1.86*  --    GFRAA 37* 31*  --    * 118*  --    CA 9.4 10.1  --    WBC  --   --  8.2   HGB  --   --  12.0   HCT  --   -- 36.9   PLT  --   --  311     Lab Results   Component Value Date/Time    Cholesterol, total 140 11/17/2021 03:11 AM    HDL Cholesterol 52 11/17/2021 03:11 AM    LDL, calculated 62 11/17/2021 03:11 AM    Triglyceride 130 11/17/2021 03:11 AM    CHOL/HDL Ratio 2.7 11/17/2021 03:11 AM     No results for input(s): AP, TBIL, TP, ALB, GLOB, GGT, AML, LPSE in the last 72 hours. No lab exists for component: SGOT, GPT, AMYP, HLPSE  No results for input(s): INR, PTP, APTT, INREXT in the last 72 hours.    No components found for: GLPOC    Current Facility-Administered Medications   Medication Dose Route Frequency    ALPRAZolam (XANAX) tablet 0.25 mg  0.25 mg Oral TID PRN    amLODIPine (NORVASC) tablet 10 mg  10 mg Oral DAILY    aspirin delayed-release tablet 81 mg  81 mg Oral DAILY    atorvastatin (LIPITOR) tablet 80 mg  80 mg Oral QHS    carvediloL (COREG) tablet 12.5 mg  12.5 mg Oral BID WITH MEALS    pantoprazole (PROTONIX) tablet 40 mg  40 mg Oral ACB    traZODone (DESYREL) tablet 150 mg  150 mg Oral QHS    acetaminophen (TYLENOL) tablet 500 mg  500 mg Oral Q6H PRN    0.9% sodium chloride infusion  75 mL/hr IntraVENous CONTINUOUS        Urban Salguero MD

## 2022-08-16 NOTE — PROGRESS NOTES
8/16/2022 10:27 AM  Patient without complaints. Last VS: Visit Vitals  BP (!) 146/62 (BP 1 Location: Right upper arm, BP Patient Position: Supine)   Pulse 84   Temp 98.1 °F (36.7 °C)   Resp 16   Ht 5' 7\" (1.702 m)   Wt 70.4 kg (155 lb 3.3 oz)   SpO2 97%   Breastfeeding No   BMI 24.31 kg/m²     Cath site without hematoma, bleeding or new bruit. Distal pulses at baseline. Continue current plan of care. Results of cath discussed with patient and available family members.

## 2022-08-17 VITALS
TEMPERATURE: 97.9 F | OXYGEN SATURATION: 95 % | HEIGHT: 67 IN | SYSTOLIC BLOOD PRESSURE: 144 MMHG | DIASTOLIC BLOOD PRESSURE: 53 MMHG | BODY MASS INDEX: 24.36 KG/M2 | HEART RATE: 76 BPM | WEIGHT: 155.2 LBS | RESPIRATION RATE: 19 BRPM

## 2022-08-17 LAB
ANION GAP SERPL CALC-SCNC: 5 MMOL/L (ref 5–15)
BUN SERPL-MCNC: 24 MG/DL (ref 6–20)
BUN/CREAT SERPL: 17 (ref 12–20)
CALCIUM SERPL-MCNC: 9.1 MG/DL (ref 8.5–10.1)
CHLORIDE SERPL-SCNC: 109 MMOL/L (ref 97–108)
CO2 SERPL-SCNC: 25 MMOL/L (ref 21–32)
CREAT SERPL-MCNC: 1.38 MG/DL (ref 0.55–1.02)
GLUCOSE SERPL-MCNC: 111 MG/DL (ref 65–100)
POTASSIUM SERPL-SCNC: 3.9 MMOL/L (ref 3.5–5.1)
SODIUM SERPL-SCNC: 139 MMOL/L (ref 136–145)

## 2022-08-17 PROCEDURE — 80048 BASIC METABOLIC PNL TOTAL CA: CPT

## 2022-08-17 PROCEDURE — 36415 COLL VENOUS BLD VENIPUNCTURE: CPT

## 2022-08-17 PROCEDURE — 74011250637 HC RX REV CODE- 250/637: Performed by: INTERNAL MEDICINE

## 2022-08-17 PROCEDURE — 74011250636 HC RX REV CODE- 250/636: Performed by: INTERNAL MEDICINE

## 2022-08-17 PROCEDURE — 74011000250 HC RX REV CODE- 250: Performed by: INTERNAL MEDICINE

## 2022-08-17 RX ORDER — ISOSORBIDE MONONITRATE 30 MG/1
30 TABLET, EXTENDED RELEASE ORAL DAILY
Qty: 30 TABLET | Refills: 12 | Status: SHIPPED | OUTPATIENT
Start: 2022-08-17

## 2022-08-17 RX ORDER — RANOLAZINE 500 MG/1
500 TABLET, EXTENDED RELEASE ORAL 2 TIMES DAILY
Status: DISCONTINUED | OUTPATIENT
Start: 2022-08-17 | End: 2022-08-17

## 2022-08-17 RX ORDER — ISOSORBIDE MONONITRATE 30 MG/1
30 TABLET, EXTENDED RELEASE ORAL DAILY
Status: DISCONTINUED | OUTPATIENT
Start: 2022-08-17 | End: 2022-08-17 | Stop reason: HOSPADM

## 2022-08-17 RX ORDER — LOSARTAN POTASSIUM 25 MG/1
25 TABLET ORAL DAILY
COMMUNITY

## 2022-08-17 RX ADMIN — AMLODIPINE BESYLATE 10 MG: 5 TABLET ORAL at 08:09

## 2022-08-17 RX ADMIN — ISOSORBIDE MONONITRATE 30 MG: 30 TABLET, EXTENDED RELEASE ORAL at 08:08

## 2022-08-17 RX ADMIN — ASPIRIN 81 MG: 81 TABLET, COATED ORAL at 08:09

## 2022-08-17 RX ADMIN — PANTOPRAZOLE SODIUM 40 MG: 40 TABLET, DELAYED RELEASE ORAL at 08:09

## 2022-08-17 RX ADMIN — SODIUM CHLORIDE 75 ML/HR: 9 INJECTION, SOLUTION INTRAVENOUS at 06:48

## 2022-08-17 RX ADMIN — CARVEDILOL 12.5 MG: 12.5 TABLET, FILM COATED ORAL at 08:09

## 2022-08-17 RX ADMIN — SODIUM CHLORIDE, PRESERVATIVE FREE 10 ML: 5 INJECTION INTRAVENOUS at 06:47

## 2022-08-17 RX ADMIN — ACETAMINOPHEN 500 MG: 500 TABLET ORAL at 03:00

## 2022-08-17 NOTE — DISCHARGE SUMMARY
Cardiology Discharge Summary     Patient ID: Verner Loron  [id number]  17 y.o.  1935    Admit Date: 8/15/2022  Discharge Date: 8/17/2022   Admitting Physician: Bonita David MD   Discharge Physician: Bonita David MD    Admission Diagnoses: Chest pain, unspecified type [R07.9]  CAD (coronary artery disease) [I25.10]  CKD (chronic kidney disease), stage IV [facility]) [N18.4]    Discharge Diagnoses: Active Problems:    CAD (coronary artery disease) (11/18/2021)      CKD (chronic kidney disease), stage IV ([address] (8/15/2022)        Cardiology Procedures this Admission:  Diagnostic left heart catheterization    Hospital Course:  1) CAD: TIDWELL 12/2019:  3 months w/o change; unremarkable echo at [address] [place] [address]. (except PAp 45). Neg MPI 1/2020. *Uncontrolled HTN/indet trop/Abnl MPI 11/2021: severe distal LM, mid circ, distal RCA. *CABG 11/2021 (DANYEL-LAD, SVG-D1; SVG-OM1; SVG-PDA). *Unremarkable echo 2/2022 (Nl EF). Abnl PET 7/2022. 2) Palpitations 12/2019:  (Q.day at night early a.m.) unremarkable 48 hour Holter at [address] [place] [address]. (was symptomatic). 3) HTN  4) Dyslipidemia (labs per PCP)     5) DVT post-op 11/2021: on [facility] 1/2022. 6) DM  7) Very large hiatal hernia  8) chronic lumbar back pain  9) CKD 3:  Cr 1.64 10/2019. Cr 1.7/GFR29 12/2019.  10) GERD  11) osteoarthritis      Cath 8/16/22:  Left Main   The vessel is large. The vessel is calcified. 75% distal LM stenosis. Left Anterior Descending   The proximal segment of the vessel is large. The middle segment of the vessel is moderate in size. The distal segment of the vessel is small. The vessel is calcified. 60% mid stenosis; 2 small and 2 medium diagonals. Left Circumflex   The proximal segment of the vessel is large. Proximal occlusion. Right Coronary Artery   The vessel is large. The vessel is calcified. Scattered 30-40% plaque; 95+ ostial PDA stenosis;  Occluded posterolateral vessel (right to right collaterals)   LIMA Graft To Dist LAD   Widely patent; good anastomosis. Vein Graft To RPDA   Vein Widely patent; good anastomosis. Vein Graft To 1st Mrg   Vein The graft was not visualized. Probable nub of SVG injected. Vein Graft To 3rd Diag   Vein The graft was not visualized. Left Ventricle LV EDP is: 11. Aortic Valve There is no aortic valve stenosis. Aorta No dissection nor aneurysm; No AI; SVG to D and SVG to OM are not visualized. CONCLUSION/post procedure Dx:   1. CAD of LM, LAD, circ and PDA as noted below. 2. Widely patent DANYEL-LAD & SVG-PDA; Apparent occlusion of SVG-OM and SVG-D.  3. No AI/AS. No thoracic aortic aneurysm. PLAN: Medical management for now; consider circ  pending clinical course. Images reviewed with Dr Vinny Armenta. NO RANEXA WITH TRAZODONE d/t potential QTc issue. Recent Labs     08/17/22  0320 08/16/22  0343 08/15/22  1604 08/15/22  1228    140   < >  --    K 3.9 3.6   < >  --    BUN 24* 29*   < >  --    CREA 1.38* 1.60*   < >  --    WBC  --   --   --  8.2   HGB  --   --   --  12.0   HCT  --   --   --  36.9   PLT  --   --   --  311    < > = values in this interval not displayed. Lab Results   Component Value Date/Time    Cholesterol, total 140 11/17/2021 03:11 AM    HDL Cholesterol 52 11/17/2021 03:11 AM    LDL, calculated 62 11/17/2021 03:11 AM    Triglyceride 130 11/17/2021 03:11 AM     No results for input(s): ALT, AP in the last 72 hours. No lab exists for component: SGOT, GPT  No results for input(s): INR, PTP, APTT, INREXT in the last 72 hours. Patient was ambulating without symptoms prior to d/c. Consults: None  Disposition: home  Discharge Condition: Stable  Patient Instructions:   Current Discharge Medication List        START taking these medications    Details   isosorbide mononitrate ER (IMDUR) 30 mg tablet Take 1 Tablet by mouth daily.   Qty: 30 Tablet, Refills: 12           CONTINUE these medications which have NOT CHANGED    Details   losartan (COZAAR) 25 mg tablet Take 25 mg by mouth daily.      traZODone (DESYREL) 150 mg tablet Take 150 mg by mouth nightly. furosemide (LASIX) 80 mg tablet Take 80 mg by mouth daily. carvediloL (COREG) 12.5 mg tablet Take 1 Tablet by mouth two (2) times daily (with meals). Qty: 30 Tablet, Refills: 1      atorvastatin (LIPITOR) 80 mg tablet Take 1 Tablet by mouth nightly. Qty: 30 Tablet, Refills: 1      acetaminophen (TYLENOL) 500 mg tablet Take 2 Tablets by mouth every six (6) hours as needed for Pain. Qty: 30 Tablet, Refills: 0      amLODIPine (NORVASC) 10 mg tablet Take 1 Tablet by mouth daily. Qty: 30 Tablet, Refills: 1      omeprazole (PRILOSEC) 20 mg capsule Take 20 mg by mouth daily. aspirin delayed-release 81 mg tablet Take 81 mg by mouth daily. ALPRAZolam (XANAX) 0.25 mg tablet take 1 tablet by mouth three times a day if needed for anxiety  Qty: 90 Tab, Refills: 3      calcium-vitamin, d3, (OS-MARINA 250) 250-125 mg-unit tablet Take 1 Tab by mouth daily. MULTIVITS W-FE,OTHER MIN (CENTRUM PO) Take  by mouth. Blood-Glucose Meter monitoring kit E11.9   Check blood sugar prior to meals and at bedtime. Qty: 1 Kit, Refills: 0      glucose blood VI test strips (blood glucose test) strip E11.9  Qty: 100 Strip, Refills: 1      lancets misc E11.9  Qty: 100 Each, Refills: 1           STOP taking these medications       spironolactone (ALDACTONE) 50 mg tablet Comments:   Reason for Stopping:               Referenced discharge instructions provided by nursing for diet and activity.   Follow-up:   Follow-up Information       Follow up With Specialties Details Why Contact Info    Akiko Gabriel MD Family Medicine   [address] [facility]  [phone]      Scot Saint, [address] Vascular Surgery, Cardiovascular Disease Physician Follow up today As planned next month [address]  [place]  [address] [phone]              Signed:  Lucas Guillory MD  8/17/2022  7:53 AM

## 2022-08-17 NOTE — DISCHARGE INSTRUCTIONS
[address], [address]    [phone]  [place],  [place] [address]    [email or web address]    Patient Discharge Instructions    Elham Chávez / [id number] : 1935    Admitted 8/15/2022 Discharged 2022     It is important that you take the medication exactly as they are prescribed. Keep your medication in the bottles provided by the pharmacist and keep a list of the medication names, dosages, and times to be taken in your wallet. Do not take other medications without consulting your doctor. BRING ALL OF YOUR MEDICINES or a list of medicines with dosages TO YOUR OFFICE VISIT with Dr. Orlando Snow. Cardiac Catheterization  Discharge Instructions  TTransfemoral Catheterization Discharge Instructions (GROIN)    Do not drive, operate any machinery, or sign any legal documents for 24 hours after your procedure. You must have someone to drive you home. You may take a shower 24 hours after your cardiac catheterization. Be sure to get the dressing wet and then remove it; gently wash the area with warm soapy water. Pat dry and leave open to air. To help prevent infections, be sure to keep the cath site clean and dry. No lotions, creams, powders, ointments, etc. in the cath site for approximately 1 week. Do not take a tub bath, get in a hot tub or swimming pool for approximately 5 days or until the cath site is completely healed. No strenuous activity or heavy lifting over 10 lbs. for 7 days. Drink plenty of fluids for 24-48 hours after your cath to flush the contrast dye from your kidneys. No alcoholic beverages for 24 hours. You may resume your previous diet (low fat, low cholesterol) after your cath. After your cath, some bruising or discomfort is common during the healing process. Tylenol, 1-2 tablets every 6 hours as needed, is recommended if you experience any discomfort.   If you experience any signs or symptoms of infection such as fever, chills, or poorly healing incision, persistent tenderness or swelling in the groin, redness and/or warmth to the touch, numbness, significant tingling or pain at the groin site or affected extremity, rash, drainage from the cath site, or if the leg feels tight or swollen, call your physician right away. If bleeding at the cath site occurs, take a clean gauze pad and apply direct pressure to the groin just above the puncture site. Call 911 immediately, and continue to apply direct pressure until an ambulance gets to your location. You may return to work  2  days after your cardiac cath if no bleeding at the cath site. If Plavix, or Brilinta, or Effient is prescribed with your aspirin, you must take them to prevent your stent from clotting. You will likely need them for at least 1 to 2 years. If you are smoking, please stop. Smoking Cessation Program is a free, phone/text/email based, smoking cessation program. The program is individualized to meet each patient's needs. To enroll use this link [email or web address]/ra/survey/3498      Information obtained by :  I understand that if any problems occur once I am at home I am to contact my physician. I understand and acknowledge receipt of the instructions indicated above. R.N.'s Signature                                                                  Date/Time                                                                                                                                              Patient or Representative Signature                                                          Date/Time      Augustine Guadalupe MD      [address], [address]    [phone]  [address]    [email or web address]

## 2022-08-17 NOTE — PROGRESS NOTES
Physician Progress Note      PATIENT:               Kai Lopez  CSN #:                  [id number]  :                       1935  ADMIT DATE:       8/15/2022 10:51 AM  DISCH DATE:        2022 9:55 AM  RESPONDING  PROVIDER #:        Aura Guardado MD          QUERY TEXT:    Pt admitted with CAD and has malnutrition documented. Please further specify type of malnutrition with documentation in the medical record. The medical record reflects the following:  Risk Factors:  Nutrition Diagnosis:  ? Inadequate protein-energy intake related to  (decreased appetite, large hiatal hernia) as evidenced by poor intake prior to admission, weight loss, mild muscle loss, mild loss of subcutaneous fat      Clinical Indicators: Malnutrition Assessment:  Malnutrition Status: Moderate malnutrition (22 1322)  Context:  Chronic illness  Findings of the 6 clinical characteristics of malnutrition:  Energy Intake:  75% or less est energy requirements for 1 month or longer  Weight Loss:  Greater than 20% over 1 year  Body Fat Loss:  Mild body fat loss, Fat overlying ribs  Muscle Mass Loss:  Mild muscle mass loss, Clavicles (pectoralis &deltoids)  Fluid Accumulation:  Mild, Extremities   Strength:  Not performed    22 70.4 kg (155 lb 3.3 oz)  21 86.9 kg (191 lb 9.6 oz)    Current BMI (kg/m2): 24.3    Treatment:  Nutrition Recommendations/Plan:  1. Continue current diet  2. Added Glucerna TID  3. Please document % meals and supplements consumed in flowsheet I/O's under intake  ? ASPEN Criteria:  https://aspenjournals. onlinelibrary. steel. com/doi/full/10.1177/9608102269060492  Options provided:  -- Moderate Malnutrition  -- Moderate Protein calorie malnutrition  -- Other - I will add my own diagnosis  -- Disagree - Not applicable / Not valid  -- Disagree - Clinically unable to determine / Unknown  -- Refer to Clinical Documentation Reviewer    PROVIDER RESPONSE TEXT:    Provider disagreed with this query.     Query created by: Baylee Ambrocio on 8/16/2022 2:33 PM      Electronically signed by:  Maude Valenzuela MD 8/17/2022 6:02 PM

## 2022-08-17 NOTE — PROGRESS NOTES
Attempted to schedule hospital follow up PCP appointment. Unable to reach anyone, unable to leave voicemail as office is closed at this time. Pending patient discharge.  Katherine Myrick, Care Management Assistant

## 2022-08-17 NOTE — PROGRESS NOTES
Cardiology Progress Note  8/16/2022     Admit Date: 8/15/2022  Admit Diagnosis: Chest pain, unspecified type [R07.9]  CAD (coronary artery disease) [I25.10]  CKD (chronic kidney disease), stage IV ([facility] [N18.4]  CC: none currently  Cardiac Assessment/Plan:     ) CAD: TIDWELL 12/2019:  3 months w/o change; unremarkable echo at [address] [place] [address]. (except PAp 45). Neg MPI 1/2020. *Uncontrolled HTN/indet trop/Abnl MPI 11/2021: severe distal LM, mid circ, distal RCA. *CABG 11/2021 (DANYEL-LAD, SVG-D1; SVG-OM1; SVG-PDA). *Unremarkable echo 2/2022 (Nl EF). Abnl PET 7/2022. 2) Palpitations 12/2019:  (Q.day at night early a.m.) unremarkable 48 hour Holter at [address] [place] [address]. (was symptomatic). 3) HTN  4) Dyslipidemia (labs per PCP)     5) DVT post-op 11/2021: on [facility] 1/2022. 6) DM  7) Very large hiatal hernia  8) chronic lumbar back pain  9) CKD 3:  Cr 1.64 10/2019. Cr 1.7/GFR29 12/2019.  10) GERD  11) osteoarthritis    Admission needed for CKD/hydration; Cath planned for 8/16/22; Will need hydration after cath. For other plans, see orders.   Hospital problem list     Active Hospital Problems    Diagnosis Date Noted    CKD (chronic kidney disease), stage IV ([facility] 08/15/2022    CAD (coronary artery disease) 11/18/2021        Subjective: Dayanara Cunha reports       Chest pain X none  consistent with:  Non-cardiac CP         Atypical CP     None now  On going  Anginal CP     Dyspnea X none  at rest  with exertion         improved  unchanged  worse              PND X none  overnight       Orthopnea X none  improved  unchanged  worse   Presyncope X none  improved  unchanged  worse     Ambulated in hallway without symptoms   Yes   Ambulated in room without symptoms  Yes   Objective:     Physical Exam:  Overall VSSAF;  Visit Vitals  BP (!) 145/63 (BP 1 Location: Right upper arm, BP Patient Position: At rest)   Pulse 90   Temp 97.9 °F (36.6 °C)   Resp 18   Ht 5' 7\" (1.702 m)   Wt 70.4 kg (155 lb 3.3 oz)   SpO2 95% Breastfeeding No   BMI 24.31 kg/m²     Temp (24hrs), Av °F (36.7 °C), Min:97.8 °F (36.6 °C), Max:98.1 °F (36.7 °C)    Patient Vitals for the past 8 hrs:   Pulse   22 0330 90       Patient Vitals for the past 8 hrs:   Resp   22 0330 18       Patient Vitals for the past 8 hrs:   BP   22 0330 (!) 145/63         Intake/Output Summary (Last 24 hours) at 2022 0739  Last data filed at 2022 0330  Gross per 24 hour   Intake 1762.5 ml   Output --   Net 1762.5 ml       General Appearance: Well developed, well nourished, no acute distress. Ears/Nose/Mouth/Throat:   Normal MM; anicteric. JVP: WNL   Resp:   Lungs clear to auscultation bilaterally. Nl resp effort. Cardiovascular:  RRR, S1, S2 normal, no new murmur. No gallop or rub. Abdomen:   Soft, non-tender, bowel sounds are present. Extremities: No edema bilaterally. Skin:  Neuro: Warm and dry. A/O x3, grossly nonfocal       cath site intact w/o hematoma or new bruit; distal pulse unchanged  Yes   Data Review:     Telemetry independently reviewed x sinus  chronic afib  parox afib  NSVT     ECG independently reviewed  NSR  afib  no significant changes  NSST-Tw chgs     x no new ECG provided for review   Lab results reviewed as noted below. Current medications reviewed as noted below.     Lab Results   Component Value Date/Time    Cholesterol, total 140 2021 03:11 AM    HDL Cholesterol 52 2021 03:11 AM    LDL, calculated 62 2021 03:11 AM    Triglyceride 130 2021 03:11 AM    CHOL/HDL Ratio 2.7 2021 03:11 AM     Current Facility-Administered Medications   Medication Dose Route Frequency    sodium chloride (NS) flush 5-40 mL  5-40 mL IntraVENous Q8H    sodium chloride (NS) flush 5-40 mL  5-40 mL IntraVENous PRN    naloxone (NARCAN) injection 0.4 mg  0.4 mg IntraVENous PRN    diphenhydrAMINE (BENADRYL) injection 25 mg  25 mg IntraVENous Q4H PRN    ALPRAZolam (XANAX) tablet 0.25 mg  0.25 mg Oral TID PRN amLODIPine (NORVASC) tablet 10 mg  10 mg Oral DAILY    aspirin delayed-release tablet 81 mg  81 mg Oral DAILY    atorvastatin (LIPITOR) tablet 80 mg  80 mg Oral QHS    carvediloL (COREG) tablet 12.5 mg  12.5 mg Oral BID WITH MEALS    pantoprazole (PROTONIX) tablet 40 mg  40 mg Oral ACB    traZODone (DESYREL) tablet 150 mg  150 mg Oral QHS    acetaminophen (TYLENOL) tablet 500 mg  500 mg Oral Q6H PRN    0.9% sodium chloride infusion  75 mL/hr IntraVENous CONTINUOUS        Sal Crocker MD

## 2022-08-17 NOTE — PROGRESS NOTES
6155 - Patient ambulating at baseline, vitals stable. Right femoral cath site CDI. Discharge and follow-up information given, site care and medication changes reviewed. Patient verbalized understanding, no questions at this time. Patient discharged to home with family as .

## 2022-08-31 NOTE — PROCEDURES
This is a re-dictation; original was inadvertently deleted by cath lab staff. Procedure: Cardiac Catheterization. Date: 8/16/22   Moderate sedation start/stop time: per cath lab records. Sedation used: versed/fentanyl. Sedation was administered by a cath lab nurse; I directly supervised the cath lab staff as the patient was monitored for the duration of the procedure. INDICATION/PreDx: CAD; prior CABG; CKD; Abnl stress test.    PROCEDURES PERFORMED   1. Left heart catheterization. 2. Selective coronary angiography. 3. Bypass graft angiography. 4. Aortography in NADINE and HODGES projections. DESCRIPTION OF PROCEDURE: After informed consent of all potential complications, the patient was prepped and draped in the usual sterile manner. Lidocaine 1% was utilized for local anesthesia. Conscious sedation per flow sheet. The right femoral artery was entered and a 5-Tunisian sheath placed without complication using modified Seldinger technique. The sheath was flushed at all catheter exchanges and all catheters were advanced over a guidewire under direct fluoroscopic visualization. Left coronary angiography was performed in multiple views using a 5-Tunisian JL4 catheter. Right coronary angiography was performed in multiple views using a JR4 catheter. Left heart catheterization was performed in HODGES projection; Aortography was performed (to attempt graft visualization) using a 5-Tunisian straight pigtail. No apparent complications. Estimated blood loss minimal. SPECIMENS: No specimens. No implants. Left Main   The vessel is large. The vessel is calcified. 75% distal LM stenosis. Left Anterior Descending   The proximal segment of the vessel is large. The middle segment of the vessel is moderate in size. The distal segment of the vessel is small. The vessel is calcified. 60% mid stenosis; 2 small and 2 medium diagonals. Left Circumflex   The proximal segment of the vessel is large. Proximal occlusion.    Right Coronary Artery   The vessel is large. The vessel is calcified. Scattered 30-40% plaque; 95+ ostial PDA stenosis; Occluded posterolateral vessel (right to right collaterals)   LIMA Graft To Dist LAD   Widely patent; good anastomosis. Vein Graft To RPDA   Vein Widely patent; good anastomosis. Vein Graft To 1st Mrg   Vein The graft was not visualized. Probable nub of SVG injected. Vein Graft To 3rd Diag   Vein The graft was not visualized. Left Ventricle LV EDP is: 11. Aortic Valve There is no aortic valve stenosis. Aorta No dissection nor aneurysm; No AI; SVG to D and SVG to OM are not visualized. CONCLUSION/post procedure Dx:   1. CAD of LM, LAD, circ and PDA as noted above. 2. Widely patent DANYEL-LAD & SVG-PDA; Apparent occlusion of SVG-OM and SVG-D.  3. No AI/AS. No thoracic aortic aneurysm. PLAN: Medical management for now; consider circ  pending clinical course.  Images reviewed with Dr Hakan Valdes

## 2023-09-29 ENCOUNTER — APPOINTMENT (OUTPATIENT)
Facility: HOSPITAL | Age: 88
DRG: 303 | End: 2023-09-29
Attending: STUDENT IN AN ORGANIZED HEALTH CARE EDUCATION/TRAINING PROGRAM
Payer: MEDICARE

## 2023-09-29 ENCOUNTER — HOSPITAL ENCOUNTER (INPATIENT)
Facility: HOSPITAL | Age: 88
LOS: 1 days | Discharge: HOME | DRG: 303 | End: 2023-09-30
Attending: STUDENT IN AN ORGANIZED HEALTH CARE EDUCATION/TRAINING PROGRAM | Admitting: STUDENT IN AN ORGANIZED HEALTH CARE EDUCATION/TRAINING PROGRAM
Payer: MEDICARE

## 2023-09-29 PROBLEM — R07.9 CHEST PAIN: Status: ACTIVE | Noted: 2023-09-29

## 2023-09-29 LAB
ALBUMIN SERPL-MCNC: 4.2 G/DL (ref 3.5–5)
ALBUMIN/GLOB SERPL: 1.1 (ref 1.1–2.2)
ALP SERPL-CCNC: 61 U/L (ref 45–117)
ALT SERPL-CCNC: 30 U/L (ref 12–78)
ANION GAP SERPL CALC-SCNC: 6 MMOL/L (ref 5–15)
AST SERPL-CCNC: 14 U/L (ref 15–37)
BASOPHILS # BLD: 0 K/UL (ref 0–0.1)
BASOPHILS NFR BLD: 0 % (ref 0–1)
BILIRUB SERPL-MCNC: 0.4 MG/DL (ref 0.2–1)
BUN SERPL-MCNC: 34 MG/DL (ref 6–20)
BUN/CREAT SERPL: 22 (ref 12–20)
CALCIUM SERPL-MCNC: 9.2 MG/DL (ref 8.5–10.1)
CHLORIDE SERPL-SCNC: 103 MMOL/L (ref 97–108)
CO2 SERPL-SCNC: 28 MMOL/L (ref 21–32)
COMMENT:: NORMAL
CREAT SERPL-MCNC: 1.58 MG/DL (ref 0.55–1.02)
D DIMER PPP FEU-MCNC: 0.82 MG/L FEU (ref 0–0.65)
DIFFERENTIAL METHOD BLD: NORMAL
EKG ATRIAL RATE: 80 BPM
EKG DIAGNOSIS: NORMAL
EKG P AXIS: 79 DEGREES
EKG P-R INTERVAL: 166 MS
EKG Q-T INTERVAL: 438 MS
EKG QRS DURATION: 110 MS
EKG QTC CALCULATION (BAZETT): 440 MS
EKG R AXIS: 46 DEGREES
EKG T AXIS: 77 DEGREES
EKG VENTRICULAR RATE: 61 BPM
EOSINOPHIL # BLD: 0.1 K/UL (ref 0–0.4)
EOSINOPHIL NFR BLD: 1 % (ref 0–7)
ERYTHROCYTE [DISTWIDTH] IN BLOOD BY AUTOMATED COUNT: 13.2 % (ref 11.5–14.5)
GLOBULIN SER CALC-MCNC: 3.7 G/DL (ref 2–4)
GLUCOSE SERPL-MCNC: 131 MG/DL (ref 65–100)
HCT VFR BLD AUTO: 40.6 % (ref 35–47)
HGB BLD-MCNC: 13.3 G/DL (ref 11.5–16)
IMM GRANULOCYTES # BLD AUTO: 0 K/UL (ref 0–0.04)
IMM GRANULOCYTES NFR BLD AUTO: 0 % (ref 0–0.5)
LYMPHOCYTES # BLD: 2.6 K/UL (ref 0.8–3.5)
LYMPHOCYTES NFR BLD: 30 % (ref 12–49)
MAGNESIUM SERPL-MCNC: 2.1 MG/DL (ref 1.6–2.4)
MCH RBC QN AUTO: 31.1 PG (ref 26–34)
MCHC RBC AUTO-ENTMCNC: 32.8 G/DL (ref 30–36.5)
MCV RBC AUTO: 95.1 FL (ref 80–99)
MONOCYTES # BLD: 0.7 K/UL (ref 0–1)
MONOCYTES NFR BLD: 8 % (ref 5–13)
NEUTS SEG # BLD: 5.4 K/UL (ref 1.8–8)
NEUTS SEG NFR BLD: 61 % (ref 32–75)
NRBC # BLD: 0 K/UL (ref 0–0.01)
NRBC BLD-RTO: 0 PER 100 WBC
NT PRO BNP: 1044 PG/ML
PLATELET # BLD AUTO: 327 K/UL (ref 150–400)
PMV BLD AUTO: 9.4 FL (ref 8.9–12.9)
POTASSIUM SERPL-SCNC: 4 MMOL/L (ref 3.5–5.1)
PROT SERPL-MCNC: 7.9 G/DL (ref 6.4–8.2)
RBC # BLD AUTO: 4.27 M/UL (ref 3.8–5.2)
SODIUM SERPL-SCNC: 137 MMOL/L (ref 136–145)
SPECIMEN HOLD: NORMAL
TROPONIN I SERPL HS-MCNC: 22 NG/L (ref 0–51)
TROPONIN I SERPL HS-MCNC: 23 NG/L (ref 0–51)
WBC # BLD AUTO: 8.8 K/UL (ref 3.6–11)

## 2023-09-29 PROCEDURE — 2580000003 HC RX 258: Performed by: INTERNAL MEDICINE

## 2023-09-29 PROCEDURE — 80053 COMPREHEN METABOLIC PANEL: CPT

## 2023-09-29 PROCEDURE — 96372 THER/PROPH/DIAG INJ SC/IM: CPT

## 2023-09-29 PROCEDURE — 85379 FIBRIN DEGRADATION QUANT: CPT

## 2023-09-29 PROCEDURE — 6360000004 HC RX CONTRAST MEDICATION: Performed by: INTERNAL MEDICINE

## 2023-09-29 PROCEDURE — 6360000002 HC RX W HCPCS: Performed by: STUDENT IN AN ORGANIZED HEALTH CARE EDUCATION/TRAINING PROGRAM

## 2023-09-29 PROCEDURE — 71275 CT ANGIOGRAPHY CHEST: CPT

## 2023-09-29 PROCEDURE — 6370000000 HC RX 637 (ALT 250 FOR IP): Performed by: INTERNAL MEDICINE

## 2023-09-29 PROCEDURE — 85025 COMPLETE CBC W/AUTO DIFF WBC: CPT

## 2023-09-29 PROCEDURE — 84484 ASSAY OF TROPONIN QUANT: CPT

## 2023-09-29 PROCEDURE — 83735 ASSAY OF MAGNESIUM: CPT

## 2023-09-29 PROCEDURE — 71045 X-RAY EXAM CHEST 1 VIEW: CPT

## 2023-09-29 PROCEDURE — 83880 ASSAY OF NATRIURETIC PEPTIDE: CPT

## 2023-09-29 PROCEDURE — 36415 COLL VENOUS BLD VENIPUNCTURE: CPT

## 2023-09-29 PROCEDURE — 76937 US GUIDE VASCULAR ACCESS: CPT

## 2023-09-29 PROCEDURE — 93005 ELECTROCARDIOGRAM TRACING: CPT | Performed by: INTERNAL MEDICINE

## 2023-09-29 PROCEDURE — 2580000003 HC RX 258: Performed by: STUDENT IN AN ORGANIZED HEALTH CARE EDUCATION/TRAINING PROGRAM

## 2023-09-29 PROCEDURE — 1100000003 HC PRIVATE W/ TELEMETRY

## 2023-09-29 PROCEDURE — 6370000000 HC RX 637 (ALT 250 FOR IP): Performed by: STUDENT IN AN ORGANIZED HEALTH CARE EDUCATION/TRAINING PROGRAM

## 2023-09-29 RX ORDER — ACETAMINOPHEN 325 MG/1
650 TABLET ORAL EVERY 6 HOURS PRN
Status: DISCONTINUED | OUTPATIENT
Start: 2023-09-29 | End: 2023-09-30 | Stop reason: HOSPADM

## 2023-09-29 RX ORDER — LOSARTAN POTASSIUM 25 MG/1
25 TABLET ORAL DAILY
Status: DISCONTINUED | OUTPATIENT
Start: 2023-09-29 | End: 2023-09-30 | Stop reason: HOSPADM

## 2023-09-29 RX ORDER — ISOSORBIDE MONONITRATE 30 MG/1
60 TABLET, EXTENDED RELEASE ORAL DAILY
Status: DISCONTINUED | OUTPATIENT
Start: 2023-09-30 | End: 2023-09-30 | Stop reason: HOSPADM

## 2023-09-29 RX ORDER — CARVEDILOL 12.5 MG/1
12.5 TABLET ORAL 2 TIMES DAILY WITH MEALS
Status: DISCONTINUED | OUTPATIENT
Start: 2023-09-29 | End: 2023-09-30 | Stop reason: HOSPADM

## 2023-09-29 RX ORDER — SODIUM CHLORIDE 0.9 % (FLUSH) 0.9 %
5-40 SYRINGE (ML) INJECTION EVERY 12 HOURS SCHEDULED
Status: DISCONTINUED | OUTPATIENT
Start: 2023-09-29 | End: 2023-09-30 | Stop reason: HOSPADM

## 2023-09-29 RX ORDER — ASPIRIN 81 MG/1
81 TABLET ORAL DAILY
Status: DISCONTINUED | OUTPATIENT
Start: 2023-09-29 | End: 2023-09-30 | Stop reason: HOSPADM

## 2023-09-29 RX ORDER — ONDANSETRON 4 MG/1
4 TABLET, ORALLY DISINTEGRATING ORAL EVERY 8 HOURS PRN
Status: DISCONTINUED | OUTPATIENT
Start: 2023-09-29 | End: 2023-09-30 | Stop reason: HOSPADM

## 2023-09-29 RX ORDER — PANTOPRAZOLE SODIUM 40 MG/1
40 TABLET, DELAYED RELEASE ORAL
Status: DISCONTINUED | OUTPATIENT
Start: 2023-09-29 | End: 2023-09-30 | Stop reason: HOSPADM

## 2023-09-29 RX ORDER — ATORVASTATIN CALCIUM 40 MG/1
80 TABLET, FILM COATED ORAL NIGHTLY
Status: DISCONTINUED | OUTPATIENT
Start: 2023-09-29 | End: 2023-09-30 | Stop reason: HOSPADM

## 2023-09-29 RX ORDER — ISOSORBIDE MONONITRATE 30 MG/1
30 TABLET, EXTENDED RELEASE ORAL DAILY
Status: DISCONTINUED | OUTPATIENT
Start: 2023-09-29 | End: 2023-09-29

## 2023-09-29 RX ORDER — ONDANSETRON 2 MG/ML
4 INJECTION INTRAMUSCULAR; INTRAVENOUS EVERY 6 HOURS PRN
Status: DISCONTINUED | OUTPATIENT
Start: 2023-09-29 | End: 2023-09-30 | Stop reason: HOSPADM

## 2023-09-29 RX ORDER — TRAZODONE HYDROCHLORIDE 100 MG/1
150 TABLET ORAL NIGHTLY PRN
Status: DISCONTINUED | OUTPATIENT
Start: 2023-09-29 | End: 2023-09-30 | Stop reason: HOSPADM

## 2023-09-29 RX ORDER — AMLODIPINE BESYLATE 5 MG/1
10 TABLET ORAL DAILY
Status: DISCONTINUED | OUTPATIENT
Start: 2023-09-29 | End: 2023-09-30 | Stop reason: HOSPADM

## 2023-09-29 RX ORDER — ALPRAZOLAM 0.25 MG/1
0.25 TABLET ORAL 3 TIMES DAILY PRN
Status: DISCONTINUED | OUTPATIENT
Start: 2023-09-29 | End: 2023-09-30 | Stop reason: HOSPADM

## 2023-09-29 RX ORDER — POLYETHYLENE GLYCOL 3350 17 G/17G
17 POWDER, FOR SOLUTION ORAL DAILY PRN
Status: DISCONTINUED | OUTPATIENT
Start: 2023-09-29 | End: 2023-09-30 | Stop reason: HOSPADM

## 2023-09-29 RX ORDER — SODIUM CHLORIDE 0.9 % (FLUSH) 0.9 %
5-40 SYRINGE (ML) INJECTION PRN
Status: DISCONTINUED | OUTPATIENT
Start: 2023-09-29 | End: 2023-09-30 | Stop reason: HOSPADM

## 2023-09-29 RX ORDER — SODIUM CHLORIDE 9 MG/ML
INJECTION, SOLUTION INTRAVENOUS CONTINUOUS
Status: DISCONTINUED | OUTPATIENT
Start: 2023-09-29 | End: 2023-09-30

## 2023-09-29 RX ORDER — ACETAMINOPHEN 650 MG/1
650 SUPPOSITORY RECTAL EVERY 6 HOURS PRN
Status: DISCONTINUED | OUTPATIENT
Start: 2023-09-29 | End: 2023-09-30 | Stop reason: HOSPADM

## 2023-09-29 RX ORDER — SODIUM CHLORIDE 9 MG/ML
INJECTION, SOLUTION INTRAVENOUS PRN
Status: DISCONTINUED | OUTPATIENT
Start: 2023-09-29 | End: 2023-09-30 | Stop reason: HOSPADM

## 2023-09-29 RX ORDER — RANOLAZINE 500 MG/1
500 TABLET, EXTENDED RELEASE ORAL 2 TIMES DAILY
Status: DISCONTINUED | OUTPATIENT
Start: 2023-09-29 | End: 2023-09-30 | Stop reason: HOSPADM

## 2023-09-29 RX ORDER — ENOXAPARIN SODIUM 100 MG/ML
40 INJECTION SUBCUTANEOUS DAILY
Status: DISCONTINUED | OUTPATIENT
Start: 2023-09-29 | End: 2023-09-30 | Stop reason: HOSPADM

## 2023-09-29 RX ADMIN — TRAZODONE HYDROCHLORIDE 150 MG: 100 TABLET ORAL at 21:01

## 2023-09-29 RX ADMIN — RANOLAZINE 500 MG: 500 TABLET, EXTENDED RELEASE ORAL at 21:01

## 2023-09-29 RX ADMIN — LOSARTAN POTASSIUM 25 MG: 25 TABLET, FILM COATED ORAL at 08:35

## 2023-09-29 RX ADMIN — ATORVASTATIN CALCIUM 80 MG: 40 TABLET, FILM COATED ORAL at 21:01

## 2023-09-29 RX ADMIN — RANOLAZINE 500 MG: 500 TABLET, EXTENDED RELEASE ORAL at 13:31

## 2023-09-29 RX ADMIN — SODIUM CHLORIDE: 9 INJECTION, SOLUTION INTRAVENOUS at 15:36

## 2023-09-29 RX ADMIN — CARVEDILOL 12.5 MG: 12.5 TABLET, FILM COATED ORAL at 08:35

## 2023-09-29 RX ADMIN — SODIUM CHLORIDE, PRESERVATIVE FREE 5 ML: 5 INJECTION INTRAVENOUS at 21:26

## 2023-09-29 RX ADMIN — ASPIRIN 81 MG: 81 TABLET, COATED ORAL at 08:35

## 2023-09-29 RX ADMIN — SODIUM CHLORIDE, PRESERVATIVE FREE 10 ML: 5 INJECTION INTRAVENOUS at 08:38

## 2023-09-29 RX ADMIN — PANTOPRAZOLE SODIUM 40 MG: 40 TABLET, DELAYED RELEASE ORAL at 06:04

## 2023-09-29 RX ADMIN — CARVEDILOL 12.5 MG: 12.5 TABLET, FILM COATED ORAL at 17:06

## 2023-09-29 RX ADMIN — IOPAMIDOL 100 ML: 755 INJECTION, SOLUTION INTRAVENOUS at 15:12

## 2023-09-29 RX ADMIN — TRAZODONE HYDROCHLORIDE 150 MG: 100 TABLET ORAL at 03:03

## 2023-09-29 RX ADMIN — ENOXAPARIN SODIUM 40 MG: 100 INJECTION SUBCUTANEOUS at 11:48

## 2023-09-29 RX ADMIN — AMLODIPINE BESYLATE 10 MG: 5 TABLET ORAL at 08:35

## 2023-09-29 RX ADMIN — ALPRAZOLAM 0.25 MG: 0.25 TABLET ORAL at 01:41

## 2023-09-29 RX ADMIN — ATORVASTATIN CALCIUM 80 MG: 40 TABLET, FILM COATED ORAL at 01:41

## 2023-09-29 RX ADMIN — ALPRAZOLAM 0.25 MG: 0.25 TABLET ORAL at 21:01

## 2023-09-29 RX ADMIN — ISOSORBIDE MONONITRATE 30 MG: 30 TABLET, EXTENDED RELEASE ORAL at 08:35

## 2023-09-29 NOTE — CONSULTS
Cardiology Consult Note    CC: CP    Debi Pierre MD  Reason for consult:  CP; CAD  Requesting MD:  Dr. Marcio Ulloa Date: 9/29/2023   Today's Date: 9/29/2023   Cardiologist:  Dr Ney Hewitt. Cardiac Assessment/Plan:   1) CAD: LOVE 12/2019:  3 months w/o change; unremarkable echo at [facility]. (except PAp 45). Neg MPI 1/2020. *Uncontrolled HTN/indet trop/Abnl MPI 11/2021: severe distal LM, mid circ, distal RCA. *CABG 11/2021 (MARLIN-LAD, SVG-D1; SVG-OM1; SVG-PDA). *Unremarkable echo 2/2022 (Nl EF). *Abnl PET 7/2022: Patent MARLIN-LAD & SVG-PDA; Apparent occlusion of SVG-OM and SVG-D 8/2022; consider Rx  prox circ if severe Sx: added Imdur then ranexa (intolerant of 1 g bid). 2) Palpitations 12/2019:  (Q.day at night early a.m.) unremarkable 48hr Holter inc w/ Sx.    3) HTN  4) Dyslipidemia (labs per PCP)    5) DVT post-op 11/2021: on [facility] 1/2022; d/cd later in 2022. 6) DM  7) Very large hiatal hernia   8) chronic lumbar back pain  9) CKD IV:  Cr 1.64 10/2019. Cr 1.7/GFR29 12/2019. Cr 1.93/gfr 25 7/2022.  10) GERD  11) osteoarthritis    Xfer for CP that resolved spont; neg trop x2; more lat ST depression than prior but known circ occlusion (& bypass occlusion as above). Current cardiac meds: norvasc 10; asa; lipitor 80; coreg 12.5 bid; imdur 30; losartan 25;     Rec: Imdur back to 60 qd;   Hold on cath today; recheck troponin tomorrow; if still neg and pt ambulating w/o Sx, could d/c from cardiac standpoint and f/u with me as planned 10/11/23. Hospital Problem List:  Principal Problem:    Chest pain  Resolved Problems:    * No resolved hospital problems. *     ____________________________________________________________________  Phu Brooks is a 81 y.o. female presents with Chest pain [R07.9].     As noted in H&P: \"81 y.o.  female with PMHx as listed below presenting as transfer from [address] [place] [address] for high risk chest pain with complaints of severe substernal crushing chest pain with holter; at [place]: No arrhythmia; HR 50 to 116:  Average 90 ; patient reported symptoms. MPI 2020 EF 0.86 (86%), No ischemia or infarct is seen on perfusion imaging. 2:00. 81% MAPHR. ACTIVE ALLERGIES:  Ingredient Reaction (Severity) Comment   ACE INHIBITORS     PENICILLINS     PROCAINAMIDE  Procan SR       PROBLEM LIST:  Problem Description Chronic   Angiotensin-converting-enzyme inhibitor allergy N   NSTEMI - Non-ST segment elevation MI Y   CABG (Coronary artery bypass grafting) planned Y       PAST MEDICAL/SURGICAL HISTORY  (Detailed)    Disease/disorder Onset Date Surgical History Date Comments     Appendectomy       CABG       Knee Replacement       Colonoscopy       EGD     Anemia       Anxiety       Arthritis       Blood Clots       Chronic Kidney Disease       Diabetes       Diverticulosis       Elevated lipids       GERD       Hypertension       Mood Disorder       NSTEMI       Overactive Bladder         OBSTETRIC HISTORY:  Not currently pregnant. Family History  (Detailed)  Relationship Family Member Name  Age at Death Condition Onset Age Cause of Death   Father    Heart Attack     Sister    High Blood Pressure       SOCIAL HISTORY  (Detailed)  Tobacco use reviewed. Preferred language is [place] Fast. EDUCATION/EMPLOYMENT/OCCUPATION  Employment History Status Retired Restrictions     retired       Smoking status: Never smoker. ALCOHOL  There is no history of alcohol use. CAFFEINE  The patient uses caffeine. LIFESTYLE  Exercises daily.     _____________________________________________________________________  Patient Active Problem List    Diagnosis Date Noted    Chest pain 2023    CKD (chronic kidney disease), stage IV ([address]) 08/15/2022    S/P CABG x 4 2021    CAD (coronary artery disease) 2021    NSTEMI (non-ST elevated myocardial infarction) ([address]) 2021    Arthritis 2013    GERD (gastroesophageal reflux disease) 2011        Past Medical

## 2023-09-29 NOTE — PROGRESS NOTES
Patient seen and examined.   Denies any chest pain currently      Chest pain  Mildly elevated dimer 0.8  -CTA chest pending  -Will do some IV fluids given elevated creatinine prophylactically  -Troponin x2 negative  Appreciate cardiology recs  Imdur back to 60 daily  Recheck troponin tomorrow if still remain negative and patient ambulating without symptoms will be cleared from cardiology standpoint for discharge and follow-up with cardiology on 10/11/2023 as outpatient      CKD stage III  Recently diagnosed and patient was discontinued on arthritis medication as outpatient  Monitor BMP  Will avoid nephrotoxic medications    Rest of the plan per H&P

## 2023-09-29 NOTE — H&P
Hospitalist Admission Note    NAME:  Greg De León   :  1935   MRN:  [id number]     Date/Time:  2023 1:01 AM    Patient PCP: Nishi Marcus MD    ______________________________________________________________________  Given the patient's current clinical presentation, I have a high level of concern for decompensation if discharged from the emergency department. Complex decision making was performed, which includes reviewing the patient's available past medical records, laboratory results, and x-ray films. My assessment of this patient's clinical condition and my plan of care is as follows. Assessment / Plan: Active Problems:  Chest pain and high risk patient  CAD status post CABG  Essential hypertension  Hyperlipidemia  MDD/COURTNEY/insomnia  GERD    Plan:  Chest pain and high risk patient  CAD status post CABG  Essential hypertension  Hyperlipidemia  Admit to telemetry monitoring  Cardiology consulted, greatly appreciate their expertise  Check serial troponins  Obtain ECG  Check CXR  We will check D-dimer-if elevated will pursue CTA chest if GFR permits  Repeat CMP, CBC, magnesium, proBNP  Continue PTA amlodipine, aspirin, atorvastatin, Coreg, Imdur, losartan  Aspirin given at OSH    MDD/COURTNEY/insomnia  Continue PTA as needed Xanax  Continue PTA trazodone nightly as needed    GERD  Formulary substitution Protonix    Medical Decision Making:   I personally reviewed labs: Yes, as listed below  I personally reviewed imaging: We will review CXR when obtained  Toxic drug monitoring: None  Discussed case with: ED provider. After discussion I am in agreement that acuity of patient's medical condition necessitates hospital stay. Code Status: Full  DVT Prophylaxis: Lovenox  GI Prophylaxis: Protonix  Baseline:  Independent    Subjective:   CHIEF COMPLAINT: Chest pain    HISTORY OF PRESENT ILLNESS:     Lisa Cunningham is a 81 y.o.  female with PMHx as listed below presenting as transfer from [facility]

## 2023-09-29 NOTE — PROGRESS NOTES
Comprehensive Nutrition Assessment    Type and Reason for Visit:  Initial, Positive Nutrition Screen    Nutrition Recommendations/Plan:   Cardiac diet      Malnutrition Assessment:  Malnutrition Status:  Insufficient data (09/29/23 1457)      Nutrition Assessment:    Patient medically noted for chest pain. PMH GERD, NSTEMI, CAD, CABG, HTN, HLD, and MDD. MST referral received; reports 24-33# weight loss PTA but no poor appetite. Patient off the floor at time of attempted visit. No recent weights noted on chart review. Receiving a carb controlled/cardiac diet at this time. Will d/c carb restriction; continue cardiac diet. Encourage intake of meals. Noted for potential discharge tomorrow. Nutrition Related Findings:    [phone]   1+ edema BLE   Norvasc, Atorvastatin, Coreg, Protonix   Wound Type: None       Current Nutrition Intake & Therapies:          ADULT DIET; Regular; 3 carb choices (45 gm/meal); Low Fat/Low Chol/High Fiber/BILL; Low Sodium (2 gm)    Anthropometric Measures:  Height: 170.2 cm (5' 7\")  Ideal Body Weight (IBW): 135 lbs (61 kg)       Current Body Weight: 143 lb 1.3 oz (64.9 kg), 106 % IBW. Current BMI (kg/m2): 22.4                          BMI Categories: Normal Weight (BMI 18.5-24. 9)    Estimated Daily Nutrient Needs:  Energy Requirements Based On: Formula  Weight Used for Energy Requirements: Current  Energy (kcal/day): 1459 kcals (BMR x 1. 3AF)  Weight Used for Protein Requirements: Current  Protein (g/day): 65-78g (1.0-1.2 g/kg bw)  Method Used for Fluid Requirements: 1 ml/kcal  Fluid (ml/day): 1450 mL    Nutrition Diagnosis:   No nutrition diagnosis at this time      Nutrition Interventions:   Food and/or Nutrient Delivery: Modify Current Diet  Nutrition Education/Counseling: No recommendation at this time  Coordination of Nutrition Care: Continue to monitor while inpatient       Goals:     Goals: PO intake 75% or greater, by next RD assessment       Nutrition Monitoring and Evaluation:   Behavioral-Environmental Outcomes: None Identified  Food/Nutrient Intake Outcomes: Food and Nutrient Intake  Physical Signs/Symptoms Outcomes: Biochemical Data, Weight, Nutrition Focused Physical Findings    Discharge Planning:    Continue current diet     Chen Brennan RD  Contact: ext [phone]

## 2023-09-29 NOTE — PROGRESS NOTES
Pt needs 20G AC line for CTA chest that is ordered, please call CT [phone]  when this is placed and we will get pt done.

## 2023-09-30 VITALS
HEART RATE: 60 BPM | BODY MASS INDEX: 22.63 KG/M2 | SYSTOLIC BLOOD PRESSURE: 128 MMHG | TEMPERATURE: 98.3 F | RESPIRATION RATE: 19 BRPM | HEIGHT: 67 IN | OXYGEN SATURATION: 93 % | WEIGHT: 144.18 LBS | DIASTOLIC BLOOD PRESSURE: 62 MMHG

## 2023-09-30 PROBLEM — N18.4 CKD (CHRONIC KIDNEY DISEASE), STAGE IV (HCC): Status: ACTIVE | Noted: 2022-08-15

## 2023-09-30 LAB
ANION GAP SERPL CALC-SCNC: 7 MMOL/L (ref 5–15)
BASOPHILS # BLD: 0 K/UL (ref 0–0.1)
BASOPHILS NFR BLD: 0 % (ref 0–1)
BUN SERPL-MCNC: 36 MG/DL (ref 6–20)
BUN/CREAT SERPL: 21 (ref 12–20)
CALCIUM SERPL-MCNC: 8.8 MG/DL (ref 8.5–10.1)
CHLORIDE SERPL-SCNC: 107 MMOL/L (ref 97–108)
CO2 SERPL-SCNC: 26 MMOL/L (ref 21–32)
CREAT SERPL-MCNC: 1.72 MG/DL (ref 0.55–1.02)
DIFFERENTIAL METHOD BLD: ABNORMAL
EKG ATRIAL RATE: 178 BPM
EKG DIAGNOSIS: NORMAL
EKG P AXIS: 64 DEGREES
EKG Q-T INTERVAL: 422 MS
EKG QRS DURATION: 144 MS
EKG QTC CALCULATION (BAZETT): 431 MS
EKG R AXIS: 44 DEGREES
EKG T AXIS: 102 DEGREES
EKG VENTRICULAR RATE: 63 BPM
EOSINOPHIL # BLD: 0.2 K/UL (ref 0–0.4)
EOSINOPHIL NFR BLD: 2 % (ref 0–7)
ERYTHROCYTE [DISTWIDTH] IN BLOOD BY AUTOMATED COUNT: 13.8 % (ref 11.5–14.5)
GLUCOSE SERPL-MCNC: 122 MG/DL (ref 65–100)
HCT VFR BLD AUTO: 37.2 % (ref 35–47)
HGB BLD-MCNC: 12.1 G/DL (ref 11.5–16)
IMM GRANULOCYTES # BLD AUTO: 0 K/UL (ref 0–0.04)
IMM GRANULOCYTES NFR BLD AUTO: 1 % (ref 0–0.5)
LYMPHOCYTES # BLD: 2.5 K/UL (ref 0.8–3.5)
LYMPHOCYTES NFR BLD: 35 % (ref 12–49)
MCH RBC QN AUTO: 31.4 PG (ref 26–34)
MCHC RBC AUTO-ENTMCNC: 32.5 G/DL (ref 30–36.5)
MCV RBC AUTO: 96.6 FL (ref 80–99)
MONOCYTES # BLD: 0.7 K/UL (ref 0–1)
MONOCYTES NFR BLD: 10 % (ref 5–13)
NEUTS SEG # BLD: 3.8 K/UL (ref 1.8–8)
NEUTS SEG NFR BLD: 52 % (ref 32–75)
NRBC # BLD: 0 K/UL (ref 0–0.01)
NRBC BLD-RTO: 0 PER 100 WBC
PLATELET # BLD AUTO: 291 K/UL (ref 150–400)
PMV BLD AUTO: 9.3 FL (ref 8.9–12.9)
POTASSIUM SERPL-SCNC: 4.3 MMOL/L (ref 3.5–5.1)
RBC # BLD AUTO: 3.85 M/UL (ref 3.8–5.2)
SODIUM SERPL-SCNC: 140 MMOL/L (ref 136–145)
TROPONIN I SERPL HS-MCNC: 24 NG/L (ref 0–51)
WBC # BLD AUTO: 7.2 K/UL (ref 3.6–11)

## 2023-09-30 PROCEDURE — 6370000000 HC RX 637 (ALT 250 FOR IP): Performed by: INTERNAL MEDICINE

## 2023-09-30 PROCEDURE — 2580000003 HC RX 258: Performed by: STUDENT IN AN ORGANIZED HEALTH CARE EDUCATION/TRAINING PROGRAM

## 2023-09-30 PROCEDURE — 96372 THER/PROPH/DIAG INJ SC/IM: CPT

## 2023-09-30 PROCEDURE — 6360000002 HC RX W HCPCS: Performed by: STUDENT IN AN ORGANIZED HEALTH CARE EDUCATION/TRAINING PROGRAM

## 2023-09-30 PROCEDURE — 36415 COLL VENOUS BLD VENIPUNCTURE: CPT

## 2023-09-30 PROCEDURE — 85025 COMPLETE CBC W/AUTO DIFF WBC: CPT

## 2023-09-30 PROCEDURE — 6370000000 HC RX 637 (ALT 250 FOR IP): Performed by: STUDENT IN AN ORGANIZED HEALTH CARE EDUCATION/TRAINING PROGRAM

## 2023-09-30 PROCEDURE — 84484 ASSAY OF TROPONIN QUANT: CPT

## 2023-09-30 PROCEDURE — 80048 BASIC METABOLIC PNL TOTAL CA: CPT

## 2023-09-30 RX ORDER — TRAZODONE HYDROCHLORIDE 150 MG/1
150 TABLET ORAL NIGHTLY PRN
Qty: 30 TABLET | Refills: 1 | Status: SHIPPED | OUTPATIENT
Start: 2023-09-30 | End: 2023-11-29

## 2023-09-30 RX ORDER — RANOLAZINE 500 MG/1
500 TABLET, EXTENDED RELEASE ORAL 2 TIMES DAILY
Qty: 60 TABLET | Refills: 3 | Status: SHIPPED | OUTPATIENT
Start: 2023-09-30

## 2023-09-30 RX ORDER — ISOSORBIDE MONONITRATE 60 MG/1
60 TABLET, EXTENDED RELEASE ORAL DAILY
Qty: 30 TABLET | Refills: 3 | Status: SHIPPED | OUTPATIENT
Start: 2023-10-01

## 2023-09-30 RX ADMIN — SODIUM CHLORIDE, PRESERVATIVE FREE 10 ML: 5 INJECTION INTRAVENOUS at 09:33

## 2023-09-30 RX ADMIN — PANTOPRAZOLE SODIUM 40 MG: 40 TABLET, DELAYED RELEASE ORAL at 09:28

## 2023-09-30 RX ADMIN — CARVEDILOL 12.5 MG: 12.5 TABLET, FILM COATED ORAL at 09:31

## 2023-09-30 RX ADMIN — ENOXAPARIN SODIUM 40 MG: 100 INJECTION SUBCUTANEOUS at 09:22

## 2023-09-30 RX ADMIN — RANOLAZINE 500 MG: 500 TABLET, EXTENDED RELEASE ORAL at 09:31

## 2023-09-30 RX ADMIN — ASPIRIN 81 MG: 81 TABLET, COATED ORAL at 09:27

## 2023-09-30 RX ADMIN — AMLODIPINE BESYLATE 10 MG: 5 TABLET ORAL at 09:30

## 2023-09-30 RX ADMIN — ISOSORBIDE MONONITRATE 60 MG: 30 TABLET, EXTENDED RELEASE ORAL at 09:27

## 2023-09-30 NOTE — PLAN OF CARE
Problem: Discharge Planning  Goal: Discharge to home or other facility with appropriate resources  Outcome: Progressing  Flowsheets (Taken 9/29/2023 1931 by Annita Cardenas RN)  Discharge to home or other facility with appropriate resources: Identify barriers to discharge with patient and caregiver     Problem: Safety - Adult  Goal: Free from fall injury  Outcome: Progressing     Problem: Skin/Tissue Integrity  Goal: Absence of new skin breakdown  Description: 1. Monitor for areas of redness and/or skin breakdown  2. Assess vascular access sites hourly  3. Every 4-6 hours minimum:  Change oxygen saturation probe site  4. Every 4-6 hours:  If on nasal continuous positive airway pressure, respiratory therapy assess nares and determine need for appliance change or resting period.   Outcome: Progressing     Problem: ABCDS Injury Assessment  Goal: Absence of physical injury  Outcome: Progressing

## 2023-09-30 NOTE — PROGRESS NOTES
I have reviewed Discharge Instructions with the patient. The patient verbalized understanding. Discharge medications reviewed with patient along with appropriate educational materials. Opportunity for questions and clarification was provided. All lines removed without difficulty. Patient's belongings gathered and with patient. Patient is ready for discharge. Pt taken down to discharge area.

## 2023-09-30 NOTE — DISCHARGE SUMMARY
Hospitalist Discharge Summary     Patient ID:    Velma Fairchild  [id number]  89 y.o.  1935    Admit date: 9/29/2023    Discharge date : 9/30/2023        Final Diagnoses:   Principal Problem:    Chest pain  Active Problems:    CKD (chronic kidney disease), stage IV (HCC)  Resolved Problems:    * No resolved hospital problems. *      Reason for Hospitalization:  Tessa De Los Santos is a 81 y.o.  female with PMHx as listed below presenting as transfer from [address] [place] [address] [address] for high risk chest pain with complaints of severe substernal crushing chest pain with radiation to back and upper abdomen lasting approximately 10-15 minutes associated with nausea without vomiting. Patient reports subjective shortness of breath. Reports some malaise. Denies preceding illness or sick contacts. ROS otherwise negative. Does have extensive cardiac history followed by Dr. Flores Funez prompting transfer to our facility for continuity of care. Patient denies tobacco use. At OSH, patient afebrile and hemodynamically stable. Reportedly with unchanged baseline ECG. Undetectable troponin. GFR 31 precluding CTA chest.  D-dimer not obtained. On arrival, patient reports some residual shortness of breath but no other complaints noted. Hemodynamically stable.        Hospital Course:   Chest pain  Mildly elevated dimer 0.8  -CTA chest pending  -Will do some IV fluids given elevated creatinine prophylactically  -Troponin x2 negative  Appreciate cardiology recs  Imdur back to 60 daily  Recheck troponin tomorrow if still remain negative and patient ambulating without symptoms will be cleared from cardiology standpoint for discharge and follow-up with cardiology on 10/11/2023 as outpatient     9/30 Trop neg x3  CP resolved no intervention per cardiology    Appreciate Recc by Cardiology   Added Ranexa ,, increased Imdur to 60 mg   Cleared for DC with OP FU with Cardiology   Will hold Lasix since pt euvolemic FU with PCP 5.1 mmol/L    Chloride 107 97 - 108 mmol/L    CO2 26 21 - 32 mmol/L    Anion Gap 7 5 - 15 mmol/L    Glucose 122 (H) 65 - 100 mg/dL    BUN 36 (H) 6 - 20 MG/DL    Creatinine 1.72 (H) 0.55 - 1.02 MG/DL    Bun/Cre Ratio 21 (H) 12 - 20      Est, Glom Filt Rate 29 (L) >60 ml/min/1.73m2    Calcium 8.8 8.5 - 10.1 MG/DL   CBC with Auto Differential    Collection Time: 09/30/23  2:58 AM   Result Value Ref Range    WBC 7.2 3.6 - 11.0 K/uL    RBC 3.85 3.80 - 5.20 M/uL    Hemoglobin 12.1 11.5 - 16.0 g/dL    Hematocrit 37.2 35.0 - 47.0 %    MCV 96.6 80.0 - 99.0 FL    MCH 31.4 26.0 - 34.0 PG    MCHC 32.5 30.0 - 36.5 g/dL    RDW 13.8 11.5 - 14.5 %    Platelets 991 765 - 918 K/uL    MPV 9.3 8.9 - 12.9 FL    Nucleated RBCs 0.0 0  WBC    nRBC 0.00 0.00 - 0.01 K/uL    Neutrophils % 52 32 - 75 %    Lymphocytes % 35 12 - 49 %    Monocytes % 10 5 - 13 %    Eosinophils % 2 0 - 7 %    Basophils % 0 0 - 1 %    Immature Granulocytes 1 (H) 0.0 - 0.5 %    Neutrophils Absolute 3.8 1.8 - 8.0 K/UL    Lymphocytes Absolute 2.5 0.8 - 3.5 K/UL    Monocytes Absolute 0.7 0.0 - 1.0 K/UL    Eosinophils Absolute 0.2 0.0 - 0.4 K/UL    Basophils Absolute 0.0 0.0 - 0.1 K/UL    Absolute Immature Granulocyte 0.0 0.00 - 0.04 K/UL    Differential Type AUTOMATED     Troponin    Collection Time: 09/30/23  2:58 AM   Result Value Ref Range    Troponin, High Sensitivity 24 0 - 51 ng/L     CTA CHEST W WO CONTRAST   Final Result   1. No pulmonary embolism or acute airspace disease. 2.  Severe coronary calcium. 3.  Large hiatal hernia. 4.  Mediastinal herniation of transverse colon. 5.  4 mm left lower lobe pulmonary nodule. Consider 12 month CT in high risk   patients. XR CHEST PORTABLE   Final Result   Mild pulmonary edema and small pleural effusions. Large hiatal hernia containing   colon. Discharge: time spent 35 minutes in discharge  Education and counseling.      Signed:  CHASITY Jacobs NP  9/30/2023  10:58 AM

## 2023-09-30 NOTE — CARE COORDINATION
09/30/23 1215   Service Assessment   Patient Orientation Alert and Oriented   Cognition Alert   History Provided By Patient   Primary [facility]   Patient's Healthcare Decision Maker is: Named in [address]    PCP Verified by CM Yes  (Annel Garrett)   Last Visit to PCP Within last 3 months   Prior Functional Level Independent in ADLs/IADLs   Current Functional Level Independent in ADLs/IADLs   Can patient return to prior living arrangement Yes   Ability to make needs known: Good   Family able to assist with home care needs: Yes   Would you like for me to discuss the discharge plan with any other family members/significant others, and if so, who? No   Financial Resources Medicare   Social/Functional History   Lives With Alone   Type of [address]   ADL Assistance Independent   Homemaking Assistance Independent   Ambulation Assistance Independent   Transfer Assistance Independent   Discharge Planning   Type of [address] Prior To Admission None   [facility]   Transition [address]  ([facility]) Discharge [facility] Discharge None   [employer] Information Provided? No   Mode of Transport at Discharge Other (see comment)  (son and daughter in law at bedside)   Confirm Follow Up Transport Family   Condition of Participation: Discharge Planning   The Plan for Transition of Care is related to the following treatment goals: home with family support   The Patient and/or Patient Representative was provided with a Choice of Provider? Patient   The Patient and/Or Patient Representative agree with the Discharge Plan? Yes     CM met with pt at bedside to complete assessment. Pt stated no needs or concerns for d/c. Pt is clear from CM.      Scott Pederson, RODERICKW, weekend   [phone]

## 2024-10-29 ENCOUNTER — APPOINTMENT (OUTPATIENT)
Facility: HOSPITAL | Age: 89
End: 2024-10-29
Payer: MEDICARE

## 2024-10-29 ENCOUNTER — HOSPITAL ENCOUNTER (INPATIENT)
Facility: HOSPITAL | Age: 89
LOS: 3 days | Discharge: HOME | End: 2024-11-01
Attending: STUDENT IN AN ORGANIZED HEALTH CARE EDUCATION/TRAINING PROGRAM | Admitting: INTERNAL MEDICINE
Payer: MEDICARE

## 2024-10-29 DIAGNOSIS — R09.02 HYPOXEMIA: ICD-10-CM

## 2024-10-29 DIAGNOSIS — I50.33 ACUTE ON CHRONIC DIASTOLIC CONGESTIVE HEART FAILURE (HCC): ICD-10-CM

## 2024-10-29 DIAGNOSIS — I50.9 ACUTE ON CHRONIC CONGESTIVE HEART FAILURE, UNSPECIFIED HEART FAILURE TYPE (HCC): Primary | ICD-10-CM

## 2024-10-29 DIAGNOSIS — R06.09 DYSPNEA ON EXERTION: ICD-10-CM

## 2024-10-29 LAB
ALBUMIN SERPL-MCNC: 3.8 G/DL (ref 3.5–5)
ALBUMIN/GLOB SERPL: 1 (ref 1.1–2.2)
ALP SERPL-CCNC: 69 U/L (ref 45–117)
ALT SERPL-CCNC: 19 U/L (ref 12–78)
ANION GAP SERPL CALC-SCNC: 6 MMOL/L (ref 2–12)
AST SERPL-CCNC: 21 U/L (ref 15–37)
BASOPHILS # BLD: 0 K/UL (ref 0–0.1)
BASOPHILS NFR BLD: 1 % (ref 0–1)
BILIRUB SERPL-MCNC: 0.4 MG/DL (ref 0.2–1)
BUN SERPL-MCNC: 27 MG/DL (ref 6–20)
BUN/CREAT SERPL: 16 (ref 12–20)
CALCIUM SERPL-MCNC: 9.4 MG/DL (ref 8.5–10.1)
CHLORIDE SERPL-SCNC: 105 MMOL/L (ref 97–108)
CO2 SERPL-SCNC: 27 MMOL/L (ref 21–32)
CREAT SERPL-MCNC: 1.74 MG/DL (ref 0.55–1.02)
DIFFERENTIAL METHOD BLD: ABNORMAL
EOSINOPHIL # BLD: 0.3 K/UL (ref 0–0.4)
EOSINOPHIL NFR BLD: 4 % (ref 0–7)
ERYTHROCYTE [DISTWIDTH] IN BLOOD BY AUTOMATED COUNT: 13.2 % (ref 11.5–14.5)
GLOBULIN SER CALC-MCNC: 3.8 G/DL (ref 2–4)
GLUCOSE SERPL-MCNC: 116 MG/DL (ref 65–100)
HCT VFR BLD AUTO: 34.7 % (ref 35–47)
HGB BLD-MCNC: 11.1 G/DL (ref 11.5–16)
IMM GRANULOCYTES # BLD AUTO: 0 K/UL (ref 0–0.04)
IMM GRANULOCYTES NFR BLD AUTO: 0 % (ref 0–0.5)
LYMPHOCYTES # BLD: 2.1 K/UL (ref 0.8–3.5)
LYMPHOCYTES NFR BLD: 27 % (ref 12–49)
MCH RBC QN AUTO: 30.7 PG (ref 26–34)
MCHC RBC AUTO-ENTMCNC: 32 G/DL (ref 30–36.5)
MCV RBC AUTO: 95.9 FL (ref 80–99)
MONOCYTES # BLD: 0.9 K/UL (ref 0–1)
MONOCYTES NFR BLD: 11 % (ref 5–13)
NEUTS SEG # BLD: 4.4 K/UL (ref 1.8–8)
NEUTS SEG NFR BLD: 57 % (ref 32–75)
NRBC # BLD: 0 K/UL (ref 0–0.01)
NRBC BLD-RTO: 0 PER 100 WBC
NT PRO BNP: 2500 PG/ML
PLATELET # BLD AUTO: 345 K/UL (ref 150–400)
POTASSIUM SERPL-SCNC: 4.6 MMOL/L (ref 3.5–5.1)
PROT SERPL-MCNC: 7.6 G/DL (ref 6.4–8.2)
RBC # BLD AUTO: 3.62 M/UL (ref 3.8–5.2)
SODIUM SERPL-SCNC: 138 MMOL/L (ref 136–145)
TROPONIN I SERPL HS-MCNC: 66 NG/L (ref 0–51)
WBC # BLD AUTO: 7.8 K/UL (ref 3.6–11)

## 2024-10-29 PROCEDURE — 85025 COMPLETE CBC W/AUTO DIFF WBC: CPT

## 2024-10-29 PROCEDURE — 6360000002 HC RX W HCPCS: Performed by: STUDENT IN AN ORGANIZED HEALTH CARE EDUCATION/TRAINING PROGRAM

## 2024-10-29 PROCEDURE — 1100000003 HC PRIVATE W/ TELEMETRY

## 2024-10-29 PROCEDURE — 6370000000 HC RX 637 (ALT 250 FOR IP): Performed by: INTERNAL MEDICINE

## 2024-10-29 PROCEDURE — 6360000002 HC RX W HCPCS: Performed by: INTERNAL MEDICINE

## 2024-10-29 PROCEDURE — 80053 COMPREHEN METABOLIC PANEL: CPT

## 2024-10-29 PROCEDURE — 96374 THER/PROPH/DIAG INJ IV PUSH: CPT

## 2024-10-29 PROCEDURE — 2580000003 HC RX 258: Performed by: INTERNAL MEDICINE

## 2024-10-29 PROCEDURE — 84484 ASSAY OF TROPONIN QUANT: CPT

## 2024-10-29 PROCEDURE — 83880 ASSAY OF NATRIURETIC PEPTIDE: CPT

## 2024-10-29 PROCEDURE — 36415 COLL VENOUS BLD VENIPUNCTURE: CPT

## 2024-10-29 PROCEDURE — 71046 X-RAY EXAM CHEST 2 VIEWS: CPT

## 2024-10-29 PROCEDURE — 93005 ELECTROCARDIOGRAM TRACING: CPT | Performed by: STUDENT IN AN ORGANIZED HEALTH CARE EDUCATION/TRAINING PROGRAM

## 2024-10-29 PROCEDURE — 99285 EMERGENCY DEPT VISIT HI MDM: CPT

## 2024-10-29 RX ORDER — ACETAMINOPHEN 325 MG/1
650 TABLET ORAL EVERY 6 HOURS PRN
Status: DISCONTINUED | OUTPATIENT
Start: 2024-10-29 | End: 2024-11-01 | Stop reason: HOSPADM

## 2024-10-29 RX ORDER — ONDANSETRON 2 MG/ML
4 INJECTION INTRAMUSCULAR; INTRAVENOUS EVERY 4 HOURS PRN
Status: DISCONTINUED | OUTPATIENT
Start: 2024-10-29 | End: 2024-10-31

## 2024-10-29 RX ORDER — FUROSEMIDE 20 MG/1
20 TABLET ORAL EVERY MORNING
Status: ON HOLD | COMMUNITY
End: 2024-11-01 | Stop reason: HOSPADM

## 2024-10-29 RX ORDER — ASPIRIN 81 MG/1
81 TABLET ORAL DAILY
Status: DISCONTINUED | OUTPATIENT
Start: 2024-10-30 | End: 2024-11-01 | Stop reason: HOSPADM

## 2024-10-29 RX ORDER — ATORVASTATIN CALCIUM 40 MG/1
40 TABLET, FILM COATED ORAL DAILY
COMMUNITY
Start: 2024-10-09

## 2024-10-29 RX ORDER — SODIUM CHLORIDE 9 MG/ML
INJECTION, SOLUTION INTRAVENOUS PRN
Status: DISCONTINUED | OUTPATIENT
Start: 2024-10-29 | End: 2024-11-01 | Stop reason: HOSPADM

## 2024-10-29 RX ORDER — FUROSEMIDE 10 MG/ML
20 INJECTION INTRAMUSCULAR; INTRAVENOUS 2 TIMES DAILY
Status: DISCONTINUED | OUTPATIENT
Start: 2024-10-29 | End: 2024-11-01

## 2024-10-29 RX ORDER — SODIUM CHLORIDE 0.9 % (FLUSH) 0.9 %
5-40 SYRINGE (ML) INJECTION PRN
Status: DISCONTINUED | OUTPATIENT
Start: 2024-10-29 | End: 2024-11-01 | Stop reason: HOSPADM

## 2024-10-29 RX ORDER — ISOSORBIDE MONONITRATE 30 MG/1
60 TABLET, EXTENDED RELEASE ORAL DAILY
Status: DISCONTINUED | OUTPATIENT
Start: 2024-10-29 | End: 2024-11-01 | Stop reason: HOSPADM

## 2024-10-29 RX ORDER — RANOLAZINE 500 MG/1
500 TABLET, EXTENDED RELEASE ORAL 2 TIMES DAILY
Status: DISCONTINUED | OUTPATIENT
Start: 2024-10-29 | End: 2024-11-01 | Stop reason: HOSPADM

## 2024-10-29 RX ORDER — ALPRAZOLAM 0.25 MG/1
0.25 TABLET ORAL 3 TIMES DAILY PRN
Status: DISCONTINUED | OUTPATIENT
Start: 2024-10-29 | End: 2024-11-01 | Stop reason: HOSPADM

## 2024-10-29 RX ORDER — LANOLIN ALCOHOL/MO/W.PET/CERES
400 CREAM (GRAM) TOPICAL DAILY
COMMUNITY
Start: 2024-09-17

## 2024-10-29 RX ORDER — ACETAMINOPHEN 325 MG/1
650 TABLET ORAL EVERY 4 HOURS PRN
Status: DISCONTINUED | OUTPATIENT
Start: 2024-10-29 | End: 2024-10-31

## 2024-10-29 RX ORDER — LOSARTAN POTASSIUM 50 MG/1
50 TABLET ORAL DAILY
Status: DISCONTINUED | OUTPATIENT
Start: 2024-10-30 | End: 2024-10-30

## 2024-10-29 RX ORDER — LANOLIN ALCOHOL/MO/W.PET/CERES
400 CREAM (GRAM) TOPICAL DAILY
Status: DISCONTINUED | OUTPATIENT
Start: 2024-10-30 | End: 2024-11-01 | Stop reason: HOSPADM

## 2024-10-29 RX ORDER — LOSARTAN POTASSIUM 50 MG/1
50 TABLET ORAL DAILY
Status: ON HOLD | COMMUNITY
Start: 2024-08-10 | End: 2024-11-01 | Stop reason: HOSPADM

## 2024-10-29 RX ORDER — SODIUM CHLORIDE 0.9 % (FLUSH) 0.9 %
5-40 SYRINGE (ML) INJECTION EVERY 12 HOURS SCHEDULED
Status: DISCONTINUED | OUTPATIENT
Start: 2024-10-29 | End: 2024-11-01 | Stop reason: HOSPADM

## 2024-10-29 RX ORDER — CARVEDILOL 12.5 MG/1
12.5 TABLET ORAL 2 TIMES DAILY WITH MEALS
Status: DISCONTINUED | OUTPATIENT
Start: 2024-10-29 | End: 2024-11-01 | Stop reason: HOSPADM

## 2024-10-29 RX ORDER — ONDANSETRON 4 MG/1
4 TABLET, ORALLY DISINTEGRATING ORAL EVERY 8 HOURS PRN
Status: DISCONTINUED | OUTPATIENT
Start: 2024-10-29 | End: 2024-11-01 | Stop reason: HOSPADM

## 2024-10-29 RX ORDER — AMLODIPINE BESYLATE 5 MG/1
10 TABLET ORAL DAILY
Status: DISCONTINUED | OUTPATIENT
Start: 2024-10-30 | End: 2024-11-01 | Stop reason: HOSPADM

## 2024-10-29 RX ORDER — FUROSEMIDE 10 MG/ML
20 INJECTION INTRAMUSCULAR; INTRAVENOUS ONCE
Status: COMPLETED | OUTPATIENT
Start: 2024-10-29 | End: 2024-10-29

## 2024-10-29 RX ORDER — ATORVASTATIN CALCIUM 40 MG/1
40 TABLET, FILM COATED ORAL NIGHTLY
Status: DISCONTINUED | OUTPATIENT
Start: 2024-10-29 | End: 2024-11-01 | Stop reason: HOSPADM

## 2024-10-29 RX ORDER — POLYETHYLENE GLYCOL 3350 17 G/17G
17 POWDER, FOR SOLUTION ORAL DAILY PRN
Status: DISCONTINUED | OUTPATIENT
Start: 2024-10-29 | End: 2024-11-01 | Stop reason: HOSPADM

## 2024-10-29 RX ORDER — ONDANSETRON 2 MG/ML
4 INJECTION INTRAMUSCULAR; INTRAVENOUS EVERY 6 HOURS PRN
Status: DISCONTINUED | OUTPATIENT
Start: 2024-10-29 | End: 2024-11-01 | Stop reason: HOSPADM

## 2024-10-29 RX ORDER — ACETAMINOPHEN 650 MG/1
650 SUPPOSITORY RECTAL EVERY 6 HOURS PRN
Status: DISCONTINUED | OUTPATIENT
Start: 2024-10-29 | End: 2024-11-01 | Stop reason: HOSPADM

## 2024-10-29 RX ORDER — ENOXAPARIN SODIUM 100 MG/ML
30 INJECTION SUBCUTANEOUS DAILY
Status: DISCONTINUED | OUTPATIENT
Start: 2024-10-30 | End: 2024-11-01 | Stop reason: HOSPADM

## 2024-10-29 RX ORDER — PANTOPRAZOLE SODIUM 40 MG/1
40 TABLET, DELAYED RELEASE ORAL
Status: DISCONTINUED | OUTPATIENT
Start: 2024-10-30 | End: 2024-11-01 | Stop reason: HOSPADM

## 2024-10-29 RX ADMIN — CARVEDILOL 12.5 MG: 12.5 TABLET, FILM COATED ORAL at 18:19

## 2024-10-29 RX ADMIN — ISOSORBIDE MONONITRATE 60 MG: 30 TABLET, EXTENDED RELEASE ORAL at 18:19

## 2024-10-29 RX ADMIN — ALPRAZOLAM 0.25 MG: 0.25 TABLET ORAL at 20:48

## 2024-10-29 RX ADMIN — FUROSEMIDE 20 MG: 10 INJECTION, SOLUTION INTRAMUSCULAR; INTRAVENOUS at 18:20

## 2024-10-29 RX ADMIN — RANOLAZINE 500 MG: 500 TABLET, EXTENDED RELEASE ORAL at 20:49

## 2024-10-29 RX ADMIN — FUROSEMIDE 20 MG: 10 INJECTION, SOLUTION INTRAMUSCULAR; INTRAVENOUS at 16:06

## 2024-10-29 RX ADMIN — ATORVASTATIN CALCIUM 40 MG: 40 TABLET, FILM COATED ORAL at 20:49

## 2024-10-29 RX ADMIN — SODIUM CHLORIDE, PRESERVATIVE FREE 10 ML: 5 INJECTION INTRAVENOUS at 20:49

## 2024-10-29 RX ADMIN — TRAZODONE HYDROCHLORIDE 150 MG: 100 TABLET ORAL at 20:48

## 2024-10-29 ASSESSMENT — PAIN - FUNCTIONAL ASSESSMENT: PAIN_FUNCTIONAL_ASSESSMENT: NONE - DENIES PAIN

## 2024-10-29 NOTE — PROGRESS NOTES
Patient arrived form ED 1845 V/S done oriented patient to unit bed alarm and call bell given. Skin assessment don with oncoming  nurse and report  given to Annabelle SOFIA

## 2024-10-29 NOTE — CONSULTS
orthopnea, syncope.  Positive for chest pain, palpitation, PND.  VASC - Negative for claudication.  Positive for edema.  GI - Negative for nausea, reflux, bleeding.   - Negative for hematuria, nocturia.  REPROD - Negative for Hx of OCP.  ENDO - Negative for goiter, tremors.  NEURO - Negative for dizziness, memory loss, seizures.  PSYCH - Negative for depression, hallucinations.  DERM - Negative for rash, skin sores.  M/S - Negative for joint pain, myalgia.  HEMAT - Negative for acute anemia, thrombocytopenia.    Vital Signs     Time BP mm/Hg Pulse /min Resp /min Temp F Ht ft Ht in Ht cm Wt lb Wt kg BMI kg/m2 BSA m2 O2 Sat%    2:01 /66 57   5.0 5.00 165.10 158.80 72.030 26.43  90         PHYSICAL EXAM:  Exam Findings Details   Const Neg Level of Distress - Awake / Alert.  Nourishment - Well Nourished.   Resp Neg Rales - Absent.  Wheezes - Absent.  Rhonchi - Absent.   Resp Note Increased RR; min basal crackles   Cardiac Neg Rhythm - Regular.  Heart Sounds - S1 Normal, S2 Normal, No S3, No S4.  Extra Sounds - None.  Murmurs - None.   Vasc Neg Carotid - No Bruits Noted.  Posterior Tibial - Bilateral Normal Pulse.   M/S Pos Gait - Uses Cane.  Able to Exercise - No.   EXT Neg Clubbing - Absent.   EXT Pos Lower Extremity Edema - Moderate, Bilateral.   Psych Neg Orientation - Oriented to Time, Person, Place.     ASSESSMENT AND PLAN:    01. Atherosclerotic heart disease of native coronary artery with other forms of angina pectoris - I25.118   3 v CAD w/ CABG 11/2021.  No anginal CP; limited mobility.  She report stable LOVE.  Abnl PET: SVG-OM, SVG-D occlusion: Med Rx for now instead of Rx circ : pt not interested in  attempt.  Cont imdur 60; Ranexa 500mg bid.    We discussed the signs and symptoms of unstable angina, myocardial infarction and malignant arrhythmia.  The patient knows to seek immediate medical attention should they occur.      ECG done        02. Presence of aortocoronary bypass graft - Z95.1

## 2024-10-29 NOTE — H&P
Hospitalist Admission Note    NAME:   Prisca Kwong   : 1935   MRN: [id number]     Date/Time: 10/29/2024 5:23 PM    Patient PCP: No primary care provider on file.  Cardiology Dr. Mckee    ______________________________________________________________________  Given the patient's current clinical presentation, I have a high level of concern for decompensation if discharged from the emergency department.  Complex decision making was performed, which includes reviewing the patient's available past medical records, laboratory results, and x-ray films.       My assessment of this patient's clinical condition and my plan of care is as follows.    Assessment / Plan:    Acute on chronic diastolic CHF POA-rule out systolic dysfunction  Causing increased shortness of breath with PND and lower extremity swelling x 1 week  Hypertension  CAD status post CABG  CKD stage 3  GERD  Hyperlipidemia  Anxiety disorder  Creatinine 1.74  proBNP 2500  Troponin 66  Chest x-ray negative acute    Admit to remote telemetry bed  Status post IV Lasix 20 mg x 1 in ED, will continue IV Lasix 20 mg twice daily  Strict I's and O's  Daily weight  Low-salt diet with p.o. fluid restrictions to 1800 mL/day  Inpatient cardiology consult for further recommendations  Check 2D echo  Continue Coreg, Amlodipine, Cozaar  Continue aspirin, Imdur, Ranexa, Lipitor  Continue Xanax as needed        Medical Decision Making:   I personally reviewed labs: Y  I personally reviewed imaging:CXR  I personally reviewed EK bpm NSR  Toxic drug monitoring: none  Discussed case with: ED provider. After discussion I am in agreement that acuity of patient's medical condition necessitates hospital stay. Pt, RN, Family at bedside      Code Status: Full code  DVT Prophylaxis: SQ lovenox  Baseline: Patient is independent with ADLs at home    Subjective:   CHIEF COMPLAINT: Worsening shortness of breath with lower extremity edema x 1 week    HISTORY OF PRESENT ILLNESS:  Sensitivity 66 (H) 0 - 51 ng/L   Brain Natriuretic Peptide    Collection Time: 10/29/24  3:05 PM   Result Value Ref Range    NT Pro-BNP 2,500 (H) <450 PG/ML         XR CHEST (2 VW)    Result Date: 10/29/2024  EXAM: XR CHEST (2 VW) INDICATION:  SOB COMPARISON: September 2023 TECHNIQUE: PA and lateral chest views FINDINGS: There is chronic cardiomegaly with median sternotomy wires. There is a suspected hiatal hernia. The pulmonary vasculature is within normal limits. The lungs and pleural spaces are clear. The visualized bones and upper abdomen are age-appropriate.     No acute process. Electronically signed by Caesar Robles MD     _______________________________________________________________________    TOTAL TIME:  76 Minutes    Critical Care Provided     Minutes non procedure based    Signed: Walter Dubon MD    Procedures: see electronic medical records for all procedures/Xrays and details which were not copied into this note but were reviewed prior to creation of Plan.

## 2024-10-29 NOTE — ED PROVIDER NOTES
Record.   Nikolay Campos MD (electronically signed)      (Please note that parts of this dictation were completed with voice recognition software. Quite often unanticipated grammatical, syntax, homophones, and other interpretive errors are inadvertently transcribed by the computer software. Please disregards these errors. Please excuse any errors that have escaped final proofreading.)         Nikolay Campos MD  11/02/24 9469

## 2024-10-30 LAB
ANION GAP SERPL CALC-SCNC: 6 MMOL/L (ref 2–12)
BUN SERPL-MCNC: 25 MG/DL (ref 6–20)
BUN/CREAT SERPL: 14 (ref 12–20)
CALCIUM SERPL-MCNC: 9.4 MG/DL (ref 8.5–10.1)
CHLORIDE SERPL-SCNC: 104 MMOL/L (ref 97–108)
CO2 SERPL-SCNC: 28 MMOL/L (ref 21–32)
CREAT SERPL-MCNC: 1.73 MG/DL (ref 0.55–1.02)
GLUCOSE BLD STRIP.AUTO-MCNC: 123 MG/DL (ref 65–117)
GLUCOSE SERPL-MCNC: 119 MG/DL (ref 65–100)
INR PPP: 1.1 (ref 0.9–1.1)
LIPASE SERPL-CCNC: 41 U/L (ref 13–75)
MAGNESIUM SERPL-MCNC: 2 MG/DL (ref 1.6–2.4)
POTASSIUM SERPL-SCNC: 4.4 MMOL/L (ref 3.5–5.1)
PROTHROMBIN TIME: 11 SEC (ref 9–11.1)
SERVICE CMNT-IMP: ABNORMAL
SODIUM SERPL-SCNC: 138 MMOL/L (ref 136–145)

## 2024-10-30 PROCEDURE — 80048 BASIC METABOLIC PNL TOTAL CA: CPT

## 2024-10-30 PROCEDURE — 6370000000 HC RX 637 (ALT 250 FOR IP): Performed by: INTERNAL MEDICINE

## 2024-10-30 PROCEDURE — 2700000000 HC OXYGEN THERAPY PER DAY

## 2024-10-30 PROCEDURE — 6360000002 HC RX W HCPCS: Performed by: INTERNAL MEDICINE

## 2024-10-30 PROCEDURE — 83690 ASSAY OF LIPASE: CPT

## 2024-10-30 PROCEDURE — 83735 ASSAY OF MAGNESIUM: CPT

## 2024-10-30 PROCEDURE — 85610 PROTHROMBIN TIME: CPT

## 2024-10-30 PROCEDURE — 2580000003 HC RX 258: Performed by: INTERNAL MEDICINE

## 2024-10-30 PROCEDURE — 82962 GLUCOSE BLOOD TEST: CPT

## 2024-10-30 PROCEDURE — 36415 COLL VENOUS BLD VENIPUNCTURE: CPT

## 2024-10-30 PROCEDURE — 1100000003 HC PRIVATE W/ TELEMETRY

## 2024-10-30 RX ADMIN — CARVEDILOL 12.5 MG: 12.5 TABLET, FILM COATED ORAL at 08:47

## 2024-10-30 RX ADMIN — CARVEDILOL 12.5 MG: 12.5 TABLET, FILM COATED ORAL at 16:23

## 2024-10-30 RX ADMIN — RANOLAZINE 500 MG: 500 TABLET, EXTENDED RELEASE ORAL at 08:47

## 2024-10-30 RX ADMIN — AMLODIPINE BESYLATE 10 MG: 5 TABLET ORAL at 08:46

## 2024-10-30 RX ADMIN — ALPRAZOLAM 0.25 MG: 0.25 TABLET ORAL at 21:47

## 2024-10-30 RX ADMIN — FUROSEMIDE 20 MG: 10 INJECTION, SOLUTION INTRAMUSCULAR; INTRAVENOUS at 17:49

## 2024-10-30 RX ADMIN — ISOSORBIDE MONONITRATE 60 MG: 30 TABLET, EXTENDED RELEASE ORAL at 08:46

## 2024-10-30 RX ADMIN — ENOXAPARIN SODIUM 30 MG: 100 INJECTION SUBCUTANEOUS at 08:48

## 2024-10-30 RX ADMIN — PANTOPRAZOLE SODIUM 40 MG: 40 TABLET, DELAYED RELEASE ORAL at 05:15

## 2024-10-30 RX ADMIN — SACUBITRIL AND VALSARTAN 1 TABLET: 49; 51 TABLET, FILM COATED ORAL at 21:28

## 2024-10-30 RX ADMIN — ATORVASTATIN CALCIUM 40 MG: 40 TABLET, FILM COATED ORAL at 21:28

## 2024-10-30 RX ADMIN — Medication 400 MG: at 08:47

## 2024-10-30 RX ADMIN — SODIUM CHLORIDE, PRESERVATIVE FREE 10 ML: 5 INJECTION INTRAVENOUS at 21:28

## 2024-10-30 RX ADMIN — FUROSEMIDE 20 MG: 10 INJECTION, SOLUTION INTRAMUSCULAR; INTRAVENOUS at 08:47

## 2024-10-30 RX ADMIN — RANOLAZINE 500 MG: 500 TABLET, EXTENDED RELEASE ORAL at 21:28

## 2024-10-30 RX ADMIN — SACUBITRIL AND VALSARTAN 1 TABLET: 49; 51 TABLET, FILM COATED ORAL at 09:03

## 2024-10-30 RX ADMIN — ASPIRIN 81 MG: 81 TABLET, COATED ORAL at 08:46

## 2024-10-30 RX ADMIN — SODIUM CHLORIDE, PRESERVATIVE FREE 10 ML: 5 INJECTION INTRAVENOUS at 08:48

## 2024-10-30 NOTE — PROGRESS NOTES
Hospitalist Progress Note    NAME:   Prisca Kwong   : 1935   MRN: [id number]     Date/Time: 10/30/2024 8:23 AM  Patient PCP: No primary care provider on file.    Estimated discharge date:   Barriers: Improvement in dyspnea, wean off oxygen, cardiology clearance      Assessment / Plan:  Acute on chronic diastolic CHF POA-rule out systolic dysfunction  Acute hypoxic respiratory failure  Causing increased shortness of breath with PND and lower extremity swelling x 1 week  proBNP 2500  Troponin 66  Chest x-ray negative acute  Admit to remote telemetry bed  Status post IV Lasix 20 mg x 1 in ED, will continue IV Lasix 20 mg twice daily  Strict I's and O's  Daily weight  Low-salt diet with p.o. fluid restrictions to 1800 mL/day  Inpatient cardiology consult for further recommendations  Check 2D echo  10/30: Patient reports that her breathing is getting better.  Currently she is on 2 L of oxygen.  Will continue with IV diuresis.  Appreciate input from cardiology. Losartan has been changed to Entresto.     Hypertension  CAD status post CABG  CKD stage 3 with creatinine between 1.3-1.8  GERD  Hyperlipidemia  Creatinine 1.74  Continue Coreg, Amlodipine, Cozaar  Continue aspirin, Imdur, Ranexa, Lipitor  10/30: Creatinine of 1.7 which is around her baseline.    Anxiety disorder  Continue Xanax as needed        Medical Decision Making:   I personally reviewed labs: CBC, BMP  I personally reviewed imaging: Chest x-ray  I personally reviewed EKG:  Toxic drug monitoring: Creatinine while patient is on IV diuresis  Discussed case with: Patient, RN        Code Status: Full code  DVT Prophylaxis: SQ lovenox  GI Prophylaxis: Protonix  Baseline: Patient is independent with ADLs at home     Subjective:     Chief Complaint / Reason for Physician Visit  \" Follow-up for acute on chronic diastolic CHF, CAD with CABG, HTN, CKD 3, HLD, GERD, anxiety.\".  Discussed with RN events overnight.       Objective:     VITALS:   Last  labs and imaging studies.  Pertinent labs include:  Recent Labs     10/29/24  1505   WBC 7.8   HGB 11.1*   HCT 34.7*        Recent Labs     10/29/24  1505 10/30/24  0132    138   K 4.6 4.4    104   CO2 27 28   GLUCOSE 116* 119*   BUN 27* 25*   CREATININE 1.74* 1.73*   CALCIUM 9.4 9.4   MG  --  2.0   BILITOT 0.4  --    AST 21  --    ALT 19  --    INR  --  1.1       Signed: Yaneth Bowers MD

## 2024-10-30 NOTE — PROGRESS NOTES
°C)    Patient Vitals for the past 8 hrs:   Pulse   10/30/24 0159 55     Patient Vitals for the past 8 hrs:   Resp   10/30/24 0159 14     Patient Vitals for the past 8 hrs:   BP   10/30/24 0501 (!) 154/56       Intake/Output Summary (Last 24 hours) at 10/30/2024 0806  Last data filed at 10/30/2024 0508  Gross per 24 hour   Intake --   Output 1850 ml   Net -1850 ml     General Appearance: Well developed, well nourished, no acute distress.   Ears/Nose/Mouth/Throat:   Normal MM; anicteric.   JVP: WNL   Resp:   Lungs clear to auscultation bilaterally.  Nl resp effort.   Cardiovascular:  RRR, S1, S2 normal, no new murmur. No gallop or rub.   Abdomen:   Soft, non-tender, bowel sounds are present.   Extremities: Mild edema bilaterally.    Skin:  Neuro: Warm and dry.  A/O x3, grossly nonfocal       cath site intact w/o hematoma or new bruit; distal pulse unchanged  Yes   Data Review:     Telemetry independently reviewed x sinus  chronic afib  parox afib  NSVT     ECG independently reviewed  NSR  afib  no significant changes  NSST-Tw chgs     x no new ECG provided for review   Lab results reviewed as noted below.  Current medications reviewed as noted below.    No results for input(s): \"PH\", \"PCO2\", \"PO2\" in the last 72 hours.  No results for input(s): \"CPK\", \"CKMB\" in the last 72 hours.    Invalid input(s): \"CKNDX\", \"TROIQ\"  Recent Labs     10/29/24  1505 10/30/24  0132    138   K 4.6 4.4    104   CO2 27 28   BUN 27* 25*   WBC 7.8  --    HGB 11.1*  --    HCT 34.7*  --      --      Lab Results   Component Value Date/Time    CHOL 140 11/17/2021 03:11 AM    HDL 52 11/17/2021 03:11 AM    LDL 62 11/17/2021 03:11 AM     Recent Labs     10/29/24  1505   GLOB 3.8     Recent Labs     10/30/24  0132   INR 1.1      No components found for: \"GLPOC\"    Current Facility-Administered Medications   Medication Dose Route Frequency    acetaminophen (TYLENOL) tablet 650 mg  650 mg Oral Q4H PRN    ondansetron (ZOFRAN)

## 2024-10-30 NOTE — PROGRESS NOTES
End of Shift Note    Bedside shift change report given to Annabelle SOFIA (oncoming nurse) by ROSALEE MEDINA, BISMARK (offgoing nurse).  Report included the following information SBAR    Shift worked:  7am-7pm     Shift summary and any significant changes:     Sitting up in the chair most of the day continued diuresis and strict I&O . No c/o SOB o2 decreased to 1 LPM     Concerns for physician to address:  None      Zone phone for oncoming shift:          Activity:     Number times ambulated in hallways past shift: 0  Number of times OOB to chair past shift: 1    Cardiac:   Cardiac Monitoring: Yes           Access:  Current line(s): PIV     Genitourinary:   Urinary status: voiding    Respiratory:      Chronic home O2 use?: NO  Incentive spirometer at bedside: NO       GI:     Current diet:  ADULT DIET; Regular; Low Fat/Low Chol/High Fiber/2 gm Na; 1800 ml  Passing flatus: YES  Tolerating current diet: YES       Pain Management:   Patient states pain is manageable on current regimen: YES    Skin:     Interventions: increase time out of bed    Patient Safety:  Fall Score:    Interventions: bed/chair alarm and gripper socks       Length of Stay:  Expected LOS: 3  Actual LOS: 1      ROSALEE MEDINA, RN

## 2024-10-30 NOTE — PLAN OF CARE
Problem: Safety - Adult  Goal: Free from fall injury  10/30/2024 1158 by Debbie Minaya, RN  Outcome: Progressing  10/29/2024 2343 by Annabelle Mccollum RN  Outcome: Progressing     Problem: Skin/Tissue Integrity - Adult  Goal: Skin integrity remains intact  Outcome: Progressing

## 2024-10-31 LAB
ANION GAP SERPL CALC-SCNC: 8 MMOL/L (ref 2–12)
BUN SERPL-MCNC: 29 MG/DL (ref 6–20)
BUN/CREAT SERPL: 17 (ref 12–20)
CALCIUM SERPL-MCNC: 9.2 MG/DL (ref 8.5–10.1)
CHLORIDE SERPL-SCNC: 102 MMOL/L (ref 97–108)
CO2 SERPL-SCNC: 27 MMOL/L (ref 21–32)
CREAT SERPL-MCNC: 1.73 MG/DL (ref 0.55–1.02)
EKG ATRIAL RATE: 62 BPM
EKG DIAGNOSIS: NORMAL
EKG P AXIS: 81 DEGREES
EKG P-R INTERVAL: 166 MS
EKG Q-T INTERVAL: 438 MS
EKG QRS DURATION: 96 MS
EKG QTC CALCULATION (BAZETT): 444 MS
EKG R AXIS: 14 DEGREES
EKG T AXIS: 57 DEGREES
EKG VENTRICULAR RATE: 62 BPM
ERYTHROCYTE [DISTWIDTH] IN BLOOD BY AUTOMATED COUNT: 12.9 % (ref 11.5–14.5)
GLUCOSE BLD STRIP.AUTO-MCNC: 126 MG/DL (ref 65–117)
GLUCOSE BLD STRIP.AUTO-MCNC: 195 MG/DL (ref 65–117)
GLUCOSE SERPL-MCNC: 114 MG/DL (ref 65–100)
HCT VFR BLD AUTO: 35.4 % (ref 35–47)
HGB BLD-MCNC: 11.3 G/DL (ref 11.5–16)
MAGNESIUM SERPL-MCNC: 1.8 MG/DL (ref 1.6–2.4)
MCH RBC QN AUTO: 30.3 PG (ref 26–34)
MCHC RBC AUTO-ENTMCNC: 31.9 G/DL (ref 30–36.5)
MCV RBC AUTO: 94.9 FL (ref 80–99)
NRBC # BLD: 0 K/UL (ref 0–0.01)
NRBC BLD-RTO: 0 PER 100 WBC
PHOSPHATE SERPL-MCNC: 4.3 MG/DL (ref 2.6–4.7)
PLATELET # BLD AUTO: 393 K/UL (ref 150–400)
PMV BLD AUTO: 9.3 FL (ref 8.9–12.9)
POTASSIUM SERPL-SCNC: 4 MMOL/L (ref 3.5–5.1)
RBC # BLD AUTO: 3.73 M/UL (ref 3.8–5.2)
SERVICE CMNT-IMP: ABNORMAL
SERVICE CMNT-IMP: ABNORMAL
SODIUM SERPL-SCNC: 137 MMOL/L (ref 136–145)
WBC # BLD AUTO: 7.8 K/UL (ref 3.6–11)

## 2024-10-31 PROCEDURE — 1100000003 HC PRIVATE W/ TELEMETRY

## 2024-10-31 PROCEDURE — 97166 OT EVAL MOD COMPLEX 45 MIN: CPT

## 2024-10-31 PROCEDURE — 80048 BASIC METABOLIC PNL TOTAL CA: CPT

## 2024-10-31 PROCEDURE — 83735 ASSAY OF MAGNESIUM: CPT

## 2024-10-31 PROCEDURE — 2580000003 HC RX 258: Performed by: INTERNAL MEDICINE

## 2024-10-31 PROCEDURE — 6370000000 HC RX 637 (ALT 250 FOR IP): Performed by: INTERNAL MEDICINE

## 2024-10-31 PROCEDURE — 85027 COMPLETE CBC AUTOMATED: CPT

## 2024-10-31 PROCEDURE — 82962 GLUCOSE BLOOD TEST: CPT

## 2024-10-31 PROCEDURE — 6360000002 HC RX W HCPCS: Performed by: INTERNAL MEDICINE

## 2024-10-31 PROCEDURE — 97535 SELF CARE MNGMENT TRAINING: CPT

## 2024-10-31 PROCEDURE — 84100 ASSAY OF PHOSPHORUS: CPT

## 2024-10-31 PROCEDURE — 2700000000 HC OXYGEN THERAPY PER DAY

## 2024-10-31 PROCEDURE — 36415 COLL VENOUS BLD VENIPUNCTURE: CPT

## 2024-10-31 RX ADMIN — SACUBITRIL AND VALSARTAN 1 TABLET: 49; 51 TABLET, FILM COATED ORAL at 20:44

## 2024-10-31 RX ADMIN — Medication 400 MG: at 10:00

## 2024-10-31 RX ADMIN — AMLODIPINE BESYLATE 10 MG: 5 TABLET ORAL at 10:00

## 2024-10-31 RX ADMIN — ALPRAZOLAM 0.25 MG: 0.25 TABLET ORAL at 20:45

## 2024-10-31 RX ADMIN — ISOSORBIDE MONONITRATE 60 MG: 30 TABLET, EXTENDED RELEASE ORAL at 10:00

## 2024-10-31 RX ADMIN — FUROSEMIDE 20 MG: 10 INJECTION, SOLUTION INTRAMUSCULAR; INTRAVENOUS at 10:01

## 2024-10-31 RX ADMIN — CARVEDILOL 12.5 MG: 12.5 TABLET, FILM COATED ORAL at 10:00

## 2024-10-31 RX ADMIN — RANOLAZINE 500 MG: 500 TABLET, EXTENDED RELEASE ORAL at 10:00

## 2024-10-31 RX ADMIN — ASPIRIN 81 MG: 81 TABLET, COATED ORAL at 10:00

## 2024-10-31 RX ADMIN — RANOLAZINE 500 MG: 500 TABLET, EXTENDED RELEASE ORAL at 20:44

## 2024-10-31 RX ADMIN — TRAZODONE HYDROCHLORIDE 150 MG: 100 TABLET ORAL at 20:44

## 2024-10-31 RX ADMIN — PANTOPRAZOLE SODIUM 40 MG: 40 TABLET, DELAYED RELEASE ORAL at 06:12

## 2024-10-31 RX ADMIN — SODIUM CHLORIDE, PRESERVATIVE FREE 10 ML: 5 INJECTION INTRAVENOUS at 20:45

## 2024-10-31 RX ADMIN — FUROSEMIDE 20 MG: 10 INJECTION, SOLUTION INTRAMUSCULAR; INTRAVENOUS at 17:18

## 2024-10-31 RX ADMIN — ATORVASTATIN CALCIUM 40 MG: 40 TABLET, FILM COATED ORAL at 20:45

## 2024-10-31 RX ADMIN — SODIUM CHLORIDE, PRESERVATIVE FREE 10 ML: 5 INJECTION INTRAVENOUS at 10:02

## 2024-10-31 RX ADMIN — SACUBITRIL AND VALSARTAN 1 TABLET: 49; 51 TABLET, FILM COATED ORAL at 10:02

## 2024-10-31 RX ADMIN — ENOXAPARIN SODIUM 30 MG: 100 INJECTION SUBCUTANEOUS at 10:01

## 2024-10-31 NOTE — PROGRESS NOTES
Cardiology Progress Note  10/31/2024     Admit Date: 10/29/2024  Admit Diagnosis: Hypoxemia [R09.02]  Dyspnea on exertion [R06.09]  Acute on chronic diastolic congestive heart failure (HCC) [I50.33]  Acute on chronic congestive heart failure, unspecified heart failure type (HCC) [I50.9]  Acute exacerbation of chronic heart failure (HCC) [I50.9]  CC: none currently  Cardiac Assessment/Plan:     1) CAD: LOVE 12/2019:  3 months w/o change; unremarkable echo at [facility]. (except PAp 45). Neg MPI 1/2020.      *Uncontrolled HTN/indet trop/Abnl MPI 11/2021: severe distal LM, mid circ, distal RCA.      *CABG 11/2021 (MARLIN-LAD, SVG-D1; SVG-OM1; SVG-PDA).      *Unremarkable echo 2/2022 (Nl EF).       *Abnl PET 7/2022: Patent MARLIN-LAD & SVG-PDA; Apparent occlusion of SVG-OM and SVG-D 8/2022; consider Rx  prox circ if severe Sx: added Imdur then ranexa (intolerant of 1 g bid).       *Atypical CP (neg trops; sl lat ST dep) 9/2023: Sx resolved     2) Palpitations 12/2019:  (Q.day at night early a.m.) unremarkable 48hr Holter inc w/ Sx.    3) HTN  4) Dyslipidemia (labs per PCP)     5) DVT post-op 11/2021: on AC 1/2022; d/cd later in 2022.  6) DM  7) Very large hiatal hernia; GERD; OA; chronic lumbar back pain  8) CKD IIIb-V:  Cr 1.64 10/2019. Cr 1.7/gfr 29 12/2019. Cr 1.93/gfr 25 7/2022.           *Cr 1.58/gfr 31 9/2023. Cr 1.46/gfr 35 11/2023; Cr 1.53/gfr 33 2/2024     10/30: Much less dyspnea; no CP; no PND; less LE edema; no palpitations.  -160; HR 50-60s; sinus; 94% 1-2L; good UOP  K 4.4; Cr 1.73; HG 11.1  Current cardiac meds: norvasc 10; asa; lipitor 40; coreg 12.5 bid; lasix 20 IV bid; Imdur 60; Losartan 50; Ranexa 500 bid.  Rec: Cont diuresis; change losartan to entresto; Watch renal Fxn.  F/U echo.     10/31: Much less dyspnea; no CP; no PND; less LE edema; no palpitations.  -150, 130 currently; HR 60s; sinus; 95% 1L; good UOP    K 4; Cr 1.73; Hg 11.3    Current cardiac meds: norvasc 10; asa;  --   --  4.3   WBC 7.8  --  7.8   HGB 11.1*  --  11.3*   HCT 34.7*  --  35.4     --  393     Lab Results   Component Value Date/Time    CHOL 140 11/17/2021 03:11 AM    HDL 52 11/17/2021 03:11 AM    LDL 62 11/17/2021 03:11 AM     Recent Labs     10/29/24  1505   GLOB 3.8     Recent Labs     10/30/24  0132   INR 1.1      No components found for: \"GLPOC\"    Current Facility-Administered Medications   Medication Dose Route Frequency    sacubitril-valsartan (ENTRESTO) 49-51 MG per tablet 1 tablet  1 tablet Oral BID    acetaminophen (TYLENOL) tablet 650 mg  650 mg Oral Q4H PRN    ondansetron (ZOFRAN) injection 4 mg  4 mg IntraVENous Q4H PRN    ALPRAZolam (XANAX) tablet 0.25 mg  0.25 mg Oral TID PRN    amLODIPine (NORVASC) tablet 10 mg  10 mg Oral Daily    aspirin EC tablet 81 mg  81 mg Oral Daily    atorvastatin (LIPITOR) tablet 40 mg  40 mg Oral Nightly    carvedilol (COREG) tablet 12.5 mg  12.5 mg Oral BID WC    furosemide (LASIX) injection 20 mg  20 mg IntraVENous BID    isosorbide mononitrate (IMDUR) extended release tablet 60 mg  60 mg Oral Daily    magnesium oxide (MAG-OX) tablet 400 mg  400 mg Oral Daily    pantoprazole (PROTONIX) tablet 40 mg  40 mg Oral QAM AC    ranolazine (RANEXA) extended release tablet 500 mg  500 mg Oral BID    traZODone (DESYREL) tablet 150 mg  150 mg Oral Nightly PRN    sodium chloride flush 0.9 % injection 5-40 mL  5-40 mL IntraVENous 2 times per day    sodium chloride flush 0.9 % injection 5-40 mL  5-40 mL IntraVENous PRN    0.9 % sodium chloride infusion   IntraVENous PRN    enoxaparin Sodium (LOVENOX) injection 30 mg  30 mg SubCUTAneous Daily    ondansetron (ZOFRAN-ODT) disintegrating tablet 4 mg  4 mg Oral Q8H PRN    Or    ondansetron (ZOFRAN) injection 4 mg  4 mg IntraVENous Q6H PRN    polyethylene glycol (GLYCOLAX) packet 17 g  17 g Oral Daily PRN    acetaminophen (TYLENOL) tablet 650 mg  650 mg Oral Q6H PRN    Or    acetaminophen (TYLENOL) suppository 650 mg  650 mg Rectal

## 2024-10-31 NOTE — CARE COORDINATION
Care Management Initial Assessment       RUR:12%  Readmission? No  1st IM letter given? Yes -     1st  letter given: No     10/31/24 4178   Service Assessment   Patient Orientation Alert and Oriented   Cognition Alert   History Provided By Patient   Primary Caregiver Self   Accompanied By/Relationship none   Support Systems Children   Patient's Healthcare Decision Maker is: Legal Next of Kin   PCP Verified by CM Yes   Last Visit to PCP Within last 3 months   Prior Functional Level Independent in ADLs/IADLs   Current Functional Level Independent in ADLs/IADLs   Can patient return to prior living arrangement Yes   Ability to make needs known: Good   Family able to assist with home care needs: Yes   Would you like for me to discuss the discharge plan with any other family members/significant others, and if so, who? Yes  (Children)   Financial Resources Medicare   Social/Functional History   Lives With Alone   Type of Home House   Bathroom Toilet Standard   Bathroom Accessibility Accessible   ADL Assistance Independent   Homemaking Assistance Independent   Ambulation Assistance Independent   Transfer Assistance Independent   Active  Yes   Mode of Transportation Car   Occupation Retired   Discharge Planning   Type of Residence House   Living Arrangements Alone   DME Ordered? Cane;Walker   Potential Assistance Purchasing Medications No   Type of Home Care Services None   Patient expects to be discharged to: House   Services At/After Discharge    Resource Information Provided? No     CM met with patient, introduced self , explained role and offered assistance  She lives alone but has support of 3 children  Her PCP is Tristen Nieto.  No home 02 but wearing it here.attempt to wean before DC  No needs noted but CM will continue to follow  Grandson or son to transport at time of DC  English Rima SOFIA CM   2539

## 2024-10-31 NOTE — PROGRESS NOTES
Order received and discussed with OT. Pt is mobilizing at her baseline and ambulated in the cruz with OT, no loss of balance or concerns. Pt is not interested in HH or OP therapy. Should continue to mobilize with nursing. Not appropriate for PT eval.

## 2024-10-31 NOTE — PLAN OF CARE
Problem: Discharge Planning  Goal: Discharge to home or other facility with appropriate resources  Outcome: Progressing     Problem: ABCDS Injury Assessment  Goal: Absence of physical injury  Outcome: Progressing     Problem: Safety - Adult  Goal: Free from fall injury  Outcome: Progressing     Problem: Neurosensory - Adult  Goal: Achieves stable or improved neurological status  Outcome: Progressing  Goal: Absence of seizures  Outcome: Progressing  Goal: Remains free of injury related to seizures activity  Outcome: Progressing  Goal: Achieves maximal functionality and self care  Outcome: Progressing     Problem: Respiratory - Adult  Goal: Achieves optimal ventilation and oxygenation  Outcome: Progressing     Problem: Cardiovascular - Adult  Goal: Maintains optimal cardiac output and hemodynamic stability  Outcome: Progressing  Goal: Absence of cardiac dysrhythmias or at baseline  Outcome: Progressing     Problem: Skin/Tissue Integrity - Adult  Goal: Skin integrity remains intact  Outcome: Progressing  Goal: Incisions, wounds, or drain sites healing without S/S of infection  Outcome: Progressing  Goal: Oral mucous membranes remain intact  Outcome: Progressing     Problem: Musculoskeletal - Adult  Goal: Return mobility to safest level of function  Outcome: Progressing  Goal: Maintain proper alignment of affected body part  Outcome: Progressing  Goal: Return ADL status to a safe level of function  Outcome: Progressing     Problem: Gastrointestinal - Adult  Goal: Minimal or absence of nausea and vomiting  Outcome: Progressing  Goal: Maintains or returns to baseline bowel function  Outcome: Progressing  Goal: Maintains adequate nutritional intake  Outcome: Progressing  Goal: Establish and maintain optimal ostomy function  Outcome: Progressing     Problem: Genitourinary - Adult  Goal: Absence of urinary retention  Outcome: Progressing  Goal: Urinary catheter remains patent  Outcome: Progressing     Problem: Infection -

## 2024-10-31 NOTE — PROGRESS NOTES
Hospitalist Progress Note    NAME:   Prisca Kwong   : 1935   MRN: [id number]     Date/Time: 10/31/2024 6:48 PM  Patient PCP: No primary care provider on file.    Estimated discharge date:   Barriers: Improvement in dyspnea, wean off oxygen, Cardiology clearance      Assessment / Plan:  Acute on chronic diastolic CHF POA-rule out systolic dysfunction  Acute hypoxic respiratory failure  Causing increased shortness of breath with PND and lower extremity swelling x 1 week  proBNP 2500  Troponin 66  Chest x-ray negative acute  Admit to remote telemetry bed  Status post IV Lasix 20 mg x 1 in ED  continue IV Lasix 20 mg twice daily  Strict I's and O's  Daily weight  Low-salt diet with p.o. fluid restrictions to 1800 mL/day  Inpatient cardiology consult for further recommendations  Check 2D echo  - wean O2  - continue diuresis  Losartan has been changed to Entresto.   - PT OT eval requested   - walk test tomorrow    Hypertension  CAD status post CABG  CKD stage 3 with creatinine between 1.3-1.8  GERD  Hyperlipidemia  Creatinine 1.74  Continue Coreg, Amlodipine, Cozaar  Continue aspirin, Imdur, Ranexa, Lipitor  10/30: Creatinine of 1.7 which is around her baseline.    Anxiety disorder  Continue Xanax as needed        Medical Decision Making:   I personally reviewed labs: CBC, BMP  I personally reviewed imaging:   I personally reviewed EKG:  Toxic drug monitoring: BMP for hypokalemia from lasix toxicity   Discussed case with: , RN CM        Code Status: Full code  DVT Prophylaxis: SQ lovenox  GI Prophylaxis: Protonix  Baseline: Patient is independent with ADLs at home       Subjective:     Chief Complaint / Reason for Physician Visit  \" Follow-up for acute on chronic diastolic CHF, CAD with CABG, HTN, CKD 3, HLD, GERD, anxiety.\".  Discussed with RN events overnight. Feels ok today, no complaints.      Objective:     VITALS:   Last 24hrs VS reviewed since prior progress note. Most recent are:  Patient Vitals

## 2024-10-31 NOTE — PROGRESS NOTES
OCCUPATIONAL THERAPY EVALUATION/DISCHARGE  Patient: Prisca Kwong (88 y.o. female)  Date: 10/31/2024  Primary Diagnosis: Hypoxemia [R09.02]  Dyspnea on exertion [R06.09]  Acute on chronic diastolic congestive heart failure (HCC) [I50.33]  Acute on chronic congestive heart failure, unspecified heart failure type (HCC) [I50.9]  Acute exacerbation of chronic heart failure (HCC) [I50.9]         Precautions:                    ASSESSMENT :  Based on the objective data below, the patient is functioning close to her baseline for ADLs and functional mobility. Patient received in bathroom on RA and agreeable for session. She completed pericare in standing without assist and demonstrated intact balance with task. She was able to don underwear independently and completed grooming while standing at sink demonstrating good safety awareness. SpO2 90-92% post activity and recovered to 94% with rest break and pursed lipped breathing. Patient agreeable to walk in hallway and completed while using SPC. No balance deficits or concerns noted. She reported increased dizziness while walking in hallway and further mobility deferred. BP soft but stable with all transitional movements. RN notified and following. Patient reported no concerns with returning home or completing ADLs/transfer. Education provided on energy conservation and pacing strategies. Patient was left sitting in recliner chair with all needs met, VSS, and chair alarmed.     Functional Outcome Measure:  The patient scored 24/24 on the [facility] outcome measure which is indicative of patient functioning close to her baseline for ADLs.      Further skilled acute occupational therapy is not indicated at this time.     PLAN :  Recommend with staff: OOB for all meals, x1 assist to bathroom using SPC    Recommendation for discharge: (in order for the patient to meet his/her long term goals):   No skilled occupational therapy    Other factors to consider for discharge: lives alone and  xaitment. All rights reserved     Score: 24     Interpretation of Tool:  Represents clinically-significant functional categories (i.e. Activities of daily living).    Cutoff score 39.4 (19) correlates to a good likelihood of discharging home versus a facility  Emerita Rick, Lois Stapleton, Jeff Bartlett, Nusrat Rodriges, Ameya Roman, Don Rick, -PAC “6-Clicks” Functional Assessment Scores Predict Acute Care Hospital Discharge Destination, Physical Therapy, Volume 94, Issue 9, 2014, Pages 3153-6865, [email or web address]    Pain Ratin/10   Pain Intervention(s):   pain is at a level acceptable to the patient    Activity Tolerance:   Good, requires rest breaks, observed shortness of breath on exertion, and SpO2 stable on room air    After treatment:   Patient left in no apparent distress sitting up in chair, Call bell within reach, Bed/ chair alarm activated, and Updated patient's board on functional status and mobility recommendations    COMMUNICATION/EDUCATION:   The patient's plan of care was discussed with: physical therapist and registered nurse    Patient Education  Education Given To: Patient  Education Provided: Role of Therapy;Plan of Care  Education Method: Verbal;Demonstration  Barriers to Learning: None  Education Outcome: Verbalized understanding;Demonstrated understanding    Thank you for this referral.  Miguelina Whitehead OT  Minutes: 23    Occupational Therapy Evaluation Charge Determination   History Examination Decision-Making   MEDIUM Complexity : Expanded review of history including physical, cognitive and psychocial history  MEDIUM Complexity: 3-5 Performance deficits relating to physical, cognitive, or psychosocial skills that result in activity limitations and/or participation restrictions MEDIUM Complexity: Patient may present with comorbidities that affect occupational performance. Minimal to moderate modifications of tasks or assist (eg.

## 2024-10-31 NOTE — CARE COORDINATION
Advance Care Planning     General Advance Care Planning (ACP) Conversation    Date of Conversation: 10/31/2024  Conducted with: Patient with Decision Making Capacity  Other persons present: None    Healthcare Decision Maker:   Primary Decision Maker: Caesar Kwong - Child - [phone]    Primary Decision Maker: FloydNohemy - Child - [phone]    Primary Decision Maker: Trae Kwong - Child - [phone]       Content/Action Overview:  Has NO ACP documents-Information provided  Reviewed DNR/DNI and patient elects Full Code (Attempt Resuscitation)      She is a full code. The chart says there are advance care directives but is is only a form r/t surgery and code status          Length of Voluntary ACP Conversation in minutes:  <16 minutes (Non-Billable)    [place] [facility]

## 2024-10-31 NOTE — PROGRESS NOTES
Predictive Model Details          31  Factor Value    Calculated 10/31/2024 05:30 44% Age 88 years old    Deterioration Index Model 36% Supplemental oxygen Oxygen     6% Pulse oximetry 92 %     4% Systolic 136     3% BUN abnormal (29 MG/DL)     3% Sodium 137 mmol/L     2% Respiratory rate 17     1% Pulse 63     1% WBC count 7.8 K/uL     0% Temperature 97.7 °F (36.5 °C)     0% Hematocrit 35.4 %     0% Potassium 4.0 mmol/L       End of Shift Note    Bedside shift change report given to BISMARK Daly (oncoming nurse) by Gladis Queen RN (offgoing nurse).  Report included the following information SBAR, ED Summary, Intake/Output, MAR, Recent Results, Med Rec Status, Cardiac Rhythm NSR, Alarm Parameters , and Quality Measures    Shift worked:  Night     Shift summary and any significant changes:    Nothing remarkable to reprt  Attempted to lower pt off 1L NC, Sp02 dropped to lower 80's, placed back on 1L NC- stats returned to 90's     Concerns for physician to address:  None           Activity:     Number times ambulated in hallways past shift: 0  Number of times OOB to chair past shift: 0    Cardiac:   Cardiac Monitoring: Yes           Access:  Current line(s): PIV     Genitourinary:   Urinary status: voiding    Respiratory:      Chronic home O2 use?: NO  Incentive spirometer at bedside: YES       GI:     Current diet:  ADULT DIET; Regular; Low Fat/Low Chol/High Fiber/2 gm Na; 1800 ml  Passing flatus: YES  Tolerating current diet: YES       Pain Management:   Patient states pain is manageable on current regimen: YES    Skin:     Interventions: increase time out of bed    Patient Safety:  Fall Score:    Interventions: bed/chair alarm, assistive device (walker, cane. etc), gripper socks, pt to call before getting OOB, and stay with me (per policy)       Length of Stay:  Expected LOS: 3  Actual LOS: 2      Gladis Queen RN

## 2024-10-31 NOTE — PLAN OF CARE
Problem: Discharge Planning  Goal: Discharge to home or other facility with appropriate resources  Outcome: Progressing  Flowsheets (Taken 10/30/2024 0750 by Antonia Beverly)  Discharge to home or other facility with appropriate resources: Identify discharge learning needs (meds, wound care, etc)     Problem: ABCDS Injury Assessment  Goal: Absence of physical injury  Outcome: Progressing     Problem: Safety - Adult  Goal: Free from fall injury  10/30/2024 2037 by Gladis Queen RN  Outcome: Progressing  10/30/2024 1158 by Debbie Minaya RN  Outcome: Progressing     Problem: Neurosensory - Adult  Goal: Achieves stable or improved neurological status  Outcome: Progressing  Goal: Absence of seizures  Outcome: Progressing  Goal: Remains free of injury related to seizures activity  Outcome: Progressing  Goal: Achieves maximal functionality and self care  Outcome: Progressing     Problem: Respiratory - Adult  Goal: Achieves optimal ventilation and oxygenation  Outcome: Progressing     Problem: Cardiovascular - Adult  Goal: Maintains optimal cardiac output and hemodynamic stability  Outcome: Progressing  Goal: Absence of cardiac dysrhythmias or at baseline  Outcome: Progressing     Problem: Skin/Tissue Integrity - Adult  Goal: Skin integrity remains intact  10/30/2024 2037 by Gladis Queen RN  Outcome: Progressing  10/30/2024 1158 by Debbie Minaya RN  Outcome: Progressing  Flowsheets (Taken 10/30/2024 0750)  Skin Integrity Remains Intact: Monitor for areas of redness and/or skin breakdown  Goal: Incisions, wounds, or drain sites healing without S/S of infection  Outcome: Progressing  Goal: Oral mucous membranes remain intact  Outcome: Progressing     Problem: Musculoskeletal - Adult  Goal: Return mobility to safest level of function  Outcome: Progressing  Goal: Maintain proper alignment of affected body part  Outcome: Progressing  Goal: Return ADL status to a safe level of function  Outcome:

## 2024-11-01 VITALS
HEIGHT: 66 IN | HEART RATE: 58 BPM | DIASTOLIC BLOOD PRESSURE: 47 MMHG | BODY MASS INDEX: 22.32 KG/M2 | TEMPERATURE: 98.1 F | OXYGEN SATURATION: 93 % | SYSTOLIC BLOOD PRESSURE: 97 MMHG | WEIGHT: 138.89 LBS | RESPIRATION RATE: 18 BRPM

## 2024-11-01 LAB
ANION GAP SERPL CALC-SCNC: 5 MMOL/L (ref 2–12)
BUN SERPL-MCNC: 37 MG/DL (ref 6–20)
BUN/CREAT SERPL: 19 (ref 12–20)
CALCIUM SERPL-MCNC: 8.7 MG/DL (ref 8.5–10.1)
CHLORIDE SERPL-SCNC: 101 MMOL/L (ref 97–108)
CO2 SERPL-SCNC: 29 MMOL/L (ref 21–32)
CREAT SERPL-MCNC: 1.96 MG/DL (ref 0.55–1.02)
GLUCOSE BLD STRIP.AUTO-MCNC: 180 MG/DL (ref 65–117)
GLUCOSE SERPL-MCNC: 122 MG/DL (ref 65–100)
POTASSIUM SERPL-SCNC: 4 MMOL/L (ref 3.5–5.1)
SERVICE CMNT-IMP: ABNORMAL
SODIUM SERPL-SCNC: 135 MMOL/L (ref 136–145)

## 2024-11-01 PROCEDURE — 80048 BASIC METABOLIC PNL TOTAL CA: CPT

## 2024-11-01 PROCEDURE — 36415 COLL VENOUS BLD VENIPUNCTURE: CPT

## 2024-11-01 PROCEDURE — 6370000000 HC RX 637 (ALT 250 FOR IP): Performed by: INTERNAL MEDICINE

## 2024-11-01 PROCEDURE — 82962 GLUCOSE BLOOD TEST: CPT

## 2024-11-01 PROCEDURE — 6360000002 HC RX W HCPCS: Performed by: INTERNAL MEDICINE

## 2024-11-01 PROCEDURE — 2580000003 HC RX 258: Performed by: INTERNAL MEDICINE

## 2024-11-01 RX ORDER — FUROSEMIDE 40 MG/1
40 TABLET ORAL DAILY
Status: DISCONTINUED | OUTPATIENT
Start: 2024-11-01 | End: 2024-11-01 | Stop reason: HOSPADM

## 2024-11-01 RX ORDER — FUROSEMIDE 40 MG/1
40 TABLET ORAL DAILY
Qty: 90 TABLET | Refills: 4 | Status: SHIPPED | OUTPATIENT
Start: 2024-11-01

## 2024-11-01 RX ADMIN — SODIUM CHLORIDE, PRESERVATIVE FREE 10 ML: 5 INJECTION INTRAVENOUS at 08:13

## 2024-11-01 RX ADMIN — AMLODIPINE BESYLATE 10 MG: 5 TABLET ORAL at 08:13

## 2024-11-01 RX ADMIN — CARVEDILOL 12.5 MG: 12.5 TABLET, FILM COATED ORAL at 08:13

## 2024-11-01 RX ADMIN — FUROSEMIDE 40 MG: 40 TABLET ORAL at 08:19

## 2024-11-01 RX ADMIN — ASPIRIN 81 MG: 81 TABLET, COATED ORAL at 08:13

## 2024-11-01 RX ADMIN — ISOSORBIDE MONONITRATE 60 MG: 30 TABLET, EXTENDED RELEASE ORAL at 08:13

## 2024-11-01 RX ADMIN — ENOXAPARIN SODIUM 30 MG: 100 INJECTION SUBCUTANEOUS at 08:13

## 2024-11-01 RX ADMIN — RANOLAZINE 500 MG: 500 TABLET, EXTENDED RELEASE ORAL at 08:12

## 2024-11-01 RX ADMIN — PANTOPRAZOLE SODIUM 40 MG: 40 TABLET, DELAYED RELEASE ORAL at 05:33

## 2024-11-01 RX ADMIN — Medication 400 MG: at 08:12

## 2024-11-01 RX ADMIN — SACUBITRIL AND VALSARTAN 1 TABLET: 49; 51 TABLET, FILM COATED ORAL at 08:13

## 2024-11-01 NOTE — PLAN OF CARE
Predictive Model Details          23 (Normal)  Factor Value    Calculated 11/1/2024 07:51 62% Age 88 years old    Deterioration Index Model 18% Respiratory rate 19     7% Sodium abnormal (135 mmol/L)     4% BUN abnormal (37 MG/DL)     4% Systolic 126     3% Pulse 59     1% WBC count 7.8 K/uL     0% Temperature 97.8 °F (36.6 °C)     0% Hematocrit 35.4 %     0% Potassium 4.0 mmol/L     0% Pulse oximetry 95 %        Problem: Discharge Planning  Goal: Discharge to home or other facility with appropriate resources  11/1/2024 0751 by Tracy Wharton RN  Outcome: Progressing  10/31/2024 2231 by Mari Ochoa RN  Outcome: Progressing     Problem: ABCDS Injury Assessment  Goal: Absence of physical injury  11/1/2024 0751 by Tracy Wharton RN  Outcome: Progressing  10/31/2024 2231 by Mari Ochoa RN  Outcome: Progressing     Problem: Safety - Adult  Goal: Free from fall injury  11/1/2024 0751 by Tracy Wharton RN  Outcome: Progressing  10/31/2024 2231 by Mari Ochoa RN  Outcome: Progressing     Problem: Neurosensory - Adult  Goal: Achieves stable or improved neurological status  11/1/2024 0751 by Tracy Wharton RN  Outcome: Progressing  10/31/2024 2231 by Mari Ochoa RN  Outcome: Progressing  Goal: Absence of seizures  11/1/2024 0751 by Tracy Wharton RN  Outcome: Progressing  10/31/2024 2231 by Mari Ochoa RN  Outcome: Progressing  Goal: Remains free of injury related to seizures activity  11/1/2024 0751 by Tracy Wharton RN  Outcome: Progressing  10/31/2024 2231 by Mari Ochoa RN  Outcome: Progressing  Goal: Achieves maximal functionality and self care  11/1/2024 0751 by Tracy Wharton RN  Outcome: Progressing  10/31/2024 2231 by Mari Ochoa RN  Outcome: Progressing     Problem: Respiratory - Adult  Goal: Achieves optimal ventilation and oxygenation  11/1/2024 0751 by Tracy Wharton RN  Outcome: Progressing  10/31/2024 2231 by Mari Ochoa RN  Outcome: Progressing     Problem:  Progressing  10/31/2024 2231 by Mari Ochoa RN  Outcome: Progressing  Goal: Maintains adequate nutritional intake  11/1/2024 0751 by Tracy Wharton RN  Outcome: Progressing  10/31/2024 2231 by Mari Ochoa RN  Outcome: Progressing  Goal: Establish and maintain optimal ostomy function  11/1/2024 0751 by Tracy Wharton RN  Outcome: Progressing  10/31/2024 2231 by Mari Ochoa RN  Outcome: Progressing     Problem: Genitourinary - Adult  Goal: Absence of urinary retention  11/1/2024 0751 by Tracy Wharton RN  Outcome: Progressing  10/31/2024 2231 by Mari Ochoa RN  Outcome: Progressing  Goal: Urinary catheter remains patent  11/1/2024 0751 by Tracy Wharton RN  Outcome: Progressing  10/31/2024 2231 by Mari Ochoa RN  Outcome: Progressing     Problem: Infection - Adult  Goal: Absence of infection at discharge  11/1/2024 0751 by Tracy Wharton RN  Outcome: Progressing  10/31/2024 2231 by Mari Ochoa RN  Outcome: Progressing  Goal: Absence of infection during hospitalization  11/1/2024 0751 by Tracy Wharton RN  Outcome: Progressing  10/31/2024 2231 by Mari Ochoa RN  Outcome: Progressing  Goal: Absence of fever/infection during anticipated neutropenic period  11/1/2024 0751 by Tracy Wharton RN  Outcome: Progressing  10/31/2024 2231 by Mari Ochoa RN  Outcome: Progressing     Problem: Metabolic/Fluid and Electrolytes - Adult  Goal: Electrolytes maintained within normal limits  11/1/2024 0751 by Tracy Wharton RN  Outcome: Progressing  10/31/2024 2231 by Mari Ochoa RN  Outcome: Progressing  Goal: Hemodynamic stability and optimal renal function maintained  11/1/2024 0751 by Tracy Wharton RN  Outcome: Progressing  10/31/2024 2231 by Mari Ochoa RN  Outcome: Progressing  Goal: Glucose maintained within prescribed range  11/1/2024 0751 by Tracy Wharton RN  Outcome: Progressing  10/31/2024 2231 by Mari Ochoa RN  Outcome: Progressing     Problem: Hematologic -

## 2024-11-01 NOTE — CARE COORDINATION
11/01/24 1034   Discharge Planning   Type of Residence House   Potential Assistance Needed N/A   DME Ordered? No   Potential Assistance Purchasing Medications No   Type of Home Care Services None   Services At/After Discharge   Transition of Care Consult (CM Consult) N/A   Services At/After Discharge None    Resource Information Provided? No   Mode of Transport at Discharge Other (see comment)   Confirm Follow Up Transport Family   Condition of Participation: Discharge Planning   The Plan for Transition of Care is related to the following treatment goals: PCP and Specialist   The Patient and/or Patient Representative was provided with a Choice of Provider? Patient   The Patient and/Or Patient Representative agree with the Discharge Plan? Yes   Freedom of Choice list was provided with basic dialogue that supports the patient's individualized plan of care/goals, treatment preferences, and shares the quality data associated with the providers?  Yes     Patient to be discharged today. She will follow up on outpatient ECHO to be scheduled. Family will transport home. No further needs from MICHELLE Abarca RN CM [phone]

## 2024-11-01 NOTE — PROGRESS NOTES
Attempted to schedule PCP hospital follow up appointment. Unable to reach anyone, unable to leave voicemail. [facility] placed [facility] information AVS for patient resource.  Pending patient discharge. Liliam Castillo, Care Management Assistant

## 2024-11-01 NOTE — DISCHARGE INSTRUCTIONS
You were treated for heart failure and seen by Cardiology. Please take the medications as listed and followup with the doctors as listed. Contact the Cardiology office for an ultrasound of your heart called an echocardiogram.

## 2024-11-01 NOTE — PROGRESS NOTES
Bedside and Verbal shift change report given to Tracy RN  (oncoming nurse) by Mari RN  (offgoing nurse). Report included the following information Nurse Handoff Report.

## 2024-11-01 NOTE — PROGRESS NOTES
Cardiology Progress Note  11/1/2024     Admit Date: 10/29/2024  Admit Diagnosis: Hypoxemia [R09.02]  Dyspnea on exertion [R06.09]  Acute on chronic diastolic congestive heart failure (HCC) [I50.33]  Acute on chronic congestive heart failure, unspecified heart failure type (HCC) [I50.9]  Acute exacerbation of chronic heart failure (HCC) [I50.9]  CC: none currently  Cardiac Assessment/Plan:     1) CAD: LOVE 12/2019:  3 months w/o change; unremarkable echo at [facility]. (except PAp 45). Neg MPI 1/2020.      *Uncontrolled HTN/indet trop/Abnl MPI 11/2021: severe distal LM, mid circ, distal RCA.      *CABG 11/2021 (MARLIN-LAD, SVG-D1; SVG-OM1; SVG-PDA).      *Unremarkable echo 2/2022 (Nl EF).       *Abnl PET 7/2022: Patent MARLIN-LAD & SVG-PDA; Apparent occlusion of SVG-OM and SVG-D 8/2022; consider Rx  prox circ if severe Sx: added Imdur then ranexa (intolerant of 1 g bid).       *Atypical CP (neg trops; sl lat ST dep) 9/2023: Sx resolved     2) Palpitations 12/2019:  (Q.day at night early a.m.) unremarkable 48hr Holter inc w/ Sx.    3) HTN  4) Dyslipidemia (labs per PCP)     5) DVT post-op 11/2021: on AC 1/2022; d/cd later in 2022.  6) DM  7) Very large hiatal hernia; GERD; OA; chronic lumbar back pain  8) CKD IIIb-V:  Cr 1.64 10/2019. Cr 1.7/gfr 29 12/2019. Cr 1.93/gfr 25 7/2022.           *Cr 1.58/gfr 31 9/2023. Cr 1.46/gfr 35 11/2023; Cr 1.53/gfr 33 2/2024     10/30: Much less dyspnea; no CP; no PND; less LE edema; no palpitations.  -160; HR 50-60s; sinus; 94% 1-2L; good UOP  K 4.4; Cr 1.73; HG 11.1  Current cardiac meds: norvasc 10; asa; lipitor 40; coreg 12.5 bid; lasix 20 IV bid; Imdur 60; Losartan 50; Ranexa 500 bid.  Rec: Cont diuresis; change losartan to entresto; Watch renal Fxn.  F/U echo.     10/31: Much less dyspnea; no CP; no PND; less LE edema; no palpitations.  -150, 130 currently; HR 60s; sinus; 95% 1L; good UOP  K 4; Cr 1.73; Hg 11.3  Current cardiac meds: norvasc 10; asa;

## 2024-11-02 NOTE — DISCHARGE SUMMARY
Discharge Summary    Name: Prisca Kwong  [id number]  YOB: 1935 (Age: 88 y.o.)   Date of Admission: 10/29/2024  Date of Discharge: 11/1/24  Attending Physician: Dr. Mendel    Discharge Diagnosis:     Acute on chronic diastolic CHF POA-  Acute hypoxic respiratory failure  Hypertension  CAD status post CABG  CKD stage 3   GERD  Hyperlipidemia  Anxiety disorder     Consultations:  IP CONSULT TO CARDIOLOGY      Brief Admission History/Reason for Admission Per Walter Dubon MD:     Prisca Kwong is a 88 y.o.  female with PMHx significant for CAD status post CABG remote, hypertension, hyperlipidemia, anxiety disorder comes in with chief complaint of worsening shortness of breath with lower extremity edema for 1 week presented initially to cardiology office Dr. Moran who sent the patient to the ED for admission for IV diuresis.  Patient denies any chest pains palpitation or diaphoresis.        We were asked to admit for work up and evaluation of the above problems.     Brief Hospital Course by Main Problems:     Acute on chronic diastolic CHF POA-rule out systolic dysfunction  Acute hypoxic respiratory failure  Causing increased shortness of breath with PND and lower extremity swelling x 1 week  proBNP 2500  Troponin 66  Chest x-ray negative acute  Admit to remote telemetry bed  Status post IV Lasix 20 mg x 1 in ED  continue IV Lasix 20 mg twice daily  Strict I's and O's  Daily weight  Low-salt diet with p.o. fluid restrictions to 1800 mL/day  Inpatient cardiology consult for further recommendations  - wean O2  - continue diuresis  Losartan has been changed to Entresto.   - PT OT eval requested, no needs  - passed walk test prior to discharge  - ok for discharge from cardiac standpoint per Cardiology, followup outpatient for echo  - see med list below for changes     Hypertension  CAD status post CABG  CKD stage 3 with creatinine between

## 2024-11-06 NOTE — PROGRESS NOTES
Physician Progress Note      PATIENT:               HUMA MALONEY  [facility] #:                  [id number]  :                       1935  ADMIT DATE:       10/29/2024 3:25 PM  DISCH DATE:        2024 2:12 PM  RESPONDING  PROVIDER #:        David L Mendel MD          QUERY TEXT:    Patient admitted with Acute on chronic diastolic CHF. Noted documentation of   acute respiratory failure in discharge summary . In order to support the   diagnosis of acute respiratory failure, please include additional clinical   indicators in your documentation.  Or please document if the diagnosis of   acute respiratory failure has been ruled out after further study.    The medical record reflects the following:  Risk Factors:  Advanced age female, HTN, CHF, CAD.  Clinical Indicators:  Discharge summary--Acute hypoxic respiratory   failure.  ED 10/29- Patient does become hypoxic with ambulation and desaturates to the   80s with activity.  On room air at rest patient is oxygenating around 93 to   95%.  Normal work of breathing does prefer to sit upright.  Pulmonary: Effort: Pulmonary effort is normal. No respiratory distress.  -Dyspnea on exertion  -Hypoxemia  H&P 10/29 - Chest wall:  No tenderness.  No accessory muscle use.  Cardiology consult-RESP -  Negative for snoring, hemoptysis.  Positive for   dyspnea.  - She has pursed lip breathing which she states is her usual (I dont recall   this); sats 88% after walking into room, increasing to 92% with rest.  Hospitalist progress notes 10/30-Patient reports that her breathing is getting   better.  Currently she is on 2 L of oxygen.  Spo2 10/30 -89 %.  Treatment:1-2 L nasal canula.  Options provided:  -- Acute Respiratory Failure as evidenced by, Please document evidence.  -- Acute Respiratory Failure ruled out after study  -- Other - I will add my own diagnosis  -- Disagree - Not applicable / Not valid  -- Disagree - Clinically unable to determine / Unknown  -- Refer to

## 2025-06-20 ENCOUNTER — TRANSCRIBE ORDERS (OUTPATIENT)
Facility: HOSPITAL | Age: 89
End: 2025-06-20

## 2025-06-20 DIAGNOSIS — M47.816 LUMBAR SPONDYLOSIS: ICD-10-CM

## 2025-06-20 DIAGNOSIS — M54.50 LUMBAR PAIN: ICD-10-CM

## 2025-06-20 DIAGNOSIS — M43.10 DEGENERATIVE SPONDYLOLISTHESIS: ICD-10-CM

## 2025-06-20 DIAGNOSIS — M51.360 DEGENERATION OF INTERVERTEBRAL DISC OF LUMBAR REGION WITH DISCOGENIC BACK PAIN: ICD-10-CM

## 2025-06-20 DIAGNOSIS — M53.3 PAIN IN SACRUM: Primary | ICD-10-CM

## 2025-06-20 DIAGNOSIS — M41.50 DEGENERATIVE SCOLIOSIS: ICD-10-CM

## 2025-07-03 ENCOUNTER — HOSPITAL ENCOUNTER (OUTPATIENT)
Facility: HOSPITAL | Age: 89
Discharge: HOME | End: 2025-07-03
Attending: ORTHOPAEDIC SURGERY
Payer: MEDICARE

## 2025-07-03 ENCOUNTER — HOSPITAL ENCOUNTER (OUTPATIENT)
Facility: HOSPITAL | Age: 89
End: 2025-07-03
Attending: ORTHOPAEDIC SURGERY
Payer: MEDICARE

## 2025-07-03 DIAGNOSIS — M43.10 DEGENERATIVE SPONDYLOLISTHESIS: ICD-10-CM

## 2025-07-03 DIAGNOSIS — M47.816 LUMBAR SPONDYLOSIS: ICD-10-CM

## 2025-07-03 DIAGNOSIS — M54.50 LUMBAR PAIN: ICD-10-CM

## 2025-07-03 DIAGNOSIS — M41.50 DEGENERATIVE SCOLIOSIS: ICD-10-CM

## 2025-07-03 DIAGNOSIS — M53.3 PAIN IN SACRUM: ICD-10-CM

## 2025-07-03 DIAGNOSIS — M51.360 DEGENERATION OF INTERVERTEBRAL DISC OF LUMBAR REGION WITH DISCOGENIC BACK PAIN: ICD-10-CM

## 2025-07-03 PROCEDURE — 72148 MRI LUMBAR SPINE W/O DYE: CPT

## 2025-07-03 PROCEDURE — 72195 MRI PELVIS W/O DYE: CPT

## (undated) DEVICE — CATHETER DIAG 5FR L100CM SPEC IMA CRV SZ DBL BRAID WIRE SFT

## (undated) DEVICE — CATHETER ANGIO AL1 038 5 FRX100 CM 5 CM PERFORMA

## (undated) DEVICE — GLIDESHEATH SLENDER ACCESS KIT: Brand: GLIDESHEATH SLENDER

## (undated) DEVICE — LABEL STERILIZATION W/PEN -- 50/CA

## (undated) DEVICE — 3M™ TEGADERM™ HP TRANSPARENT FILM DRESSING FRAME STYLE, 9534HP, 2-3/8 X 2-3/4 IN (6 CM X 7 CM), 100/CT 4CT/CASE: Brand: 3M™ TEGADERM™

## (undated) DEVICE — INTENDED FOR TISSUE SEPARATION, AND OTHER PROCEDURES THAT REQUIRE A SHARP SURGICAL BLADE TO PUNCTURE OR CUT.: Brand: BARD-PARKER SAFETY BLADES SIZE 15, STERILE

## (undated) DEVICE — Device

## (undated) DEVICE — SUTURE VCRL SZ 0 L18IN ABSRB VLT L40MM CT 1/2 CIR J752D

## (undated) DEVICE — NEEDLE HYPO 21GA L1.5IN GRN POLYPR HUB S STL THN WALL IV

## (undated) DEVICE — OPEN HEART A- RICHMOND: Brand: MEDLINE INDUSTRIES, INC.

## (undated) DEVICE — NEEDLE HYPO 18GA L1.5IN PNK POLYPR HUB S STL THN WALL FILL

## (undated) DEVICE — BAG RED 3PLY 2MIL 30X40 IN

## (undated) DEVICE — GUIDE CATH CONVEY 5FR JR4 -- 39228-686

## (undated) DEVICE — BLADE OPHTH 180DEG CUT SURF BLU STR SHRP DBL BVL GRINDLESS

## (undated) DEVICE — CLIP LIG M BLU TI HRT SHP WIRE HORZ 600 PER BX

## (undated) DEVICE — BLANKET WRM W25XL64IN NONWOVEN SFT LTWT PLIABLE HYPR

## (undated) DEVICE — DRSG BORDR MPLX HEEL 8.7X9.1IN --

## (undated) DEVICE — DRAIN,WOUND,ROUND,24FR,5/16",FULL-FLUTED: Brand: MEDLINE

## (undated) DEVICE — KIT ANGIOGRAPHY CUST MRMC

## (undated) DEVICE — COVER,TABLE,44X76,STERILE: Brand: MEDLINE

## (undated) DEVICE — COVER,TABLE,HEAVY DUTY,77"X90",STRL: Brand: MEDLINE

## (undated) DEVICE — CUFF TRNQT W4XL18IN 2 PRT SGL BLDR CYL PLC W/ SL DISP

## (undated) DEVICE — CATHETER ANGIO L100CM OD5FR ID.046IN L2.5CM .038IN PROG R

## (undated) DEVICE — OPEN HEART B-RICHMOND: Brand: MEDLINE INDUSTRIES, INC.

## (undated) DEVICE — SOL INJ SOD CL 0.9% 500ML BG --

## (undated) DEVICE — SOLUTION IRRIG 1000ML STRL H2O USP PLAS POUR BTL

## (undated) DEVICE — BNDG ELAS HK LOOP 3X5YD NS -- MATRIX

## (undated) DEVICE — SUTURE PROL SZ 4-0 L36IN NONABSORBABLE BLU L26MM SH 1/2 CIR 8521H

## (undated) DEVICE — PRESSURE MONITORING SET: Brand: TRUWAVE

## (undated) DEVICE — APPLICATOR SCRB 26ML TEAL STRL -- CHLORAPREP 26ML

## (undated) DEVICE — 3M™ TEGADERM™ TRANSPARENT FILM DRESSING FRAME STYLE, 1626W, 4 IN X 4-3/4 IN (10 CM X 12 CM), 50/CT 4CT/CASE: Brand: 3M™ TEGADERM™

## (undated) DEVICE — SYRINGE BLB FEED IV POLE BG 60ML

## (undated) DEVICE — CANNULA AORT ROOT INTRO STD TIP 5FR OVERALL LEN 55IN DLP

## (undated) DEVICE — TRANSFER PK BLD PROD 300ML --

## (undated) DEVICE — DRAPE SLUSH DISC W44XL66IN ST FOR RND BSIN HUSH SLUSH SYS

## (undated) DEVICE — SHEET,DRAPE,70X85,STERILE: Brand: MEDLINE

## (undated) DEVICE — INSERT SUT HLD F/OCTBS RETRCTR --

## (undated) DEVICE — GUIDEWIRE VASC L260CM 0.035IN J TIP L3MM PTFE FIX COR NAMIC

## (undated) DEVICE — SYRINGE KIT,PACKAGED,,150FT,MK 7(ANGIO-ARTERION, 150ML SYR KIT W/QFT,MC)(60729385): Brand: MEDRAD® MARK 7 ARTERION DISPOSABLE SYRINGE 150 ML WITH QUICK FILL TUBE

## (undated) DEVICE — BLADE OPHTH W1.5MM 15DEG ORNG HNDL SHRP SHRP DEL FOR CATRCT

## (undated) DEVICE — BAND COMPR L24CM REG CLR PLAS HEMSTAT EXT HK AND LOOP RETEN

## (undated) DEVICE — CATHETER ANGIO AR MOD 038 5 FRX100 CM 3.3 CM PERFORMA

## (undated) DEVICE — 3M™ MEDIPORE™ H SOFT CLOTH SURGICAL TAPE 2864, 4 INCH X 10 YARD (10CM X 9,14M), 12 ROLLS/CASE: Brand: 3M™ MEDIPORE™

## (undated) DEVICE — TUBING PRSS MON L6IN PVC M FEM CONN

## (undated) DEVICE — ANGIO-SEAL VIP VASCULAR CLOSURE DEVICE: Brand: ANGIO-SEAL

## (undated) DEVICE — Device: Brand: JELCO

## (undated) DEVICE — SYR LR LCK 1ML GRAD NSAF 30ML --

## (undated) DEVICE — CATHETER ANGIO JR4 AD 5 FRX100 CM 25 CM PERFORMA

## (undated) DEVICE — BANDAGE,ELASTIC,ESMARK,STERILE,4"X9',LF: Brand: MEDLINE

## (undated) DEVICE — SOL IRR SOD CL 0.9% 1000ML BTL --

## (undated) DEVICE — SUTURE MCRYL SZ 3-0 L27IN ABSRB UD L24MM PS-1 3/8 CIR PRIM Y936H

## (undated) DEVICE — SYR 20ML LL STRL LF --

## (undated) DEVICE — AORTIC PUNCHES ARE USED TO CREATE A UNIFORM OPENING IN BLOOD VESSELS DURING CARDIOVASCULAR SURGERY. THE VESSEL GRAFT IS INSERTED INTO THE CREATED OPENING AND SUTURED TO THE VESSEL WALL. AORTIC LANCETS ARE USED TO MAKE A SMALL UNIFORM CUT IN A BLOOD VESSEL TO FACILITATE INSERTION OF AN AORTIC PUNCH.  PUNCHES COME IN VARIOUS LENGTHS, DIAMETERS AND TIP CONFIGURATIONS.: Brand: CLEANCUT ROTATING AORTIC PUNCH

## (undated) DEVICE — STERILE POLYISOPRENE POWDER-FREE SURGICAL GLOVES: Brand: PROTEXIS

## (undated) DEVICE — GAUZE,SPONGE,4"X4",16PLY,STRL,LF,10/TRAY: Brand: MEDLINE

## (undated) DEVICE — GOWN,REINFORCED,POLY,AURORA,XLARGE,STRL: Brand: MEDLINE

## (undated) DEVICE — PADDING CAST W4INXL4YD NONSTERILE COT COHESIVE HND TEARABLE

## (undated) DEVICE — 6 FOOT DISPOSABLE EXTENSION CABLE WITH SAFE CONNECT / SCREW-DOWN

## (undated) DEVICE — SYS VSL HARV HEMOPRO2 VASOVIEW -- HARV SYS MINIMUM ORDER 5/EA

## (undated) DEVICE — CATHETER ANGIO JL3.5 AD 5 FRX100 CM PERFORMA

## (undated) DEVICE — GLOVE SURG SZ 85 CRM LTX FREE POLYISOPRENE POLYMER BEAD ANTI

## (undated) DEVICE — GUIDEWIRE ANGIO L150CM OD0.035IN STR FIX PTFE SLD SUPP

## (undated) DEVICE — PACK PROCEDURE SURG HRT CATH

## (undated) DEVICE — PACK HARV VEIN ENDOSCP VASOVIEW

## (undated) DEVICE — KIT,ANTI FOG,W/SPONGE & FLUID,SOFT PACK: Brand: MEDLINE

## (undated) DEVICE — SUTURE PROL SZ 6-0 L30IN NONABSORBABLE BLU L13MM C-1 3/8 M8706

## (undated) DEVICE — DRAPE,EXTREMITY,89X128,STERILE: Brand: MEDLINE

## (undated) DEVICE — SUTURE TICRON DBL ARMED 2 0 CV 305 42IN BLU N ABSRB BRAID 8886303551

## (undated) DEVICE — GOWN,SIRUS,NONRNF,SETINSLV,XL,20/CS: Brand: MEDLINE

## (undated) DEVICE — SYRINGE ANGIO 10 CC BRL STD PRNT POLYCARB LT BLU MEDALLION

## (undated) DEVICE — MEDI-TRACE CADENCE ADULT, DEFIBRILLATION ELECTRODE -RTS  (10 PR/PK) - PHILIPS: Brand: MEDI-TRACE CADENCE

## (undated) DEVICE — CANNULA INJ L2.5IN BLNT TIP 3MM CLR BODY W/ 1 W VLV DLP

## (undated) DEVICE — RADIFOCUS OPTITORQUE ANGIOGRAPHIC CATHETER: Brand: OPTITORQUE

## (undated) DEVICE — SUTURE ETHBND EXCEL SZ 3-0 L36IN NONABSORBABLE GRN BB L17MM X588H

## (undated) DEVICE — SYR 50ML LR LCK 1ML GRAD NSAF --

## (undated) DEVICE — CATHETER ANGIO LCB AD 5 FRX100 CM PERFORMA

## (undated) DEVICE — GUIDEWIRE VASC L145CM 0.035IN J TIP L3MM PTFE FIX COR NAMIC

## (undated) DEVICE — DRESSING HEMSTAT W4XL4IN 4 PLY WHT IMPREG KAOLIN HYDRPHLC

## (undated) DEVICE — X-RAY DETECTABLE SPONGES,16 PLY: Brand: VISTEC

## (undated) DEVICE — BLADE SAW STRNL 29.9MM NS --

## (undated) DEVICE — CLIP INT L ORNG TI TRNSVRS GRV CHEVRON SHP W/ PRECIS TIP TO

## (undated) DEVICE — SUTURE PROL SZ 5-0 L30IN NONABSORBABLE BLU L13MM RB-2 1/2 8710H

## (undated) DEVICE — AEGIS 1" DISK 4MM HOLE, PEEL OPEN: Brand: MEDLINE

## (undated) DEVICE — CATHETER ETER ANGIO L110CM OD5FR ID046IN L75CM 038IN 145DEG CARD

## (undated) DEVICE — DRESSING SIL W4XL5IN ANTIBACT GELLING FBR CYTOFORM

## (undated) DEVICE — PLEDGET SURG W3/16XL0.25IN THK1.65MM PTFE OVL FELT FOR THE

## (undated) DEVICE — SYR 3ML LL TIP 1/10ML GRAD --

## (undated) DEVICE — SUT ETHLN 4-0 18IN FS2 BLK --

## (undated) DEVICE — AGENT HEMSTAT W4XL4IN OXIDIZED REGENERATED CELOS ABSRB SFT

## (undated) DEVICE — BLADE SCALP SURG SAFLOK 15 --

## (undated) DEVICE — SYR 10ML LUER LOK 1/5ML GRAD --

## (undated) DEVICE — CANNULA PERF L12IN DIA20FR GWIRE DIA0.038IN ART ELONG 1 PC

## (undated) DEVICE — KIT BLWR MISTER 5P 15L W/ TBNG SET IRRIG MIST TO IMPROVE

## (undated) DEVICE — SOLUTION IRRIG 1000ML 0.9% SOD CHL USP POUR PLAS BTL

## (undated) DEVICE — HEART CATH-MRMC: Brand: MEDLINE INDUSTRIES, INC.

## (undated) DEVICE — AVID DUAL STAGE VENOUS DRAINAGE CANNULA: Brand: AVID DUAL STAGE VENOUS DRAINAGE CANNULA

## (undated) DEVICE — SKIN MARKER,REGULAR TIP WITH RULER CAP: Brand: DEVON

## (undated) DEVICE — PROVE COVER: Brand: UNBRANDED

## (undated) DEVICE — DRAPE PRB US TRNSDCR 6X96IN --

## (undated) DEVICE — HI-TORQUE VERSACORE FLOPPY GUIDE WIRE SYSTEM 145 CM: Brand: HI-TORQUE VERSACORE

## (undated) DEVICE — GLOVE SURG SZ 7 CRM LTX FREE POLYISOPRENE POLYMER BEAD ANTI

## (undated) DEVICE — DUAL LUMEN STOMACH TUBE MULTI-FUNCTIONAL PORT: Brand: SALEM SUMP

## (undated) DEVICE — TUBING, SUCTION, 1/4" X 10', STRAIGHT: Brand: MEDLINE

## (undated) DEVICE — PINNACLE INTRODUCER SHEATH: Brand: PINNACLE

## (undated) DEVICE — KIT,1200CC CANISTER,3/16"X6' TUBING: Brand: MEDLINE INDUSTRIES, INC.

## (undated) DEVICE — SYR 10ML CTRL LR LCK NSAF LF --

## (undated) DEVICE — 1230 DOUBLE MAGNET NEEDLE COUNTER,BLACK: Brand: DEVON

## (undated) DEVICE — SUTURE SZ 7 L18IN NONABSORBABLE SIL CCS L48MM 1/2 CIR STRNM M655G

## (undated) DEVICE — KIT ACCS INTRO 4FR L10CM NDL 21GA L7CM GWIRE L40CM

## (undated) DEVICE — DRAPE SHT 3 QTR PROXIMA 53X77 --

## (undated) DEVICE — CATHETER ANGIO MPB2 AD 5 FRX100 CM 5.8 CM PERFORMA

## (undated) DEVICE — CLIP INT SM WIDE RED TI TRNSVRS GRV CHEVRON SHP W PRECIS

## (undated) DEVICE — CATHETER ETER CARD MULTIPAK MULTIPAK 5FR PERFORMA

## (undated) DEVICE — SOLUTION IV 1000ML 140MEQ/L SOD 5MEQ/L K 3MEQ/L MG 27MEQ/L

## (undated) DEVICE — SET PERF L203CM 12IN RED AND BLU AORT ROOT MULT SLIP CONN

## (undated) DEVICE — SUTURE PROL SZ 7-0 L24IN NONABSORBABLE BLU L8MM BV175-6 3/8 8735H